# Patient Record
Sex: MALE | Race: OTHER | Employment: OTHER | URBAN - METROPOLITAN AREA
[De-identification: names, ages, dates, MRNs, and addresses within clinical notes are randomized per-mention and may not be internally consistent; named-entity substitution may affect disease eponyms.]

---

## 2017-01-13 PROBLEM — D64.9 ANEMIA: Status: ACTIVE | Noted: 2017-01-13

## 2017-01-31 ENCOUNTER — TELEPHONE (OUTPATIENT)
Dept: PHARMACY | Facility: CLINIC | Age: 57
End: 2017-01-31

## 2017-02-09 ENCOUNTER — OFFICE VISIT (OUTPATIENT)
Dept: ORTHOPEDIC SURGERY | Age: 57
End: 2017-02-09

## 2017-02-09 VITALS
HEART RATE: 78 BPM | SYSTOLIC BLOOD PRESSURE: 134 MMHG | DIASTOLIC BLOOD PRESSURE: 87 MMHG | BODY MASS INDEX: 41.31 KG/M2 | HEIGHT: 72 IN | WEIGHT: 305 LBS

## 2017-02-09 DIAGNOSIS — M17.0 PRIMARY OSTEOARTHRITIS OF BOTH KNEES: Primary | ICD-10-CM

## 2017-02-09 PROCEDURE — 99213 OFFICE O/P EST LOW 20 MIN: CPT | Performed by: ORTHOPAEDIC SURGERY

## 2017-02-09 PROCEDURE — 20610 DRAIN/INJ JOINT/BURSA W/O US: CPT | Performed by: ORTHOPAEDIC SURGERY

## 2017-02-16 ENCOUNTER — OFFICE VISIT (OUTPATIENT)
Dept: ORTHOPEDIC SURGERY | Age: 57
End: 2017-02-16

## 2017-02-16 DIAGNOSIS — M17.0 PRIMARY OSTEOARTHRITIS OF BOTH KNEES: Primary | ICD-10-CM

## 2017-02-16 PROCEDURE — 20610 DRAIN/INJ JOINT/BURSA W/O US: CPT | Performed by: ORTHOPAEDIC SURGERY

## 2017-02-23 ENCOUNTER — OFFICE VISIT (OUTPATIENT)
Dept: ORTHOPEDIC SURGERY | Age: 57
End: 2017-02-23

## 2017-02-23 DIAGNOSIS — M17.0 PRIMARY OSTEOARTHRITIS OF BOTH KNEES: Primary | ICD-10-CM

## 2017-02-23 PROCEDURE — 20610 DRAIN/INJ JOINT/BURSA W/O US: CPT | Performed by: ORTHOPAEDIC SURGERY

## 2017-03-02 ENCOUNTER — OFFICE VISIT (OUTPATIENT)
Dept: ORTHOPEDIC SURGERY | Age: 57
End: 2017-03-02

## 2017-03-02 VITALS
WEIGHT: 304.9 LBS | HEART RATE: 70 BPM | HEIGHT: 72 IN | BODY MASS INDEX: 41.3 KG/M2 | SYSTOLIC BLOOD PRESSURE: 134 MMHG | DIASTOLIC BLOOD PRESSURE: 70 MMHG

## 2017-03-02 DIAGNOSIS — M17.0 PRIMARY OSTEOARTHRITIS OF BOTH KNEES: Primary | ICD-10-CM

## 2017-03-02 PROCEDURE — 20610 DRAIN/INJ JOINT/BURSA W/O US: CPT | Performed by: ORTHOPAEDIC SURGERY

## 2017-03-09 ENCOUNTER — OFFICE VISIT (OUTPATIENT)
Dept: ORTHOPEDIC SURGERY | Age: 57
End: 2017-03-09

## 2017-03-09 VITALS
HEART RATE: 78 BPM | HEIGHT: 72 IN | BODY MASS INDEX: 41.3 KG/M2 | WEIGHT: 304.9 LBS | SYSTOLIC BLOOD PRESSURE: 130 MMHG | DIASTOLIC BLOOD PRESSURE: 86 MMHG

## 2017-03-09 DIAGNOSIS — M17.0 PRIMARY OSTEOARTHRITIS OF BOTH KNEES: Primary | ICD-10-CM

## 2017-03-09 PROCEDURE — 20610 DRAIN/INJ JOINT/BURSA W/O US: CPT | Performed by: ORTHOPAEDIC SURGERY

## 2017-03-16 ENCOUNTER — ANTI-COAG VISIT (OUTPATIENT)
Dept: PHARMACY | Facility: CLINIC | Age: 57
End: 2017-03-16

## 2017-03-16 LAB — INR BLD: 2.2

## 2017-04-01 ENCOUNTER — HOSPITAL ENCOUNTER (OUTPATIENT)
Dept: OTHER | Age: 57
Discharge: HOME | End: 2017-04-30
Attending: NURSE PRACTITIONER | Admitting: NURSE PRACTITIONER

## 2017-04-13 ENCOUNTER — ANTI-COAG VISIT (OUTPATIENT)
Dept: PHARMACY | Facility: CLINIC | Age: 57
End: 2017-04-13

## 2017-04-13 LAB — INR BLD: 2.6

## 2017-05-11 ENCOUNTER — ANTI-COAG VISIT (OUTPATIENT)
Dept: PHARMACY | Facility: CLINIC | Age: 57
End: 2017-05-11

## 2017-05-11 LAB — INR BLD: 2.2

## 2017-06-06 ENCOUNTER — HOSPITAL ENCOUNTER (OUTPATIENT)
Dept: WOUND CARE | Age: 57
Discharge: HOME | End: 2017-06-06
Attending: CLINICAL NURSE SPECIALIST | Admitting: CLINICAL NURSE SPECIALIST

## 2017-06-06 VITALS
SYSTOLIC BLOOD PRESSURE: 98 MMHG | RESPIRATION RATE: 20 BRPM | HEIGHT: 71 IN | WEIGHT: 296.2 LBS | HEART RATE: 50 BPM | DIASTOLIC BLOOD PRESSURE: 70 MMHG | TEMPERATURE: 97 F | BODY MASS INDEX: 41.47 KG/M2

## 2017-06-06 DIAGNOSIS — T63.301A SPIDER BITE WOUND, ACCIDENTAL OR UNINTENTIONAL, INITIAL ENCOUNTER: ICD-10-CM

## 2017-06-06 LAB
GLUCOSE BLD-MCNC: 89 MG/DL (ref 70–99)
PERFORMED ON: NORMAL

## 2017-06-06 PROCEDURE — 97597 DBRDMT OPN WND 1ST 20 CM/<: CPT | Performed by: CLINICAL NURSE SPECIALIST

## 2017-06-06 RX ORDER — DOXYCYCLINE HYCLATE 100 MG/1
100 CAPSULE ORAL 2 TIMES DAILY
COMMUNITY
End: 2017-06-30

## 2017-06-06 ASSESSMENT — PAIN DESCRIPTION - ONSET: ONSET: SUDDEN

## 2017-06-06 ASSESSMENT — PAIN DESCRIPTION - DESCRIPTORS: DESCRIPTORS: ACHING;BURNING;THROBBING

## 2017-06-06 ASSESSMENT — PAIN DESCRIPTION - LOCATION: LOCATION: LEG

## 2017-06-06 ASSESSMENT — PAIN DESCRIPTION - PROGRESSION: CLINICAL_PROGRESSION: NOT CHANGED

## 2017-06-06 ASSESSMENT — PAIN DESCRIPTION - FREQUENCY: FREQUENCY: INTERMITTENT

## 2017-06-06 ASSESSMENT — PAIN DESCRIPTION - PAIN TYPE: TYPE: ACUTE PAIN

## 2017-06-06 ASSESSMENT — PAIN SCALES - GENERAL: PAINLEVEL_OUTOF10: 6

## 2017-06-06 ASSESSMENT — PAIN DESCRIPTION - ORIENTATION: ORIENTATION: LEFT;LOWER

## 2017-06-08 PROBLEM — T63.301A SPIDER BITE WOUND: Status: ACTIVE | Noted: 2017-06-08

## 2017-06-13 ENCOUNTER — HOSPITAL ENCOUNTER (OUTPATIENT)
Dept: WOUND CARE | Age: 57
Discharge: HOME | End: 2017-06-13
Attending: CLINICAL NURSE SPECIALIST | Admitting: CLINICAL NURSE SPECIALIST

## 2017-06-13 VITALS
HEART RATE: 58 BPM | HEIGHT: 71 IN | WEIGHT: 296.4 LBS | DIASTOLIC BLOOD PRESSURE: 60 MMHG | TEMPERATURE: 97 F | SYSTOLIC BLOOD PRESSURE: 103 MMHG | BODY MASS INDEX: 41.49 KG/M2 | RESPIRATION RATE: 20 BRPM

## 2017-06-13 PROCEDURE — 11045 DBRDMT SUBQ TISS EACH ADDL: CPT | Performed by: CLINICAL NURSE SPECIALIST

## 2017-06-13 PROCEDURE — 11042 DBRDMT SUBQ TIS 1ST 20SQCM/<: CPT | Performed by: CLINICAL NURSE SPECIALIST

## 2017-06-13 RX ORDER — OXYCODONE HYDROCHLORIDE AND ACETAMINOPHEN 5; 325 MG/1; MG/1
1 TABLET ORAL EVERY 8 HOURS PRN
Status: ON HOLD | COMMUNITY
End: 2017-08-02

## 2017-06-13 ASSESSMENT — PAIN SCALES - GENERAL: PAINLEVEL_OUTOF10: 10

## 2017-06-13 ASSESSMENT — PAIN DESCRIPTION - PAIN TYPE: TYPE: ACUTE PAIN

## 2017-06-13 ASSESSMENT — PAIN DESCRIPTION - DESCRIPTORS: DESCRIPTORS: THROBBING;STABBING;CONSTANT

## 2017-06-13 ASSESSMENT — PAIN DESCRIPTION - LOCATION: LOCATION: LEG

## 2017-06-13 ASSESSMENT — PAIN DESCRIPTION - ONSET: ONSET: ON-GOING

## 2017-06-13 ASSESSMENT — PAIN DESCRIPTION - ORIENTATION: ORIENTATION: LEFT;POSTERIOR

## 2017-06-13 ASSESSMENT — PAIN DESCRIPTION - FREQUENCY: FREQUENCY: CONTINUOUS

## 2017-06-13 ASSESSMENT — PAIN DESCRIPTION - PROGRESSION: CLINICAL_PROGRESSION: NOT CHANGED

## 2017-06-15 ENCOUNTER — TELEPHONE (OUTPATIENT)
Dept: PHARMACY | Facility: CLINIC | Age: 57
End: 2017-06-15

## 2017-06-20 ENCOUNTER — ANTI-COAG VISIT (OUTPATIENT)
Dept: PHARMACY | Facility: CLINIC | Age: 57
End: 2017-06-20

## 2017-06-20 ENCOUNTER — HOSPITAL ENCOUNTER (OUTPATIENT)
Dept: WOUND CARE | Age: 57
Discharge: HOME | End: 2017-06-20
Attending: CLINICAL NURSE SPECIALIST | Admitting: CLINICAL NURSE SPECIALIST

## 2017-06-20 VITALS
SYSTOLIC BLOOD PRESSURE: 98 MMHG | BODY MASS INDEX: 40.74 KG/M2 | WEIGHT: 291 LBS | HEIGHT: 71 IN | DIASTOLIC BLOOD PRESSURE: 62 MMHG | HEART RATE: 99 BPM | RESPIRATION RATE: 20 BRPM | TEMPERATURE: 98.3 F

## 2017-06-20 DIAGNOSIS — T63.301D SPIDER BITE WOUND, ACCIDENTAL OR UNINTENTIONAL, SUBSEQUENT ENCOUNTER: ICD-10-CM

## 2017-06-20 LAB
GLUCOSE BLD-MCNC: 196 MG/DL (ref 70–99)
INR BLD: 3.9
PERFORMED ON: ABNORMAL

## 2017-06-20 PROCEDURE — 11045 DBRDMT SUBQ TISS EACH ADDL: CPT | Performed by: CLINICAL NURSE SPECIALIST

## 2017-06-20 PROCEDURE — 11042 DBRDMT SUBQ TIS 1ST 20SQCM/<: CPT | Performed by: CLINICAL NURSE SPECIALIST

## 2017-06-20 ASSESSMENT — PAIN DESCRIPTION - PAIN TYPE: TYPE: ACUTE PAIN

## 2017-06-20 ASSESSMENT — PAIN DESCRIPTION - DESCRIPTORS
DESCRIPTORS: ACHING;THROBBING
DESCRIPTORS: ACHING;BURNING;THROBBING

## 2017-06-20 ASSESSMENT — PAIN DESCRIPTION - ORIENTATION: ORIENTATION: LEFT;POSTERIOR

## 2017-06-20 ASSESSMENT — PAIN DESCRIPTION - PROGRESSION
CLINICAL_PROGRESSION: GRADUALLY WORSENING
CLINICAL_PROGRESSION: NOT CHANGED

## 2017-06-20 ASSESSMENT — PAIN SCALES - GENERAL: PAINLEVEL_OUTOF10: 8

## 2017-06-20 ASSESSMENT — PAIN DESCRIPTION - ONSET: ONSET: ON-GOING

## 2017-06-20 ASSESSMENT — PAIN DESCRIPTION - FREQUENCY
FREQUENCY: CONTINUOUS
FREQUENCY: CONTINUOUS

## 2017-06-20 ASSESSMENT — PAIN DESCRIPTION - LOCATION: LOCATION: LEG

## 2017-06-27 ENCOUNTER — HOSPITAL ENCOUNTER (OUTPATIENT)
Dept: WOUND CARE | Age: 57
Discharge: HOME | End: 2017-06-27
Attending: CLINICAL NURSE SPECIALIST | Admitting: CLINICAL NURSE SPECIALIST

## 2017-06-27 ENCOUNTER — ANTI-COAG VISIT (OUTPATIENT)
Dept: PHARMACY | Facility: CLINIC | Age: 57
End: 2017-06-27

## 2017-06-27 VITALS
TEMPERATURE: 98.2 F | WEIGHT: 291 LBS | BODY MASS INDEX: 40.74 KG/M2 | HEART RATE: 88 BPM | HEIGHT: 71 IN | DIASTOLIC BLOOD PRESSURE: 71 MMHG | SYSTOLIC BLOOD PRESSURE: 111 MMHG

## 2017-06-27 DIAGNOSIS — L97.222 ULCER OF LEFT CALF WITH FAT LAYER EXPOSED (HCC): ICD-10-CM

## 2017-06-27 DIAGNOSIS — I12.9: ICD-10-CM

## 2017-06-27 DIAGNOSIS — E55.9 VITAMIN D DEFICIENCY: ICD-10-CM

## 2017-06-27 DIAGNOSIS — E61.1 IRON DEFICIENCY: ICD-10-CM

## 2017-06-27 DIAGNOSIS — E83.39 HYPERPHOSPHATEMIA: ICD-10-CM

## 2017-06-27 DIAGNOSIS — E83.59 CALCIPHYLAXIS: ICD-10-CM

## 2017-06-27 DIAGNOSIS — N25.81 SECONDARY HYPERPARATHYROIDISM OF RENAL ORIGIN (HCC): ICD-10-CM

## 2017-06-27 LAB
ANION GAP SERPL CALCULATED.3IONS-SCNC: 18 MMOL/L (ref 3–16)
BUN BLDV-MCNC: 106 MG/DL (ref 7–20)
CALCIUM SERPL-MCNC: 8.8 MG/DL (ref 8.3–10.6)
CHLORIDE BLD-SCNC: 103 MMOL/L (ref 99–110)
CO2: 16 MMOL/L (ref 21–32)
CREAT SERPL-MCNC: 3.8 MG/DL (ref 0.9–1.3)
GFR AFRICAN AMERICAN: 20
GFR NON-AFRICAN AMERICAN: 17
GLUCOSE BLD-MCNC: 155 MG/DL (ref 70–99)
INR BLD: 3
PARATHYROID HORMONE INTACT: 155.8 PG/ML (ref 14–72)
PHOSPHORUS: 5.9 MG/DL (ref 2.5–4.9)
POTASSIUM SERPL-SCNC: 5 MMOL/L (ref 3.5–5.1)
SODIUM BLD-SCNC: 137 MMOL/L (ref 136–145)
VITAMIN D 25-HYDROXY: 25.8 NG/ML

## 2017-06-27 PROCEDURE — 97598 DBRDMT OPN WND ADDL 20CM/<: CPT | Performed by: CLINICAL NURSE SPECIALIST

## 2017-06-27 PROCEDURE — 99244 OFF/OP CNSLTJ NEW/EST MOD 40: CPT | Performed by: INTERNAL MEDICINE

## 2017-06-27 PROCEDURE — 97597 DBRDMT OPN WND 1ST 20 CM/<: CPT | Performed by: CLINICAL NURSE SPECIALIST

## 2017-06-27 ASSESSMENT — PAIN DESCRIPTION - ORIENTATION: ORIENTATION: LEFT;LOWER

## 2017-06-27 ASSESSMENT — PAIN DESCRIPTION - PROGRESSION: CLINICAL_PROGRESSION: NOT CHANGED

## 2017-06-27 ASSESSMENT — PAIN DESCRIPTION - ONSET: ONSET: ON-GOING

## 2017-06-27 ASSESSMENT — PAIN DESCRIPTION - PAIN TYPE: TYPE: ACUTE PAIN

## 2017-06-27 ASSESSMENT — PAIN DESCRIPTION - FREQUENCY: FREQUENCY: CONTINUOUS

## 2017-06-27 ASSESSMENT — PAIN DESCRIPTION - LOCATION: LOCATION: LEG

## 2017-06-27 ASSESSMENT — PAIN SCALES - GENERAL: PAINLEVEL_OUTOF10: 5

## 2017-06-27 ASSESSMENT — PAIN DESCRIPTION - DESCRIPTORS: DESCRIPTORS: ACHING;BURNING

## 2017-06-28 LAB
ESTIMATED AVERAGE GLUCOSE: 139.9 MG/DL
HBA1C MFR BLD: 6.5 %

## 2017-06-29 PROBLEM — E83.59 CALCIPHYLAXIS: Status: ACTIVE | Noted: 2017-06-08

## 2017-06-30 PROBLEM — E83.39 HYPERPHOSPHATEMIA: Status: ACTIVE | Noted: 2017-06-30

## 2017-06-30 PROBLEM — E61.1 IRON DEFICIENCY: Status: ACTIVE | Noted: 2017-06-30

## 2017-06-30 PROBLEM — F17.200 TOBACCO USE DISORDER: Status: ACTIVE | Noted: 2017-06-30

## 2017-06-30 PROBLEM — L97.222 ULCER OF LEFT CALF WITH FAT LAYER EXPOSED (HCC): Status: ACTIVE | Noted: 2017-06-30

## 2017-06-30 PROBLEM — E55.9 VITAMIN D DEFICIENCY: Status: ACTIVE | Noted: 2017-06-30

## 2017-06-30 PROBLEM — F32.89 OTHER DEPRESSIVE DISORDER: Status: ACTIVE | Noted: 2017-06-30

## 2017-06-30 PROBLEM — L02.92 FURUNCULOSIS: Status: ACTIVE | Noted: 2017-06-30

## 2017-06-30 PROBLEM — E66.9 ADIPOSITY: Status: ACTIVE | Noted: 2017-01-25

## 2017-06-30 PROBLEM — I12.9 RENAL ARTERIOSCLEROSIS: Status: ACTIVE | Noted: 2017-06-30

## 2017-06-30 PROBLEM — I25.10 CORONARY ARTERY DISEASE INVOLVING NATIVE CORONARY ARTERY: Status: ACTIVE | Noted: 2017-06-30

## 2017-06-30 PROBLEM — N25.81 SECONDARY HYPERPARATHYROIDISM OF RENAL ORIGIN (HCC): Status: ACTIVE | Noted: 2017-06-30

## 2017-06-30 PROBLEM — E66.01 MORBID OBESITY DUE TO EXCESS CALORIES (HCC): Status: ACTIVE | Noted: 2017-01-25

## 2017-06-30 PROBLEM — F32.A DEPRESSION: Status: ACTIVE | Noted: 2017-06-30

## 2017-06-30 PROBLEM — L02.93 CARBUNCLE: Status: ACTIVE | Noted: 2017-06-30

## 2017-07-01 RX ORDER — DOXYCYCLINE HYCLATE 100 MG
TABLET ORAL
Refills: 1 | COMMUNITY
Start: 2017-05-10 | End: 2017-07-12

## 2017-07-01 RX ORDER — PIOGLITAZONEHYDROCHLORIDE 30 MG/1
TABLET ORAL
Status: ON HOLD | COMMUNITY
Start: 2017-06-18 | End: 2017-09-26 | Stop reason: ALTCHOICE

## 2017-07-05 ENCOUNTER — HOSPITAL ENCOUNTER (OUTPATIENT)
Dept: WOUND CARE | Age: 57
Discharge: HOME | End: 2017-07-05
Attending: INTERNAL MEDICINE | Admitting: INTERNAL MEDICINE

## 2017-07-05 VITALS
HEART RATE: 98 BPM | RESPIRATION RATE: 18 BRPM | WEIGHT: 284 LBS | HEIGHT: 71 IN | TEMPERATURE: 98.3 F | SYSTOLIC BLOOD PRESSURE: 98 MMHG | BODY MASS INDEX: 39.76 KG/M2 | DIASTOLIC BLOOD PRESSURE: 67 MMHG

## 2017-07-05 PROCEDURE — 99214 OFFICE O/P EST MOD 30 MIN: CPT | Performed by: INTERNAL MEDICINE

## 2017-07-05 RX ORDER — LIDOCAINE AND PRILOCAINE 25; 25 MG/G; MG/G
CREAM TOPICAL
Qty: 60 G | Refills: 2 | Status: ON HOLD | OUTPATIENT
Start: 2017-07-05 | End: 2017-09-26 | Stop reason: ALTCHOICE

## 2017-07-05 ASSESSMENT — PAIN DESCRIPTION - DESCRIPTORS: DESCRIPTORS: CONSTANT;STABBING

## 2017-07-05 ASSESSMENT — PAIN DESCRIPTION - LOCATION: LOCATION: LEG

## 2017-07-05 ASSESSMENT — PAIN DESCRIPTION - PAIN TYPE: TYPE: ACUTE PAIN

## 2017-07-05 ASSESSMENT — PAIN SCALES - GENERAL: PAINLEVEL_OUTOF10: 8

## 2017-07-05 ASSESSMENT — PAIN DESCRIPTION - ONSET: ONSET: ON-GOING

## 2017-07-05 ASSESSMENT — PAIN DESCRIPTION - ORIENTATION: ORIENTATION: LEFT;LOWER;OUTER

## 2017-07-05 ASSESSMENT — PAIN DESCRIPTION - PROGRESSION: CLINICAL_PROGRESSION: NOT CHANGED

## 2017-07-05 ASSESSMENT — PAIN DESCRIPTION - FREQUENCY: FREQUENCY: CONTINUOUS

## 2017-07-06 ENCOUNTER — TELEPHONE (OUTPATIENT)
Dept: PHARMACY | Facility: CLINIC | Age: 57
End: 2017-07-06

## 2017-07-07 PROBLEM — E83.59 OTHER DISORDER OF CALCIUM METABOLISM: Status: ACTIVE | Noted: 2017-07-07

## 2017-07-07 RX ORDER — SODIUM CHLORIDE 9 MG/ML
INJECTION, SOLUTION INTRAVENOUS ONCE
Status: CANCELLED
Start: 2017-07-11 | End: 2017-07-11

## 2017-07-07 RX ORDER — SODIUM CHLORIDE 0.9 % (FLUSH) 0.9 %
10 SYRINGE (ML) INJECTION ONCE
Status: CANCELLED
Start: 2017-07-11 | End: 2017-07-11

## 2017-07-11 ENCOUNTER — HOSPITAL ENCOUNTER (OUTPATIENT)
Dept: OTHER | Age: 57
Discharge: HOME | End: 2017-07-31
Attending: INTERNAL MEDICINE | Admitting: INTERNAL MEDICINE

## 2017-07-11 ENCOUNTER — HOSPITAL ENCOUNTER (OUTPATIENT)
Dept: WOUND CARE | Age: 57
Discharge: HOME | End: 2017-07-11
Attending: CLINICAL NURSE SPECIALIST | Admitting: CLINICAL NURSE SPECIALIST

## 2017-07-11 DIAGNOSIS — E83.59 CALCIPHYLAXIS: ICD-10-CM

## 2017-07-11 DIAGNOSIS — E83.59 OTHER DISORDER OF CALCIUM METABOLISM: ICD-10-CM

## 2017-07-12 ENCOUNTER — HOSPITAL ENCOUNTER (OUTPATIENT)
Dept: WOUND CARE | Age: 57
Discharge: HOME | End: 2017-07-12
Attending: INTERNAL MEDICINE | Admitting: INTERNAL MEDICINE

## 2017-07-12 ENCOUNTER — HOSPITAL ENCOUNTER (OUTPATIENT)
Dept: OTHER | Age: 57
Discharge: HOME | End: 2017-07-12
Admitting: INTERNAL MEDICINE

## 2017-07-12 VITALS
BODY MASS INDEX: 39.76 KG/M2 | HEIGHT: 71 IN | HEART RATE: 98 BPM | WEIGHT: 284 LBS | DIASTOLIC BLOOD PRESSURE: 67 MMHG | RESPIRATION RATE: 20 BRPM | SYSTOLIC BLOOD PRESSURE: 93 MMHG

## 2017-07-12 VITALS
HEART RATE: 82 BPM | DIASTOLIC BLOOD PRESSURE: 76 MMHG | HEIGHT: 71 IN | TEMPERATURE: 97 F | RESPIRATION RATE: 18 BRPM | WEIGHT: 284.6 LBS | SYSTOLIC BLOOD PRESSURE: 108 MMHG | BODY MASS INDEX: 39.84 KG/M2

## 2017-07-12 DIAGNOSIS — E83.59 OTHER DISORDER OF CALCIUM METABOLISM: ICD-10-CM

## 2017-07-12 DIAGNOSIS — E83.59 CALCIPHYLAXIS: ICD-10-CM

## 2017-07-12 LAB
ANION GAP SERPL CALCULATED.3IONS-SCNC: 16 MMOL/L (ref 3–16)
BUN BLDV-MCNC: 118 MG/DL (ref 7–20)
CALCIUM SERPL-MCNC: 9.3 MG/DL (ref 8.3–10.6)
CHLORIDE BLD-SCNC: 100 MMOL/L (ref 99–110)
CO2: 19 MMOL/L (ref 21–32)
CREAT SERPL-MCNC: 4.4 MG/DL (ref 0.9–1.3)
GFR AFRICAN AMERICAN: 17
GFR NON-AFRICAN AMERICAN: 14
GLUCOSE BLD-MCNC: 133 MG/DL (ref 70–99)
POTASSIUM SERPL-SCNC: 4.8 MMOL/L (ref 3.5–5.1)
SODIUM BLD-SCNC: 135 MMOL/L (ref 136–145)

## 2017-07-12 PROCEDURE — 15271 SKIN SUB GRAFT TRNK/ARM/LEG: CPT | Performed by: INTERNAL MEDICINE

## 2017-07-12 RX ORDER — DOXYCYCLINE HYCLATE 100 MG
100 TABLET ORAL 2 TIMES DAILY
Qty: 60 TABLET | Refills: 3 | Status: ON HOLD | OUTPATIENT
Start: 2017-07-12 | End: 2017-08-02 | Stop reason: HOSPADM

## 2017-07-12 RX ORDER — SODIUM CHLORIDE 9 MG/ML
INJECTION, SOLUTION INTRAVENOUS ONCE
Status: COMPLETED | OUTPATIENT
Start: 2017-07-12 | End: 2017-07-12

## 2017-07-12 RX ORDER — SODIUM CHLORIDE 0.9 % (FLUSH) 0.9 %
10 SYRINGE (ML) INJECTION ONCE
Status: COMPLETED | OUTPATIENT
Start: 2017-07-12 | End: 2017-07-12

## 2017-07-12 RX ORDER — SODIUM CHLORIDE 0.9 % (FLUSH) 0.9 %
10 SYRINGE (ML) INJECTION ONCE
Status: CANCELLED
Start: 2017-07-12 | End: 2017-07-12

## 2017-07-12 RX ORDER — SODIUM CHLORIDE 9 MG/ML
INJECTION, SOLUTION INTRAVENOUS ONCE
Status: CANCELLED
Start: 2017-07-12 | End: 2017-07-12

## 2017-07-12 RX ADMIN — SODIUM CHLORIDE: 9 INJECTION, SOLUTION INTRAVENOUS at 09:39

## 2017-07-12 RX ADMIN — Medication 10 ML: at 09:39

## 2017-07-12 ASSESSMENT — PAIN DESCRIPTION - ORIENTATION
ORIENTATION: RIGHT
ORIENTATION: RIGHT

## 2017-07-12 ASSESSMENT — PAIN DESCRIPTION - DESCRIPTORS
DESCRIPTORS: SHARP;SHOOTING
DESCRIPTORS: OTHER (COMMENT)

## 2017-07-12 ASSESSMENT — PAIN DESCRIPTION - LOCATION
LOCATION: LEG
LOCATION: LEG

## 2017-07-12 ASSESSMENT — PAIN DESCRIPTION - FREQUENCY
FREQUENCY: INTERMITTENT
FREQUENCY: INTERMITTENT

## 2017-07-12 ASSESSMENT — PAIN DESCRIPTION - PROGRESSION
CLINICAL_PROGRESSION: NOT CHANGED
CLINICAL_PROGRESSION: NOT CHANGED

## 2017-07-12 ASSESSMENT — PAIN DESCRIPTION - PAIN TYPE
TYPE: CHRONIC PAIN
TYPE: ACUTE PAIN

## 2017-07-12 ASSESSMENT — PAIN SCALES - GENERAL
PAINLEVEL_OUTOF10: 2
PAINLEVEL_OUTOF10: 4

## 2017-07-12 ASSESSMENT — PAIN DESCRIPTION - ONSET: ONSET: ON-GOING

## 2017-07-14 ENCOUNTER — HOSPITAL ENCOUNTER (OUTPATIENT)
Dept: WOUND CARE | Age: 57
Discharge: HOME | End: 2017-07-14
Attending: CLINICAL NURSE SPECIALIST | Admitting: INTERNAL MEDICINE

## 2017-07-14 ENCOUNTER — HOSPITAL ENCOUNTER (OUTPATIENT)
Dept: OTHER | Age: 57
Discharge: HOME | End: 2017-07-14
Admitting: INTERNAL MEDICINE

## 2017-07-14 VITALS
BODY MASS INDEX: 39.76 KG/M2 | HEIGHT: 71 IN | WEIGHT: 284 LBS | RESPIRATION RATE: 18 BRPM | HEART RATE: 85 BPM | TEMPERATURE: 97.5 F | DIASTOLIC BLOOD PRESSURE: 60 MMHG | SYSTOLIC BLOOD PRESSURE: 98 MMHG

## 2017-07-14 VITALS
DIASTOLIC BLOOD PRESSURE: 69 MMHG | TEMPERATURE: 98.3 F | WEIGHT: 284 LBS | BODY MASS INDEX: 39.76 KG/M2 | HEART RATE: 77 BPM | HEIGHT: 71 IN | RESPIRATION RATE: 18 BRPM | SYSTOLIC BLOOD PRESSURE: 130 MMHG

## 2017-07-14 DIAGNOSIS — E83.59 OTHER DISORDER OF CALCIUM METABOLISM: ICD-10-CM

## 2017-07-14 DIAGNOSIS — E83.59 CALCIPHYLAXIS: ICD-10-CM

## 2017-07-14 RX ORDER — SODIUM CHLORIDE 0.9 % (FLUSH) 0.9 %
10 SYRINGE (ML) INJECTION ONCE
Status: CANCELLED
Start: 2017-07-14 | End: 2017-07-14

## 2017-07-14 RX ORDER — SODIUM CHLORIDE 9 MG/ML
INJECTION, SOLUTION INTRAVENOUS ONCE
Status: CANCELLED
Start: 2017-07-14 | End: 2017-07-14

## 2017-07-14 RX ORDER — SODIUM CHLORIDE 0.9 % (FLUSH) 0.9 %
10 SYRINGE (ML) INJECTION ONCE
Status: COMPLETED | OUTPATIENT
Start: 2017-07-14 | End: 2017-07-14

## 2017-07-14 RX ORDER — SODIUM CHLORIDE 9 MG/ML
INJECTION, SOLUTION INTRAVENOUS ONCE
Status: COMPLETED | OUTPATIENT
Start: 2017-07-14 | End: 2017-07-14

## 2017-07-14 RX ADMIN — Medication 10 ML: at 09:25

## 2017-07-14 RX ADMIN — SODIUM CHLORIDE: 9 INJECTION, SOLUTION INTRAVENOUS at 09:25

## 2017-07-14 ASSESSMENT — PAIN DESCRIPTION - ONSET: ONSET: ON-GOING

## 2017-07-14 ASSESSMENT — PAIN DESCRIPTION - FREQUENCY: FREQUENCY: INTERMITTENT

## 2017-07-14 ASSESSMENT — PAIN DESCRIPTION - DESCRIPTORS: DESCRIPTORS: TINGLING

## 2017-07-14 ASSESSMENT — PAIN DESCRIPTION - ORIENTATION: ORIENTATION: LEFT

## 2017-07-14 ASSESSMENT — PAIN SCALES - GENERAL: PAINLEVEL_OUTOF10: 5

## 2017-07-14 ASSESSMENT — PAIN DESCRIPTION - PAIN TYPE: TYPE: CHRONIC PAIN

## 2017-07-14 ASSESSMENT — PAIN DESCRIPTION - PROGRESSION: CLINICAL_PROGRESSION: NOT CHANGED

## 2017-07-14 ASSESSMENT — PAIN DESCRIPTION - LOCATION: LOCATION: LEG

## 2017-07-18 ENCOUNTER — HOSPITAL ENCOUNTER (OUTPATIENT)
Dept: WOUND CARE | Age: 57
Discharge: HOME | End: 2017-07-18
Attending: CLINICAL NURSE SPECIALIST | Admitting: CLINICAL NURSE SPECIALIST

## 2017-07-18 DIAGNOSIS — E83.59 CALCIPHYLAXIS: ICD-10-CM

## 2017-07-25 ENCOUNTER — HOSPITAL ENCOUNTER (OUTPATIENT)
Dept: WOUND CARE | Age: 57
Discharge: HOME | End: 2017-07-25
Attending: CLINICAL NURSE SPECIALIST | Admitting: CLINICAL NURSE SPECIALIST

## 2017-07-25 ENCOUNTER — ANTI-COAG VISIT (OUTPATIENT)
Dept: PHARMACY | Facility: CLINIC | Age: 57
End: 2017-07-25

## 2017-07-25 DIAGNOSIS — E83.59 CALCIPHYLAXIS: ICD-10-CM

## 2017-07-25 PROBLEM — N17.9 ACUTE RENAL FAILURE (ARF) (HCC): Status: ACTIVE | Noted: 2017-07-25

## 2017-07-25 PROBLEM — K92.2 GI BLEED: Status: ACTIVE | Noted: 2017-07-25

## 2017-07-25 PROBLEM — E87.20 METABOLIC ACIDOSIS: Status: ACTIVE | Noted: 2017-07-25

## 2017-07-25 PROBLEM — E87.5 HYPERKALEMIA: Status: ACTIVE | Noted: 2017-07-25

## 2017-07-25 PROBLEM — N18.30 CKD (CHRONIC KIDNEY DISEASE), STAGE III (HCC): Status: ACTIVE | Noted: 2017-07-25

## 2017-08-15 ENCOUNTER — OFFICE VISIT (OUTPATIENT)
Dept: CARDIOLOGY CLINIC | Age: 57
End: 2017-08-15

## 2017-08-15 VITALS
SYSTOLIC BLOOD PRESSURE: 110 MMHG | HEIGHT: 71 IN | WEIGHT: 294 LBS | HEART RATE: 94 BPM | BODY MASS INDEX: 41.16 KG/M2 | OXYGEN SATURATION: 97 % | DIASTOLIC BLOOD PRESSURE: 80 MMHG

## 2017-08-15 DIAGNOSIS — I48.20 CHRONIC ATRIAL FIBRILLATION (HCC): ICD-10-CM

## 2017-08-15 DIAGNOSIS — I25.10 CORONARY ARTERY DISEASE INVOLVING NATIVE CORONARY ARTERY OF NATIVE HEART WITHOUT ANGINA PECTORIS: Primary | ICD-10-CM

## 2017-08-15 DIAGNOSIS — E78.2 MIXED HYPERLIPIDEMIA: ICD-10-CM

## 2017-08-15 PROCEDURE — 99214 OFFICE O/P EST MOD 30 MIN: CPT | Performed by: INTERNAL MEDICINE

## 2017-08-15 RX ORDER — FUROSEMIDE 20 MG/1
20 TABLET ORAL DAILY
Status: ON HOLD | COMMUNITY
End: 2017-09-27 | Stop reason: ALTCHOICE

## 2017-08-15 RX ORDER — OXYCODONE HYDROCHLORIDE AND ACETAMINOPHEN 5; 325 MG/1; MG/1
1 TABLET ORAL EVERY 4 HOURS PRN
COMMUNITY
End: 2018-01-09 | Stop reason: ALTCHOICE

## 2017-09-07 ENCOUNTER — HOSPITAL ENCOUNTER (OUTPATIENT)
Dept: WOUND CARE | Age: 57
Discharge: HOME | End: 2017-09-07
Attending: SURGERY | Admitting: SURGERY

## 2017-09-27 PROBLEM — E87.1 HYPONATREMIA: Status: ACTIVE | Noted: 2017-09-27

## 2017-09-27 PROBLEM — Z72.0 TOBACCO ABUSE: Status: ACTIVE | Noted: 2017-09-27

## 2017-09-27 PROBLEM — N17.9 AKI (ACUTE KIDNEY INJURY) (HCC): Status: ACTIVE | Noted: 2017-09-27

## 2017-10-09 ENCOUNTER — HOSPITAL ENCOUNTER (OUTPATIENT)
Dept: ONCOLOGY | Age: 57
Discharge: HOME | End: 2017-10-31
Attending: NURSE PRACTITIONER | Admitting: NURSE PRACTITIONER

## 2017-10-09 VITALS
DIASTOLIC BLOOD PRESSURE: 67 MMHG | SYSTOLIC BLOOD PRESSURE: 102 MMHG | WEIGHT: 280 LBS | HEIGHT: 72 IN | RESPIRATION RATE: 16 BRPM | TEMPERATURE: 98.4 F | BODY MASS INDEX: 37.93 KG/M2 | HEART RATE: 70 BPM

## 2017-10-09 ASSESSMENT — PAIN - FUNCTIONAL ASSESSMENT: PAIN_FUNCTIONAL_ASSESSMENT: 0-10

## 2017-10-11 ENCOUNTER — HOSPITAL ENCOUNTER (OUTPATIENT)
Dept: ONCOLOGY | Age: 57
Discharge: HOME | End: 2017-10-11
Admitting: NURSE PRACTITIONER

## 2017-10-11 VITALS
BODY MASS INDEX: 37.93 KG/M2 | HEART RATE: 53 BPM | WEIGHT: 280 LBS | TEMPERATURE: 98.3 F | DIASTOLIC BLOOD PRESSURE: 76 MMHG | RESPIRATION RATE: 16 BRPM | SYSTOLIC BLOOD PRESSURE: 118 MMHG | HEIGHT: 72 IN

## 2017-10-11 ASSESSMENT — PAIN - FUNCTIONAL ASSESSMENT: PAIN_FUNCTIONAL_ASSESSMENT: 0-10

## 2017-10-13 ENCOUNTER — HOSPITAL ENCOUNTER (OUTPATIENT)
Dept: ONCOLOGY | Age: 57
Discharge: HOME | End: 2017-10-13
Admitting: NURSE PRACTITIONER

## 2017-10-13 VITALS
BODY MASS INDEX: 37.93 KG/M2 | HEIGHT: 72 IN | TEMPERATURE: 97.7 F | RESPIRATION RATE: 14 BRPM | DIASTOLIC BLOOD PRESSURE: 75 MMHG | WEIGHT: 280 LBS | SYSTOLIC BLOOD PRESSURE: 114 MMHG | HEART RATE: 69 BPM

## 2017-10-13 ASSESSMENT — PAIN SCALES - GENERAL: PAINLEVEL_OUTOF10: 0

## 2017-10-16 ENCOUNTER — HOSPITAL ENCOUNTER (OUTPATIENT)
Dept: ONCOLOGY | Age: 57
Discharge: HOME | End: 2017-10-16
Admitting: NURSE PRACTITIONER

## 2017-10-16 VITALS
HEART RATE: 61 BPM | DIASTOLIC BLOOD PRESSURE: 75 MMHG | BODY MASS INDEX: 37.93 KG/M2 | SYSTOLIC BLOOD PRESSURE: 117 MMHG | RESPIRATION RATE: 16 BRPM | TEMPERATURE: 99 F | HEIGHT: 72 IN | WEIGHT: 280 LBS

## 2017-10-16 ASSESSMENT — PAIN - FUNCTIONAL ASSESSMENT: PAIN_FUNCTIONAL_ASSESSMENT: 0-10

## 2017-10-17 ENCOUNTER — HOSPITAL ENCOUNTER (OUTPATIENT)
Dept: WOUND CARE | Age: 57
Discharge: HOME | End: 2017-10-17
Attending: CLINICAL NURSE SPECIALIST | Admitting: CLINICAL NURSE SPECIALIST

## 2017-10-17 VITALS
HEIGHT: 72 IN | BODY MASS INDEX: 37.82 KG/M2 | TEMPERATURE: 97.7 F | RESPIRATION RATE: 20 BRPM | WEIGHT: 279.2 LBS | DIASTOLIC BLOOD PRESSURE: 49 MMHG | HEART RATE: 80 BPM | SYSTOLIC BLOOD PRESSURE: 85 MMHG

## 2017-10-17 LAB
GLUCOSE BLD-MCNC: 168 MG/DL (ref 70–99)
PERFORMED ON: ABNORMAL

## 2017-10-17 PROCEDURE — 97597 DBRDMT OPN WND 1ST 20 CM/<: CPT | Performed by: CLINICAL NURSE SPECIALIST

## 2017-10-17 PROCEDURE — 97598 DBRDMT OPN WND ADDL 20CM/<: CPT | Performed by: CLINICAL NURSE SPECIALIST

## 2017-10-17 ASSESSMENT — PAIN SCALES - GENERAL: PAINLEVEL_OUTOF10: 10

## 2017-10-17 ASSESSMENT — PAIN DESCRIPTION - LOCATION: LOCATION: LEG

## 2017-10-17 ASSESSMENT — PAIN DESCRIPTION - PAIN TYPE: TYPE: CHRONIC PAIN

## 2017-10-17 ASSESSMENT — PAIN DESCRIPTION - DESCRIPTORS: DESCRIPTORS: ACHING;CONSTANT

## 2017-10-17 ASSESSMENT — PAIN DESCRIPTION - ORIENTATION: ORIENTATION: LEFT

## 2017-10-17 ASSESSMENT — PAIN DESCRIPTION - PROGRESSION: CLINICAL_PROGRESSION: NOT CHANGED

## 2017-10-17 ASSESSMENT — PAIN DESCRIPTION - FREQUENCY: FREQUENCY: CONTINUOUS

## 2017-10-19 NOTE — PROGRESS NOTES
[facility] Progress Note    Doni Peters     : 1960    DATE OF VISIT:  10/17/2017    Subjective:     Doni Peters is a 65 y.o. 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 Significant symptoms or pertinent wound history since last visit: Patient was seen in the wound clinic in the past. Now receiving Sodium Theosulfate infusions per Dr. Alvarado Mckeon, Nephrologist. Follow up appointment with Dr. Alvarado Mckeon next week. Recent labs: BUN 43, Creat 3.7, GFR 17, CRP 47, Sed rate 64, A1C 8.6, Albumin 3.7. Recent PRBC for H/H 6.. Patient now residing at [place], [place]. Current treatment with 1/4 strength Dakins soak and Santyl. Patient smokes, but minimally. B/P low in clinic today. Asymptomatic. Suggest taking B/P prior to administering medication. Sits in chair all day, with legs dependent. Encouraged to elevate legs 2-3 times per day for 30-40 minutes. Add surepress wraps daily. Appetite good. BS today 168. No fever or chills. Additional ulcer(s) noted? no.     His current medication list consists of Sodium Thiosulfate, apixaban, aspirin, carvedilol, collagenase, cyclobenzaprine, diltiazem, gabapentin, glimepiride, hydrALAZINE, insulin aspart, isosorbide mononitrate, oxyCODONE-acetaminophen, pantoprazole, pravastatin, sodium hypochlorite, and spironolactone. Allergies: Codeine;  Onion; and Penicillins    Objective:     Vitals:    10/17/17 1039 10/17/17 1047   BP: (!) 91/53  Comment: asymptomatic, states had BP meds this am at [place] (!) 85/49   Pulse: 80    Resp: 20    Temp: 97.7 °F (36.5 °C)    TempSrc: Oral    Weight: 279 lb 3.2 oz (126.6 kg)    Height: 6' (1.829 m)      General Appearance: alert and oriented to person, place and time, obese, in no acute distress  Psychiatric:  Mood and affect appropriate for situation  Skin: warm and dry, no rash  Head: normocephalic and atraumatic  Eyes: pupils equal, round, sclerae anicteric, conjunctivae normal  ENT: no thrush or oral ulcers  Neck:No complaints, normal appearance  Pulmonary/Chest: Respirations easy at rest, no cough or respiratory distress  Cardiovascular: No chest pain, normal rate, toes warm, cap refill normal, left PT and DP pulse audible with doppler  Abdomen: No nausea or vomiting  Extremities: no cyanosis or cellulitis. Bilateral leg edema  Musculoskeletal: Wheel chair for distance, moves all extremities, no deformities  Neurologic: distal sensation to light touch intact, no allodynia. Heean-ulcer skin: Intact and Induration. Dry peeling skin  Ulcer(s): Ulcer with pink and red granulation, slough, eschar and fibrin. Wound bed moist, edges open and attached. Surrounding tissue and ulcer without signs and symptoms of infection. No purulence, malodor, erythema or increased temperature. Pain, 10/10. Chronic, aching and constant pain. Pre-debridement wound measurements are in the wound documentation flowsheet. Photos also saved in electronic chart.     Assessment:     Patient Active Problem List   Diagnosis Code    Type 2 diabetes mellitus with stage 4 chronic kidney disease, without long-term current use of insulin ([facility]) E11.22, N18.4    Cigarette smoker F17.210    Chronic atrial fibrillation ([facility]) I48.2    Essential (primary) hypertension I10    Normocytic anemia D64.9    Primary osteoarthritis of both knees M17.0    Calciphylaxis E83.59    Depression F32.9    Hyperlipidemia E78.5    Morbid obesity due to excess calories ([facility]) E66.01    Furunculosis (recurrent, on chronic suppressive Abx) L02.92    Tobacco use disorder F17.200    Ulcer of left calf with fat layer exposed ([facility] [address] L97.222    Hyperphosphatemia E83.39    Vitamin D deficiency E55.9    Secondary hyperparathyroidism of renal origin ([facility] [address] N25.81    Iron deficiency E61.1    White matter changes, severe by MRI brain [id number]    Coronary artery disease involving native coronary artery I25.10    Renal arteriosclerosis I12.9    Other disorder of calcium metabolism E83.59    CKD (chronic kidney disease), stage III N18.3    Acute renal failure (ARF) ([facility]) N17.9    Gastrointestinal hemorrhage A84.1    Metabolic acidosis H92.9    Hyperkalemia E87.5    Leg wound, left S81.802A    Deep vein thrombosis (DVT) ([facility]) I82.409    Tobacco abuse Z72.0    Hyponatremia E87.1    ANA (acute kidney injury) ([facility] [address] N17.9    Wound infection T14. 8XXA, L08.9    Acute on chronic renal failure (HCC) N17.9, N18.9       Assessment of today's active condition(s): Calciphylaxis requiring debridement, elevation and compression. Factors contributing to occurrence and/or persistence of the chronic ulcer include edema, venous stasis, diabetes, poor glucose control, decreased mobility, obesity and CRF. Medical necessity of today's visit is shown by the above documentation. Sharp debridement is indicated today, based upon the exam findings in the wound(s) above. Procedure note:     Consent obtained. Time out performed per [facility] policy. Anesthetic  Anesthetic: 4% Topical Xylocaine     Using a curette, #15 blade scalpel, scissors and forceps, I sharply debrided the left lower leg ulcer(s) down through and including the removal of epidermis and dermis. The type(s) of tissue debrided included fibrin, biofilm, slough, necrotic/eschar and exudate. Along with sharp debridement, I also debrided using Debrisoft with good results. Total Surface Area Debrided: estimate 60 sq cm.     Post  Debridement Measurements:  Wound 10/17/17 #2, Left lower leg, circumferential cluster,calciphylaxis, full thickness, onset 7/1/17, gradually appeared-Wound Length (cm): 12 cm    Wound 10/17/17 #2, Left lower leg, circumferential cluster,calciphylaxis, full thickness, onset 7/1/17, gradually appeared-Wound Width (cm): 20.5 cm    Wound 10/17/17 #2, Left lower leg, circumferential cluster,calciphylaxis, full thickness, onset 7/1/17, gradually appeared-Wound Depth (cm) : 0.4    The ulcers were then irrigated with normal saline solution. The procedure was completed with a small amount of bleeding, and hemostasis was by pressure. The patient tolerated the procedure well, with no significant complications. The patient's level of pain during and after the procedure was monitored, and is noted in the wound documentation flowsheet. Discharge plan:     Continue Sodium Theosulfate infusions per Dr. Sonja Winter, Nephrologist  Follow up appointment with Dr. Sonja Winter next week  Leg elevations 2-3 times per day for 30-40 minutes  Continue to cut down or stop smoking    Treatment in the wound care center today: Wound 10/17/17 #2, Left lower leg, circumferential cluster,calciphylaxis, full thickness, onset 7/1/17, gradually appeared-Dressing/Treatment: Other (Comment) (ZnO asha,hydrogel,4x4,abd,kerlix,surepress). Home treatment: good handwashing before and after any dressing changes. Cleanse wound with saline or soap & water before dressing change. May use Vaseline (petrolatum), Aquaphor, Aveeno, CeraVe, Cetaphil, Eucerin, Lubriderm, etc for dry skin. Dressing type for home: Other: 1/4 strength Dakins 10 minute soaks prior to dressing changes. Sensicare to asha wound, Santyl to wound, moistened gauze and dry dressing daily. Surepress compression wrap daily. On AM off PM. Written discharge instructions given to patient. Follow up in 1 week.     Electronically signed by GIANLUCA Alcantara on 10/19/2017 at 5:23 PM.

## 2017-10-23 ENCOUNTER — HOSPITAL ENCOUNTER (OUTPATIENT)
Dept: ONCOLOGY | Age: 57
Discharge: HOME | End: 2017-10-23
Admitting: NURSE PRACTITIONER

## 2017-10-23 VITALS
BODY MASS INDEX: 37.79 KG/M2 | HEART RATE: 54 BPM | SYSTOLIC BLOOD PRESSURE: 102 MMHG | TEMPERATURE: 97.8 F | DIASTOLIC BLOOD PRESSURE: 70 MMHG | RESPIRATION RATE: 18 BRPM | WEIGHT: 279 LBS | HEIGHT: 72 IN

## 2017-10-23 DIAGNOSIS — E83.59 CALCIPHYLAXIS: ICD-10-CM

## 2017-10-23 ASSESSMENT — PAIN - FUNCTIONAL ASSESSMENT: PAIN_FUNCTIONAL_ASSESSMENT: 0-10

## 2017-10-24 ENCOUNTER — HOSPITAL ENCOUNTER (OUTPATIENT)
Dept: WOUND CARE | Age: 57
Discharge: HOME | End: 2017-10-24
Attending: CLINICAL NURSE SPECIALIST | Admitting: CLINICAL NURSE SPECIALIST

## 2017-10-25 ENCOUNTER — TELEPHONE (OUTPATIENT)
Dept: CARDIOLOGY CLINIC | Age: 57
End: 2017-10-25

## 2017-10-25 PROBLEM — K31.1 GASTRIC OUTLET OBSTRUCTION: Status: ACTIVE | Noted: 2017-10-25

## 2017-10-30 ENCOUNTER — HOSPITAL ENCOUNTER (OUTPATIENT)
Dept: ONCOLOGY | Age: 57
Discharge: HOME | End: 2017-10-30
Admitting: NURSE PRACTITIONER

## 2017-10-30 ENCOUNTER — HOSPITAL ENCOUNTER (OUTPATIENT)
Dept: ONCOLOGY | Age: 57
Discharge: HOME | End: 2017-10-31
Admitting: INTERNAL MEDICINE

## 2017-10-30 DIAGNOSIS — E83.59 CALCIPHYLAXIS: ICD-10-CM

## 2017-10-30 DIAGNOSIS — E83.59 OTHER DISORDER OF CALCIUM METABOLISM: ICD-10-CM

## 2017-10-30 LAB
ANION GAP SERPL CALCULATED.3IONS-SCNC: 14 MMOL/L (ref 3–16)
ANION GAP SERPL CALCULATED.3IONS-SCNC: 14 MMOL/L (ref 3–16)
BASOPHILS ABSOLUTE: 0.1 K/UL (ref 0–0.2)
BASOPHILS RELATIVE PERCENT: 2 %
BUN BLDV-MCNC: 33 MG/DL (ref 7–20)
BUN BLDV-MCNC: 37 MG/DL (ref 7–20)
CALCIUM SERPL-MCNC: 8.8 MG/DL (ref 8.3–10.6)
CALCIUM SERPL-MCNC: 8.9 MG/DL (ref 8.3–10.6)
CHLORIDE BLD-SCNC: 102 MMOL/L (ref 99–110)
CHLORIDE BLD-SCNC: 102 MMOL/L (ref 99–110)
CO2: 20 MMOL/L (ref 21–32)
CO2: 22 MMOL/L (ref 21–32)
CREAT SERPL-MCNC: 2.9 MG/DL (ref 0.9–1.3)
CREAT SERPL-MCNC: 2.9 MG/DL (ref 0.9–1.3)
EOSINOPHILS ABSOLUTE: 0.1 K/UL (ref 0–0.6)
EOSINOPHILS RELATIVE PERCENT: 1.7 %
FERRITIN: 65.4 NG/ML (ref 30–400)
GFR AFRICAN AMERICAN: 27
GFR AFRICAN AMERICAN: 27
GFR NON-AFRICAN AMERICAN: 23
GFR NON-AFRICAN AMERICAN: 23
GLUCOSE BLD-MCNC: 184 MG/DL (ref 70–99)
GLUCOSE BLD-MCNC: 186 MG/DL (ref 70–99)
HCT VFR BLD CALC: 26.9 % (ref 40.5–52.5)
HEMOGLOBIN: 8.2 G/DL (ref 13.5–17.5)
IRON SATURATION: 19 % (ref 20–50)
IRON: 60 UG/DL (ref 59–158)
LYMPHOCYTES ABSOLUTE: 0.7 K/UL (ref 1–5.1)
LYMPHOCYTES RELATIVE PERCENT: 13.1 %
MCH RBC QN AUTO: 26.1 PG (ref 26–34)
MCHC RBC AUTO-ENTMCNC: 30.3 G/DL (ref 31–36)
MCV RBC AUTO: 86 FL (ref 80–100)
MONOCYTES ABSOLUTE: 0.5 K/UL (ref 0–1.3)
MONOCYTES RELATIVE PERCENT: 9.4 %
NEUTROPHILS ABSOLUTE: 3.8 K/UL (ref 1.7–7.7)
NEUTROPHILS RELATIVE PERCENT: 73.8 %
PDW BLD-RTO: 24.3 % (ref 12.4–15.4)
PLATELET # BLD: 229 K/UL (ref 135–450)
PMV BLD AUTO: 8.1 FL (ref 5–10.5)
POTASSIUM SERPL-SCNC: 4.9 MMOL/L (ref 3.5–5.1)
POTASSIUM SERPL-SCNC: 4.9 MMOL/L (ref 3.5–5.1)
RBC # BLD: 3.13 M/UL (ref 4.2–5.9)
SODIUM BLD-SCNC: 136 MMOL/L (ref 136–145)
SODIUM BLD-SCNC: 138 MMOL/L (ref 136–145)
TOTAL IRON BINDING CAPACITY: 310 UG/DL (ref 260–445)
WBC # BLD: 5.1 K/UL (ref 4–11)

## 2017-10-30 RX ORDER — SODIUM CHLORIDE 0.9 % (FLUSH) 0.9 %
10 SYRINGE (ML) INJECTION ONCE
Status: CANCELLED
Start: 2017-10-30 | End: 2017-10-30

## 2017-10-30 RX ORDER — SODIUM CHLORIDE 9 MG/ML
INJECTION, SOLUTION INTRAVENOUS ONCE
Status: CANCELLED
Start: 2017-10-30 | End: 2017-10-30

## 2017-10-30 RX ORDER — SODIUM CHLORIDE 9 MG/ML
INJECTION, SOLUTION INTRAVENOUS ONCE
Status: DISCONTINUED | OUTPATIENT
Start: 2017-10-30 | End: 2017-10-30

## 2017-10-30 RX ORDER — SODIUM CHLORIDE 0.9 % (FLUSH) 0.9 %
10 SYRINGE (ML) INJECTION ONCE
Status: DISCONTINUED | OUTPATIENT
Start: 2017-10-30 | End: 2017-11-01 | Stop reason: HOSPADM

## 2017-10-30 NOTE — PROGRESS NOTES
Aranesp 100 mcg given SQ per order unable to scan. All his labs were drawn. Patient has 2 therapy plans with 2 different doctors. This id for MD Kemp Litten.

## 2017-10-30 NOTE — PROGRESS NOTES
Infusion completed. Tolerated without immediate complications. Discharged per wheelchair with [employer].

## 2017-11-01 ENCOUNTER — HOSPITAL ENCOUNTER (OUTPATIENT)
Dept: ONCOLOGY | Age: 57
Discharge: HOME | End: 2017-11-30
Attending: NURSE PRACTITIONER | Admitting: NURSE PRACTITIONER

## 2017-11-01 ENCOUNTER — HOSPITAL ENCOUNTER (OUTPATIENT)
Dept: ONCOLOGY | Age: 57
Discharge: HOME | End: 2017-11-30
Attending: INTERNAL MEDICINE | Admitting: INTERNAL MEDICINE

## 2017-11-01 VITALS
SYSTOLIC BLOOD PRESSURE: 158 MMHG | WEIGHT: 309 LBS | HEIGHT: 72 IN | TEMPERATURE: 98.9 F | HEART RATE: 98 BPM | RESPIRATION RATE: 16 BRPM | DIASTOLIC BLOOD PRESSURE: 100 MMHG | BODY MASS INDEX: 41.85 KG/M2

## 2017-11-03 ENCOUNTER — HOSPITAL ENCOUNTER (OUTPATIENT)
Dept: OTHER | Age: 57
Discharge: HOME | End: 2017-11-03
Admitting: NURSE PRACTITIONER

## 2017-11-03 VITALS
SYSTOLIC BLOOD PRESSURE: 133 MMHG | HEART RATE: 65 BPM | TEMPERATURE: 97.8 F | DIASTOLIC BLOOD PRESSURE: 82 MMHG | WEIGHT: 290 LBS | RESPIRATION RATE: 14 BRPM | HEIGHT: 72 IN | BODY MASS INDEX: 39.28 KG/M2

## 2017-11-03 DIAGNOSIS — E83.59 CALCIPHYLAXIS: ICD-10-CM

## 2017-11-03 LAB
FOLATE: 6.43 NG/ML (ref 4.78–24.2)
VITAMIN B-12: 467 PG/ML (ref 211–911)

## 2017-11-03 RX ORDER — ONDANSETRON 2 MG/ML
4 INJECTION INTRAMUSCULAR; INTRAVENOUS ONCE
Status: COMPLETED | OUTPATIENT
Start: 2017-11-03 | End: 2017-11-03

## 2017-11-03 RX ADMIN — ONDANSETRON 4 MG: 2 INJECTION INTRAMUSCULAR; INTRAVENOUS at 12:34

## 2017-11-03 ASSESSMENT — PAIN DESCRIPTION - DESCRIPTORS: DESCRIPTORS: CONSTANT;ACHING

## 2017-11-03 ASSESSMENT — PAIN DESCRIPTION - ORIENTATION: ORIENTATION: RIGHT;LEFT

## 2017-11-03 ASSESSMENT — PAIN DESCRIPTION - LOCATION: LOCATION: LEG

## 2017-11-03 ASSESSMENT — PAIN DESCRIPTION - FREQUENCY: FREQUENCY: CONTINUOUS

## 2017-11-03 ASSESSMENT — PAIN DESCRIPTION - PAIN TYPE: TYPE: CHRONIC PAIN

## 2017-11-03 ASSESSMENT — PAIN SCALES - GENERAL: PAINLEVEL_OUTOF10: 5

## 2017-11-07 ENCOUNTER — HOSPITAL ENCOUNTER (OUTPATIENT)
Dept: WOUND CARE | Age: 57
Discharge: HOME | End: 2017-11-07
Attending: CLINICAL NURSE SPECIALIST | Admitting: CLINICAL NURSE SPECIALIST

## 2017-11-07 VITALS
HEIGHT: 72 IN | WEIGHT: 312.3 LBS | HEART RATE: 90 BPM | SYSTOLIC BLOOD PRESSURE: 104 MMHG | TEMPERATURE: 98.1 F | DIASTOLIC BLOOD PRESSURE: 68 MMHG | RESPIRATION RATE: 16 BRPM | BODY MASS INDEX: 42.3 KG/M2

## 2017-11-07 PROCEDURE — 97598 DBRDMT OPN WND ADDL 20CM/<: CPT | Performed by: CLINICAL NURSE SPECIALIST

## 2017-11-07 PROCEDURE — 97597 DBRDMT OPN WND 1ST 20 CM/<: CPT | Performed by: CLINICAL NURSE SPECIALIST

## 2017-11-07 ASSESSMENT — PAIN DESCRIPTION - DESCRIPTORS: DESCRIPTORS: ACHING;CONSTANT

## 2017-11-07 ASSESSMENT — PAIN DESCRIPTION - FREQUENCY: FREQUENCY: CONTINUOUS

## 2017-11-07 ASSESSMENT — PAIN DESCRIPTION - ONSET: ONSET: ON-GOING

## 2017-11-07 ASSESSMENT — PAIN DESCRIPTION - PROGRESSION: CLINICAL_PROGRESSION: GRADUALLY WORSENING

## 2017-11-07 ASSESSMENT — PAIN DESCRIPTION - PAIN TYPE: TYPE: CHRONIC PAIN

## 2017-11-07 ASSESSMENT — PAIN DESCRIPTION - LOCATION: LOCATION: LEG

## 2017-11-07 ASSESSMENT — PAIN DESCRIPTION - ORIENTATION: ORIENTATION: RIGHT

## 2017-11-07 ASSESSMENT — PAIN SCALES - GENERAL: PAINLEVEL_OUTOF10: 10

## 2017-11-09 ENCOUNTER — HOSPITAL ENCOUNTER (OUTPATIENT)
Dept: OTHER | Age: 57
Discharge: HOME | End: 2017-11-09
Attending: INTERNAL MEDICINE | Admitting: INTERNAL MEDICINE

## 2017-11-09 VITALS
OXYGEN SATURATION: 98 % | SYSTOLIC BLOOD PRESSURE: 148 MMHG | HEIGHT: 72 IN | HEART RATE: 135 BPM | DIASTOLIC BLOOD PRESSURE: 99 MMHG | WEIGHT: 312 LBS | BODY MASS INDEX: 42.26 KG/M2 | TEMPERATURE: 98.6 F | RESPIRATION RATE: 14 BRPM

## 2017-11-09 DIAGNOSIS — E83.59 OTHER DISORDERS OF CALCIUM METABOLISM: ICD-10-CM

## 2017-11-09 DIAGNOSIS — E83.59 TUMORAL CALCINOSIS: ICD-10-CM

## 2017-11-09 LAB
INR BLD: 1.33 (ref 0.85–1.15)
PLATELET # BLD: 319 K/UL (ref 135–450)
PROTHROMBIN TIME: 15 SEC (ref 9.6–13)

## 2017-11-09 RX ORDER — MIDAZOLAM HYDROCHLORIDE 5 MG/ML
INJECTION INTRAMUSCULAR; INTRAVENOUS
Status: COMPLETED | OUTPATIENT
Start: 2017-11-09 | End: 2017-11-09

## 2017-11-09 RX ORDER — FENTANYL CITRATE 50 UG/ML
INJECTION, SOLUTION INTRAMUSCULAR; INTRAVENOUS
Status: COMPLETED | OUTPATIENT
Start: 2017-11-09 | End: 2017-11-09

## 2017-11-09 RX ORDER — SODIUM CHLORIDE 9 MG/ML
INJECTION, SOLUTION INTRAVENOUS ONCE
Status: DISCONTINUED | OUTPATIENT
Start: 2017-11-09 | End: 2017-11-10 | Stop reason: HOSPADM

## 2017-11-09 RX ADMIN — FENTANYL CITRATE 50 MCG: 50 INJECTION, SOLUTION INTRAMUSCULAR; INTRAVENOUS at 08:40

## 2017-11-09 RX ADMIN — MIDAZOLAM HYDROCHLORIDE 1 MG: 5 INJECTION INTRAMUSCULAR; INTRAVENOUS at 08:40

## 2017-11-09 ASSESSMENT — PAIN - FUNCTIONAL ASSESSMENT: PAIN_FUNCTIONAL_ASSESSMENT: 0-10

## 2017-11-09 NOTE — PROGRESS NOTES
Patient had iv removed from right hand. Port inserted into his right chest. Report called to [facility] message left. Patient to return back to [facility] per transport company via wheelchair.

## 2017-11-09 NOTE — PROGRESS NOTES
doppler  Abdomen: No nausea or vomiting  Extremities: no cyanosis or cellulitis. Bilateral increased leg edema  Musculoskeletal: Wheel chair for distance, ambulatory, moves all extremities, no deformities  Neurologic: distal sensation to light touch intact, no allodynia. Heena-ulcer skin: Intact and Induration. Ulcer(s): Left lower leg ulcers with red and pink granulation, biofilm and fibrin. Right lower leg ulcers with minimal pink granulation, extensive eschar and fibrin. Left lower leg wound beds moist, edges mostly open and attached. Right lower leg wound beds dry. Edges attached with nonviable tissue. Right lower leg too painful to sharply debride. Gently debrided with Debrisoft. Surrounding tissue and ulcer without signs and symptoms of infection. No purulence, malodor, erythema or increased temperature. Pain 10/10 continuous aching. Pre-debridement wound measurements are in the wound documentation flowsheet. Photos also saved in electronic chart.     Assessment:     Patient Active Problem List   Diagnosis Code    Type 2 diabetes mellitus with stage 4 chronic kidney disease, without long-term current use of insulin ([facility]) E11.22, N18.4    Cigarette smoker F17.210    Chronic atrial fibrillation ([facility]) I48.2    Essential (primary) hypertension I10    Normocytic anemia D64.9    Primary osteoarthritis of both knees M17.0    Calciphylaxis E83.59    Depression F32.9    Hyperlipidemia E78.5    Morbid obesity due to excess calories ([facility]) E66.01    Furunculosis (recurrent, on chronic suppressive Abx) L02.92    Tobacco use disorder F17.200    Ulcer of left calf with fat layer exposed ([address] L97.222    Hyperphosphatemia E83.39    Vitamin D deficiency E55.9    Secondary hyperparathyroidism of renal origin ([address] N25.81    Iron deficiency E61.1    White matter changes, severe by MRI brain [id number]    Coronary artery disease involving native coronary artery I25.10    Renal arteriosclerosis I12.9    Other disorder of calcium metabolism E83.59    CKD (chronic kidney disease), stage III N18.3    Acute renal failure (ARF) ([facility]) N17.9    Gastrointestinal hemorrhage Q18.7    Metabolic acidosis J59.9    Hyperkalemia E87.5    Leg wound, left S81.802A    Deep vein thrombosis (DVT) ([facility]) I82.409    Tobacco abuse Z72.0    Hyponatremia E87.1    ANA (acute kidney injury) ([facility] [address] N17.9    Wound infection T14. 8XXA, L08.9    Acute on chronic renal failure (HCC) N17.9, N18.9    Gastric outlet obstruction K31.1       Assessment of today's active condition(s): Calciphylaxis right and left lower legs. Factors contributing to occurrence and/or persistence of the chronic ulcer include edema, diabetes, poor glucose control, obesity, smoking, non-adherence and renal failure. Medical necessity of today's visit is shown by the above documentation. Sharp debridement is indicated today, based upon the exam findings in the wound(s) above. Procedure note:     Consent obtained. Time out performed per [facility] policy. Anesthetic  Anesthetic: 4% Topical Xylocaine     Using a curette, I sharply debrided the left lower leg anterior cluster and left posterior lower leg ulcer(s) down through and including the removal of epidermis and dermis. The type(s) of tissue debrided included fibrin, biofilm, slough and exudate. Total Surface Area Debrided: estimated 70 sq cm.     Post  Debridement Measurements:  Wound 11/07/17 #3 Right Posterior Leg,Calciphalaxis,Full thickness,onset 10/15/17,gradually appeared -Wound Length (cm): 13.9 cm  Wound 06/06/17  #1 Left posterior lower leg, Calciphylaxis, full thickness (onset 5/31/17) \"spider bite\", unknown etiology-Wound Length (cm): 13 cm  Wound 10/17/17 #2, Left lower leg, anterior cluster,calciphylaxis, full thickness, onset 7/1/17, gradually appeared-Wound Length (cm): 7 cm    Wound 11/07/17 #3 Right Posterior Leg,Calciphalaxis,Full thickness,onset 10/15/17,gradually appeared -Wound Width (cm): cluster,calciphylaxis, full thickness, onset 7/1/17, gradually appeared-Dressing/Treatment: Other (Comment) (ZnO asha,triad,aquacel ag,4x4,kerlix,surepress). Home treatment: good handwashing before and after any dressing changes. Cleanse wound with saline or soap & water before dressing change. May use Vaseline (petrolatum), Aquaphor, Aveeno, CeraVe, Cetaphil, Eucerin, Lubriderm, etc for dry skin. Dressing type for home: Other: 1/4 strength Dakins soak, then thin layer of triad and aquacel ag and dry dressing daily. Surpress wraps on AM off PM daily. Written discharge instructions given to patient. Follow up in 1 week.     Electronically signed by GIANLUCA Bryan on 11/9/2017 at 1:41 PM.

## 2017-11-09 NOTE — PRE SEDATION
Airway Assessment:  Mallampati Class II - (soft palate, fauces & uvula are visible)    Prior History of Anesthesia Complications:   none    ASA Classification:  Class 3 - A patient with severe systemic disease that limits activity but is not incapacitating    Sedation/ Anesthesia Plan:   intravenous sedation    Medications Planned:   midazolam (Versed) intravenously and fentanyl intravenously    Patient is an appropriate candidate for plan of sedation: yes    Electronically signed by Janice Favre, MD on 11/9/2017 at 8:01 AM

## 2017-11-09 NOTE — BRIEF OP NOTE
Brief Postoperative Note    Michelle Woodard  YOB: 1960  [id number]    Pre-operative Diagnosis: Calciphylaxis requiring frequent IV infusions    Post-operative Diagnosis: Same    Procedure: Port placement    Anesthesia: Local and Moderate Sedation    Surgeons/Assistants: Fredy    Estimated Blood Loss: less than 50     Complications: None    Specimens: Was Not Obtained    Findings: Port placed via right IJ using US and fluoro guidance.     Electronically signed by Miguelito White MD on 11/9/2017 at 9:24 AM

## 2017-11-10 ENCOUNTER — HOSPITAL ENCOUNTER (OUTPATIENT)
Dept: ONCOLOGY | Age: 57
Discharge: HOME | End: 2017-11-10
Admitting: NURSE PRACTITIONER

## 2017-11-10 VITALS
DIASTOLIC BLOOD PRESSURE: 86 MMHG | WEIGHT: 312 LBS | SYSTOLIC BLOOD PRESSURE: 137 MMHG | HEIGHT: 72 IN | TEMPERATURE: 99.2 F | HEART RATE: 86 BPM | BODY MASS INDEX: 42.26 KG/M2 | RESPIRATION RATE: 14 BRPM

## 2017-11-10 DIAGNOSIS — E83.59 CALCIPHYLAXIS: ICD-10-CM

## 2017-11-10 RX ORDER — ONDANSETRON 2 MG/ML
4 INJECTION INTRAMUSCULAR; INTRAVENOUS ONCE
Status: COMPLETED | OUTPATIENT
Start: 2017-11-10 | End: 2017-11-10

## 2017-11-10 RX ADMIN — ONDANSETRON 4 MG: 2 INJECTION INTRAMUSCULAR; INTRAVENOUS at 13:15

## 2017-11-10 ASSESSMENT — PAIN - FUNCTIONAL ASSESSMENT: PAIN_FUNCTIONAL_ASSESSMENT: 0-10

## 2017-11-13 ENCOUNTER — HOSPITAL ENCOUNTER (OUTPATIENT)
Dept: ONCOLOGY | Age: 57
Discharge: HOME | End: 2017-11-13
Admitting: NURSE PRACTITIONER

## 2017-11-13 VITALS
RESPIRATION RATE: 16 BRPM | HEART RATE: 89 BPM | SYSTOLIC BLOOD PRESSURE: 137 MMHG | TEMPERATURE: 98.4 F | HEIGHT: 72 IN | BODY MASS INDEX: 40.77 KG/M2 | DIASTOLIC BLOOD PRESSURE: 80 MMHG | WEIGHT: 301 LBS

## 2017-11-13 DIAGNOSIS — E83.59 OTHER DISORDER OF CALCIUM METABOLISM: ICD-10-CM

## 2017-11-13 DIAGNOSIS — E83.59 CALCIPHYLAXIS: ICD-10-CM

## 2017-11-13 LAB
BASOPHILS ABSOLUTE: 0.1 K/UL (ref 0–0.2)
BASOPHILS RELATIVE PERCENT: 1.4 %
EOSINOPHILS ABSOLUTE: 0.2 K/UL (ref 0–0.6)
EOSINOPHILS RELATIVE PERCENT: 2.1 %
HCT VFR BLD CALC: 27.2 % (ref 40.5–52.5)
HEMOGLOBIN: 8.7 G/DL (ref 13.5–17.5)
LYMPHOCYTES ABSOLUTE: 1.2 K/UL (ref 1–5.1)
LYMPHOCYTES RELATIVE PERCENT: 15.9 %
MCH RBC QN AUTO: 27 PG (ref 26–34)
MCHC RBC AUTO-ENTMCNC: 32.1 G/DL (ref 31–36)
MCV RBC AUTO: 84.1 FL (ref 80–100)
MONOCYTES ABSOLUTE: 0.6 K/UL (ref 0–1.3)
MONOCYTES RELATIVE PERCENT: 7.4 %
NEUTROPHILS ABSOLUTE: 5.6 K/UL (ref 1.7–7.7)
NEUTROPHILS RELATIVE PERCENT: 73.2 %
PDW BLD-RTO: 24.9 % (ref 12.4–15.4)
PLATELET # BLD: 373 K/UL (ref 135–450)
PMV BLD AUTO: 7.5 FL (ref 5–10.5)
RBC # BLD: 3.23 M/UL (ref 4.2–5.9)
WBC # BLD: 7.7 K/UL (ref 4–11)

## 2017-11-13 RX ORDER — ONDANSETRON 2 MG/ML
4 INJECTION INTRAMUSCULAR; INTRAVENOUS ONCE
Status: DISCONTINUED | OUTPATIENT
Start: 2017-11-13 | End: 2017-11-13 | Stop reason: ALTCHOICE

## 2017-11-13 RX ADMIN — ONDANSETRON 4 MG: 2 INJECTION INTRAMUSCULAR; INTRAVENOUS at 13:07

## 2017-11-14 ENCOUNTER — HOSPITAL ENCOUNTER (OUTPATIENT)
Dept: WOUND CARE | Age: 57
Discharge: HOME | End: 2017-11-14
Attending: CLINICAL NURSE SPECIALIST | Admitting: CLINICAL NURSE SPECIALIST

## 2017-11-14 PROCEDURE — 97598 DBRDMT OPN WND ADDL 20CM/<: CPT | Performed by: CLINICAL NURSE SPECIALIST

## 2017-11-14 PROCEDURE — 97597 DBRDMT OPN WND 1ST 20 CM/<: CPT | Performed by: CLINICAL NURSE SPECIALIST

## 2017-11-14 RX ORDER — TORSEMIDE 100 MG/1
50 TABLET ORAL DAILY
COMMUNITY
End: 2019-04-08

## 2017-11-15 ENCOUNTER — HOSPITAL ENCOUNTER (OUTPATIENT)
Dept: ONCOLOGY | Age: 57
Discharge: HOME | End: 2017-11-15
Admitting: NURSE PRACTITIONER

## 2017-11-15 VITALS
HEIGHT: 72 IN | SYSTOLIC BLOOD PRESSURE: 123 MMHG | DIASTOLIC BLOOD PRESSURE: 80 MMHG | HEART RATE: 106 BPM | TEMPERATURE: 98.1 F | BODY MASS INDEX: 40.77 KG/M2 | WEIGHT: 301 LBS | RESPIRATION RATE: 16 BRPM

## 2017-11-15 DIAGNOSIS — E83.59 CALCIPHYLAXIS: ICD-10-CM

## 2017-11-15 RX ORDER — ONDANSETRON 2 MG/ML
4 INJECTION INTRAMUSCULAR; INTRAVENOUS ONCE
Status: COMPLETED | OUTPATIENT
Start: 2017-11-15 | End: 2017-11-15

## 2017-11-15 RX ADMIN — ONDANSETRON 4 MG: 2 INJECTION INTRAMUSCULAR; INTRAVENOUS at 14:25

## 2017-11-15 ASSESSMENT — PAIN - FUNCTIONAL ASSESSMENT: PAIN_FUNCTIONAL_ASSESSMENT: 0-10

## 2017-11-17 ENCOUNTER — HOSPITAL ENCOUNTER (OUTPATIENT)
Dept: ONCOLOGY | Age: 57
Discharge: HOME | End: 2017-11-17
Admitting: NURSE PRACTITIONER

## 2017-11-17 VITALS
WEIGHT: 301 LBS | DIASTOLIC BLOOD PRESSURE: 59 MMHG | BODY MASS INDEX: 40.77 KG/M2 | HEIGHT: 72 IN | HEART RATE: 69 BPM | TEMPERATURE: 98.5 F | SYSTOLIC BLOOD PRESSURE: 103 MMHG | RESPIRATION RATE: 16 BRPM

## 2017-11-17 DIAGNOSIS — E83.59 CALCIPHYLAXIS: ICD-10-CM

## 2017-11-17 RX ORDER — ONDANSETRON 2 MG/ML
4 INJECTION INTRAMUSCULAR; INTRAVENOUS ONCE
Status: COMPLETED | OUTPATIENT
Start: 2017-11-17 | End: 2017-11-17

## 2017-11-17 RX ADMIN — ONDANSETRON 4 MG: 2 INJECTION INTRAMUSCULAR; INTRAVENOUS at 12:39

## 2017-11-17 ASSESSMENT — PAIN - FUNCTIONAL ASSESSMENT: PAIN_FUNCTIONAL_ASSESSMENT: 0-10

## 2017-11-17 NOTE — PROGRESS NOTES
[facility] Progress Note    Wyatt Reese     : 1960    DATE OF VISIT:  2017    Subjective:     Wyatt Reese is a 65 y.o. male who has a calciphylaxis ulcers located on the left anterior lower leg, left lower leg and right posterior lower leg. Significant symptoms or pertinent wound history since last visit: Appetite is good. No fever or chills. Encouraged to elevate legs and to stop smoking. ECG reports patient is noncompliant with this. Patient's weight is down. Has started Torsemide, as well as, Levaquin for wound infection. On Eliquis for Afib. Port was placed 17 for Sodium Theosulfate infusions 3 times per week. We cannot be aggressive with compression due to pain. Pain medication has been increased. Patient in complex wound management due to co morbidities and noncompliance with prescribed treatment regime. Additional ulcer(s) noted? no.     His current medication list consists of apixaban, aspirin, carvedilol, collagenase, cyclobenzaprine, diltiazem, gabapentin, glimepiride, hydrALAZINE, insulin aspart, isosorbide mononitrate, oxyCODONE-acetaminophen, pantoprazole, pravastatin, sodium bicarbonate, sodium hypochlorite, spironolactone, and torsemide. Allergies: Codeine; Onion; and Penicillins    Objective: There were no vitals filed for this visit.   General Appearance: alert and oriented to person, place and time, obese, in no acute distress  Psychiatric:  Mood and affect appropriate for situation  Skin: warm and dry, no rash  Head: normocephalic and atraumatic  Eyes: pupils equal, round, sclerae anicteric, conjunctivae normal  ENT: no thrush or oral ulcers  Neck:No complaints, normal appearance  Pulmonary/Chest: Respirations easy at rest, no cough or respiratory distress  Cardiovascular: No chest pain, normal rate, toes warm, cap refill normal, Bilateral PT and DP pulse audible with doppler  Abdomen: No nausea or vomiting  Extremities: no cyanosis or cellulitis. Bilateral leg edema  Musculoskeletal: Wheel chair for distance, ambulatory, moves all extremities, no deformities  Neurologic: distal sensation to light touch heightened, no allodynia. Heena-ulcer skin: Induration. Ulcer(s): Right posterior lower leg ulcers with extensive dry adherent eschar and fibrin. Minimal red granulation. Eschar cross-hatched with scalpel. Left lower leg ulcers with pink and red granulation, slough and fibrin. Measurements improved. Wound beds moist, edges open and attached. Surrounding tissue and ulcer without signs and symptoms of infection. No purulence, malodor, erythema or increased temperature. Unable to fully due to pain. Pain 10/10, continuous burning. Pre-debridement wound measurements are in the wound documentation flowsheet. Photos also saved in electronic chart.     Assessment:     Patient Active Problem List   Diagnosis Code    Type 2 diabetes mellitus with stage 4 chronic kidney disease, without long-term current use of insulin ([facility]) E11.22, N18.4    Cigarette smoker F17.210    Chronic atrial fibrillation ([facility]) I48.2    Essential (primary) hypertension I10    Normocytic anemia D64.9    Primary osteoarthritis of both knees M17.0    Calciphylaxis E83.59    Depression F32.9    Hyperlipidemia E78.5    Morbid obesity due to excess calories ([facility]) E66.01    Furunculosis (recurrent, on chronic suppressive Abx) L02.92    Tobacco use disorder F17.200    Ulcer of left calf with fat layer exposed ([facility] [address] L97.222    Hyperphosphatemia E83.39    Vitamin D deficiency E55.9    Secondary hyperparathyroidism of renal origin ([facility] [address] N25.81    Iron deficiency E61.1    White matter changes, severe by MRI brain [id number]    Coronary artery disease involving native coronary artery I25.10    Renal arteriosclerosis I12.9    Other disorder of calcium metabolism E83.59    CKD (chronic kidney disease), stage III N18.3    Acute renal failure (ARF) ([facility]) N17.9   

## 2017-11-20 ENCOUNTER — HOSPITAL ENCOUNTER (OUTPATIENT)
Dept: ONCOLOGY | Age: 57
Discharge: HOME | End: 2017-11-20
Admitting: NURSE PRACTITIONER

## 2017-11-20 VITALS
TEMPERATURE: 97.7 F | BODY MASS INDEX: 40.77 KG/M2 | WEIGHT: 301 LBS | HEIGHT: 72 IN | DIASTOLIC BLOOD PRESSURE: 73 MMHG | RESPIRATION RATE: 16 BRPM | HEART RATE: 79 BPM | SYSTOLIC BLOOD PRESSURE: 111 MMHG

## 2017-11-20 DIAGNOSIS — E83.59 CALCIPHYLAXIS: ICD-10-CM

## 2017-11-20 RX ORDER — ONDANSETRON 2 MG/ML
4 INJECTION INTRAMUSCULAR; INTRAVENOUS ONCE
Status: COMPLETED | OUTPATIENT
Start: 2017-11-20 | End: 2017-11-20

## 2017-11-20 RX ADMIN — ONDANSETRON 4 MG: 2 INJECTION INTRAMUSCULAR; INTRAVENOUS at 12:49

## 2017-11-20 ASSESSMENT — PAIN - FUNCTIONAL ASSESSMENT: PAIN_FUNCTIONAL_ASSESSMENT: 0-10

## 2017-11-21 ENCOUNTER — HOSPITAL ENCOUNTER (OUTPATIENT)
Dept: WOUND CARE | Age: 57
Discharge: HOME | End: 2017-11-21
Attending: CLINICAL NURSE SPECIALIST | Admitting: CLINICAL NURSE SPECIALIST

## 2017-11-21 VITALS
HEIGHT: 72 IN | HEART RATE: 66 BPM | DIASTOLIC BLOOD PRESSURE: 68 MMHG | BODY MASS INDEX: 39.17 KG/M2 | SYSTOLIC BLOOD PRESSURE: 110 MMHG | RESPIRATION RATE: 18 BRPM | TEMPERATURE: 97.6 F | WEIGHT: 289.2 LBS

## 2017-11-21 PROCEDURE — 97598 DBRDMT OPN WND ADDL 20CM/<: CPT | Performed by: CLINICAL NURSE SPECIALIST

## 2017-11-21 PROCEDURE — 97597 DBRDMT OPN WND 1ST 20 CM/<: CPT | Performed by: CLINICAL NURSE SPECIALIST

## 2017-11-21 PROCEDURE — 11042 DBRDMT SUBQ TIS 1ST 20SQCM/<: CPT | Performed by: CLINICAL NURSE SPECIALIST

## 2017-11-21 ASSESSMENT — PAIN SCALES - GENERAL: PAINLEVEL_OUTOF10: 0

## 2017-11-24 ENCOUNTER — HOSPITAL ENCOUNTER (OUTPATIENT)
Dept: ONCOLOGY | Age: 57
Discharge: HOME | End: 2017-11-24
Admitting: NURSE PRACTITIONER

## 2017-11-24 VITALS
HEIGHT: 71 IN | TEMPERATURE: 97 F | WEIGHT: 280 LBS | RESPIRATION RATE: 18 BRPM | SYSTOLIC BLOOD PRESSURE: 114 MMHG | HEART RATE: 58 BPM | BODY MASS INDEX: 39.2 KG/M2 | DIASTOLIC BLOOD PRESSURE: 71 MMHG

## 2017-11-24 DIAGNOSIS — E83.59 CALCIPHYLAXIS: ICD-10-CM

## 2017-11-24 RX ORDER — ONDANSETRON 2 MG/ML
4 INJECTION INTRAMUSCULAR; INTRAVENOUS ONCE
Status: COMPLETED | OUTPATIENT
Start: 2017-11-24 | End: 2017-11-24

## 2017-11-24 RX ADMIN — ONDANSETRON 4 MG: 2 INJECTION INTRAMUSCULAR; INTRAVENOUS at 12:46

## 2017-11-24 ASSESSMENT — PAIN - FUNCTIONAL ASSESSMENT: PAIN_FUNCTIONAL_ASSESSMENT: 0-10

## 2017-11-24 NOTE — PROGRESS NOTES
[facility] Progress Note    Sandi Cooper     : 1960    DATE OF VISIT:  2017    Subjective:     Sandi Cooper is a 65 y.o. male who has a calciphylaxis ulcers located on the left anterior lower leg, left lower leg and right posterior lower leg. Significant symptoms or pertinent wound history since last visit: Is trying to cut down smoking. Is trying to elevate his legs, though not as much as he should. Continues with significant amount of edema and pain. Additional ulcer(s) noted? no.     His current medication list consists of apixaban, aspirin, carvedilol, collagenase, cyclobenzaprine, diltiazem, gabapentin, glimepiride, hydrALAZINE, insulin aspart, isosorbide mononitrate, oxyCODONE-acetaminophen, pantoprazole, pravastatin, sodium bicarbonate, sodium hypochlorite, spironolactone, and torsemide. Allergies: Codeine; Onion; and Penicillins    Objective:     Vitals:    17 1047   BP: 110/68   Pulse: 66   Resp: 18   Temp: 97.6 °F (36.4 °C)   TempSrc: Oral   Weight: 289 lb 3.2 oz (131.2 kg)   Height: 6' (1.829 m)     General Appearance: alert and oriented to person, place and time, obese, in no acute distress  Psychiatric:  Mood and affect appropriate for situation  Skin: warm and dry, no rash  Head: normocephalic and atraumatic  Eyes: pupils equal, round, sclerae anicteric, conjunctivae normal  ENT: no thrush or oral ulcers  Neck:No complaints, normal appearance  Pulmonary/Chest: Respirations easy at rest, no cough or respiratory distress  Cardiovascular: No chest pain, normal rate, toes warm, cap refill normal, bilateral PT and DP pulse audible with doppler  Abdomen: No nausea or vomiting  Extremities: no cyanosis or cellulitis. Bilateral leg edema  Musculoskeletal: Ambulatory, moves all extremities, no deformities  Neurologic: distal sensation to light touch intact, no allodynia. Heena-ulcer skin: Intact and Induration. Scars.   Ulcer(s): Left lower leg ulcers with pink granulation and extensive amounts of biofilm and fibrin, (though improving). Wound beds moist, edges open and attached. Right posterior lower leg ulcer with extensive adherent necrotic tissue. Minimal fibrin and granulation. Too painful for extensive debridement. Cross hatched remaining eschar. Surrounding tissue and ulcer without signs and symptoms of infection. No purulence, malodor or increased temperature. Continues with erythema and pain. 10/10 with debridement. Pre-debridement wound measurements are in the wound documentation flowsheet. Photos also saved in electronic chart.     Assessment:     Patient Active Problem List   Diagnosis Code    Type 2 diabetes mellitus with stage 4 chronic kidney disease, without long-term current use of insulin ([facility] E11.22, N18.4    Cigarette smoker F17.210    Chronic atrial fibrillation ([facility] I48.2    Essential (primary) hypertension I10    Normocytic anemia D64.9    Primary osteoarthritis of both knees M17.0    Calciphylaxis E83.59    Depression F32.9    Hyperlipidemia E78.5    Morbid obesity due to excess calories ([facility]) E66.01    Furunculosis (recurrent, on chronic suppressive Abx) L02.92    Tobacco use disorder F17.200    Ulcer of left calf with fat layer exposed ([address] L97.222    Hyperphosphatemia E83.39    Vitamin D deficiency E55.9    Secondary hyperparathyroidism of renal origin ([address] N25.81    Iron deficiency E61.1    White matter changes, severe by MRI brain [id number]    Coronary artery disease involving native coronary artery I25.10    Renal arteriosclerosis I12.9    Other disorder of calcium metabolism E83.59    CKD (chronic kidney disease), stage III N18.3    Acute renal failure (ARF) ([facility]) N17.9    Gastrointestinal hemorrhage K75.3    Metabolic acidosis T17.1    Hyperkalemia E87.5    Leg wound, left S81.802A    Deep vein thrombosis (DVT) ([facility]) I82.409    Tobacco abuse Z72.0    Hyponatremia E87.1    ANA (acute kidney injury) ([facility] N17.9    Wound infection T14. 8XXA, L08.9    Acute on chronic renal failure (HCC) N17.9, N18.9    Gastric outlet obstruction K31.1       Assessment of today's active condition(s): Calciphylaxis bilateral lower legs. Factors contributing to occurrence and/or persistence of the chronic ulcer include edema, diabetes, poor glucose control, decreased mobility, obesity, smoking and non-adherence. Medical necessity of today's visit is shown by the above documentation. Sharp debridement is indicated today, based upon the exam findings in the wound(s) above. Procedure note:     Consent obtained. Time out performed per [facility] policy. Anesthetic  Anesthetic: 4% Topical Xylocaine     Using Debrisoft, I gently debrided all ulcers. Using a curette, #15 blade scalpel, scissors and forceps, I sharply debrided the left lower leg ulcers down through and including the removal of epidermis and dermis. The types of tissue debrided included fibrin, slough and exudate. Total surface area debrided estimated 40 sq cm. Using a curette, #15 scalpel, scissors and forceps, I sharply debrided the right posterior lower leg ulcer down through and including the removal of epidermis, dermis and subcutaneous tissue. Using a scalpel, I crosshatched remaining, adherent eschar. The type(s) of tissue debrided included fibrin, biofilm, slough, necrotic/eschar and exudate. Total Surface Area Debrided: estimated 10 sq cm.     Post  Debridement Measurements:  Wound 11/07/17 #3 Right Posterior Leg,Calciphalaxis,Full thickness,onset 10/15/17,gradually appeared -Wound Length (cm): 6.4 cm  Wound 10/17/17 #2, Left lower leg, anterior cluster,calciphylaxis, full thickness, onset 7/1/17, gradually appeared-Wound Length (cm): 5 cm  Wound 06/06/17  #1 Left posterior lower leg, Calciphylaxis, full thickness (onset 5/31/17) \"spider bite\", unknown etiology-Wound Length (cm): 9 cm    Wound 11/07/17 #3 Right Posterior Leg,Calciphalaxis,Full thickness,onset

## 2017-11-27 ENCOUNTER — HOSPITAL ENCOUNTER (OUTPATIENT)
Dept: ONCOLOGY | Age: 57
Discharge: HOME | End: 2017-11-27
Admitting: NURSE PRACTITIONER

## 2017-11-27 VITALS
RESPIRATION RATE: 18 BRPM | HEART RATE: 67 BPM | DIASTOLIC BLOOD PRESSURE: 73 MMHG | WEIGHT: 280 LBS | SYSTOLIC BLOOD PRESSURE: 123 MMHG | HEIGHT: 71 IN | TEMPERATURE: 98.2 F | BODY MASS INDEX: 39.2 KG/M2

## 2017-11-27 DIAGNOSIS — E83.59 CALCIPHYLAXIS: ICD-10-CM

## 2017-11-27 LAB
BASOPHILS ABSOLUTE: 0.1 K/UL (ref 0–0.2)
BASOPHILS RELATIVE PERCENT: 2 %
EOSINOPHILS ABSOLUTE: 0.1 K/UL (ref 0–0.6)
EOSINOPHILS RELATIVE PERCENT: 1.2 %
HCT VFR BLD CALC: 25.5 % (ref 40.5–52.5)
HEMOGLOBIN: 8.3 G/DL (ref 13.5–17.5)
LYMPHOCYTES ABSOLUTE: 0.9 K/UL (ref 1–5.1)
LYMPHOCYTES RELATIVE PERCENT: 13.9 %
MCH RBC QN AUTO: 27.4 PG (ref 26–34)
MCHC RBC AUTO-ENTMCNC: 32.5 G/DL (ref 31–36)
MCV RBC AUTO: 84.3 FL (ref 80–100)
MONOCYTES ABSOLUTE: 0.5 K/UL (ref 0–1.3)
MONOCYTES RELATIVE PERCENT: 7.2 %
NEUTROPHILS ABSOLUTE: 5 K/UL (ref 1.7–7.7)
NEUTROPHILS RELATIVE PERCENT: 75.7 %
PDW BLD-RTO: 23.6 % (ref 12.4–15.4)
PLATELET # BLD: 311 K/UL (ref 135–450)
PMV BLD AUTO: 7.3 FL (ref 5–10.5)
RBC # BLD: 3.02 M/UL (ref 4.2–5.9)
WBC # BLD: 6.6 K/UL (ref 4–11)

## 2017-11-27 RX ORDER — ONDANSETRON 2 MG/ML
4 INJECTION INTRAMUSCULAR; INTRAVENOUS ONCE
Status: COMPLETED | OUTPATIENT
Start: 2017-11-27 | End: 2017-11-27

## 2017-11-27 RX ADMIN — ONDANSETRON 4 MG: 2 INJECTION INTRAMUSCULAR; INTRAVENOUS at 12:56

## 2017-11-27 ASSESSMENT — PAIN - FUNCTIONAL ASSESSMENT: PAIN_FUNCTIONAL_ASSESSMENT: 0-10

## 2017-11-28 ENCOUNTER — HOSPITAL ENCOUNTER (OUTPATIENT)
Dept: WOUND CARE | Age: 57
Discharge: HOME | End: 2017-11-28
Attending: CLINICAL NURSE SPECIALIST | Admitting: CLINICAL NURSE SPECIALIST

## 2017-11-29 ENCOUNTER — HOSPITAL ENCOUNTER (OUTPATIENT)
Dept: ONCOLOGY | Age: 57
Discharge: HOME | End: 2017-11-29
Admitting: NURSE PRACTITIONER

## 2017-11-29 VITALS
DIASTOLIC BLOOD PRESSURE: 92 MMHG | HEART RATE: 97 BPM | BODY MASS INDEX: 39.2 KG/M2 | TEMPERATURE: 97.1 F | WEIGHT: 280 LBS | RESPIRATION RATE: 18 BRPM | SYSTOLIC BLOOD PRESSURE: 125 MMHG | HEIGHT: 71 IN

## 2017-11-29 DIAGNOSIS — E83.59 CALCIPHYLAXIS: ICD-10-CM

## 2017-11-29 RX ORDER — ONDANSETRON 2 MG/ML
4 INJECTION INTRAMUSCULAR; INTRAVENOUS ONCE
Status: DISCONTINUED | OUTPATIENT
Start: 2017-11-29 | End: 2017-11-29 | Stop reason: ALTCHOICE

## 2017-11-29 RX ADMIN — ONDANSETRON 4 MG: 2 INJECTION INTRAMUSCULAR; INTRAVENOUS at 12:50

## 2017-11-29 ASSESSMENT — PAIN - FUNCTIONAL ASSESSMENT: PAIN_FUNCTIONAL_ASSESSMENT: 0-10

## 2017-12-01 ENCOUNTER — HOSPITAL ENCOUNTER (OUTPATIENT)
Dept: ONCOLOGY | Age: 57
Discharge: HOME | End: 2017-12-31
Attending: NURSE PRACTITIONER | Admitting: NURSE PRACTITIONER

## 2017-12-01 DIAGNOSIS — E83.59 CALCIPHYLAXIS: ICD-10-CM

## 2017-12-05 ENCOUNTER — HOSPITAL ENCOUNTER (OUTPATIENT)
Dept: WOUND CARE | Age: 57
Discharge: HOME | End: 2017-12-05
Attending: CLINICAL NURSE SPECIALIST | Admitting: CLINICAL NURSE SPECIALIST

## 2017-12-05 NOTE — PLAN OF CARE
Problem: Wound:  Goal: Will show signs of wound healing; wound closure and no evidence of infection  Will show signs of wound healing; wound closure and no evidence of infection   Outcome: Ongoing  Pt no show for appt - called ECF- RN reported pt refused to go with Transportation to [facility] .  Also pointed that pt missed appt last week also -rescheduled for next week - [place]  will speak to pt about compliance with [facility] appts

## 2017-12-06 ENCOUNTER — HOSPITAL ENCOUNTER (OUTPATIENT)
Dept: ONCOLOGY | Age: 57
Discharge: HOME | End: 2017-12-06
Admitting: NURSE PRACTITIONER

## 2017-12-06 VITALS
RESPIRATION RATE: 18 BRPM | HEIGHT: 71 IN | TEMPERATURE: 97.9 F | WEIGHT: 287 LBS | SYSTOLIC BLOOD PRESSURE: 109 MMHG | HEART RATE: 50 BPM | DIASTOLIC BLOOD PRESSURE: 66 MMHG | BODY MASS INDEX: 40.18 KG/M2

## 2017-12-06 DIAGNOSIS — E83.59 CALCIPHYLAXIS: ICD-10-CM

## 2017-12-06 RX ORDER — ONDANSETRON 2 MG/ML
4 INJECTION INTRAMUSCULAR; INTRAVENOUS ONCE
Status: COMPLETED | OUTPATIENT
Start: 2017-12-06 | End: 2017-12-06

## 2017-12-06 RX ADMIN — ONDANSETRON 4 MG: 2 INJECTION INTRAMUSCULAR; INTRAVENOUS at 13:40

## 2017-12-06 ASSESSMENT — PAIN - FUNCTIONAL ASSESSMENT: PAIN_FUNCTIONAL_ASSESSMENT: 0-10

## 2017-12-08 ENCOUNTER — HOSPITAL ENCOUNTER (OUTPATIENT)
Dept: ONCOLOGY | Age: 57
Discharge: HOME | End: 2017-12-08
Admitting: NURSE PRACTITIONER

## 2017-12-08 VITALS
HEART RATE: 108 BPM | DIASTOLIC BLOOD PRESSURE: 92 MMHG | RESPIRATION RATE: 18 BRPM | TEMPERATURE: 98 F | BODY MASS INDEX: 40.18 KG/M2 | SYSTOLIC BLOOD PRESSURE: 144 MMHG | HEIGHT: 71 IN | WEIGHT: 287 LBS

## 2017-12-08 DIAGNOSIS — E83.59 CALCIPHYLAXIS: ICD-10-CM

## 2017-12-08 RX ORDER — ONDANSETRON 2 MG/ML
4 INJECTION INTRAMUSCULAR; INTRAVENOUS ONCE
Status: COMPLETED | OUTPATIENT
Start: 2017-12-08 | End: 2017-12-08

## 2017-12-08 RX ADMIN — ONDANSETRON 4 MG: 2 INJECTION INTRAMUSCULAR; INTRAVENOUS at 13:03

## 2017-12-08 ASSESSMENT — PAIN - FUNCTIONAL ASSESSMENT: PAIN_FUNCTIONAL_ASSESSMENT: 0-10

## 2017-12-11 ENCOUNTER — HOSPITAL ENCOUNTER (OUTPATIENT)
Dept: ONCOLOGY | Age: 57
Discharge: HOME | End: 2017-12-11
Admitting: NURSE PRACTITIONER

## 2017-12-11 VITALS
RESPIRATION RATE: 18 BRPM | DIASTOLIC BLOOD PRESSURE: 80 MMHG | SYSTOLIC BLOOD PRESSURE: 135 MMHG | BODY MASS INDEX: 40.18 KG/M2 | WEIGHT: 287 LBS | HEART RATE: 77 BPM | TEMPERATURE: 98.4 F | HEIGHT: 71 IN

## 2017-12-11 DIAGNOSIS — E83.59 CALCIPHYLAXIS: ICD-10-CM

## 2017-12-11 DIAGNOSIS — E83.59 OTHER DISORDER OF CALCIUM METABOLISM: ICD-10-CM

## 2017-12-11 LAB
HCT VFR BLD CALC: 27 % (ref 40.5–52.5)
HEMOGLOBIN: 8.7 G/DL (ref 13.5–17.5)

## 2017-12-11 RX ORDER — ONDANSETRON 2 MG/ML
4 INJECTION INTRAMUSCULAR; INTRAVENOUS ONCE
Status: COMPLETED | OUTPATIENT
Start: 2017-12-11 | End: 2017-12-11

## 2017-12-11 RX ADMIN — ONDANSETRON 4 MG: 2 INJECTION INTRAMUSCULAR; INTRAVENOUS at 12:52

## 2017-12-12 ENCOUNTER — HOSPITAL ENCOUNTER (OUTPATIENT)
Dept: WOUND CARE | Age: 57
Discharge: HOME | End: 2017-12-12
Attending: CLINICAL NURSE SPECIALIST | Admitting: CLINICAL NURSE SPECIALIST

## 2017-12-12 NOTE — PLAN OF CARE
Problem: Wound:  Goal: Will show signs of wound healing; wound closure and no evidence of infection  Will show signs of wound healing; wound closure and no evidence of infection   Outcome: Ongoing  Transportation cancelled pt appt

## 2017-12-13 ENCOUNTER — HOSPITAL ENCOUNTER (OUTPATIENT)
Dept: ONCOLOGY | Age: 57
Discharge: HOME | End: 2017-12-13
Admitting: NURSE PRACTITIONER

## 2017-12-13 VITALS
TEMPERATURE: 98.5 F | WEIGHT: 287 LBS | HEART RATE: 92 BPM | SYSTOLIC BLOOD PRESSURE: 123 MMHG | DIASTOLIC BLOOD PRESSURE: 78 MMHG | HEIGHT: 71 IN | BODY MASS INDEX: 40.18 KG/M2 | RESPIRATION RATE: 18 BRPM

## 2017-12-13 DIAGNOSIS — E83.59 CALCIPHYLAXIS: ICD-10-CM

## 2017-12-13 RX ORDER — ONDANSETRON 2 MG/ML
4 INJECTION INTRAMUSCULAR; INTRAVENOUS ONCE
Status: COMPLETED | OUTPATIENT
Start: 2017-12-13 | End: 2017-12-13

## 2017-12-13 RX ADMIN — ONDANSETRON 4 MG: 2 INJECTION INTRAMUSCULAR; INTRAVENOUS at 13:13

## 2017-12-13 ASSESSMENT — PAIN - FUNCTIONAL ASSESSMENT: PAIN_FUNCTIONAL_ASSESSMENT: 0-10

## 2017-12-18 ENCOUNTER — HOSPITAL ENCOUNTER (OUTPATIENT)
Dept: ONCOLOGY | Age: 57
Discharge: HOME | End: 2017-12-18
Admitting: NURSE PRACTITIONER

## 2017-12-18 VITALS
DIASTOLIC BLOOD PRESSURE: 69 MMHG | RESPIRATION RATE: 20 BRPM | HEIGHT: 71 IN | HEART RATE: 64 BPM | WEIGHT: 287 LBS | TEMPERATURE: 98.2 F | SYSTOLIC BLOOD PRESSURE: 100 MMHG | BODY MASS INDEX: 40.18 KG/M2

## 2017-12-18 DIAGNOSIS — E83.59 CALCIPHYLAXIS: ICD-10-CM

## 2017-12-18 RX ORDER — ONDANSETRON 2 MG/ML
4 INJECTION INTRAMUSCULAR; INTRAVENOUS ONCE
Status: COMPLETED | OUTPATIENT
Start: 2017-12-18 | End: 2017-12-18

## 2017-12-18 RX ADMIN — ONDANSETRON 4 MG: 2 INJECTION INTRAMUSCULAR; INTRAVENOUS at 13:07

## 2017-12-18 ASSESSMENT — PAIN - FUNCTIONAL ASSESSMENT: PAIN_FUNCTIONAL_ASSESSMENT: 0-10

## 2017-12-19 ENCOUNTER — HOSPITAL ENCOUNTER (OUTPATIENT)
Dept: WOUND CARE | Age: 57
Discharge: HOME | End: 2017-12-19
Attending: CLINICAL NURSE SPECIALIST | Admitting: CLINICAL NURSE SPECIALIST

## 2017-12-19 VITALS
HEART RATE: 72 BPM | BODY MASS INDEX: 39.83 KG/M2 | WEIGHT: 284.5 LBS | SYSTOLIC BLOOD PRESSURE: 97 MMHG | TEMPERATURE: 97.8 F | HEIGHT: 71 IN | RESPIRATION RATE: 18 BRPM | DIASTOLIC BLOOD PRESSURE: 63 MMHG

## 2017-12-19 PROCEDURE — 97597 DBRDMT OPN WND 1ST 20 CM/<: CPT | Performed by: CLINICAL NURSE SPECIALIST

## 2017-12-19 PROCEDURE — 99999 PR OFFICE/OUTPT VISIT,PROCEDURE ONLY: CPT | Performed by: CLINICAL NURSE SPECIALIST

## 2017-12-20 ENCOUNTER — HOSPITAL ENCOUNTER (OUTPATIENT)
Dept: ONCOLOGY | Age: 57
Discharge: HOME | End: 2017-12-20
Admitting: NURSE PRACTITIONER

## 2017-12-20 VITALS
HEART RATE: 55 BPM | HEIGHT: 71 IN | RESPIRATION RATE: 18 BRPM | WEIGHT: 284 LBS | SYSTOLIC BLOOD PRESSURE: 129 MMHG | TEMPERATURE: 99.6 F | DIASTOLIC BLOOD PRESSURE: 71 MMHG | BODY MASS INDEX: 39.76 KG/M2

## 2017-12-20 DIAGNOSIS — E83.59 CALCIPHYLAXIS: ICD-10-CM

## 2017-12-20 RX ORDER — ONDANSETRON 2 MG/ML
4 INJECTION INTRAMUSCULAR; INTRAVENOUS ONCE
Status: DISCONTINUED | OUTPATIENT
Start: 2017-12-20 | End: 2017-12-20

## 2017-12-20 RX ADMIN — ONDANSETRON 4 MG: 2 INJECTION INTRAMUSCULAR; INTRAVENOUS at 13:12

## 2017-12-20 ASSESSMENT — PAIN - FUNCTIONAL ASSESSMENT: PAIN_FUNCTIONAL_ASSESSMENT: 0-10

## 2017-12-21 NOTE — PROGRESS NOTES
cluster,calciphylaxis, full thickness, onset 7/1/17, gradually appeared-Wound Width (cm): 9.4 cm  Wound 06/06/17  #1 Left posterior lower leg, Calciphylaxis, full thickness (onset 5/31/17) \"spider bite\", unknown etiology-Wound Width (cm): 12 cm  Wound 11/07/17 #3 Right Posterior Leg,Calciphalaxis,Full thickness,onset 10/15/17,gradually appeared -Wound Width (cm): 13.5 cm    Wound 10/17/17 #2, Left lower leg, anterior cluster,calciphylaxis, full thickness, onset 7/1/17, gradually appeared-Wound Depth (cm) : 0.3  Wound 06/06/17  #1 Left posterior lower leg, Calciphylaxis, full thickness (onset 5/31/17) \"spider bite\", unknown etiology-Wound Depth (cm) : 0.3  Wound 11/07/17 #3 Right Posterior Leg,Calciphalaxis,Full thickness,onset 10/15/17,gradually appeared -Wound Depth (cm) : 0.5    The ulcers were then irrigated with normal saline solution. The procedure was completed with a small amount of bleeding, and hemostasis was not needed. The patient tolerated the procedure well, with no significant complications. The patient's level of pain during and after the procedure was monitored, and is noted in the wound documentation flowsheet.     Complex wound management due to complex co-morbidities and non compliance with treatment plans    Discharge plan:     Weekly wound care visits  Leg elevations 2-3 times per day for 30-40 minutes  Continue to cut down smoking    Treatment in the wound care center today: Wound 10/17/17 #2, Left lower leg, anterior cluster,calciphylaxis, full thickness, onset 7/1/17, gradually appeared-Dressing/Treatment:  (Zinc oxide,Triad,Nexodyn,Aquacel Ag,gauze,Kerlix,Surepress)  Wound 06/06/17  #1 Left posterior lower leg, Calciphylaxis, full thickness (onset 5/31/17) \"spider bite\", unknown etiology-Dressing/Treatment:  (Zinc oxide,Triad,Nexodyn,Aquacel Ag,gauze,Kerlix,Surepress)  Wound 11/07/17 #3 Right Posterior Leg,Calciphalaxis,Full thickness,onset 10/15/17,gradually appeared -Dressing/Treatment: (Zinc oxide,Triad,Nexodyn,Aquacel Ag,gauze,Kerlix,Surepress). Home treatment: good handwashing before and after any dressing changes. Cleanse wound with saline or soap & water before dressing change. May use Vaseline (petrolatum), Aquaphor, Aveeno, CeraVe, Cetaphil, Eucerin, Lubriderm, etc for dry skin. Dressing type for home: Other: 1/4 strength Dakins soak for 10 minutes. Triad, Aquacel Ag and dry dressing daily. Surepres wrap on AM off PM. Written discharge instructions given to patient. Follow up in 1 week.     Electronically signed by GIANLUCA Guevara on 12/21/2017 at 2:16 PM.

## 2017-12-26 ENCOUNTER — HOSPITAL ENCOUNTER (OUTPATIENT)
Dept: WOUND CARE | Age: 57
Discharge: HOME | End: 2017-12-26
Attending: CLINICAL NURSE SPECIALIST | Admitting: CLINICAL NURSE SPECIALIST

## 2017-12-26 VITALS
HEART RATE: 80 BPM | HEIGHT: 72 IN | DIASTOLIC BLOOD PRESSURE: 92 MMHG | WEIGHT: 281 LBS | RESPIRATION RATE: 20 BRPM | SYSTOLIC BLOOD PRESSURE: 136 MMHG | BODY MASS INDEX: 38.06 KG/M2 | TEMPERATURE: 98.4 F

## 2017-12-26 PROCEDURE — 97597 DBRDMT OPN WND 1ST 20 CM/<: CPT | Performed by: CLINICAL NURSE SPECIALIST

## 2017-12-26 RX ORDER — ONDANSETRON 4 MG/1
4 TABLET, ORALLY DISINTEGRATING ORAL EVERY 8 HOURS PRN
COMMUNITY
End: 2019-04-08

## 2017-12-26 RX ORDER — HYDROPHILIC CREAM
1 PASTE (GRAM) TOPICAL DAILY
COMMUNITY
End: 2019-04-08

## 2017-12-26 RX ORDER — ASCORBIC ACID 500 MG
500 TABLET ORAL DAILY
COMMUNITY
End: 2019-04-08

## 2017-12-26 ASSESSMENT — PAIN DESCRIPTION - LOCATION: LOCATION: LEG

## 2017-12-26 ASSESSMENT — PAIN DESCRIPTION - ONSET: ONSET: ON-GOING

## 2017-12-26 ASSESSMENT — PAIN DESCRIPTION - DESCRIPTORS: DESCRIPTORS: ACHING;BURNING

## 2017-12-26 ASSESSMENT — PAIN DESCRIPTION - PAIN TYPE: TYPE: CHRONIC PAIN

## 2017-12-26 ASSESSMENT — PAIN DESCRIPTION - ORIENTATION: ORIENTATION: RIGHT

## 2017-12-26 ASSESSMENT — PAIN DESCRIPTION - FREQUENCY: FREQUENCY: CONTINUOUS

## 2017-12-26 ASSESSMENT — PAIN SCALES - GENERAL: PAINLEVEL_OUTOF10: 7

## 2017-12-26 ASSESSMENT — PAIN DESCRIPTION - PROGRESSION: CLINICAL_PROGRESSION: NOT CHANGED

## 2017-12-26 NOTE — PLAN OF CARE
Problem: Wound:  Goal: Will show signs of wound healing; wound closure and no evidence of infection  Will show signs of wound healing; wound closure and no evidence of infection   Outcome: Ongoing  Pt seen in [facility]- Asked to be injected to anesthetize wounds for debridement( areas to large to inject)- applied 4%+5%lidocaine topically  and was able to use debrisoft successfully to debride wounds - minimal necrotic tissue  After using debrisoft- pt also reports that using warm water helps lessen pain- more granulation and epithelization noted this week . Pt still has significant edema- non compliant with compression and elevation as reported from ECF. Pt not allowing ECF to measure wounds and doesn't give facility DC instructions - so faxed to LASHANDA Batista today.  Pt reports minimal smoking due to having to go outside in the cold- instructions reviewed - f/u in 1 week

## 2017-12-27 ENCOUNTER — HOSPITAL ENCOUNTER (OUTPATIENT)
Dept: ONCOLOGY | Age: 57
Discharge: HOME | End: 2017-12-27
Admitting: NURSE PRACTITIONER

## 2017-12-27 VITALS
DIASTOLIC BLOOD PRESSURE: 74 MMHG | TEMPERATURE: 97 F | SYSTOLIC BLOOD PRESSURE: 112 MMHG | HEIGHT: 72 IN | RESPIRATION RATE: 20 BRPM | BODY MASS INDEX: 38.06 KG/M2 | WEIGHT: 281 LBS | HEART RATE: 64 BPM

## 2017-12-27 LAB
HCT VFR BLD CALC: 26.8 % (ref 40.5–52.5)
HEMOGLOBIN: 8.5 G/DL (ref 13.5–17.5)

## 2017-12-27 RX ORDER — ONDANSETRON 2 MG/ML
4 INJECTION INTRAMUSCULAR; INTRAVENOUS ONCE
Status: COMPLETED | OUTPATIENT
Start: 2017-12-27 | End: 2017-12-27

## 2017-12-27 RX ADMIN — ONDANSETRON 4 MG: 2 INJECTION INTRAMUSCULAR; INTRAVENOUS at 14:58

## 2017-12-27 ASSESSMENT — PAIN - FUNCTIONAL ASSESSMENT: PAIN_FUNCTIONAL_ASSESSMENT: 0-10

## 2017-12-29 ENCOUNTER — HOSPITAL ENCOUNTER (OUTPATIENT)
Dept: ONCOLOGY | Age: 57
Discharge: HOME | End: 2017-12-29
Admitting: NURSE PRACTITIONER

## 2017-12-29 VITALS
HEIGHT: 72 IN | HEART RATE: 97 BPM | SYSTOLIC BLOOD PRESSURE: 118 MMHG | DIASTOLIC BLOOD PRESSURE: 76 MMHG | WEIGHT: 281 LBS | TEMPERATURE: 98.5 F | RESPIRATION RATE: 18 BRPM | BODY MASS INDEX: 38.06 KG/M2

## 2017-12-29 DIAGNOSIS — E83.59 CALCIPHYLAXIS: ICD-10-CM

## 2017-12-29 RX ORDER — ONDANSETRON 2 MG/ML
4 INJECTION INTRAMUSCULAR; INTRAVENOUS ONCE
Status: COMPLETED | OUTPATIENT
Start: 2017-12-29 | End: 2017-12-29

## 2017-12-29 RX ADMIN — ONDANSETRON 4 MG: 2 INJECTION INTRAMUSCULAR; INTRAVENOUS at 13:01

## 2017-12-29 ASSESSMENT — PAIN - FUNCTIONAL ASSESSMENT: PAIN_FUNCTIONAL_ASSESSMENT: 0-10

## 2017-12-29 NOTE — PROGRESS NOTES
Call to [facility], spoke with Amado Morales, nurse, requested copy of MAR. She will send.
staining. Ulcer(s):  All ulcers improving with less nonviable tissue. Ulcers with pink and red granulation, biofilm, fibrin and minimal eschar (right lower leg). Wound beds moist, edges open and attached. Irregular shapes. Surrounding tissue and ulcer without signs and symptoms of infection. No purulence, malodor, erythema, increased temperature, or increased pain. Pain 7/10, continuous burning and aching. Today's ulcer measurements are in the electronic flowsheet. Photos also saved in electronic chart.     Lab Results   Component Value Date    LABALBU 3.7 10/27/2017     Lab Results   Component Value Date    CREATININE 2.9 (H) 10/30/2017     Lab Results   Component Value Date    HGB 8.5 (L) 12/27/2017     Lab Results   Component Value Date    LABA1C 8.6 09/26/2017     Assessment:     Patient Active Problem List   Diagnosis Code    Type 2 diabetes mellitus with stage 4 chronic kidney disease, without long-term current use of insulin ([facility]) E11.22, N18.4    Cigarette smoker F17.210    Chronic atrial fibrillation ([facility]) I48.2    Essential (primary) hypertension I10    Normocytic anemia D64.9    Primary osteoarthritis of both knees M17.0    Calciphylaxis E83.59    Depression F32.9    Hyperlipidemia E78.5    Morbid obesity due to excess calories ([facility]) E66.01    Furunculosis (recurrent, on chronic suppressive Abx) L02.92    Tobacco use disorder F17.200    Ulcer of left calf with fat layer exposed ([facility]) L97.222    Hyperphosphatemia E83.39    Vitamin D deficiency E55.9    Secondary hyperparathyroidism of renal origin ([facility] N25.81    Iron deficiency E61.1    White matter changes, severe by MRI brain [id number]    Coronary artery disease involving native coronary artery I25.10    Renal arteriosclerosis I12.9    Other disorder of calcium metabolism E83.59    CKD (chronic kidney disease), stage III N18.3    Acute renal failure (ARF) ([facility]) N17.9    Gastrointestinal hemorrhage P13.3    Metabolic acidosis

## 2018-01-01 ENCOUNTER — HOSPITAL ENCOUNTER (OUTPATIENT)
Dept: ONCOLOGY | Age: 58
Discharge: HOME | End: 2018-01-31
Attending: NURSE PRACTITIONER | Admitting: NURSE PRACTITIONER

## 2018-01-02 ENCOUNTER — HOSPITAL ENCOUNTER (OUTPATIENT)
Dept: WOUND CARE | Age: 58
Discharge: HOME | End: 2018-01-02
Attending: CLINICAL NURSE SPECIALIST | Admitting: CLINICAL NURSE SPECIALIST

## 2018-01-03 ENCOUNTER — HOSPITAL ENCOUNTER (OUTPATIENT)
Dept: ONCOLOGY | Age: 58
Discharge: HOME | End: 2018-01-03
Admitting: NURSE PRACTITIONER

## 2018-01-03 VITALS
DIASTOLIC BLOOD PRESSURE: 72 MMHG | RESPIRATION RATE: 18 BRPM | SYSTOLIC BLOOD PRESSURE: 121 MMHG | HEART RATE: 64 BPM | TEMPERATURE: 98 F | BODY MASS INDEX: 38.06 KG/M2 | WEIGHT: 281 LBS | HEIGHT: 72 IN

## 2018-01-03 DIAGNOSIS — E83.59 CALCIPHYLAXIS: ICD-10-CM

## 2018-01-03 RX ORDER — ONDANSETRON 2 MG/ML
4 INJECTION INTRAMUSCULAR; INTRAVENOUS ONCE
Status: COMPLETED | OUTPATIENT
Start: 2018-01-03 | End: 2018-01-03

## 2018-01-03 RX ADMIN — ONDANSETRON 4 MG: 2 INJECTION INTRAMUSCULAR; INTRAVENOUS at 12:46

## 2018-01-03 ASSESSMENT — PAIN - FUNCTIONAL ASSESSMENT: PAIN_FUNCTIONAL_ASSESSMENT: 0-10

## 2018-01-05 ENCOUNTER — HOSPITAL ENCOUNTER (OUTPATIENT)
Dept: ONCOLOGY | Age: 58
Discharge: HOME | End: 2018-01-05
Admitting: NURSE PRACTITIONER

## 2018-01-05 VITALS
HEART RATE: 77 BPM | RESPIRATION RATE: 18 BRPM | TEMPERATURE: 97.9 F | SYSTOLIC BLOOD PRESSURE: 111 MMHG | WEIGHT: 281 LBS | BODY MASS INDEX: 38.06 KG/M2 | HEIGHT: 72 IN | DIASTOLIC BLOOD PRESSURE: 76 MMHG

## 2018-01-05 DIAGNOSIS — E83.59 CALCIPHYLAXIS: ICD-10-CM

## 2018-01-05 RX ORDER — ONDANSETRON 2 MG/ML
4 INJECTION INTRAMUSCULAR; INTRAVENOUS ONCE
Status: COMPLETED | OUTPATIENT
Start: 2018-01-05 | End: 2018-01-05

## 2018-01-05 RX ADMIN — ONDANSETRON 4 MG: 2 INJECTION INTRAMUSCULAR; INTRAVENOUS at 13:16

## 2018-01-05 ASSESSMENT — PAIN - FUNCTIONAL ASSESSMENT: PAIN_FUNCTIONAL_ASSESSMENT: 0-10

## 2018-01-08 ENCOUNTER — HOSPITAL ENCOUNTER (OUTPATIENT)
Dept: ONCOLOGY | Age: 58
Discharge: HOME | End: 2018-01-08
Admitting: NURSE PRACTITIONER

## 2018-01-08 VITALS
TEMPERATURE: 97.8 F | SYSTOLIC BLOOD PRESSURE: 134 MMHG | HEART RATE: 68 BPM | HEIGHT: 72 IN | BODY MASS INDEX: 38.06 KG/M2 | WEIGHT: 281 LBS | RESPIRATION RATE: 18 BRPM | DIASTOLIC BLOOD PRESSURE: 69 MMHG

## 2018-01-08 DIAGNOSIS — E83.59 CALCIPHYLAXIS: ICD-10-CM

## 2018-01-08 RX ORDER — ONDANSETRON 2 MG/ML
4 INJECTION INTRAMUSCULAR; INTRAVENOUS
Status: ACTIVE | OUTPATIENT
Start: 2018-01-08 | End: 2018-01-08

## 2018-01-08 RX ORDER — ONDANSETRON 2 MG/ML
4 INJECTION INTRAMUSCULAR; INTRAVENOUS ONCE
Status: COMPLETED | OUTPATIENT
Start: 2018-01-08 | End: 2018-01-08

## 2018-01-08 RX ADMIN — ONDANSETRON 4 MG: 2 INJECTION INTRAMUSCULAR; INTRAVENOUS at 12:54

## 2018-01-08 ASSESSMENT — PAIN - FUNCTIONAL ASSESSMENT: PAIN_FUNCTIONAL_ASSESSMENT: 0-10

## 2018-01-09 ENCOUNTER — HOSPITAL ENCOUNTER (OUTPATIENT)
Dept: WOUND CARE | Age: 58
Discharge: HOME | End: 2018-01-09
Attending: CLINICAL NURSE SPECIALIST | Admitting: CLINICAL NURSE SPECIALIST

## 2018-01-09 VITALS
RESPIRATION RATE: 20 BRPM | SYSTOLIC BLOOD PRESSURE: 111 MMHG | DIASTOLIC BLOOD PRESSURE: 76 MMHG | HEIGHT: 72 IN | WEIGHT: 286 LBS | BODY MASS INDEX: 38.74 KG/M2 | TEMPERATURE: 97.2 F | HEART RATE: 71 BPM

## 2018-01-09 PROCEDURE — 97597 DBRDMT OPN WND 1ST 20 CM/<: CPT | Performed by: CLINICAL NURSE SPECIALIST

## 2018-01-09 RX ORDER — OMEPRAZOLE 20 MG/1
20 CAPSULE, DELAYED RELEASE ORAL DAILY
Status: ON HOLD | COMMUNITY
End: 2018-08-07

## 2018-01-09 RX ORDER — BACITRACIN 500 [USP'U]/G
OINTMENT OPHTHALMIC EVERY 4 HOURS
COMMUNITY
End: 2018-08-06

## 2018-01-09 RX ORDER — ISOSORBIDE MONONITRATE 60 MG/1
60 TABLET, EXTENDED RELEASE ORAL DAILY
Status: ON HOLD | COMMUNITY
End: 2019-06-25 | Stop reason: SDUPTHER

## 2018-01-09 RX ORDER — OXYCODONE HYDROCHLORIDE AND ACETAMINOPHEN 5; 325 MG/1; MG/1
1 TABLET ORAL EVERY 4 HOURS PRN
Status: ON HOLD | COMMUNITY
End: 2018-08-07 | Stop reason: HOSPADM

## 2018-01-09 ASSESSMENT — PAIN DESCRIPTION - PROGRESSION: CLINICAL_PROGRESSION: NOT CHANGED

## 2018-01-09 ASSESSMENT — PAIN DESCRIPTION - ONSET: ONSET: SUDDEN

## 2018-01-09 ASSESSMENT — PAIN DESCRIPTION - LOCATION: LOCATION: LEG

## 2018-01-09 ASSESSMENT — PAIN DESCRIPTION - FREQUENCY: FREQUENCY: CONTINUOUS

## 2018-01-09 ASSESSMENT — PAIN DESCRIPTION - DESCRIPTORS: DESCRIPTORS: ACHING;CONSTANT;DISCOMFORT

## 2018-01-09 ASSESSMENT — PAIN DESCRIPTION - ORIENTATION: ORIENTATION: RIGHT;LEFT

## 2018-01-09 ASSESSMENT — PAIN SCALES - GENERAL: PAINLEVEL_OUTOF10: 6

## 2018-01-09 ASSESSMENT — PAIN DESCRIPTION - PAIN TYPE: TYPE: CHRONIC PAIN

## 2018-01-11 NOTE — PROGRESS NOTES
E87.2    Hyperkalemia E87.5    Leg wound, left S81.802A    Deep vein thrombosis (DVT) ([facility]) I82.409    Tobacco abuse Z72.0    Hyponatremia E87.1    ANA (acute kidney injury) ([facility] N17.9    Wound infection T14. 8XXA, L08.9    Acute on chronic renal failure (HCC) N17.9, N18.9    Gastric outlet obstruction K31.1       Assessment of today's active condition(s): Calciphylaxis bilateral lower legs, improving. Factors contributing to occurrence and/or persistence of the chronic ulcer include edema, venous stasis, diabetes, decreased mobility, obesity, smoking and non-adherence. Medical necessity of today's visit is shown by the above documentation. Sharp debridement is indicated today, based upon the exam findings in the wound(s) above. Procedure note:     Consent obtained. Time out performed per [facility] policy. Anesthetic  Anesthetic: 4% Topical Xylocaine     Using a curette, I sharply debrided the right and left lower leg ulcer(s) down through and including the removal of epidermis and dermis. The type(s) of tissue debrided included fibrin, biofilm, slough and exudate. Total Surface Area Debrided: estimated 20 sq cm. I nonselectively debrided right and left lower legs ulcers as well.     Post  Debridement Measurements:  Wound 11/07/17 #3 Right Posterior Leg,Calciphalaxis,Full thickness,onset 10/15/17,gradually appeared -Wound Length (cm): 12.3 cm  Wound 10/17/17 #2, Left lower leg, anterior cluster,calciphylaxis, full thickness, onset 7/1/17, gradually appeared-Wound Length (cm): 6.5 cm  Wound 06/06/17  #1 Left posterior lower leg, Calciphylaxis, full thickness (onset 5/31/17) \"spider bite\", unknown etiology-Wound Length (cm): 10 cm    Wound 11/07/17 #3 Right Posterior Leg,Calciphalaxis,Full thickness,onset 10/15/17,gradually appeared -Wound Width (cm): 15.5 cm  Wound 10/17/17 #2, Left lower leg, anterior cluster,calciphylaxis, full thickness, onset 7/1/17, gradually appeared-Wound Width (cm): 10 cm  Wound

## 2018-01-12 ENCOUNTER — HOSPITAL ENCOUNTER (OUTPATIENT)
Dept: ONCOLOGY | Age: 58
Discharge: HOME | End: 2018-01-12
Admitting: NURSE PRACTITIONER

## 2018-01-12 VITALS
TEMPERATURE: 98.9 F | WEIGHT: 286 LBS | HEART RATE: 58 BPM | DIASTOLIC BLOOD PRESSURE: 64 MMHG | RESPIRATION RATE: 18 BRPM | SYSTOLIC BLOOD PRESSURE: 99 MMHG | BODY MASS INDEX: 38.74 KG/M2 | HEIGHT: 72 IN

## 2018-01-12 DIAGNOSIS — E83.59 CALCIPHYLAXIS: ICD-10-CM

## 2018-01-12 RX ORDER — ONDANSETRON 2 MG/ML
4 INJECTION INTRAMUSCULAR; INTRAVENOUS ONCE
Status: COMPLETED | OUTPATIENT
Start: 2018-01-12 | End: 2018-01-12

## 2018-01-12 RX ADMIN — ONDANSETRON 4 MG: 2 INJECTION INTRAMUSCULAR; INTRAVENOUS at 13:07

## 2018-01-12 ASSESSMENT — PAIN - FUNCTIONAL ASSESSMENT: PAIN_FUNCTIONAL_ASSESSMENT: 0-10

## 2018-01-12 ASSESSMENT — PAIN DESCRIPTION - DESCRIPTORS: DESCRIPTORS: CONSTANT;ACHING;BURNING

## 2018-01-15 ENCOUNTER — HOSPITAL ENCOUNTER (OUTPATIENT)
Dept: ONCOLOGY | Age: 58
Discharge: HOME | End: 2018-01-15
Admitting: NURSE PRACTITIONER

## 2018-01-15 VITALS
SYSTOLIC BLOOD PRESSURE: 112 MMHG | TEMPERATURE: 99.2 F | DIASTOLIC BLOOD PRESSURE: 68 MMHG | WEIGHT: 286 LBS | HEIGHT: 72 IN | BODY MASS INDEX: 38.74 KG/M2 | HEART RATE: 56 BPM | RESPIRATION RATE: 18 BRPM

## 2018-01-15 DIAGNOSIS — E83.59 CALCIPHYLAXIS: ICD-10-CM

## 2018-01-15 RX ORDER — ONDANSETRON 2 MG/ML
4 INJECTION INTRAMUSCULAR; INTRAVENOUS PRN
Status: DISCONTINUED | OUTPATIENT
Start: 2018-01-15 | End: 2018-01-17 | Stop reason: HOSPADM

## 2018-01-15 RX ORDER — ONDANSETRON 2 MG/ML
4 INJECTION INTRAMUSCULAR; INTRAVENOUS ONCE
Status: COMPLETED | OUTPATIENT
Start: 2018-01-15 | End: 2018-01-15

## 2018-01-15 RX ADMIN — ONDANSETRON 4 MG: 2 INJECTION INTRAMUSCULAR; INTRAVENOUS at 13:32

## 2018-01-15 ASSESSMENT — PAIN - FUNCTIONAL ASSESSMENT: PAIN_FUNCTIONAL_ASSESSMENT: 0-10

## 2018-01-16 ENCOUNTER — HOSPITAL ENCOUNTER (OUTPATIENT)
Dept: WOUND CARE | Age: 58
Discharge: HOME | End: 2018-01-16
Attending: CLINICAL NURSE SPECIALIST | Admitting: CLINICAL NURSE SPECIALIST

## 2018-01-17 ENCOUNTER — HOSPITAL ENCOUNTER (OUTPATIENT)
Dept: ONCOLOGY | Age: 58
Discharge: HOME | End: 2018-01-17
Admitting: NURSE PRACTITIONER

## 2018-01-17 VITALS
WEIGHT: 286 LBS | RESPIRATION RATE: 18 BRPM | TEMPERATURE: 99.7 F | DIASTOLIC BLOOD PRESSURE: 64 MMHG | HEIGHT: 72 IN | SYSTOLIC BLOOD PRESSURE: 113 MMHG | HEART RATE: 58 BPM | BODY MASS INDEX: 38.74 KG/M2

## 2018-01-17 DIAGNOSIS — E83.59 CALCIPHYLAXIS: ICD-10-CM

## 2018-01-17 DIAGNOSIS — E83.59 OTHER DISORDER OF CALCIUM METABOLISM: ICD-10-CM

## 2018-01-17 LAB
HCT VFR BLD CALC: 24.6 % (ref 40.5–52.5)
HEMOGLOBIN: 8 G/DL (ref 13.5–17.5)

## 2018-01-17 RX ORDER — ONDANSETRON 2 MG/ML
4 INJECTION INTRAMUSCULAR; INTRAVENOUS EVERY 6 HOURS PRN
Status: DISCONTINUED | OUTPATIENT
Start: 2018-01-17 | End: 2018-01-19 | Stop reason: HOSPADM

## 2018-01-17 RX ADMIN — ONDANSETRON 4 MG: 2 INJECTION INTRAMUSCULAR; INTRAVENOUS at 13:20

## 2018-01-17 ASSESSMENT — PAIN - FUNCTIONAL ASSESSMENT: PAIN_FUNCTIONAL_ASSESSMENT: 0-10

## 2018-01-19 ENCOUNTER — HOSPITAL ENCOUNTER (OUTPATIENT)
Dept: ONCOLOGY | Age: 58
Discharge: HOME | End: 2018-01-19
Admitting: NURSE PRACTITIONER

## 2018-01-19 VITALS
WEIGHT: 286 LBS | HEART RATE: 57 BPM | RESPIRATION RATE: 20 BRPM | SYSTOLIC BLOOD PRESSURE: 104 MMHG | DIASTOLIC BLOOD PRESSURE: 61 MMHG | BODY MASS INDEX: 38.74 KG/M2 | TEMPERATURE: 97.2 F | HEIGHT: 72 IN

## 2018-01-19 DIAGNOSIS — E83.59 CALCIPHYLAXIS: ICD-10-CM

## 2018-01-19 RX ORDER — ONDANSETRON 2 MG/ML
4 INJECTION INTRAMUSCULAR; INTRAVENOUS EVERY 6 HOURS PRN
Status: DISCONTINUED | OUTPATIENT
Start: 2018-01-19 | End: 2018-01-21 | Stop reason: HOSPADM

## 2018-01-19 RX ADMIN — ONDANSETRON 4 MG: 2 INJECTION INTRAMUSCULAR; INTRAVENOUS at 13:35

## 2018-01-19 ASSESSMENT — PAIN - FUNCTIONAL ASSESSMENT: PAIN_FUNCTIONAL_ASSESSMENT: 0-10

## 2018-01-19 ASSESSMENT — PAIN DESCRIPTION - DESCRIPTORS: DESCRIPTORS: ACHING

## 2018-01-22 ENCOUNTER — HOSPITAL ENCOUNTER (OUTPATIENT)
Dept: ONCOLOGY | Age: 58
Discharge: HOME | End: 2018-01-22
Admitting: NURSE PRACTITIONER

## 2018-01-22 VITALS
BODY MASS INDEX: 38.74 KG/M2 | HEIGHT: 72 IN | TEMPERATURE: 98.8 F | RESPIRATION RATE: 18 BRPM | SYSTOLIC BLOOD PRESSURE: 124 MMHG | DIASTOLIC BLOOD PRESSURE: 74 MMHG | HEART RATE: 68 BPM | WEIGHT: 286 LBS

## 2018-01-22 DIAGNOSIS — E83.59 CALCIPHYLAXIS: ICD-10-CM

## 2018-01-22 RX ORDER — ONDANSETRON 2 MG/ML
4 INJECTION INTRAMUSCULAR; INTRAVENOUS ONCE
Status: COMPLETED | OUTPATIENT
Start: 2018-01-22 | End: 2018-01-22

## 2018-01-22 RX ADMIN — ONDANSETRON 4 MG: 2 INJECTION INTRAMUSCULAR; INTRAVENOUS at 13:11

## 2018-01-22 ASSESSMENT — PAIN SCALES - GENERAL: PAINLEVEL_OUTOF10: 0

## 2018-01-23 ENCOUNTER — HOSPITAL ENCOUNTER (OUTPATIENT)
Dept: WOUND CARE | Age: 58
Discharge: HOME | End: 2018-01-23
Attending: CLINICAL NURSE SPECIALIST | Admitting: CLINICAL NURSE SPECIALIST

## 2018-01-23 VITALS
SYSTOLIC BLOOD PRESSURE: 112 MMHG | HEIGHT: 72 IN | HEART RATE: 98 BPM | DIASTOLIC BLOOD PRESSURE: 69 MMHG | WEIGHT: 273.5 LBS | RESPIRATION RATE: 18 BRPM | TEMPERATURE: 98.4 F | BODY MASS INDEX: 37.05 KG/M2

## 2018-01-23 PROCEDURE — 97597 DBRDMT OPN WND 1ST 20 CM/<: CPT | Performed by: CLINICAL NURSE SPECIALIST

## 2018-01-23 PROCEDURE — 99999 PR OFFICE/OUTPT VISIT,PROCEDURE ONLY: CPT | Performed by: CLINICAL NURSE SPECIALIST

## 2018-01-23 ASSESSMENT — PAIN DESCRIPTION - FREQUENCY: FREQUENCY: CONTINUOUS

## 2018-01-23 ASSESSMENT — PAIN DESCRIPTION - PAIN TYPE: TYPE: CHRONIC PAIN

## 2018-01-23 ASSESSMENT — PAIN DESCRIPTION - ORIENTATION: ORIENTATION: RIGHT;LEFT

## 2018-01-23 ASSESSMENT — PAIN DESCRIPTION - DESCRIPTORS: DESCRIPTORS: ACHING;CONSTANT

## 2018-01-23 ASSESSMENT — PAIN DESCRIPTION - PROGRESSION: CLINICAL_PROGRESSION: GRADUALLY WORSENING

## 2018-01-23 ASSESSMENT — PAIN SCALES - GENERAL: PAINLEVEL_OUTOF10: 10

## 2018-01-23 ASSESSMENT — PAIN DESCRIPTION - ONSET: ONSET: ON-GOING

## 2018-01-23 ASSESSMENT — PAIN DESCRIPTION - LOCATION: LOCATION: LEG

## 2018-01-26 NOTE — PROGRESS NOTES
88 [facility] Progress Note    Gurpreet Granados     : 1960    DATE OF VISIT:  2018    Subjective:     Gurpreet Granados is a 65 y.o. male who has calciphylaxis ulcers located on the left anterior, left posterior and right posterior lower leg. Significant symptoms or pertinent wound history since last visit: Patient continues with Sodium Thiosulfate infusions 3 times per week. He will be discharged from [facility] with home care. Patient needs to call wound clinic with home care agency. Patient reports he has been doing some of his dressing changes. Wounds with increased biofilm, drainage, edema and maceration. Leg elevations \"when he can\". Refused compression with surepress wraps. Agreed to loose ace wraps. Complex wound care due to co-morbidities and noncompliance. He also has been short and inconsiderate of staff members. We will discuss this at next appointment. Reporting no fever or chills. Appetite is good. Additional ulcer(s) noted? no.     His current medication list consists of Glimepiride, NONFORMULARY, Sodium Thiosulfate, Triad Hydrophilic Wound Dressi, apixaban, ascorbic acid, aspirin, bacitracin, carvedilol, collagenase, cyclobenzaprine, gabapentin, hydrALAZINE, insulin aspart, isosorbide mononitrate, omeprazole, ondansetron, oxyCODONE-acetaminophen, pravastatin, sodium bicarbonate, sodium hypochlorite, spironolactone, and torsemide. Allergies: Codeine;  Onion; and Penicillins    Objective:     Vitals:    18 1104 18 1158   BP: 112/69    Pulse: 108 98   Resp: 18    Temp: 98.4 °F (36.9 °C)    TempSrc: Oral    Weight: 273 lb 8 oz (124.1 kg)    Height: 6' (1.829 m)      General Appearance: alert and oriented to person, place and time, obese, in no acute distress  Psychiatric:  Mood and affect appropriate for situation  Skin: warm and dry, no rash  Head: normocephalic and atraumatic  Eyes: pupils equal, round, sclerae anicteric, conjunctivae normal  ENT: no

## 2018-01-29 ENCOUNTER — HOSPITAL ENCOUNTER (OUTPATIENT)
Dept: ONCOLOGY | Age: 58
Discharge: HOME | End: 2018-01-29
Admitting: NURSE PRACTITIONER

## 2018-01-29 VITALS
WEIGHT: 273 LBS | HEIGHT: 72 IN | BODY MASS INDEX: 36.98 KG/M2 | HEART RATE: 92 BPM | RESPIRATION RATE: 18 BRPM | SYSTOLIC BLOOD PRESSURE: 155 MMHG | TEMPERATURE: 98 F | DIASTOLIC BLOOD PRESSURE: 99 MMHG

## 2018-01-29 DIAGNOSIS — E83.59 CALCIPHYLAXIS: ICD-10-CM

## 2018-01-29 RX ORDER — ONDANSETRON 2 MG/ML
4 INJECTION INTRAMUSCULAR; INTRAVENOUS ONCE
Status: COMPLETED | OUTPATIENT
Start: 2018-01-29 | End: 2018-01-29

## 2018-01-29 RX ADMIN — ONDANSETRON 4 MG: 2 INJECTION INTRAMUSCULAR; INTRAVENOUS at 13:29

## 2018-01-29 ASSESSMENT — PAIN SCALES - GENERAL: PAINLEVEL_OUTOF10: 7

## 2018-01-29 ASSESSMENT — PAIN DESCRIPTION - FREQUENCY: FREQUENCY: CONTINUOUS

## 2018-01-29 ASSESSMENT — PAIN DESCRIPTION - ONSET: ONSET: ON-GOING

## 2018-01-29 ASSESSMENT — PAIN DESCRIPTION - DESCRIPTORS: DESCRIPTORS: ACHING;RADIATING;SHOOTING;SHARP

## 2018-01-29 ASSESSMENT — PAIN DESCRIPTION - LOCATION: LOCATION: LEG;FOOT

## 2018-01-29 ASSESSMENT — PAIN DESCRIPTION - PAIN TYPE: TYPE: CHRONIC PAIN

## 2018-01-29 ASSESSMENT — PAIN DESCRIPTION - ORIENTATION: ORIENTATION: RIGHT;LEFT

## 2018-01-29 ASSESSMENT — PAIN DESCRIPTION - PROGRESSION: CLINICAL_PROGRESSION: GRADUALLY WORSENING

## 2018-01-30 ENCOUNTER — HOSPITAL ENCOUNTER (OUTPATIENT)
Dept: WOUND CARE | Age: 58
Discharge: HOME | End: 2018-01-30
Attending: CLINICAL NURSE SPECIALIST | Admitting: CLINICAL NURSE SPECIALIST

## 2018-02-01 ENCOUNTER — HOSPITAL ENCOUNTER (OUTPATIENT)
Dept: ONCOLOGY | Age: 58
Discharge: HOME | End: 2018-02-28
Attending: NURSE PRACTITIONER | Admitting: NURSE PRACTITIONER

## 2018-02-02 ENCOUNTER — HOSPITAL ENCOUNTER (OUTPATIENT)
Dept: ONCOLOGY | Age: 58
Discharge: HOME | End: 2018-02-03
Admitting: NURSE PRACTITIONER

## 2018-02-02 VITALS
DIASTOLIC BLOOD PRESSURE: 83 MMHG | BODY MASS INDEX: 38.92 KG/M2 | SYSTOLIC BLOOD PRESSURE: 125 MMHG | RESPIRATION RATE: 18 BRPM | HEART RATE: 77 BPM | TEMPERATURE: 97.8 F | WEIGHT: 287 LBS

## 2018-02-02 DIAGNOSIS — E83.59 OTHER DISORDER OF CALCIUM METABOLISM: ICD-10-CM

## 2018-02-02 DIAGNOSIS — E83.59 CALCIPHYLAXIS: ICD-10-CM

## 2018-02-02 LAB
FERRITIN: 23.2 NG/ML (ref 30–400)
HCT VFR BLD CALC: 28.4 % (ref 40.5–52.5)
HEMOGLOBIN: 9.2 G/DL (ref 13.5–17.5)
IRON SATURATION: 9 % (ref 20–50)
IRON: 24 UG/DL (ref 59–158)
TOTAL IRON BINDING CAPACITY: 262 UG/DL (ref 260–445)

## 2018-02-02 RX ORDER — ONDANSETRON 2 MG/ML
4 INJECTION INTRAMUSCULAR; INTRAVENOUS ONCE
Status: COMPLETED | OUTPATIENT
Start: 2018-02-02 | End: 2018-02-02

## 2018-02-02 RX ADMIN — ONDANSETRON 4 MG: 2 INJECTION INTRAMUSCULAR; INTRAVENOUS at 14:06

## 2018-02-09 ENCOUNTER — HOSPITAL ENCOUNTER (OUTPATIENT)
Dept: ONCOLOGY | Age: 58
Discharge: HOME | End: 2018-02-10
Admitting: NURSE PRACTITIONER

## 2018-02-09 VITALS
RESPIRATION RATE: 18 BRPM | DIASTOLIC BLOOD PRESSURE: 62 MMHG | SYSTOLIC BLOOD PRESSURE: 102 MMHG | HEART RATE: 69 BPM | TEMPERATURE: 97 F

## 2018-02-09 DIAGNOSIS — E83.59 CALCIPHYLAXIS: ICD-10-CM

## 2018-02-09 DIAGNOSIS — E83.59 OTHER DISORDER OF CALCIUM METABOLISM: ICD-10-CM

## 2018-02-09 LAB
HCT VFR BLD CALC: 27.9 % (ref 40.5–52.5)
HEMOGLOBIN: 8.9 G/DL (ref 13.5–17.5)

## 2018-02-09 RX ORDER — ONDANSETRON 2 MG/ML
4 INJECTION INTRAMUSCULAR; INTRAVENOUS ONCE
Status: COMPLETED | OUTPATIENT
Start: 2018-02-09 | End: 2018-02-09

## 2018-02-09 RX ADMIN — ONDANSETRON 4 MG: 2 INJECTION INTRAMUSCULAR; INTRAVENOUS at 13:41

## 2018-02-09 ASSESSMENT — PAIN - FUNCTIONAL ASSESSMENT: PAIN_FUNCTIONAL_ASSESSMENT: 0-10

## 2018-02-09 ASSESSMENT — PAIN DESCRIPTION - DESCRIPTORS: DESCRIPTORS: ACHING

## 2018-02-09 NOTE — PROGRESS NOTES
Tolerates infusion well Medicated with aranesp 125 micrograms as ordered for hemoglobin 8.9 discharged via wheelchair in stable condition

## 2018-02-12 ENCOUNTER — HOSPITAL ENCOUNTER (OUTPATIENT)
Dept: ONCOLOGY | Age: 58
Discharge: HOME | End: 2018-02-13
Admitting: NURSE PRACTITIONER

## 2018-02-12 VITALS — TEMPERATURE: 97.2 F

## 2018-02-12 DIAGNOSIS — E83.59 CALCIPHYLAXIS: ICD-10-CM

## 2018-02-12 RX ORDER — ONDANSETRON 2 MG/ML
4 INJECTION INTRAMUSCULAR; INTRAVENOUS ONCE
Status: COMPLETED | OUTPATIENT
Start: 2018-02-12 | End: 2018-02-12

## 2018-02-12 RX ADMIN — ONDANSETRON 4 MG: 2 INJECTION INTRAMUSCULAR; INTRAVENOUS at 13:30

## 2018-02-12 ASSESSMENT — PAIN - FUNCTIONAL ASSESSMENT: PAIN_FUNCTIONAL_ASSESSMENT: 0-10

## 2018-02-12 ASSESSMENT — PAIN DESCRIPTION - DESCRIPTORS: DESCRIPTORS: ACHING

## 2018-02-16 ENCOUNTER — HOSPITAL ENCOUNTER (OUTPATIENT)
Dept: ONCOLOGY | Age: 58
Discharge: HOME | End: 2018-02-17
Admitting: NURSE PRACTITIONER

## 2018-02-16 VITALS
HEIGHT: 72 IN | HEART RATE: 51 BPM | WEIGHT: 287 LBS | SYSTOLIC BLOOD PRESSURE: 90 MMHG | TEMPERATURE: 98.7 F | RESPIRATION RATE: 18 BRPM | DIASTOLIC BLOOD PRESSURE: 55 MMHG | BODY MASS INDEX: 38.87 KG/M2

## 2018-02-16 DIAGNOSIS — E83.59 CALCIPHYLAXIS: ICD-10-CM

## 2018-02-16 LAB
HCT VFR BLD CALC: 27 % (ref 40.5–52.5)
HEMOGLOBIN: 8.7 G/DL (ref 13.5–17.5)

## 2018-02-16 RX ORDER — ONDANSETRON 2 MG/ML
4 INJECTION INTRAMUSCULAR; INTRAVENOUS ONCE
Status: COMPLETED | OUTPATIENT
Start: 2018-02-16 | End: 2018-02-16

## 2018-02-16 RX ADMIN — ONDANSETRON 4 MG: 2 INJECTION INTRAMUSCULAR; INTRAVENOUS at 13:36

## 2018-02-16 ASSESSMENT — PAIN - FUNCTIONAL ASSESSMENT: PAIN_FUNCTIONAL_ASSESSMENT: 0-10

## 2018-02-23 ENCOUNTER — HOSPITAL ENCOUNTER (OUTPATIENT)
Dept: ONCOLOGY | Age: 58
Discharge: HOME | End: 2018-02-24
Admitting: NURSE PRACTITIONER

## 2018-02-23 VITALS
RESPIRATION RATE: 18 BRPM | HEART RATE: 68 BPM | SYSTOLIC BLOOD PRESSURE: 116 MMHG | TEMPERATURE: 98.6 F | HEIGHT: 72 IN | DIASTOLIC BLOOD PRESSURE: 69 MMHG

## 2018-02-23 DIAGNOSIS — E83.59 OTHER DISORDER OF CALCIUM METABOLISM: ICD-10-CM

## 2018-02-23 DIAGNOSIS — E83.59 CALCIPHYLAXIS: ICD-10-CM

## 2018-02-23 LAB
BASOPHILS ABSOLUTE: 0.2 K/UL (ref 0–0.2)
BASOPHILS RELATIVE PERCENT: 2.2 %
EOSINOPHILS ABSOLUTE: 0.1 K/UL (ref 0–0.6)
EOSINOPHILS RELATIVE PERCENT: 0.8 %
HCT VFR BLD CALC: 28.2 % (ref 40.5–52.5)
HEMOGLOBIN: 9.1 G/DL (ref 13.5–17.5)
LYMPHOCYTES ABSOLUTE: 0.9 K/UL (ref 1–5.1)
LYMPHOCYTES RELATIVE PERCENT: 11.2 %
MCH RBC QN AUTO: 27.9 PG (ref 26–34)
MCHC RBC AUTO-ENTMCNC: 32.3 G/DL (ref 31–36)
MCV RBC AUTO: 86.3 FL (ref 80–100)
MONOCYTES ABSOLUTE: 0.5 K/UL (ref 0–1.3)
MONOCYTES RELATIVE PERCENT: 6.8 %
NEUTROPHILS ABSOLUTE: 6.3 K/UL (ref 1.7–7.7)
NEUTROPHILS RELATIVE PERCENT: 79 %
PDW BLD-RTO: 19.9 % (ref 12.4–15.4)
PLATELET # BLD: 332 K/UL (ref 135–450)
PMV BLD AUTO: 8.3 FL (ref 5–10.5)
RBC # BLD: 3.28 M/UL (ref 4.2–5.9)
WBC # BLD: 7.9 K/UL (ref 4–11)

## 2018-02-23 RX ORDER — ONDANSETRON 2 MG/ML
4 INJECTION INTRAMUSCULAR; INTRAVENOUS ONCE
Status: COMPLETED | OUTPATIENT
Start: 2018-02-23 | End: 2018-02-23

## 2018-02-23 RX ADMIN — ONDANSETRON 4 MG: 2 INJECTION INTRAMUSCULAR; INTRAVENOUS at 14:07

## 2018-02-23 ASSESSMENT — PAIN DESCRIPTION - DESCRIPTORS: DESCRIPTORS: ACHING

## 2018-02-23 ASSESSMENT — PAIN - FUNCTIONAL ASSESSMENT: PAIN_FUNCTIONAL_ASSESSMENT: 0-10

## 2018-02-26 ENCOUNTER — HOSPITAL ENCOUNTER (OUTPATIENT)
Dept: ONCOLOGY | Age: 58
Discharge: HOME | End: 2018-02-27
Admitting: NURSE PRACTITIONER

## 2018-02-26 VITALS
RESPIRATION RATE: 18 BRPM | TEMPERATURE: 97.5 F | HEIGHT: 72 IN | HEART RATE: 60 BPM | BODY MASS INDEX: 36.16 KG/M2 | DIASTOLIC BLOOD PRESSURE: 72 MMHG | WEIGHT: 267 LBS | SYSTOLIC BLOOD PRESSURE: 116 MMHG

## 2018-02-26 DIAGNOSIS — E83.59 CALCIPHYLAXIS: ICD-10-CM

## 2018-02-26 RX ORDER — ONDANSETRON 2 MG/ML
4 INJECTION INTRAMUSCULAR; INTRAVENOUS ONCE
Status: COMPLETED | OUTPATIENT
Start: 2018-02-26 | End: 2018-02-26

## 2018-02-26 RX ADMIN — ONDANSETRON 4 MG: 2 INJECTION INTRAMUSCULAR; INTRAVENOUS at 13:46

## 2018-02-26 ASSESSMENT — PAIN - FUNCTIONAL ASSESSMENT: PAIN_FUNCTIONAL_ASSESSMENT: 0-10

## 2018-03-01 ENCOUNTER — HOSPITAL ENCOUNTER (OUTPATIENT)
Dept: ONCOLOGY | Age: 58
Discharge: HOME | End: 2018-03-31
Attending: NURSE PRACTITIONER | Admitting: NURSE PRACTITIONER

## 2018-03-02 ENCOUNTER — HOSPITAL ENCOUNTER (OUTPATIENT)
Dept: ONCOLOGY | Age: 58
Discharge: HOME | End: 2018-03-03
Admitting: NURSE PRACTITIONER

## 2018-03-02 VITALS
RESPIRATION RATE: 18 BRPM | HEART RATE: 70 BPM | SYSTOLIC BLOOD PRESSURE: 99 MMHG | TEMPERATURE: 97.6 F | DIASTOLIC BLOOD PRESSURE: 64 MMHG

## 2018-03-02 DIAGNOSIS — E83.59 OTHER DISORDER OF CALCIUM METABOLISM: ICD-10-CM

## 2018-03-02 DIAGNOSIS — E83.59 CALCIPHYLAXIS: ICD-10-CM

## 2018-03-02 LAB
HCT VFR BLD CALC: 29.7 % (ref 40.5–52.5)
HEMOGLOBIN: 9.5 G/DL (ref 13.5–17.5)

## 2018-03-02 RX ORDER — ONDANSETRON 2 MG/ML
4 INJECTION INTRAMUSCULAR; INTRAVENOUS ONCE
Status: COMPLETED | OUTPATIENT
Start: 2018-03-02 | End: 2018-03-02

## 2018-03-02 RX ADMIN — ONDANSETRON 4 MG: 2 INJECTION INTRAMUSCULAR; INTRAVENOUS at 13:10

## 2018-03-02 ASSESSMENT — PAIN DESCRIPTION - DESCRIPTORS: DESCRIPTORS: ACHING

## 2018-03-02 ASSESSMENT — PAIN - FUNCTIONAL ASSESSMENT: PAIN_FUNCTIONAL_ASSESSMENT: 0-10

## 2018-03-05 ENCOUNTER — HOSPITAL ENCOUNTER (OUTPATIENT)
Dept: ONCOLOGY | Age: 58
Discharge: HOME | End: 2018-03-06
Admitting: NURSE PRACTITIONER

## 2018-03-05 VITALS
HEART RATE: 50 BPM | DIASTOLIC BLOOD PRESSURE: 80 MMHG | RESPIRATION RATE: 18 BRPM | SYSTOLIC BLOOD PRESSURE: 125 MMHG | TEMPERATURE: 97 F

## 2018-03-05 DIAGNOSIS — E83.59 CALCIPHYLAXIS: ICD-10-CM

## 2018-03-05 RX ORDER — ONDANSETRON 2 MG/ML
4 INJECTION INTRAMUSCULAR; INTRAVENOUS ONCE
Status: COMPLETED | OUTPATIENT
Start: 2018-03-05 | End: 2018-03-05

## 2018-03-05 RX ADMIN — ONDANSETRON 4 MG: 2 INJECTION INTRAMUSCULAR; INTRAVENOUS at 13:44

## 2018-03-05 NOTE — PROGRESS NOTES
Patient has tolerated his infusion of sodium thio sulfate. Left in a wheelchair per transport staff in stable condition.

## 2018-03-12 ENCOUNTER — HOSPITAL ENCOUNTER (OUTPATIENT)
Dept: ONCOLOGY | Age: 58
Discharge: HOME | End: 2018-03-13
Admitting: NURSE PRACTITIONER

## 2018-03-12 VITALS
SYSTOLIC BLOOD PRESSURE: 124 MMHG | HEART RATE: 67 BPM | DIASTOLIC BLOOD PRESSURE: 83 MMHG | BODY MASS INDEX: 34.67 KG/M2 | HEIGHT: 72 IN | WEIGHT: 256 LBS | TEMPERATURE: 97.7 F | RESPIRATION RATE: 18 BRPM

## 2018-03-12 DIAGNOSIS — E83.59 CALCIPHYLAXIS: ICD-10-CM

## 2018-03-12 DIAGNOSIS — E83.59 OTHER DISORDER OF CALCIUM METABOLISM: ICD-10-CM

## 2018-03-12 LAB
HCT VFR BLD CALC: 30.9 % (ref 40.5–52.5)
HEMOGLOBIN: 9.9 G/DL (ref 13.5–17.5)

## 2018-03-12 RX ORDER — ONDANSETRON 2 MG/ML
4 INJECTION INTRAMUSCULAR; INTRAVENOUS ONCE
Status: COMPLETED | OUTPATIENT
Start: 2018-03-12 | End: 2018-03-12

## 2018-03-12 RX ADMIN — ONDANSETRON 4 MG: 2 INJECTION INTRAMUSCULAR; INTRAVENOUS at 13:19

## 2018-03-12 ASSESSMENT — PAIN - FUNCTIONAL ASSESSMENT: PAIN_FUNCTIONAL_ASSESSMENT: 0-10

## 2018-03-12 NOTE — PROGRESS NOTES
Patient tolerates sodium thiosulfate well Hemoglobin 9.9 aranesp  125 micrograms given as ordered patient discharged via wheelchair in stable condition

## 2018-04-01 ENCOUNTER — HOSPITAL ENCOUNTER (OUTPATIENT)
Dept: ONCOLOGY | Age: 58
Discharge: HOME | End: 2018-04-30
Attending: NURSE PRACTITIONER | Admitting: NURSE PRACTITIONER

## 2018-04-04 ENCOUNTER — HOSPITAL ENCOUNTER (OUTPATIENT)
Dept: ONCOLOGY | Age: 58
Discharge: HOME | End: 2018-04-05
Admitting: NURSE PRACTITIONER

## 2018-04-04 VITALS
WEIGHT: 256 LBS | HEIGHT: 72 IN | RESPIRATION RATE: 18 BRPM | TEMPERATURE: 98.3 F | DIASTOLIC BLOOD PRESSURE: 72 MMHG | SYSTOLIC BLOOD PRESSURE: 105 MMHG | BODY MASS INDEX: 34.67 KG/M2 | HEART RATE: 62 BPM

## 2018-04-04 DIAGNOSIS — E83.59 CALCIPHYLAXIS: ICD-10-CM

## 2018-04-04 DIAGNOSIS — E83.59 OTHER DISORDER OF CALCIUM METABOLISM: ICD-10-CM

## 2018-04-04 LAB
HCT VFR BLD CALC: 31.3 % (ref 40.5–52.5)
HEMOGLOBIN: 10.1 G/DL (ref 13.5–17.5)

## 2018-04-04 RX ORDER — ONDANSETRON 2 MG/ML
4 INJECTION INTRAMUSCULAR; INTRAVENOUS ONCE
Status: COMPLETED | OUTPATIENT
Start: 2018-04-04 | End: 2018-04-04

## 2018-04-04 RX ADMIN — ONDANSETRON 4 MG: 2 INJECTION INTRAMUSCULAR; INTRAVENOUS at 13:04

## 2018-04-04 ASSESSMENT — PAIN - FUNCTIONAL ASSESSMENT: PAIN_FUNCTIONAL_ASSESSMENT: 0-10

## 2018-04-09 ENCOUNTER — HOSPITAL ENCOUNTER (OUTPATIENT)
Dept: ONCOLOGY | Age: 58
Discharge: HOME | End: 2018-04-10
Admitting: NURSE PRACTITIONER

## 2018-04-09 VITALS
BODY MASS INDEX: 34.67 KG/M2 | TEMPERATURE: 97.1 F | WEIGHT: 256 LBS | RESPIRATION RATE: 18 BRPM | DIASTOLIC BLOOD PRESSURE: 82 MMHG | HEART RATE: 93 BPM | SYSTOLIC BLOOD PRESSURE: 108 MMHG | HEIGHT: 72 IN

## 2018-04-09 DIAGNOSIS — E83.59 CALCIPHYLAXIS: ICD-10-CM

## 2018-04-09 RX ORDER — ONDANSETRON 2 MG/ML
4 INJECTION INTRAMUSCULAR; INTRAVENOUS ONCE
Status: COMPLETED | OUTPATIENT
Start: 2018-04-09 | End: 2018-04-09

## 2018-04-09 RX ADMIN — ONDANSETRON 4 MG: 2 INJECTION INTRAMUSCULAR; INTRAVENOUS at 12:26

## 2018-04-09 ASSESSMENT — PAIN - FUNCTIONAL ASSESSMENT: PAIN_FUNCTIONAL_ASSESSMENT: 0-10

## 2018-04-18 ENCOUNTER — HOSPITAL ENCOUNTER (OUTPATIENT)
Dept: ONCOLOGY | Age: 58
Discharge: HOME | End: 2018-04-19
Admitting: NURSE PRACTITIONER

## 2018-04-18 VITALS
HEIGHT: 72 IN | SYSTOLIC BLOOD PRESSURE: 145 MMHG | RESPIRATION RATE: 18 BRPM | BODY MASS INDEX: 34.67 KG/M2 | DIASTOLIC BLOOD PRESSURE: 93 MMHG | WEIGHT: 256 LBS | HEART RATE: 60 BPM | TEMPERATURE: 97 F

## 2018-04-18 DIAGNOSIS — E83.59 OTHER DISORDER OF CALCIUM METABOLISM: ICD-10-CM

## 2018-04-18 DIAGNOSIS — E83.59 CALCIPHYLAXIS: ICD-10-CM

## 2018-04-18 LAB
BASOPHILS ABSOLUTE: 0.1 K/UL (ref 0–0.2)
BASOPHILS RELATIVE PERCENT: 1.7 %
EOSINOPHILS ABSOLUTE: 0.1 K/UL (ref 0–0.6)
EOSINOPHILS RELATIVE PERCENT: 1.5 %
HCT VFR BLD CALC: 32.3 % (ref 40.5–52.5)
HEMOGLOBIN: 10.5 G/DL (ref 13.5–17.5)
LYMPHOCYTES ABSOLUTE: 1.4 K/UL (ref 1–5.1)
LYMPHOCYTES RELATIVE PERCENT: 17.6 %
MCH RBC QN AUTO: 28 PG (ref 26–34)
MCHC RBC AUTO-ENTMCNC: 32.4 G/DL (ref 31–36)
MCV RBC AUTO: 86.2 FL (ref 80–100)
MONOCYTES ABSOLUTE: 0.5 K/UL (ref 0–1.3)
MONOCYTES RELATIVE PERCENT: 6.9 %
NEUTROPHILS ABSOLUTE: 5.6 K/UL (ref 1.7–7.7)
NEUTROPHILS RELATIVE PERCENT: 72.3 %
PDW BLD-RTO: 20.6 % (ref 12.4–15.4)
PLATELET # BLD: 348 K/UL (ref 135–450)
PMV BLD AUTO: 8 FL (ref 5–10.5)
RBC # BLD: 3.75 M/UL (ref 4.2–5.9)
WBC # BLD: 7.8 K/UL (ref 4–11)

## 2018-04-18 RX ORDER — ONDANSETRON 2 MG/ML
4 INJECTION INTRAMUSCULAR; INTRAVENOUS ONCE
Status: COMPLETED | OUTPATIENT
Start: 2018-04-18 | End: 2018-04-18

## 2018-04-18 RX ADMIN — ONDANSETRON 4 MG: 2 INJECTION INTRAMUSCULAR; INTRAVENOUS at 13:20

## 2018-04-18 ASSESSMENT — PAIN - FUNCTIONAL ASSESSMENT: PAIN_FUNCTIONAL_ASSESSMENT: 0-10

## 2018-04-20 ENCOUNTER — HOSPITAL ENCOUNTER (OUTPATIENT)
Dept: ONCOLOGY | Age: 58
Discharge: HOME | End: 2018-04-21
Admitting: NURSE PRACTITIONER

## 2018-04-20 VITALS
RESPIRATION RATE: 18 BRPM | DIASTOLIC BLOOD PRESSURE: 91 MMHG | SYSTOLIC BLOOD PRESSURE: 125 MMHG | TEMPERATURE: 97.6 F | HEART RATE: 57 BPM | BODY MASS INDEX: 34.67 KG/M2 | WEIGHT: 256 LBS | HEIGHT: 72 IN

## 2018-04-20 DIAGNOSIS — E83.59 CALCIPHYLAXIS: ICD-10-CM

## 2018-04-20 RX ORDER — ONDANSETRON 2 MG/ML
4 INJECTION INTRAMUSCULAR; INTRAVENOUS ONCE
Status: COMPLETED | OUTPATIENT
Start: 2018-04-20 | End: 2018-04-20

## 2018-04-20 RX ADMIN — ONDANSETRON 4 MG: 2 INJECTION INTRAMUSCULAR; INTRAVENOUS at 13:40

## 2018-04-20 ASSESSMENT — PAIN - FUNCTIONAL ASSESSMENT: PAIN_FUNCTIONAL_ASSESSMENT: 0-10

## 2018-04-30 ENCOUNTER — HOSPITAL ENCOUNTER (OUTPATIENT)
Dept: ONCOLOGY | Age: 58
Discharge: HOME | End: 2018-05-01
Admitting: NURSE PRACTITIONER

## 2018-04-30 VITALS
HEART RATE: 67 BPM | HEIGHT: 72 IN | RESPIRATION RATE: 18 BRPM | BODY MASS INDEX: 34.67 KG/M2 | TEMPERATURE: 97.5 F | SYSTOLIC BLOOD PRESSURE: 138 MMHG | DIASTOLIC BLOOD PRESSURE: 91 MMHG | WEIGHT: 256 LBS

## 2018-04-30 DIAGNOSIS — E83.59 OTHER DISORDER OF CALCIUM METABOLISM: ICD-10-CM

## 2018-04-30 DIAGNOSIS — E83.59 CALCIPHYLAXIS: ICD-10-CM

## 2018-04-30 LAB
ANISOCYTOSIS: ABNORMAL
BANDED NEUTROPHILS RELATIVE PERCENT: 2 % (ref 0–7)
BASOPHILS ABSOLUTE: 0.2 K/UL (ref 0–0.2)
BASOPHILS RELATIVE PERCENT: 3 %
EOSINOPHILS ABSOLUTE: 0.3 K/UL (ref 0–0.6)
EOSINOPHILS RELATIVE PERCENT: 5 %
HCT VFR BLD CALC: 31.8 % (ref 40.5–52.5)
HEMOGLOBIN: 10.2 G/DL (ref 13.5–17.5)
LYMPHOCYTES ABSOLUTE: 1.1 K/UL (ref 1–5.1)
LYMPHOCYTES RELATIVE PERCENT: 18 %
MCH RBC QN AUTO: 28.4 PG (ref 26–34)
MCHC RBC AUTO-ENTMCNC: 32.2 G/DL (ref 31–36)
MCV RBC AUTO: 88.2 FL (ref 80–100)
MONOCYTES ABSOLUTE: 0.4 K/UL (ref 0–1.3)
MONOCYTES RELATIVE PERCENT: 6 %
NEUTROPHILS ABSOLUTE: 4.1 K/UL (ref 1.7–7.7)
NEUTROPHILS RELATIVE PERCENT: 66 %
PDW BLD-RTO: 20.2 % (ref 12.4–15.4)
PLATELET # BLD: 301 K/UL (ref 135–450)
PLATELET SLIDE REVIEW: ADEQUATE
PMV BLD AUTO: 8.2 FL (ref 5–10.5)
POIKILOCYTES: ABNORMAL
POLYCHROMASIA: ABNORMAL
RBC # BLD: 3.6 M/UL (ref 4.2–5.9)
WBC # BLD: 6.1 K/UL (ref 4–11)

## 2018-04-30 RX ORDER — ONDANSETRON 2 MG/ML
4 INJECTION INTRAMUSCULAR; INTRAVENOUS ONCE
Status: COMPLETED | OUTPATIENT
Start: 2018-04-30 | End: 2018-04-30

## 2018-04-30 RX ADMIN — ONDANSETRON 4 MG: 2 INJECTION INTRAMUSCULAR; INTRAVENOUS at 13:10

## 2018-04-30 ASSESSMENT — PAIN - FUNCTIONAL ASSESSMENT: PAIN_FUNCTIONAL_ASSESSMENT: 0-10

## 2018-05-01 ENCOUNTER — HOSPITAL ENCOUNTER (OUTPATIENT)
Dept: ONCOLOGY | Age: 58
Discharge: HOME | End: 2018-05-31
Attending: NURSE PRACTITIONER | Admitting: NURSE PRACTITIONER

## 2018-05-02 ENCOUNTER — HOSPITAL ENCOUNTER (OUTPATIENT)
Dept: ONCOLOGY | Age: 58
Discharge: HOME | End: 2018-05-03
Admitting: NURSE PRACTITIONER

## 2018-05-02 VITALS
HEART RATE: 98 BPM | SYSTOLIC BLOOD PRESSURE: 126 MMHG | HEIGHT: 72 IN | RESPIRATION RATE: 18 BRPM | DIASTOLIC BLOOD PRESSURE: 87 MMHG | BODY MASS INDEX: 34.67 KG/M2 | WEIGHT: 256 LBS | TEMPERATURE: 98.4 F

## 2018-05-02 DIAGNOSIS — E83.59 CALCIPHYLAXIS: ICD-10-CM

## 2018-05-02 RX ORDER — ONDANSETRON 2 MG/ML
4 INJECTION INTRAMUSCULAR; INTRAVENOUS ONCE
Status: COMPLETED | OUTPATIENT
Start: 2018-05-02 | End: 2018-05-02

## 2018-05-02 RX ADMIN — ONDANSETRON 4 MG: 2 INJECTION INTRAMUSCULAR; INTRAVENOUS at 12:54

## 2018-05-02 ASSESSMENT — PAIN - FUNCTIONAL ASSESSMENT: PAIN_FUNCTIONAL_ASSESSMENT: 0-10

## 2018-05-09 ENCOUNTER — HOSPITAL ENCOUNTER (OUTPATIENT)
Dept: ONCOLOGY | Age: 58
Discharge: HOME | End: 2018-05-10
Admitting: NURSE PRACTITIONER

## 2018-05-09 VITALS
HEIGHT: 72 IN | TEMPERATURE: 98.7 F | DIASTOLIC BLOOD PRESSURE: 83 MMHG | RESPIRATION RATE: 18 BRPM | BODY MASS INDEX: 34.67 KG/M2 | WEIGHT: 256 LBS | HEART RATE: 67 BPM | SYSTOLIC BLOOD PRESSURE: 124 MMHG

## 2018-05-09 DIAGNOSIS — E83.59 OTHER DISORDER OF CALCIUM METABOLISM: ICD-10-CM

## 2018-05-09 DIAGNOSIS — E83.59 CALCIPHYLAXIS: ICD-10-CM

## 2018-05-09 LAB
HCT VFR BLD CALC: 30.7 % (ref 40.5–52.5)
HEMOGLOBIN: 10 G/DL (ref 13.5–17.5)

## 2018-05-09 RX ORDER — ONDANSETRON 2 MG/ML
4 INJECTION INTRAMUSCULAR; INTRAVENOUS ONCE
Status: COMPLETED | OUTPATIENT
Start: 2018-05-09 | End: 2018-05-09

## 2018-05-09 RX ADMIN — ONDANSETRON 4 MG: 2 INJECTION INTRAMUSCULAR; INTRAVENOUS at 12:56

## 2018-05-09 ASSESSMENT — PAIN - FUNCTIONAL ASSESSMENT: PAIN_FUNCTIONAL_ASSESSMENT: 0-10

## 2018-05-11 ENCOUNTER — HOSPITAL ENCOUNTER (OUTPATIENT)
Dept: ONCOLOGY | Age: 58
Discharge: HOME | End: 2018-05-12
Admitting: NURSE PRACTITIONER

## 2018-05-11 VITALS
RESPIRATION RATE: 18 BRPM | WEIGHT: 256 LBS | DIASTOLIC BLOOD PRESSURE: 80 MMHG | BODY MASS INDEX: 34.67 KG/M2 | HEART RATE: 60 BPM | SYSTOLIC BLOOD PRESSURE: 113 MMHG | TEMPERATURE: 98 F | HEIGHT: 72 IN

## 2018-05-11 DIAGNOSIS — E83.59 CALCIPHYLAXIS: ICD-10-CM

## 2018-05-11 RX ORDER — ONDANSETRON 2 MG/ML
4 INJECTION INTRAMUSCULAR; INTRAVENOUS ONCE
Status: COMPLETED | OUTPATIENT
Start: 2018-05-11 | End: 2018-05-11

## 2018-05-11 RX ADMIN — ONDANSETRON 4 MG: 2 INJECTION INTRAMUSCULAR; INTRAVENOUS at 12:50

## 2018-05-11 ASSESSMENT — PAIN - FUNCTIONAL ASSESSMENT: PAIN_FUNCTIONAL_ASSESSMENT: 0-10

## 2018-05-16 ENCOUNTER — HOSPITAL ENCOUNTER (OUTPATIENT)
Dept: ONCOLOGY | Age: 58
Discharge: HOME | End: 2018-05-17
Admitting: NURSE PRACTITIONER

## 2018-05-16 VITALS
WEIGHT: 256 LBS | BODY MASS INDEX: 34.67 KG/M2 | TEMPERATURE: 97.8 F | RESPIRATION RATE: 18 BRPM | HEIGHT: 72 IN | SYSTOLIC BLOOD PRESSURE: 140 MMHG | HEART RATE: 92 BPM | DIASTOLIC BLOOD PRESSURE: 93 MMHG

## 2018-05-16 DIAGNOSIS — E83.59 OTHER DISORDER OF CALCIUM METABOLISM: ICD-10-CM

## 2018-05-16 DIAGNOSIS — E83.59 CALCIPHYLAXIS: ICD-10-CM

## 2018-05-16 LAB
HCT VFR BLD CALC: 31.3 % (ref 40.5–52.5)
HEMOGLOBIN: 10.3 G/DL (ref 13.5–17.5)

## 2018-05-16 RX ORDER — ONDANSETRON 2 MG/ML
4 INJECTION INTRAMUSCULAR; INTRAVENOUS ONCE
Status: COMPLETED | OUTPATIENT
Start: 2018-05-16 | End: 2018-05-16

## 2018-05-16 RX ADMIN — ONDANSETRON 4 MG: 2 INJECTION INTRAMUSCULAR; INTRAVENOUS at 13:33

## 2018-05-16 ASSESSMENT — PAIN - FUNCTIONAL ASSESSMENT: PAIN_FUNCTIONAL_ASSESSMENT: 0-10

## 2018-05-21 ENCOUNTER — HOSPITAL ENCOUNTER (OUTPATIENT)
Dept: ONCOLOGY | Age: 58
Discharge: HOME | End: 2018-05-22
Admitting: NURSE PRACTITIONER

## 2018-05-21 VITALS
WEIGHT: 256 LBS | HEART RATE: 89 BPM | RESPIRATION RATE: 16 BRPM | BODY MASS INDEX: 34.67 KG/M2 | HEIGHT: 72 IN | DIASTOLIC BLOOD PRESSURE: 95 MMHG | TEMPERATURE: 97.9 F | SYSTOLIC BLOOD PRESSURE: 139 MMHG

## 2018-05-21 DIAGNOSIS — E83.59 CALCIPHYLAXIS: ICD-10-CM

## 2018-05-21 RX ORDER — ONDANSETRON 2 MG/ML
4 INJECTION INTRAMUSCULAR; INTRAVENOUS ONCE
Status: COMPLETED | OUTPATIENT
Start: 2018-05-21 | End: 2018-05-21

## 2018-05-21 RX ADMIN — ONDANSETRON 4 MG: 2 INJECTION INTRAMUSCULAR; INTRAVENOUS at 12:58

## 2018-05-21 ASSESSMENT — PAIN - FUNCTIONAL ASSESSMENT: PAIN_FUNCTIONAL_ASSESSMENT: 0-10

## 2018-05-23 ENCOUNTER — HOSPITAL ENCOUNTER (OUTPATIENT)
Dept: ONCOLOGY | Age: 58
Discharge: HOME | End: 2018-05-24
Admitting: NURSE PRACTITIONER

## 2018-05-23 VITALS
RESPIRATION RATE: 18 BRPM | SYSTOLIC BLOOD PRESSURE: 144 MMHG | BODY MASS INDEX: 34.67 KG/M2 | HEIGHT: 72 IN | DIASTOLIC BLOOD PRESSURE: 93 MMHG | HEART RATE: 61 BPM | TEMPERATURE: 97.6 F | WEIGHT: 256 LBS

## 2018-05-23 DIAGNOSIS — E83.59 OTHER DISORDER OF CALCIUM METABOLISM: ICD-10-CM

## 2018-05-23 DIAGNOSIS — E83.59 CALCIPHYLAXIS: ICD-10-CM

## 2018-05-23 LAB
BASOPHILS ABSOLUTE: 0.1 K/UL (ref 0–0.2)
BASOPHILS RELATIVE PERCENT: 2.3 %
EOSINOPHILS ABSOLUTE: 0.1 K/UL (ref 0–0.6)
EOSINOPHILS RELATIVE PERCENT: 2.4 %
HCT VFR BLD CALC: 31.7 % (ref 40.5–52.5)
HEMOGLOBIN: 10.3 G/DL (ref 13.5–17.5)
LYMPHOCYTES ABSOLUTE: 1.2 K/UL (ref 1–5.1)
LYMPHOCYTES RELATIVE PERCENT: 20.6 %
MCH RBC QN AUTO: 27.7 PG (ref 26–34)
MCHC RBC AUTO-ENTMCNC: 32.4 G/DL (ref 31–36)
MCV RBC AUTO: 85.3 FL (ref 80–100)
MONOCYTES ABSOLUTE: 0.5 K/UL (ref 0–1.3)
MONOCYTES RELATIVE PERCENT: 8.3 %
NEUTROPHILS ABSOLUTE: 3.9 K/UL (ref 1.7–7.7)
NEUTROPHILS RELATIVE PERCENT: 66.4 %
PDW BLD-RTO: 19.5 % (ref 12.4–15.4)
PLATELET # BLD: 307 K/UL (ref 135–450)
PMV BLD AUTO: 7.9 FL (ref 5–10.5)
RBC # BLD: 3.72 M/UL (ref 4.2–5.9)
WBC # BLD: 5.9 K/UL (ref 4–11)

## 2018-05-23 RX ORDER — ONDANSETRON 2 MG/ML
4 INJECTION INTRAMUSCULAR; INTRAVENOUS ONCE
Status: COMPLETED | OUTPATIENT
Start: 2018-05-23 | End: 2018-05-23

## 2018-05-23 RX ADMIN — ONDANSETRON 4 MG: 2 INJECTION INTRAMUSCULAR; INTRAVENOUS at 13:42

## 2018-05-23 ASSESSMENT — PAIN - FUNCTIONAL ASSESSMENT: PAIN_FUNCTIONAL_ASSESSMENT: 0-10

## 2018-05-30 ENCOUNTER — HOSPITAL ENCOUNTER (OUTPATIENT)
Dept: ONCOLOGY | Age: 58
Discharge: HOME | End: 2018-06-30
Admitting: NURSE PRACTITIONER

## 2018-06-01 ENCOUNTER — HOSPITAL ENCOUNTER (OUTPATIENT)
Dept: ONCOLOGY | Age: 58
Discharge: HOME | End: 2018-06-01
Attending: NURSE PRACTITIONER | Admitting: NURSE PRACTITIONER

## 2018-06-04 ENCOUNTER — HOSPITAL ENCOUNTER (OUTPATIENT)
Dept: ONCOLOGY | Age: 58
Discharge: HOME | End: 2018-06-05
Admitting: NURSE PRACTITIONER

## 2018-06-04 VITALS
HEART RATE: 70 BPM | TEMPERATURE: 97.9 F | DIASTOLIC BLOOD PRESSURE: 82 MMHG | SYSTOLIC BLOOD PRESSURE: 124 MMHG | WEIGHT: 256 LBS | BODY MASS INDEX: 34.67 KG/M2 | RESPIRATION RATE: 18 BRPM | HEIGHT: 72 IN

## 2018-06-04 DIAGNOSIS — E83.59 CALCIPHYLAXIS: ICD-10-CM

## 2018-06-04 LAB
HCT VFR BLD CALC: 34.3 % (ref 40.5–52.5)
HEMOGLOBIN: 11.3 G/DL (ref 13.5–17.5)
MCH RBC QN AUTO: 28.1 PG (ref 26–34)
MCHC RBC AUTO-ENTMCNC: 33.1 G/DL (ref 31–36)
MCV RBC AUTO: 85 FL (ref 80–100)
PDW BLD-RTO: 19 % (ref 12.4–15.4)
PLATELET # BLD: 288 K/UL (ref 135–450)
PMV BLD AUTO: 8 FL (ref 5–10.5)
RBC # BLD: 4.03 M/UL (ref 4.2–5.9)
WBC # BLD: 5.9 K/UL (ref 4–11)

## 2018-06-08 ENCOUNTER — HOSPITAL ENCOUNTER (OUTPATIENT)
Dept: ONCOLOGY | Age: 58
Discharge: HOME | End: 2018-06-09
Admitting: NURSE PRACTITIONER

## 2018-06-08 VITALS
SYSTOLIC BLOOD PRESSURE: 111 MMHG | BODY MASS INDEX: 34.67 KG/M2 | RESPIRATION RATE: 18 BRPM | HEIGHT: 72 IN | WEIGHT: 256 LBS | DIASTOLIC BLOOD PRESSURE: 75 MMHG | HEART RATE: 86 BPM | TEMPERATURE: 97.8 F

## 2018-06-08 DIAGNOSIS — E83.59 CALCIPHYLAXIS: ICD-10-CM

## 2018-06-08 RX ORDER — ONDANSETRON 2 MG/ML
4 INJECTION INTRAMUSCULAR; INTRAVENOUS ONCE
Status: COMPLETED | OUTPATIENT
Start: 2018-06-08 | End: 2018-06-08

## 2018-06-08 RX ADMIN — ONDANSETRON 4 MG: 2 INJECTION INTRAMUSCULAR; INTRAVENOUS at 13:53

## 2018-06-08 ASSESSMENT — PAIN - FUNCTIONAL ASSESSMENT: PAIN_FUNCTIONAL_ASSESSMENT: 0-10

## 2018-07-01 ENCOUNTER — HOSPITAL ENCOUNTER (OUTPATIENT)
Dept: ONCOLOGY | Age: 58
Discharge: HOME | End: 2018-07-01
Attending: NURSE PRACTITIONER | Admitting: NURSE PRACTITIONER

## 2018-07-29 ENCOUNTER — APPOINTMENT (OUTPATIENT)
Dept: GENERAL RADIOLOGY | Age: 58
End: 2018-07-29
Payer: MEDICARE

## 2018-07-29 ENCOUNTER — APPOINTMENT (OUTPATIENT)
Dept: CT IMAGING | Age: 58
End: 2018-07-29
Payer: MEDICARE

## 2018-07-29 ENCOUNTER — HOSPITAL ENCOUNTER (EMERGENCY)
Age: 58
Discharge: HOME | End: 2018-07-29
Attending: EMERGENCY MEDICINE
Payer: MEDICARE

## 2018-07-29 VITALS
BODY MASS INDEX: 35.21 KG/M2 | OXYGEN SATURATION: 99 % | SYSTOLIC BLOOD PRESSURE: 101 MMHG | WEIGHT: 260 LBS | HEIGHT: 72 IN | TEMPERATURE: 98.5 F | HEART RATE: 110 BPM | DIASTOLIC BLOOD PRESSURE: 74 MMHG | RESPIRATION RATE: 15 BRPM

## 2018-07-29 DIAGNOSIS — Z86.79 HISTORY OF ATRIAL FIBRILLATION: ICD-10-CM

## 2018-07-29 DIAGNOSIS — S09.90XA CLOSED HEAD INJURY, INITIAL ENCOUNTER: ICD-10-CM

## 2018-07-29 DIAGNOSIS — S52.591A OTHER CLOSED FRACTURE OF DISTAL END OF RIGHT RADIUS, INITIAL ENCOUNTER: ICD-10-CM

## 2018-07-29 DIAGNOSIS — M25.531 RIGHT WRIST PAIN: ICD-10-CM
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
## 2018-07-29 LAB
A/G RATIO: 1.1 (ref 1.1–2.2)
ALBUMIN SERPL-MCNC: 3.8 G/DL (ref 3.4–5)
ALP BLD-CCNC: 140 U/L (ref 40–129)
ALT SERPL-CCNC: 6 U/L (ref 10–40)
ANION GAP SERPL CALCULATED.3IONS-SCNC: 12 MMOL/L (ref 3–16)
AST SERPL-CCNC: 10 U/L (ref 15–37)
BACTERIA: ABNORMAL /HPF
BASOPHILS ABSOLUTE: 0.2 K/UL (ref 0–0.2)
BASOPHILS RELATIVE PERCENT: 2.2 %
BILIRUB SERPL-MCNC: 0.4 MG/DL (ref 0–1)
BILIRUBIN URINE: NEGATIVE
BLOOD, URINE: NEGATIVE
BUN BLDV-MCNC: 44 MG/DL (ref 7–20)
CALCIUM SERPL-MCNC: 8.9 MG/DL (ref 8.3–10.6)
CHLORIDE BLD-SCNC: 106 MMOL/L (ref 99–110)
CLARITY: CLEAR
CO2: 17 MMOL/L (ref 21–32)
COLOR: YELLOW
CREAT SERPL-MCNC: 3.8 MG/DL (ref 0.9–1.3)
EKG ATRIAL RATE: 102 BPM
EKG DIAGNOSIS: NORMAL
EKG Q-T INTERVAL: 366 MS
EKG QRS DURATION: 88 MS
EKG QTC CALCULATION (BAZETT): 447 MS
EKG R AXIS: -2 DEGREES
EKG T AXIS: 35 DEGREES
EKG VENTRICULAR RATE: 90 BPM
EOSINOPHILS ABSOLUTE: 0.1 K/UL (ref 0–0.6)
EOSINOPHILS RELATIVE PERCENT: 1.7 %
GFR AFRICAN AMERICAN: 20
GFR NON-AFRICAN AMERICAN: 16
GLOBULIN: 3.5 G/DL
GLUCOSE BLD-MCNC: 199 MG/DL (ref 70–99)
GLUCOSE URINE: 100 MG/DL
HCT VFR BLD CALC: 34.3 % (ref 40.5–52.5)
HEMOGLOBIN: 11.4 G/DL (ref 13.5–17.5)
INR BLD: 1.19 (ref 0.86–1.14)
KETONES, URINE: NEGATIVE MG/DL
LEUKOCYTE ESTERASE, URINE: NEGATIVE
LYMPHOCYTES ABSOLUTE: 0.8 K/UL (ref 1–5.1)
LYMPHOCYTES RELATIVE PERCENT: 10.8 %
MCH RBC QN AUTO: 28.4 PG (ref 26–34)
MCHC RBC AUTO-ENTMCNC: 33.3 G/DL (ref 31–36)
MCV RBC AUTO: 85.2 FL (ref 80–100)
MICROSCOPIC EXAMINATION: YES
MONOCYTES ABSOLUTE: 0.5 K/UL (ref 0–1.3)
MONOCYTES RELATIVE PERCENT: 6.7 %
NEUTROPHILS ABSOLUTE: 6 K/UL (ref 1.7–7.7)
NEUTROPHILS RELATIVE PERCENT: 78.6 %
NITRITE, URINE: NEGATIVE
PDW BLD-RTO: 19.1 % (ref 12.4–15.4)
PH UA: 6
PLATELET # BLD: 316 K/UL (ref 135–450)
PMV BLD AUTO: 7.8 FL (ref 5–10.5)
POTASSIUM SERPL-SCNC: 5 MMOL/L (ref 3.5–5.1)
PROTEIN UA: >=300 MG/DL
PROTHROMBIN TIME: 13.6 SEC (ref 9.8–13)
RBC # BLD: 4.03 M/UL (ref 4.2–5.9)
RBC UA: ABNORMAL /HPF (ref 0–2)
SODIUM BLD-SCNC: 135 MMOL/L (ref 136–145)
SPECIFIC GRAVITY UA: 1.02
TOTAL PROTEIN: 7.3 G/DL (ref 6.4–8.2)
TROPONIN: 0.02 NG/ML
TROPONIN: 0.03 NG/ML
URINE REFLEX TO CULTURE: ABNORMAL
URINE TYPE: ABNORMAL
UROBILINOGEN, URINE: 0.2 E.U./DL
WBC # BLD: 7.7 K/UL (ref 4–11)
WBC UA: ABNORMAL /HPF (ref 0–5)

## 2018-07-29 PROCEDURE — 99284 EMERGENCY DEPT VISIT MOD MDM: CPT

## 2018-07-29 PROCEDURE — 96375 TX/PRO/DX INJ NEW DRUG ADDON: CPT

## 2018-07-29 PROCEDURE — 71046 X-RAY EXAM CHEST 2 VIEWS: CPT

## 2018-07-29 PROCEDURE — 2580000003 HC RX 258: Performed by: NURSE PRACTITIONER

## 2018-07-29 PROCEDURE — 73100 X-RAY EXAM OF WRIST: CPT

## 2018-07-29 PROCEDURE — 81001 URINALYSIS AUTO W/SCOPE: CPT

## 2018-07-29 PROCEDURE — 84484 ASSAY OF TROPONIN QUANT: CPT

## 2018-07-29 PROCEDURE — 4500000024 HC ED LEVEL 4 PROCEDURE

## 2018-07-29 PROCEDURE — 2580000003 HC RX 258: Performed by: EMERGENCY MEDICINE

## 2018-07-29 PROCEDURE — 93010 ELECTROCARDIOGRAM REPORT: CPT | Performed by: INTERNAL MEDICINE

## 2018-07-29 PROCEDURE — 6360000002 HC RX W HCPCS: Performed by: NURSE PRACTITIONER

## 2018-07-29 PROCEDURE — 70450 CT HEAD/BRAIN W/O DYE: CPT

## 2018-07-29 PROCEDURE — 96374 THER/PROPH/DIAG INJ IV PUSH: CPT

## 2018-07-29 PROCEDURE — 93005 ELECTROCARDIOGRAM TRACING: CPT | Performed by: NURSE PRACTITIONER

## 2018-07-29 PROCEDURE — 96361 HYDRATE IV INFUSION ADD-ON: CPT

## 2018-07-29 PROCEDURE — 80053 COMPREHEN METABOLIC PANEL: CPT

## 2018-07-29 PROCEDURE — 85025 COMPLETE CBC W/AUTO DIFF WBC: CPT

## 2018-07-29 PROCEDURE — 85610 PROTHROMBIN TIME: CPT

## 2018-07-29 RX ORDER — ONDANSETRON 2 MG/ML
4 INJECTION INTRAMUSCULAR; INTRAVENOUS ONCE
Status: COMPLETED | OUTPATIENT
Start: 2018-07-29 | End: 2018-07-29

## 2018-07-29 RX ORDER — MORPHINE SULFATE 4 MG/ML
4 INJECTION, SOLUTION INTRAMUSCULAR; INTRAVENOUS ONCE
Status: COMPLETED | OUTPATIENT
Start: 2018-07-29 | End: 2018-07-29

## 2018-07-29 RX ORDER — 0.9 % SODIUM CHLORIDE 0.9 %
500 INTRAVENOUS SOLUTION INTRAVENOUS ONCE
Status: COMPLETED | OUTPATIENT
Start: 2018-07-29 | End: 2018-07-29

## 2018-07-29 RX ORDER — HYDROCODONE BITARTRATE AND ACETAMINOPHEN 5; 325 MG/1; MG/1
1 TABLET ORAL EVERY 6 HOURS PRN
Qty: 10 TABLET | Refills: 0 | Status: SHIPPED | OUTPATIENT
Start: 2018-07-29 | End: 2018-08-05

## 2018-07-29 RX ORDER — 0.9 % SODIUM CHLORIDE 0.9 %
1000 INTRAVENOUS SOLUTION INTRAVENOUS ONCE
Status: COMPLETED | OUTPATIENT
Start: 2018-07-29 | End: 2018-07-29

## 2018-07-29 RX ADMIN — HYDROMORPHONE HYDROCHLORIDE 1 MG: 1 INJECTION, SOLUTION INTRAMUSCULAR; INTRAVENOUS; SUBCUTANEOUS at 16:25

## 2018-07-29 RX ADMIN — SODIUM CHLORIDE 500 ML: 900 INJECTION INTRAVENOUS at 15:23

## 2018-07-29 RX ADMIN — SODIUM CHLORIDE 1000 ML: 9 INJECTION, SOLUTION INTRAVENOUS at 18:32

## 2018-07-29 RX ADMIN — MORPHINE SULFATE 4 MG: 4 INJECTION INTRAVENOUS at 14:30

## 2018-07-29 RX ADMIN — ONDANSETRON 4 MG: 2 SOLUTION INTRAMUSCULAR; INTRAVENOUS at 14:30

## 2018-07-29 ASSESSMENT — ENCOUNTER SYMPTOMS
COUGH: 0
DIARRHEA: 0
COLOR CHANGE: 0
BACK PAIN: 0
WHEEZING: 0
VOMITING: 0
PHOTOPHOBIA: 0
ABDOMINAL PAIN: 0
SHORTNESS OF BREATH: 0
NAUSEA: 0

## 2018-07-29 ASSESSMENT — PAIN SCALES - GENERAL
PAINLEVEL_OUTOF10: 7
PAINLEVEL_OUTOF10: 6
PAINLEVEL_OUTOF10: 3
PAINLEVEL_OUTOF10: 8

## 2018-07-29 NOTE — ED NOTES
Placed patients right arm in a volar splint using one roll webril, 5in ortho glass, one 2in ace wrap and one 3in ace wrap. Patient tolerated well, stated it was comfortable, and understood care taking instructions.       Stevo Parsons  07/29/18 [id number]

## 2018-07-29 NOTE — ED PROVIDER NOTES
[facility]  ED  eMERGENCY dEPARTMENT eNCOUnter        Pt Name: Raúl Robertson  MRN: [id number]  Cheikhgfsiri 1960  Date of evaluation: 7/29/2018  Provider: GIANLUCA Chacko CNP  PCP: GIANLUCA Padgett CNP  ED Attending: No att. providers found    [facility]       Chief Complaint   Patient presents with    Fall       HISTORY OF PRESENT ILLNESS   (Location/Symptom, Timing/Onset, Context/Setting, Quality, Duration, Modifying Factors, Severity)  Note limiting factors. Raúl Robertson is a 63 y.o. male who presents emergency department status post fall. They just prior to ED arrival he was at home writing a list for groceries. States that he suddenly just fell to catch himself with his right arm. He is having right wrist pain and swelling. He denies getting any lightheadedness, dizziness, chest pain, shortness of breath or other symptoms prior to falling. He states he is unsure why he fell as he was not walking, did not trip. He denies any chest pain, or chest pain or shortness of breath before or after the fall. He does report having history of atrial fibrillation. He denies any recent illnesses, fever, chills, nausea, vomiting or diarrhea. Denies any pain on exam except for his right wrist, states any movement worsens the pain. Rates the pain a 6 out of 10. Denies any recent illnesses, fever, chills, cough, congestion. Denies any numbness tingling or paresthesias. Denies any additional complaints, no additional aggravating or alleviating factors. He presents awake, alert and in no acute distress or toxic appearance. Nursing Notes were all reviewed and agreed with or any disagreements were addressed  in the HPI. REVIEW OF SYSTEMS    (2-9 systems for level 4, 10 or more for level 5)     Review of Systems   Constitutional: Negative for chills and fever. HENT: Negative for congestion. Eyes: Negative for photophobia and visual disturbance.    Respiratory: outpatient follow-up with their family doctor in 24-48 hours. The patient tolerated their visit well. They were seen and evaluated by the attending physician who agreed with the assessment and plan. The patient and / or the family were informed of the results of any tests, a time was given to answer questions, a plan was proposed and they agreed with plan. Patient verbalized understanding of discharge instructions and was discharged from the department in stable condition. 1810 Addendum, nursing staff went to discharge the patient and he went to stand up and became unsteady on his feet and feeling dizzy. Attending physician evaluated the patient, however due to the end my shift attending physician will assume full care patient. The plan is for the patient to still be discharged home however if his symptoms were to continue her exacerbate we did discuss possibility of admission. Please see the attending physician documentation for further disposition and plan of care. FINAL IMPRESSION      1. Fall, initial encounter    2. Other closed fracture of distal end of right radius, initial encounter    3. Closed head injury, initial encounter    4. History of atrial fibrillation    5.  Right wrist pain          DISPOSITION/PLAN   DISPOSITION        PATIENT REFERRED TO:  GIANLUCA Hernandez [address] [place] [address] [facility]  [phone]    Schedule an appointment as soon as possible for a visit in 2 days  Follow-up with your family doctor the next 2 days reevaluation    Haylee Griggs MD  [facility] [phone]    Schedule an appointment as soon as possible for a visit in 2 days  This is an orthopedic referral, call tomorrow make an appointment in regards to your wrist fracture    [facility]  ED  [facility]  [address] [phone]  Go to   If symptoms worsen    See Olmstead MD  [address] [place] [address] [address]  [address] [id number]  [phone]    Schedule an appointment as soon as possible for a visit in 2 days  Follow-up with your cardiologist in the next 2 days      DISCHARGE MEDICATIONS:  New Prescriptions    HYDROCODONE-ACETAMINOPHEN (NORCO) 5-325 MG PER TABLET    Take 1 tablet by mouth every 6 hours as needed for Pain for up to 7 days. Surinder Humphreys        DISCONTINUED MEDICATIONS:  Discontinued Medications    No medications on file              (Please note that portions of this note were completed with a voice recognition program.  Efforts were made to edit the dictations but occasionally words are mis-transcribed.)    GIANLUCA Briseno CNP (electronically signed)          GIANLUCA Briseno CNP  07/29/18 1735       GIANLUCA Grewal CNP  07/29/18 1816

## 2018-07-30 NOTE — ED NOTES
- pt given with discharge instructions, rx's, and materials. - instructions, rx's, and follow up appointments reviewed with patient/family. No further questions posed. - vs and pt stable upon discharge. - pt ambulatory to [facility].        Blanca Bermudez RN  07/29/18 2028

## 2018-08-01 ENCOUNTER — OFFICE VISIT (OUTPATIENT)
Dept: ORTHOPEDIC SURGERY | Age: 58
End: 2018-08-01

## 2018-08-01 VITALS — HEIGHT: 72 IN | WEIGHT: 259.92 LBS | BODY MASS INDEX: 35.21 KG/M2

## 2018-08-01 DIAGNOSIS — M25.551 RIGHT HIP PAIN: Primary | ICD-10-CM

## 2018-08-01 DIAGNOSIS — G56.01 CARPAL TUNNEL SYNDROME OF RIGHT WRIST: ICD-10-CM

## 2018-08-01 DIAGNOSIS — S52.531A CLOSED COLLES' FRACTURE OF RIGHT RADIUS, INITIAL ENCOUNTER: ICD-10-CM

## 2018-08-01 PROCEDURE — G8598 ASA/ANTIPLAT THER USED: HCPCS | Performed by: ORTHOPAEDIC SURGERY

## 2018-08-01 PROCEDURE — G8427 DOCREV CUR MEDS BY ELIG CLIN: HCPCS | Performed by: ORTHOPAEDIC SURGERY

## 2018-08-01 PROCEDURE — G8417 CALC BMI ABV UP PARAM F/U: HCPCS | Performed by: ORTHOPAEDIC SURGERY

## 2018-08-01 PROCEDURE — 4004F PT TOBACCO SCREEN RCVD TLK: CPT | Performed by: ORTHOPAEDIC SURGERY

## 2018-08-01 PROCEDURE — 3017F COLORECTAL CA SCREEN DOC REV: CPT | Performed by: ORTHOPAEDIC SURGERY

## 2018-08-01 PROCEDURE — 99214 OFFICE O/P EST MOD 30 MIN: CPT | Performed by: ORTHOPAEDIC SURGERY

## 2018-08-01 NOTE — PROGRESS NOTES
Caron Roth is a 4 year old [de-identified] old female who was brought in for her Wellness Visit visit.     History was provided by mother  HPI:   Patient presents for:  Patient presents with:  Wellness Visit          Past Medical History  Past Medical Histor appears well hydrated, alert and responsive, no acute distress noted  Head/Face:  head is normocephalic  Eyes/Vision:  pupils are equal, round, and react to light, red reflex and light reflex are present and symmetric bilaterally, extraocular movements int Treatment/comfort measures reviewed with parent(s). Flu shot in October and DTaP and HAV in 6 weeks at nurse visit  Parental concerns and questions addressed. Diet, exercise, safety and development discussed  Anticipatory guidance for age reviewed.   Kr APPOINTMENT FOR NEW BLOOD WORK (Patient taking differently: TAKE ONE TABLET BY MOUTH EVERY EVENING), Disp: 30 tablet, Rfl: 0    aspirin 81 MG chewable tablet, Take 81 mg by mouth daily. , Disp: , Rfl:   Allergies:  Codeine; Onion; and Penicillins  Social History:    reports that he has been smoking Cigarettes. He has a 3.75 pack-year smoking history. He has never used smokeless tobacco. He reports that he does not drink alcohol or use drugs. Family History:   Family History   Problem Relation Age of Onset    Arthritis Mother     Depression Mother     High Blood Pressure Mother     Mental Illness Mother 80        dementia    Obesity Mother     Osteoporosis Mother        REVIEW OF SYSTEMS:   For new problems, a full review of systems will be found scanned in the patient's chart. CONSTITUTIONAL: Denies unexplained weight loss, fevers, chills   NEUROLOGICAL: Denies unsteady gait or progressive weakness  SKIN: Denies skin changes, delayed healing, rash, itching       PHYSICAL EXAM:    Vitals: Height 6' 0.01\" (1.829 m), weight 259 lb 14.8 oz (117.9 kg). GENERAL EXAM:  · General Apparence: Patient is adequately groomed with no evidence of malnutrition. · Orientation: The patient is oriented to time, place and person. · Mood & Affect:The patient's mood and affect are appropriate       Right wrist/right hip PHYSICAL EXAMINATION:  · Inspection:  Patient is  immobilized in a short arm splint    · Palpation:  Tenderness to palpation of the distal radius and at the groin. No palpable mass      · Range of Motion: Range of motion of the wrist was not tested. He is able to perform range of motion of the hip with minimal pain    · Strength: No gross strength deficits are noted at the lower extremity. Strength not tested on the wrist    · Special Tests:  Neurovascular exam is intact to the distal extremity of the right arm. Negative logroll testing of the right hip. Negative straight leg raise testing.   Negative

## 2018-08-02 ENCOUNTER — TELEPHONE (OUTPATIENT)
Dept: CARDIOLOGY CLINIC | Age: 58
End: 2018-08-02

## 2018-08-02 NOTE — LETTER
[address] [place] [address] Cardiology - [address] [place] [address] Stu Burroughs. [address]  Phone: [phone]  Fax: [phone]      Jean Paul David MD      August 2, 2018     [facility] [address] [facility] [address] [place] [place] [address]  1960      To whom it may concern,        Relda Furbish should be low risk for non cardiac procedure and ok to proceed. If you have any questions or concerns, please don't hesitate to call.     Sincerely,        Jean Paul David MD

## 2018-08-06 ENCOUNTER — ANESTHESIA EVENT (OUTPATIENT)
Dept: OPERATING ROOM | Age: 58
End: 2018-08-06
Payer: MEDICARE

## 2018-08-06 RX ORDER — CARVEDILOL 12.5 MG/1
12.5 TABLET ORAL 2 TIMES DAILY
Status: ON HOLD | COMMUNITY
End: 2021-04-14 | Stop reason: HOSPADM

## 2018-08-07 ENCOUNTER — ANESTHESIA (OUTPATIENT)
Dept: OPERATING ROOM | Age: 58
End: 2018-08-07
Payer: MEDICARE

## 2018-08-07 ENCOUNTER — HOSPITAL ENCOUNTER (OUTPATIENT)
Age: 58
Setting detail: OUTPATIENT SURGERY
Discharge: HOME | End: 2018-08-07
Attending: ORTHOPAEDIC SURGERY | Admitting: ORTHOPAEDIC SURGERY
Payer: MEDICARE

## 2018-08-07 VITALS
DIASTOLIC BLOOD PRESSURE: 63 MMHG | RESPIRATION RATE: 15 BRPM | SYSTOLIC BLOOD PRESSURE: 82 MMHG | OXYGEN SATURATION: 99 %

## 2018-08-07 VITALS
DIASTOLIC BLOOD PRESSURE: 66 MMHG | TEMPERATURE: 97.2 F | BODY MASS INDEX: 35.08 KG/M2 | RESPIRATION RATE: 16 BRPM | OXYGEN SATURATION: 97 % | HEART RATE: 77 BPM | WEIGHT: 259 LBS | HEIGHT: 72 IN | SYSTOLIC BLOOD PRESSURE: 113 MMHG

## 2018-08-07 DIAGNOSIS — S52.509A CLOSED FRACTURE OF DISTAL END OF RADIUS, UNSPECIFIED FRACTURE MORPHOLOGY, UNSPECIFIED LATERALITY, INITIAL ENCOUNTER: Primary | ICD-10-CM

## 2018-08-07 LAB
GLUCOSE BLD-MCNC: 168 MG/DL (ref 70–99)
PERFORMED ON: ABNORMAL

## 2018-08-07 PROCEDURE — 2500000003 HC RX 250 WO HCPCS: Performed by: NURSE ANESTHETIST, CERTIFIED REGISTERED

## 2018-08-07 PROCEDURE — 2709999900 HC NON-CHARGEABLE SUPPLY: Performed by: ORTHOPAEDIC SURGERY

## 2018-08-07 PROCEDURE — 3600000004 HC SURGERY LEVEL 4 BASE: Performed by: ORTHOPAEDIC SURGERY

## 2018-08-07 PROCEDURE — 6360000002 HC RX W HCPCS: Performed by: ORTHOPAEDIC SURGERY

## 2018-08-07 PROCEDURE — 7100000000 HC PACU RECOVERY - FIRST 15 MIN: Performed by: ORTHOPAEDIC SURGERY

## 2018-08-07 PROCEDURE — C1713 ANCHOR/SCREW BN/BN,TIS/BN: HCPCS | Performed by: ORTHOPAEDIC SURGERY

## 2018-08-07 PROCEDURE — 76942 ECHO GUIDE FOR BIOPSY: CPT | Performed by: ANESTHESIOLOGY

## 2018-08-07 PROCEDURE — 3600000014 HC SURGERY LEVEL 4 ADDTL 15MIN: Performed by: ORTHOPAEDIC SURGERY

## 2018-08-07 PROCEDURE — 3700000000 HC ANESTHESIA ATTENDED CARE: Performed by: ORTHOPAEDIC SURGERY

## 2018-08-07 PROCEDURE — 2580000003 HC RX 258: Performed by: ANESTHESIOLOGY

## 2018-08-07 PROCEDURE — 2720000010 HC SURG SUPPLY STERILE: Performed by: ORTHOPAEDIC SURGERY

## 2018-08-07 PROCEDURE — 6360000002 HC RX W HCPCS: Performed by: NURSE ANESTHETIST, CERTIFIED REGISTERED

## 2018-08-07 PROCEDURE — 7100000001 HC PACU RECOVERY - ADDTL 15 MIN: Performed by: ORTHOPAEDIC SURGERY

## 2018-08-07 PROCEDURE — 6360000002 HC RX W HCPCS: Performed by: ANESTHESIOLOGY

## 2018-08-07 PROCEDURE — 3700000001 HC ADD 15 MINUTES (ANESTHESIA): Performed by: ORTHOPAEDIC SURGERY

## 2018-08-07 PROCEDURE — 2580000003 HC RX 258: Performed by: ORTHOPAEDIC SURGERY

## 2018-08-07 PROCEDURE — 7100000010 HC PHASE II RECOVERY - FIRST 15 MIN: Performed by: ORTHOPAEDIC SURGERY

## 2018-08-07 PROCEDURE — 7100000011 HC PHASE II RECOVERY - ADDTL 15 MIN: Performed by: ORTHOPAEDIC SURGERY

## 2018-08-07 DEVICE — PERI-LOC 2.5MM T7 LOCKING SCREW                                    12MM SELF-TAPPING
Type: IMPLANTABLE DEVICE | Site: ARM | Status: FUNCTIONAL
Brand: PERI-LOC

## 2018-08-07 DEVICE — PERI-LOC 3.5MM T20 CORTEX SCREW                                    14MM SELF-TAPPING
Type: IMPLANTABLE DEVICE | Site: ARM | Status: FUNCTIONAL
Brand: PERI-LOC

## 2018-08-07 DEVICE — PERI-LOC 2.5MM T7 LOCKING SCREW                                    16MM SELF-TAPPING
Type: IMPLANTABLE DEVICE | Site: ARM | Status: FUNCTIONAL
Brand: PERI-LOC

## 2018-08-07 DEVICE — PERI-LOC 2.5MM T7 LOCKING SCREW                                    14MM SELF-TAPPING
Type: IMPLANTABLE DEVICE | Site: ARM | Status: FUNCTIONAL
Brand: PERI-LOC

## 2018-08-07 DEVICE — PERI-LOC 3.5MM T20 CORTEX SCREW                                    16MM SELF-TAPPING
Type: IMPLANTABLE DEVICE | Site: ARM | Status: FUNCTIONAL
Brand: PERI-LOC

## 2018-08-07 DEVICE — PERI-LOC VOLAR DISTAL RADIUS                                    LOCKING PLATE STANDARD HD 3 HOLE                                    SHAFT RIGHT 62MM
Type: IMPLANTABLE DEVICE | Site: ARM | Status: FUNCTIONAL
Brand: PERI-LOC

## 2018-08-07 RX ORDER — OXYCODONE HYDROCHLORIDE AND ACETAMINOPHEN 5; 325 MG/1; MG/1
1 TABLET ORAL EVERY 4 HOURS PRN
Qty: 30 TABLET | Refills: 0 | Status: SHIPPED | OUTPATIENT
Start: 2018-08-07 | End: 2018-08-07 | Stop reason: HOSPADM

## 2018-08-07 RX ORDER — ONDANSETRON 2 MG/ML
4 INJECTION INTRAMUSCULAR; INTRAVENOUS
Status: COMPLETED | OUTPATIENT
Start: 2018-08-07 | End: 2018-08-07

## 2018-08-07 RX ORDER — OXYCODONE HYDROCHLORIDE AND ACETAMINOPHEN 5; 325 MG/1; MG/1
1 TABLET ORAL EVERY 4 HOURS PRN
Qty: 30 TABLET | Refills: 0 | Status: SHIPPED | OUTPATIENT
Start: 2018-08-07 | End: 2018-09-01 | Stop reason: SDUPTHER

## 2018-08-07 RX ORDER — SODIUM CHLORIDE 0.9 % (FLUSH) 0.9 %
SYRINGE (ML) INJECTION
Status: DISCONTINUED
Start: 2018-08-07 | End: 2018-08-07 | Stop reason: WASHOUT

## 2018-08-07 RX ORDER — FENTANYL CITRATE 50 UG/ML
25 INJECTION, SOLUTION INTRAMUSCULAR; INTRAVENOUS EVERY 5 MIN PRN
Status: DISCONTINUED | OUTPATIENT
Start: 2018-08-07 | End: 2018-08-07 | Stop reason: HOSPADM

## 2018-08-07 RX ORDER — FENTANYL CITRATE 50 UG/ML
50 INJECTION, SOLUTION INTRAMUSCULAR; INTRAVENOUS EVERY 5 MIN PRN
Status: DISCONTINUED | OUTPATIENT
Start: 2018-08-07 | End: 2018-08-07 | Stop reason: HOSPADM

## 2018-08-07 RX ORDER — PROMETHAZINE HYDROCHLORIDE 25 MG/ML
6.25 INJECTION, SOLUTION INTRAMUSCULAR; INTRAVENOUS
Status: DISCONTINUED | OUTPATIENT
Start: 2018-08-07 | End: 2018-08-07 | Stop reason: HOSPADM

## 2018-08-07 RX ORDER — SODIUM CHLORIDE, SODIUM LACTATE, POTASSIUM CHLORIDE, CALCIUM CHLORIDE 600; 310; 30; 20 MG/100ML; MG/100ML; MG/100ML; MG/100ML
INJECTION, SOLUTION INTRAVENOUS CONTINUOUS
Status: DISCONTINUED | OUTPATIENT
Start: 2018-08-07 | End: 2018-08-07 | Stop reason: HOSPADM

## 2018-08-07 RX ORDER — EPHEDRINE SULFATE 50 MG/ML
INJECTION INTRAVENOUS PRN
Status: DISCONTINUED | OUTPATIENT
Start: 2018-08-07 | End: 2018-08-07 | Stop reason: SDUPTHER

## 2018-08-07 RX ORDER — PROPOFOL 10 MG/ML
INJECTION, EMULSION INTRAVENOUS PRN
Status: DISCONTINUED | OUTPATIENT
Start: 2018-08-07 | End: 2018-08-07 | Stop reason: SDUPTHER

## 2018-08-07 RX ADMIN — PROPOFOL 200 MG: 10 INJECTION, EMULSION INTRAVENOUS at 10:45

## 2018-08-07 RX ADMIN — PHENYLEPHRINE HYDROCHLORIDE 100 MCG: 10 INJECTION INTRAVENOUS at 11:12

## 2018-08-07 RX ADMIN — Medication 2 G: at 10:43

## 2018-08-07 RX ADMIN — EPHEDRINE SULFATE 10 MG: 50 INJECTION INTRAVENOUS at 11:19

## 2018-08-07 RX ADMIN — PHENYLEPHRINE HYDROCHLORIDE 100 MCG: 10 INJECTION INTRAVENOUS at 11:10

## 2018-08-07 RX ADMIN — SODIUM CHLORIDE, POTASSIUM CHLORIDE, SODIUM LACTATE AND CALCIUM CHLORIDE: 600; 310; 30; 20 INJECTION, SOLUTION INTRAVENOUS at 10:30

## 2018-08-07 RX ADMIN — EPHEDRINE SULFATE 10 MG: 50 INJECTION INTRAVENOUS at 11:25

## 2018-08-07 RX ADMIN — PHENYLEPHRINE HYDROCHLORIDE 100 MCG: 10 INJECTION INTRAVENOUS at 11:06

## 2018-08-07 RX ADMIN — ONDANSETRON 4 MG: 2 INJECTION INTRAMUSCULAR; INTRAVENOUS at 10:47

## 2018-08-07 RX ADMIN — PHENYLEPHRINE HYDROCHLORIDE 100 MCG: 10 INJECTION INTRAVENOUS at 11:29

## 2018-08-07 ASSESSMENT — PULMONARY FUNCTION TESTS
PIF_VALUE: 4
PIF_VALUE: 24
PIF_VALUE: 16
PIF_VALUE: 0
PIF_VALUE: 3
PIF_VALUE: 4
PIF_VALUE: 17
PIF_VALUE: 4
PIF_VALUE: 4
PIF_VALUE: 19
PIF_VALUE: 4
PIF_VALUE: 3
PIF_VALUE: 4
PIF_VALUE: 23
PIF_VALUE: 4
PIF_VALUE: 5
PIF_VALUE: 4
PIF_VALUE: 4
PIF_VALUE: 3
PIF_VALUE: 4
PIF_VALUE: 4
PIF_VALUE: 18
PIF_VALUE: 4
PIF_VALUE: 0
PIF_VALUE: 3
PIF_VALUE: 4
PIF_VALUE: 4
PIF_VALUE: 3
PIF_VALUE: 4
PIF_VALUE: 4
PIF_VALUE: 0
PIF_VALUE: 3
PIF_VALUE: 29
PIF_VALUE: 3
PIF_VALUE: 4
PIF_VALUE: 1
PIF_VALUE: 3
PIF_VALUE: 21
PIF_VALUE: 4
PIF_VALUE: 3
PIF_VALUE: 4
PIF_VALUE: 4

## 2018-08-07 ASSESSMENT — PAIN - FUNCTIONAL ASSESSMENT: PAIN_FUNCTIONAL_ASSESSMENT: 0-10

## 2018-08-07 NOTE — ANESTHESIA PRE PROCEDURE
Department of Anesthesiology  Preprocedure Note       Name:  Piter Patterson   Age:  63 y.o.  :  1960                                          MRN:  [id number]         Date:  2018      Surgeon: Jose Luis Rodriguez):  Mary Randall MD    Procedure: Procedure(s):  OPEN REDUCTION INTERNAL FIXATION RIGHT DISTAL RADIUS, RIGHT CARPAL TUNNEL RELEASE    Medications prior to admission:       Current medications:    Current Facility-Administered Medications   Medication Dose Route Frequency Provider Last Rate Last Dose    ceFAZolin (ANCEF) 2 g in sterile water 20 mL IV syringe  2 g Intravenous On Call to Lambert Martínez MD        lactated ringers infusion   Intravenous Continuous Matteo Brand, DO           Allergies: Allergies   Allergen Reactions    Codeine Itching    Onion     Penicillins Hives and Rash     Unknown reaction.   Childhood problem       Problem List:    Patient Active Problem List   Diagnosis Code    Type 2 diabetes mellitus with stage 4 chronic kidney disease, without long-term current use of insulin (HCC) E11.22, N18.4    Cigarette smoker F17.210    Chronic atrial fibrillation ([facility] I48.2    Essential (primary) hypertension I10    Normocytic anemia D64.9    Primary osteoarthritis of both knees M17.0    Calciphylaxis E83.59    Depression F32.9    Hyperlipidemia E78.5    Morbid obesity due to excess calories ([facility]) E66.01    Furunculosis (recurrent, on chronic suppressive Abx) L02.92    Tobacco use disorder F17.200    Ulcer of left calf with fat layer exposed ([address] L97.222    Hyperphosphatemia E83.39    Vitamin D deficiency E55.9    Secondary hyperparathyroidism of renal origin ([address] N25.81    Iron deficiency E61.1    White matter changes, severe by MRI brain [id number]    Coronary artery disease involving native coronary artery I25.10    Renal arteriosclerosis I12.9    Other disorder of calcium metabolism E83.59    CKD (chronic kidney disease), stage III N18.3    Acute renal failure (ARF) ([facility]) N17.9    Gastrointestinal hemorrhage R87.6    Metabolic acidosis K32.0    Hyperkalemia E87.5    Leg wound, left S81.802A    Deep vein thrombosis (DVT) ([facility]) I82.409    Tobacco abuse Z72.0    Hyponatremia E87.1    ANA (acute kidney injury) ([facility] N17.9    Wound infection T14. 8XXA, L08.9    Acute on chronic renal failure (HCC) N17.9, N18.9    Gastric outlet obstruction K31.1       Past Medical History:        Diagnosis Date    Atrial fibrillation ([facility])     CAD (coronary artery disease)     Cardiomyopathy ([facility] 7/13/2015    CHF (congestive heart failure) ([facility])     Chronic kidney disease     Deep vein thrombosis (DVT) ([facility]) 8/7/2012    Depression     Diabetes mellitus ([facility])     Gastrointestinal bleed     GERD (gastroesophageal reflux disease)     Heme positive stool 01/2017    History of blood transfusion     1/2017    Hyperlipidemia     Hypertension     Hypoglycemia     MRSA (methicillin resistant staph aureus) culture positive 9/26/17,09/18/2017    posterior left calf    Primary osteoarthritis of both knees     Primary osteoarthritis of both knees        Past Surgical History:        Procedure Laterality Date    CARDIAC CATHETERIZATION  07/12/2011    COLONOSCOPY  01/17/2017    diverticulosis    HAND SURGERY      KIDNEY BIOPSY Right 06/20/2016    KIDNEY BIOPSY Left 09/20/2016    TUNNELED VENOUS PORT PLACEMENT Right 11/09/2017    BARD POWER PORT    UPPER GASTROINTESTINAL ENDOSCOPY  01/17/2017    mild chronic gastritis; duodenal biopsy normal    UPPER GASTROINTESTINAL ENDOSCOPY  10/24/2017       Social History:    Social History   Substance Use Topics    Smoking status: Current Every Day Smoker     Packs/day: 0.50     Years: 15.00     Types: Cigarettes    Smokeless tobacco: Never Used    Alcohol use No      Comment: rarely                                Ready to quit: Not Answered  Counseling given: Not Answered      Vital Signs (Current):   Vitals: 08/06/18 0841   Weight: 259 lb (117.5 kg)   Height: 6' (1.829 m)                                              BP Readings from Last 3 Encounters:   07/29/18 101/74   06/08/18 111/75   06/04/18 124/82       NPO Status:                                                                                 BMI:   Wt Readings from Last 3 Encounters:   08/06/18 259 lb (117.5 kg)   08/01/18 259 lb 14.8 oz (117.9 kg)   07/29/18 260 lb (117.9 kg)     Body mass index is 35.13 kg/m². CBC:   Lab Results   Component Value Date    WBC 7.7 07/29/2018    RBC 4.03 07/29/2018    HGB 11.4 07/29/2018    HCT 34.3 07/29/2018    MCV 85.2 07/29/2018    RDW 19.1 07/29/2018     07/29/2018       CMP:   Lab Results   Component Value Date     07/29/2018    K 5.0 07/29/2018     07/29/2018    CO2 17 07/29/2018    BUN 44 07/29/2018    CREATININE 3.8 07/29/2018    GFRAA 20 07/29/2018    GFRAA 27 12/16/2012    AGRATIO 1.1 07/29/2018    LABGLOM 16 07/29/2018    GLUCOSE 199 07/29/2018    PROT 7.3 07/29/2018    PROT 7.6 12/16/2012    CALCIUM 8.9 07/29/2018    BILITOT 0.4 07/29/2018    ALKPHOS 140 07/29/2018    AST 10 07/29/2018    ALT 6 07/29/2018       POC Tests: No results for input(s): POCGLU, POCNA, POCK, POCCL, POCBUN, POCHEMO, POCHCT in the last 72 hours.     Coags:   Lab Results   Component Value Date    PROTIME 13.6 07/29/2018    INR 1.19 07/29/2018    APTT 32.4 10/24/2017       HCG (If Applicable): No results found for: PREGTESTUR, PREGSERUM, HCG, HCGQUANT     ABGs: No results found for: PHART, PO2ART, CUA3RBZ, DFG1CBK, BEART, F8QLCZSJ     Type & Screen (If Applicable):  No results found for: LABABO, [address]    Anesthesia Evaluation  Patient summary reviewed and Nursing notes reviewed no history of anesthetic complications:   Airway: Mallampati: II  TM distance: >3 FB   Neck ROM: full  Mouth opening: > = 3 FB Dental: normal exam         Pulmonary:Negative Pulmonary ROS and normal exam

## 2018-08-07 NOTE — H&P
I have reviewed the history and physical and examined the patient and find no relevant changes. I have reviewed with the patient and/or family the risks, benefits, and alternatives to the procedure. Patient being given increased dosage/quantity of opoid pain medication in excess of OSMB guidelines which noted a 30 MED daily of opioids due to the fact that he/she has undergone major orthopaedic surgery as outlined in rule 4731-11-13. Dosages and further duration of the pain medication will be re-evaluated at her post op visit in 2 weeks. Patient was educated on dosing expectations and limits of prescribing as a result of the new [facility] rules enacted August 31, 2017. Please also note that this is not the initial opoid prescription issued to this patient but a continuation of medication utilized during the hospital admission as noted in the medical record. OARRS report has also been utilized to screen for any abuse history or suspicious activity as outlined in [place]. All efforts have been taken to prevent abuse potential and misuse of opioid medications including education, screening, and close clinical follow up.

## 2018-08-07 NOTE — BRIEF OP NOTE
Brief Postoperative Note  ______________________________________________________________    Patient: Esther Wagner  YOB: 1960  MRN: [id number]  Date of Procedure: 8/7/2018    Pre-Op Diagnosis: RIGHT DISTAL RADIUS FRACTURE, RIGHT CARPAL TUNNEL SYNDROME    Post-Op Diagnosis: Same       Procedure(s):  OPEN REDUCTION INTERNAL FIXATION RIGHT DISTAL RADIUS, RIGHT CARPAL TUNNEL RELEASE    Anesthesia: General    Surgeon(s):  Dale Stoner MD    Staff:  Jd Ramey Person First: Shae Mercado Assist Physician Assistant: NARCISA Chavis     Estimated Blood Loss: 5 (units unknown) mL    Complications: None    Specimens:   * No specimens in log *    Implants:    Implant Name Type Inv.  Item Serial No.  Lot No. LRB No. Used   PLATE VDR LK STD HD 3HL RT 62MM Screw/Plate/Nail/Otf PLATE VDR LK STD HD 3HL RT 62MM  SMITH   Right 1   SCREW CORTX LK 2.5X12MM STAR Screw/Plate/Nail/Otf SCREW CORTX LK 2.5X12MM STAR  SMITH   Right 1   SCREW CORTX LK 2.5X14MM STAR Screw/Plate/Nail/Otf SCREW CORTX LK 2.5X14MM STAR  SMITH   Right 1   SCREW CORTX LK 2.5X14MM STAR Screw/Plate/Nail/Otf SCREW CORTX LK 2.5X14MM STAR  SMITH   Right 1   SCREW CORTX LK 2.5X16MM STAR Screw/Plate/Nail/Otf SCREW CORTX LK 2.5X16MM STAR  SMITH   Right 1   SCREW CORTX LK 2.5X16MM STAR Screw/Plate/Nail/Otf SCREW CORTX LK 2.5X16MM STAR  SMITH   Right 1   SCREW PERILOC CORTX 3.5X14MM Screw/Plate/Nail/Otf SCREW PERILOC CORTX 3.5X14MM  [employer]   Right 1   SCREW PERILOC CORTX 3.5X14MM Screw/Plate/Nail/Otf SCREW PERILOC CORTX 3.5X14MM  CAM[employer]   Right 1   SCREW NON LK 3.5X16MM Screw/Plate/Nail/Otf SCREW NON LK 3.5X16MM   SMITH    Right 1         Drains:      Findings: Fracture distal radius     NARCISA Chavis  Date: 8/7/2018  Time: 1:10 PM

## 2018-08-07 NOTE — PROGRESS NOTES
POCT      Blood Glucose: 168      (Normal Range 70-99)    PT:      (Normal Range 9.8 - 13)    INR:      (Normal Range 0.86-1.14)

## 2018-08-10 ENCOUNTER — HOSPITAL ENCOUNTER (OUTPATIENT)
Dept: OCCUPATIONAL THERAPY | Age: 58
Setting detail: THERAPIES SERIES
Discharge: HOME | End: 2018-08-10
Payer: MEDICARE

## 2018-08-10 ENCOUNTER — OFFICE VISIT (OUTPATIENT)
Dept: ORTHOPEDIC SURGERY | Age: 58
End: 2018-08-10

## 2018-08-10 VITALS — BODY MASS INDEX: 35.09 KG/M2 | HEIGHT: 72 IN | WEIGHT: 259.04 LBS

## 2018-08-10 DIAGNOSIS — S52.531A CLOSED COLLES' FRACTURE OF RIGHT RADIUS, INITIAL ENCOUNTER: ICD-10-CM

## 2018-08-10 DIAGNOSIS — M25.531 RIGHT WRIST PAIN: Primary | ICD-10-CM

## 2018-08-10 PROCEDURE — 99024 POSTOP FOLLOW-UP VISIT: CPT | Performed by: ORTHOPAEDIC SURGERY

## 2018-08-10 PROCEDURE — L3906 WHO W/O JOINTS CF: HCPCS | Performed by: OCCUPATIONAL THERAPIST

## 2018-08-10 RX ORDER — TRAMADOL HYDROCHLORIDE 50 MG/1
50 TABLET ORAL EVERY 6 HOURS PRN
Qty: 28 TABLET | Refills: 0 | Status: SHIPPED | OUTPATIENT
Start: 2018-08-10 | End: 2018-08-17

## 2018-08-10 RX ORDER — TRAMADOL HYDROCHLORIDE 50 MG/1
50 TABLET ORAL EVERY 6 HOURS PRN
Qty: 28 TABLET | Refills: 0 | Status: SHIPPED | OUTPATIENT
Start: 2018-08-10 | End: 2018-08-10 | Stop reason: SDUPTHER

## 2018-08-10 NOTE — PROGRESS NOTES
The patient presents a status post his ORIF of the distal radius and carpal tunnel release. Right    Overall he reports he is doing well. Minimal pain. The block is no more often he feels his fingers well. He is got good range of motion of the fingers. The incisions are completely benign in appearance. Mild expected amount of ecchymosis and swelling. 3 x-ray views of the right wrist inserted adequate position of the plate. The fracture is anatomically aligned. The patient will have a light dressing applied and have the Orthoplast splint made by hand therapy. He'll return in 10 days for suture removal and initiation of wrist range of motion. His groin strain injection bothering him more than his wrist.  I wouldn't: Some tramadol for this. Flexoril was not helping. I have personally performed and/or participated in the history, exam and medical decision making and agree with all pertinent clinical information. I have also reviewed and agree with the past medical, family and social history unless otherwise noted. This dictation was performed with a verbal recognition program (DRAGON) and it was checked for errors. It is possible that there are still dictated errors within this office note. If so, please bring any errors to my attention for an addendum. All efforts were made to ensure that this office note is accurate.           Shaniqua Tan MD

## 2018-08-20 ENCOUNTER — OFFICE VISIT (OUTPATIENT)
Dept: ORTHOPEDIC SURGERY | Age: 58
End: 2018-08-20

## 2018-08-20 DIAGNOSIS — S52.531A CLOSED COLLES' FRACTURE OF RIGHT RADIUS, INITIAL ENCOUNTER: Primary | ICD-10-CM

## 2018-08-20 PROCEDURE — 99024 POSTOP FOLLOW-UP VISIT: CPT | Performed by: PHYSICIAN ASSISTANT

## 2018-08-20 NOTE — PATIENT INSTRUCTIONS
Patient Education        Hip Flexor Strain: Rehab Exercises  Your Care Instructions  Here are some examples of typical rehabilitation exercises for your condition. Start each exercise slowly. Ease off the exercise if you start to have pain. Your doctor or physical therapist will tell you when you can start these exercises and which ones will work best for you. How to do the exercises  Pelvic tilt with marching    1. Lie on your back with your knees bent and your feet flat on the floor. 2. Tighten your belly muscles and buttocks, and press your lower back to the floor. 3. Keeping your knees bent, lift and then lower one leg up off the floor, and then lift and lower your other leg like you are marching. Each time you lift your leg, hold that position for about 6 seconds before lowering your leg. 4. Repeat 8 to 12 times. Scissors    1. Lie on your back with your knees bent at a 90-degree angle and your feet off the floor. 2. Tighten your belly muscles and buttocks, and press your lower back to the floor. 3. Slowly straighten one leg, and hold that position for about 6 seconds. Your leg should be about 12 inches off the floor. Bring that leg back to the starting position, and then straighten your other leg. Hold that position for about 6 seconds, and then switch legs again. 4. Repeat 8 to 12 times. Hamstring stretch (lying down)    1. Lie flat on your back with your legs straight. If you feel discomfort in your back, place a small towel roll under your lower back. 2. Holding the back of your affected leg for support, lift your leg straight up and toward your body until you feel a stretch at the back of your thigh. 3. Hold the stretch for at least 30 seconds. 4. Repeat 2 to 4 times. Quadricep and hip flexor stretch (lying on side)    1. Lie on your side with your good leg flat on the floor and your hand supporting your head.   2. Bend your top leg, and reach behind you to grab the front of that foot or ankle

## 2018-08-21 ENCOUNTER — HOSPITAL ENCOUNTER (OUTPATIENT)
Dept: OCCUPATIONAL THERAPY | Age: 58
Setting detail: THERAPIES SERIES
End: 2018-08-21
Payer: MEDICARE

## 2018-09-01 ENCOUNTER — TELEPHONE (OUTPATIENT)
Dept: ORTHOPEDIC SURGERY | Age: 58
End: 2018-09-01

## 2018-09-01 DIAGNOSIS — S52.509A CLOSED FRACTURE OF DISTAL END OF RADIUS, UNSPECIFIED FRACTURE MORPHOLOGY, UNSPECIFIED LATERALITY, INITIAL ENCOUNTER: ICD-10-CM

## 2018-09-01 RX ORDER — OXYCODONE HYDROCHLORIDE AND ACETAMINOPHEN 5; 325 MG/1; MG/1
1 TABLET ORAL EVERY 12 HOURS PRN
Qty: 6 TABLET | Refills: 0 | Status: SHIPPED | OUTPATIENT
Start: 2018-09-01 | End: 2018-09-04

## 2018-09-01 NOTE — TELEPHONE ENCOUNTER
Patient called on 9/1 for a refill of pain medication. S/P ORIF distal radius fracture and CTR on 8/7. All pain medication on chart was due to run out by 8/12. He was only taking percocet 5/325 BID. 6 pills into pharmacy to get him through the weekend. Instructed to call Gregor Liriano on Tuesday. Attempted to call patient twice to inform him that the medication was at the pharmacy. Could not leave message.   Mail box not set up

## 2018-09-05 ENCOUNTER — OFFICE VISIT (OUTPATIENT)
Dept: ORTHOPEDIC SURGERY | Age: 58
End: 2018-09-05

## 2018-09-05 VITALS — BODY MASS INDEX: 35.08 KG/M2 | HEIGHT: 72 IN | WEIGHT: 259 LBS

## 2018-09-05 DIAGNOSIS — M25.531 RIGHT WRIST PAIN: Primary | ICD-10-CM

## 2018-09-05 DIAGNOSIS — Z98.890 POSTOPERATIVE STATE: ICD-10-CM

## 2018-09-05 PROCEDURE — 99024 POSTOP FOLLOW-UP VISIT: CPT | Performed by: PHYSICIAN ASSISTANT

## 2018-09-05 RX ORDER — HYDROCODONE BITARTRATE AND ACETAMINOPHEN 5; 325 MG/1; MG/1
1 TABLET ORAL EVERY 6 HOURS PRN
Qty: 28 TABLET | Refills: 0 | Status: SHIPPED | OUTPATIENT
Start: 2018-09-05 | End: 2018-09-12

## 2018-09-05 NOTE — PROGRESS NOTES
Chief Complaint   Patient presents with    Post-Op Check     PO RIGHT WRIST ORIF SX:8/7/18      Subjective:    Scott Yousif returns to clinic today following up from his right distal radius ORIF performed on 8/7/2018. He has been working on physical therapy exercises at home. He states his symptoms are improving however, he does still feels stiff with range of motion. Objective: Incision is healing well with no signs of infection. Range of motion is limited secondary to pain. Neurovascular exam is intact to the distal extremity. He has significant dysvascular changes of his extremities with ulcerations of his lower legs. X-rays:    3 x-ray views of the right wrist were taken today and reveal a healing fracture with no signs of loosening. Assessment/plan:    He is doing well postoperatively and is going to begin working with hand therapy. He was given 1 more refill of Norco.  I discussed with him this will be his last refill of pain medication. I would like to see him back again in 1 month to assess his progress. Controlled Substances Monitoring:     RX Monitoring 9/5/2018   Attestation The Prescription Monitoring Report for this patient was reviewed today. Documentation No signs of potential drug abuse or diversion identified. Eric [place], [facility]    This dictation was performed with a verbal recognition program [facility] ) and it was checked for errors. It is possible that there are still dictated errors within this office note. If so, please bring any errors to my attention for an addendum. All efforts were made to ensure that this office note is accurate.

## 2018-09-06 ENCOUNTER — HOSPITAL ENCOUNTER (OUTPATIENT)
Dept: OCCUPATIONAL THERAPY | Age: 58
Setting detail: THERAPIES SERIES
Discharge: HOME | End: 2018-09-06
Payer: MEDICARE

## 2018-09-06 PROCEDURE — 97140 MANUAL THERAPY 1/> REGIONS: CPT | Performed by: OCCUPATIONAL THERAPIST

## 2018-09-06 PROCEDURE — G8984 CARRY CURRENT STATUS: HCPCS | Performed by: OCCUPATIONAL THERAPIST

## 2018-09-06 PROCEDURE — 97165 OT EVAL LOW COMPLEX 30 MIN: CPT | Performed by: OCCUPATIONAL THERAPIST

## 2018-09-06 PROCEDURE — G8985 CARRY GOAL STATUS: HCPCS | Performed by: OCCUPATIONAL THERAPIST

## 2018-09-06 PROCEDURE — 97110 THERAPEUTIC EXERCISES: CPT | Performed by: OCCUPATIONAL THERAPIST

## 2018-09-06 NOTE — FLOWSHEET NOTE
[facility] [facility], [address] [place] [address] [place]  Phone: [phone]  Fax [phone]  Hand Therapy Daily Treatment Note  Date:  2018    Patient: Leoncio Hairston   : 1960   MRN: [id number]  Referring Physician: Referring Practitioner: Dr. Maris Mariscal Diagnosis Information:   Diagnosis: S52.531A (ICD-10-CM) - Closed Colles' fracture of right radius   Assessment: M25.631 right wrist stiffness     Date of injury: 18  Date of Surgery/Type of procedure:    18  1.  Open reduction and internal fixation of right distal radius fracture,  Smith and Nephew volar plate, standard size with utilization of fluoroscopy  to assess fracture alignment and implant fixation and position.  The  fluoroscopy was used on several occasions during the procedure to assess  the aforementioned respects of this operation. 2.  Open carpal tunnel release-right. Insurance information:  Medicare  Comorbidities Affecting Functional Performance:     []Anxiety (F41.9)/Depression (F32.9)   []Diabetes Type 1(E10.65) or 2 (E11.65)   []Rheumatoid Arthritis (M05.9)  []Fibromyalgia (M79.7)  []Neuropathy(G60.9)  []Osteoarthritis(M19.91)  [x]None   []Other:    G-code: OT G-codes  Functional Assessment Tool Used: QuickDASH  Score: 61  Functional Limitation: Carrying, moving and handling objects  Carrying, Moving and Handling Objects Current Status (): At least 60 percent but less than 80 percent impaired, limited or restricted  Carrying, Moving and Handling Objects Goal Status ():  At least 20 percent but less than 40 percent impaired, limited or restricted    Date of Patient follow up with Physician:  10/3/18  Preferred Language for Healthcare:   [x]English       []other:  Latex Allergy:  [x]NO      []YES    RESTRICTIONS/PRECAUTIONS:  Fracture precautions   NONE  Progress Note: [x]  Yes  []  No  Next due by : Visit contact. Use of dynamic activities to improve functional performance. Activities of Daily Living:  [] (95454) Provided self-care/home management training (i.e., activities of daily living and compensatory training, meal preparation, safety procedures, and instructions in use of assistive technology devices/adaptive equipment)     Home Exercise Program:    [x] (31871) Reviewed/Progressed HEP activities related to strengthening, flexibility, endurance, ROM of scapular, scapulothoracic and UE control with self care, reaching, carrying, lifting, house/yardwork, driving/computer work    Manual Treatments:  Scar, edema and STM right forearm  [x] (01486) Provided manual therapy to mobilize soft tissue/joints of the UE for the purpose of modulating pain, promoting relaxation,  increasing ROM, reducing/eliminating soft tissue swelling/inflammation/restriction, improving soft tissue extensibility and allowing for proper ROM for normal function with self care, reaching, carrying, lifting, house/yardwork, driving/computer work    Splinting:  [] Fabrication of: L-code  [] (69997) Checkout for orthotic/prosthetic use, established patient   [] (45670) Orthotic management and training (fitting and assessment)  [x] Comments:adjusted splint to accommodate decreased edeam  Charges:  Timed Code Treatment Minutes: 30   Total Treatment Minutes: 60   [x] EVAL (LOW) 42450   [] OT Re-eval (29243)  [] EVAL (MOD) 19017   [] EVAL (HIGH) 21622       [x] [place] (36141) x  1   [] TRHUX(28085)  [] NMR (48689) x      [] Estim (attended) (20805)   [x] Manual ([id number]) x  1    [] US (16854)  [] TA (43479) x      [] Paraffin (91682)  [] ADL  ([phone]) x     [] Splint/L code:    [] Estim (unattended) (71535)  [] Other:  [] Fluidotherapy (84702)  [](72785) Checkout for orthotic use, established patient x       [] (55399) Orthotic mgmt & training  x        GOALS: Short Term Goals: To be achieved in: 2 weeks  1.  Independent in HEP and progression per patient

## 2018-09-06 NOTE — PLAN OF CARE
[place] [address] and [facility], [address]  Phone: [phone]  Fax [phone]    Occupational Therapy/Hand Therapy Certification  Dear Referring Practitioner: Dr. Frida Lizarraga    We had the pleasure of evaluating the following patient for occupational therapy services at [address] [place] [address]. A summary of our findings can be found in the initial assessment below. This includes our plan of care. If you have any questions or concerns regarding these findings, please do not hesitate to contact me at the office phone number checked above. Thank you for the referral.     Physician Signature:_______________________________Date:__________________  By signing above (or electronic signature), therapists plan is approved by physician      Patient: Nora Vasquez   : 1960   MRN: [id number]  Referring Physician: Referring Practitioner: Dr. Frida Lizarraga      Evaluation Date: 2018      Medical Diagnosis Information:  Diagnosis: L54.329E (ICD-10-CM) - Closed Colles' fracture of right radius                                             Insurance information:  Medicare     Precautions/ Contra-indications: wrist precautions  Latex Allergy:  [x]No      []Yes  Pacemaker:  [x] No       [] Yes     Preferred Language for Healthcare:   [x]English       []other:    G-Code:  Applied on 2018   OT G-codes  Functional Assessment Tool Used: QuickDASH  Score: 61  Functional Limitation: Carrying, moving and handling objects  Carrying, Moving and Handling Objects Current Status (): At least 60 percent but less than 80 percent impaired, limited or restricted  Carrying, Moving and Handling Objects Goal Status (): At least 20 percent but less than 40 percent impaired, limited or restricted      [x] Patient reported history, allergies, and medications reviewed - see intake form.      SUBJECTIVE:  Date of injury: 18 fell in grocery store  Date of surgery: 8/7/18  1. Open reduction and internal fixation of right distal radius fracture,  Smith and Nephew volar plate, standard size with utilization of fluoroscopy  to assess fracture alignment and implant fixation and position. The  fluoroscopy was used on several occasions during the procedure to assess  the aforementioned respects of this operation. 2.  Open carpal tunnel release-right. Background/Relevant Medical & Therapy History:  See history. Patient is on disability mainly from a brown recluse spider bite in 6/2017 that caused MRSA in left leg and needed to be in rehab for 8 months.     Pain Scale: 5/10   [x]Constant      [x]Intermittent    []other:  Pain Location:  Right wrist  Easing factors: rest  Provocative factors: pain increases with using right hand outside of splint     Occupational Profile:  Home Enviroment: lives with  [] spouse,  [x] family,  [] alone,  [] significant other,   [x] other: lives with mom who has dementia    Occupation/School:  Worked as a  up until the time of the spider bite, currently on disability    Recreational Activities/Meaningful Interests:  none    Prior Level of Function: [x] Independent with ADLs/IADLs     [] Assistance needed (describe):    Patient-Identified Primary Performance Deficits (to be addressed in POC):   [x] bathing - difficult taking a shower    [x] household tasks (cooking/cleaning) carrying laundry basket, can't do yard work, have been using riding mower, difficulty opening food packages, cans and jars   [x] dressing - tying shoes, don't tie them    [] self feeding   [x] grooming - can't shave with right hand    [] work/education   [] functional mobility   [] sleeping/rest   [] toileting/hygiene   [] recreational activities   [] driving    [] community/social participation   [] other:     Comorbidities Affecting Functional Performance:     [x]Anxiety (F41.9)/Depression (F32.9)   [x]Diabetes Type 1(E10.65) or 2 Pt to be independent in graded HEP progression with a good level of effort and compliance. 2) Pt to report a score of 25 % or less on the Quick DASH disability questionnaire for increased performance with carrying, moving, and handling objects. 3) Pt will demonstrate increased ROM to right wrist 60/60, forearm 80/80 for improved ability to shave with right hand. 4) Pt will demonstrate increased strength to right  and pinch to 60% of left  for improved ability to pull on clothing, tie shoes, open food packages. [place] 5) Pt will have a decrease in pain to 2/10 to facilitate improvement in managing the yard . 6)    7)    OCCUPATIONAL THERAPY EVALUATION COMPLEXITY JUSTIFICATION:    [] An occupational profile and medical/therapy history, which includes:   [x] a brief history including medical and/or therapy records relating to the     presenting problem   [] an expanded review of medical and/or therapy records and additional review     of physical, cognitive or psychosocial history related to current functional    performance   [] an extensive additional review of review of medical and/or therapy records   and physical, cognitive, or psychosocial history related to current    functional performance    [] An assessment that identifies performance deficits (relating to physical, cognitive, or psychosocial skills) that result in activity limitations and/or participation restrictions:   [x] 1-3 performance deficits   [] 3-5 performance deficits   [] 5 or more performance deficits    [] Clinical decision making of:   [x] low complexity, including analysis of occupational profile, data analysis from problem focused assessment, and consideration of a limited number of treatment options. No comorbidities affect occupational performance. No task modifications or assistance needed to complete evaluation.    [] moderate complexity, including analysis of occupational profile, data analysis from detailed assessment and consideration of several treatment options. Comorbidities that affect occupational performance may be present. Minimal to moderate task modifications or assistance needed to complete assessment. [] high complexity, including analysis of occupational profile, analysis of data from comprehensive assessment and consideration of multiple treatment options. Multiple comorbidities present that affect occupational performance. Significant task modifications or assistance needed to complete assessment.     Evaluation Code:  [x] Low Complexity EVAL 19874 (typically 30 minutes face to face)  [] Mod Complexity EVAL 40342 (typically 45 minutes face to face)  [] High Complexity EVAL 39598 (typically 60 minutes face to face)    Electronically signed by:  Malena Hua, [address] [place] [id number]

## 2018-09-27 ENCOUNTER — APPOINTMENT (OUTPATIENT)
Dept: OCCUPATIONAL THERAPY | Age: 58
End: 2018-09-27
Payer: MEDICARE

## 2018-10-03 DIAGNOSIS — Z98.890 POSTOPERATIVE STATE: Primary | ICD-10-CM

## 2018-10-04 RX ORDER — HYDROCODONE BITARTRATE AND ACETAMINOPHEN 7.5; 325 MG/1; MG/1
1 TABLET ORAL EVERY 8 HOURS PRN
Qty: 21 TABLET | Refills: 0 | Status: SHIPPED | OUTPATIENT
Start: 2018-10-04 | End: 2018-10-11

## 2018-10-08 ENCOUNTER — HOSPITAL ENCOUNTER (OUTPATIENT)
Dept: OCCUPATIONAL THERAPY | Age: 58
Setting detail: THERAPIES SERIES
Discharge: HOME | End: 2018-10-08
Payer: MEDICARE

## 2018-10-08 ENCOUNTER — OFFICE VISIT (OUTPATIENT)
Dept: ORTHOPEDIC SURGERY | Age: 58
End: 2018-10-08

## 2018-10-08 VITALS — HEIGHT: 72 IN | WEIGHT: 259 LBS | BODY MASS INDEX: 35.08 KG/M2

## 2018-10-08 DIAGNOSIS — S52.509A CLOSED FRACTURE OF DISTAL END OF RADIUS, UNSPECIFIED FRACTURE MORPHOLOGY, UNSPECIFIED LATERALITY, INITIAL ENCOUNTER: Primary | ICD-10-CM

## 2018-10-08 PROCEDURE — 99024 POSTOP FOLLOW-UP VISIT: CPT | Performed by: ORTHOPAEDIC SURGERY

## 2018-10-08 PROCEDURE — 97140 MANUAL THERAPY 1/> REGIONS: CPT | Performed by: OCCUPATIONAL THERAPIST

## 2018-10-08 PROCEDURE — 97110 THERAPEUTIC EXERCISES: CPT | Performed by: OCCUPATIONAL THERAPIST

## 2018-10-08 PROCEDURE — 97022 WHIRLPOOL THERAPY: CPT | Performed by: OCCUPATIONAL THERAPIST

## 2018-10-08 RX ORDER — HYDROCODONE BITARTRATE AND ACETAMINOPHEN 5; 325 MG/1; MG/1
1 TABLET ORAL EVERY 6 HOURS PRN
Qty: 16 TABLET | Refills: 0 | Status: SHIPPED | OUTPATIENT
Start: 2018-10-08 | End: 2018-10-11

## 2018-10-10 ENCOUNTER — HOSPITAL ENCOUNTER (OUTPATIENT)
Dept: OCCUPATIONAL THERAPY | Age: 58
Setting detail: THERAPIES SERIES
Discharge: HOME | End: 2018-10-10
Payer: MEDICARE

## 2018-10-10 NOTE — FLOWSHEET NOTE
[place] [address] [facility], [address] [place] [address] [place]  Phone: [phone]  Fax [phone]  Hand Therapy Daily Treatment Note  Date:  10/10/2018    Patient: Erwin Jo   : 1960   MRN: [id number]  Referring Physician: Referring Practitioner: Dr. Lashae Khan Diagnosis Information:   Diagnosis: S52.531A (ICD-10-CM) - Closed Colles' fracture of right radius   Assessment: M25.631 right wrist stiffness     Date of injury: 18  Date of Surgery/Type of procedure:    18  1.  Open reduction and internal fixation of right distal radius fracture,  Smith and Nephew volar plate, standard size with utilization of fluoroscopy  to assess fracture alignment and implant fixation and position.  The  fluoroscopy was used on several occasions during the procedure to assess  the aforementioned respects of this operation. 2.  Open carpal tunnel release-right. Insurance information:  Medicare  Comorbidities Affecting Functional Performance:     []Anxiety (F41.9)/Depression (F32.9)   []Diabetes Type 1(E10.65) or 2 (E11.65)   []Rheumatoid Arthritis (M05.9)  []Fibromyalgia (M79.7)  []Neuropathy(G60.9)  []Osteoarthritis(M19.91)  [x]None   []Other:    G-code: OT G-codes  Functional Assessment Tool Used: QuickDASH  Score: 61  Functional Limitation: Carrying, moving and handling objects  Carrying, Moving and Handling Objects Current Status (): At least 60 percent but less than 80 percent impaired, limited or restricted  Carrying, Moving and Handling Objects Goal Status ():  At least 20 percent but less than 40 percent impaired, limited or restricted    Date of Patient follow up with Physician:  10/3/18  Preferred Language for Healthcare:   [x]English       []other:  Latex Allergy:  [x]NO      []YES    RESTRICTIONS/PRECAUTIONS:  Fracture precautions   NONE  Progress Note: [x]  Yes  []  No  Next due by : Visit

## 2018-10-12 DIAGNOSIS — G89.18 POST-OP PAIN: Primary | ICD-10-CM

## 2018-10-12 RX ORDER — TRAMADOL HYDROCHLORIDE 50 MG/1
50 TABLET ORAL EVERY 8 HOURS PRN
Qty: 15 TABLET | Refills: 0 | Status: SHIPPED | OUTPATIENT
Start: 2018-10-12 | End: 2018-10-17

## 2018-10-12 NOTE — TELEPHONE ENCOUNTER
Pt was told at last visit no more narcotics. I attempted to call him his phone is not accepting calls.   I will send him one small RX of tramadol NO REFILLS

## 2018-10-15 ENCOUNTER — APPOINTMENT (OUTPATIENT)
Dept: OCCUPATIONAL THERAPY | Age: 58
End: 2018-10-15
Payer: MEDICARE

## 2018-10-22 ENCOUNTER — APPOINTMENT (OUTPATIENT)
Dept: OCCUPATIONAL THERAPY | Age: 58
End: 2018-10-22
Payer: MEDICARE

## 2019-03-22 ENCOUNTER — OFFICE VISIT (OUTPATIENT)
Dept: VASCULAR SURGERY | Age: 59
End: 2019-03-22
Payer: MEDICARE

## 2019-03-22 VITALS
DIASTOLIC BLOOD PRESSURE: 85 MMHG | HEIGHT: 72 IN | SYSTOLIC BLOOD PRESSURE: 110 MMHG | BODY MASS INDEX: 39.28 KG/M2 | WEIGHT: 290 LBS

## 2019-03-22 DIAGNOSIS — Z99.2 ENCOUNTER REGARDING VASCULAR ACCESS FOR DIALYSIS FOR ESRD (HCC): Primary | ICD-10-CM

## 2019-03-22 DIAGNOSIS — N18.6 ENCOUNTER REGARDING VASCULAR ACCESS FOR DIALYSIS FOR ESRD (HCC): Primary | ICD-10-CM

## 2019-03-22 PROCEDURE — G8484 FLU IMMUNIZE NO ADMIN: HCPCS | Performed by: SURGERY

## 2019-03-22 PROCEDURE — 3017F COLORECTAL CA SCREEN DOC REV: CPT | Performed by: SURGERY

## 2019-03-22 PROCEDURE — 99202 OFFICE O/P NEW SF 15 MIN: CPT | Performed by: SURGERY

## 2019-03-22 PROCEDURE — G8417 CALC BMI ABV UP PARAM F/U: HCPCS | Performed by: SURGERY

## 2019-03-22 PROCEDURE — G8427 DOCREV CUR MEDS BY ELIG CLIN: HCPCS | Performed by: SURGERY

## 2019-04-02 ENCOUNTER — SURG/PROC ORDERS (OUTPATIENT)
Dept: VASCULAR SURGERY | Age: 59
End: 2019-04-02

## 2019-04-02 DIAGNOSIS — Z01.818 PRE-OP TESTING: Primary | ICD-10-CM

## 2019-04-02 RX ORDER — SODIUM CHLORIDE 0.9 % (FLUSH) 0.9 %
10 SYRINGE (ML) INJECTION PRN
Status: CANCELLED | OUTPATIENT
Start: 2019-04-02

## 2019-04-02 RX ORDER — SODIUM CHLORIDE 0.9 % (FLUSH) 0.9 %
10 SYRINGE (ML) INJECTION EVERY 12 HOURS SCHEDULED
Status: CANCELLED | OUTPATIENT
Start: 2019-04-02

## 2019-04-08 RX ORDER — ACETAMINOPHEN 160 MG
1 TABLET,DISINTEGRATING ORAL DAILY
COMMUNITY
End: 2021-02-23

## 2019-04-08 RX ORDER — DILTIAZEM HYDROCHLORIDE 120 MG/1
120 CAPSULE, EXTENDED RELEASE ORAL 2 TIMES DAILY
COMMUNITY
End: 2021-02-23

## 2019-04-08 NOTE — PROGRESS NOTES
Obstructive Sleep Apnea (JAQUELIN) Screening     Patient:  Jose Muñiz    YOB: 1960      Medical Record #:  [id number]                     Date:  4/8/2019     1. Are you a loud and/or regular snorer? []  Yes       [x] No    2. Have you been observed to gasp or stop breathing during sleep? []  Yes       [x] No    3. Do you feel tired or groggy upon awakening or do you awaken with a headache?           []  Yes       [x] No    4. Are you often tired or fatigued during the wake time hours? []  Yes       [x] No    5. Do you fall asleep sitting, reading, watching TV or driving? []  Yes       [x] No    6. Do you often have problems with memory or concentration? []  Yes       [x] No    **If patient's score is ? 3 they are considered high risk for JAQUELIN. Notify the anesthesiologist of the high risk and document in focus note. Note:  If the patient's BMI is more than 35 kg m¯² , has neck circumference > 40 cm, and/or high blood pressure the risk is greater (© American Sleep Apnea Association, 2006).

## 2019-04-10 ENCOUNTER — TELEPHONE (OUTPATIENT)
Dept: VASCULAR SURGERY | Age: 59
End: 2019-04-10

## 2019-04-10 NOTE — TELEPHONE ENCOUNTER
Called patient and reminded him of his surgery for tomorrow. Arrive at 6:00am. NPO after midnight. Stopped Eliquis two days prior. saleem

## 2019-04-11 ENCOUNTER — HOSPITAL ENCOUNTER (INPATIENT)
Age: 59
LOS: 1 days | Discharge: HOME | DRG: 641 | End: 2019-04-12
Attending: SURGERY | Admitting: SURGERY
Payer: MEDICARE

## 2019-04-11 ENCOUNTER — ANESTHESIA EVENT (OUTPATIENT)
Dept: OPERATING ROOM | Age: 59
DRG: 641 | End: 2019-04-11
Payer: MEDICARE

## 2019-04-11 ENCOUNTER — ANESTHESIA (OUTPATIENT)
Dept: OPERATING ROOM | Age: 59
DRG: 641 | End: 2019-04-11
Payer: MEDICARE

## 2019-04-11 VITALS
SYSTOLIC BLOOD PRESSURE: 125 MMHG | DIASTOLIC BLOOD PRESSURE: 77 MMHG | RESPIRATION RATE: 51 BRPM | OXYGEN SATURATION: 100 %

## 2019-04-11 PROBLEM — E87.5 HYPERKALEMIA: Status: ACTIVE | Noted: 2019-04-11

## 2019-04-11 PROBLEM — E11.9 DM2 (DIABETES MELLITUS, TYPE 2) (HCC): Status: ACTIVE | Noted: 2019-04-11

## 2019-04-11 PROBLEM — N18.5 CKD (CHRONIC KIDNEY DISEASE) STAGE 5, GFR LESS THAN 15 ML/MIN (HCC): Status: ACTIVE | Noted: 2019-04-11

## 2019-04-11 PROBLEM — E87.5 POTASSIUM EXCESS: Status: ACTIVE | Noted: 2019-04-11

## 2019-04-11 LAB
ALBUMIN SERPL-MCNC: 3.5 G/DL (ref 3.4–5)
ANION GAP SERPL CALCULATED.3IONS-SCNC: 11 MMOL/L (ref 3–16)
ANION GAP SERPL CALCULATED.3IONS-SCNC: 13 MMOL/L (ref 3–16)
ANION GAP SERPL CALCULATED.3IONS-SCNC: 15 MMOL/L (ref 3–16)
BASOPHILS ABSOLUTE: 0 K/UL (ref 0–0.2)
BASOPHILS RELATIVE PERCENT: 0.3 %
BUN BLDV-MCNC: 79 MG/DL (ref 7–20)
BUN BLDV-MCNC: 80 MG/DL (ref 7–20)
BUN BLDV-MCNC: 80 MG/DL (ref 7–20)
CALCIUM SERPL-MCNC: 8.1 MG/DL (ref 8.3–10.6)
CHLORIDE BLD-SCNC: 104 MMOL/L (ref 99–110)
CHLORIDE BLD-SCNC: 105 MMOL/L (ref 99–110)
CHLORIDE BLD-SCNC: 106 MMOL/L (ref 99–110)
CO2: 14 MMOL/L (ref 21–32)
CO2: 15 MMOL/L (ref 21–32)
CO2: 15 MMOL/L (ref 21–32)
CREAT SERPL-MCNC: 5.6 MG/DL (ref 0.9–1.3)
CREAT SERPL-MCNC: 5.9 MG/DL (ref 0.9–1.3)
CREAT SERPL-MCNC: 5.9 MG/DL (ref 0.9–1.3)
EOSINOPHILS ABSOLUTE: 0.1 K/UL (ref 0–0.6)
EOSINOPHILS RELATIVE PERCENT: 2.2 %
GFR AFRICAN AMERICAN: 12
GFR AFRICAN AMERICAN: 12
GFR AFRICAN AMERICAN: 13
GFR NON-AFRICAN AMERICAN: 10
GFR NON-AFRICAN AMERICAN: 10
GFR NON-AFRICAN AMERICAN: 11
GLUCOSE BLD-MCNC: 117 MG/DL (ref 70–99)
GLUCOSE BLD-MCNC: 128 MG/DL (ref 70–99)
GLUCOSE BLD-MCNC: 144 MG/DL (ref 70–99)
GLUCOSE BLD-MCNC: 204 MG/DL (ref 70–99)
GLUCOSE BLD-MCNC: 250 MG/DL (ref 70–99)
GLUCOSE BLD-MCNC: 251 MG/DL (ref 70–99)
GLUCOSE BLD-MCNC: 258 MG/DL (ref 70–99)
HCT VFR BLD CALC: 30.3 % (ref 40.5–52.5)
HEMOGLOBIN: 10.3 G/DL (ref 13.5–17.5)
LYMPHOCYTES ABSOLUTE: 1.2 K/UL (ref 1–5.1)
LYMPHOCYTES RELATIVE PERCENT: 19.3 %
MCH RBC QN AUTO: 33 PG (ref 26–34)
MCHC RBC AUTO-ENTMCNC: 33.9 G/DL (ref 31–36)
MCV RBC AUTO: 97.2 FL (ref 80–100)
MONOCYTES ABSOLUTE: 0.5 K/UL (ref 0–1.3)
MONOCYTES RELATIVE PERCENT: 8 %
NEUTROPHILS ABSOLUTE: 4.5 K/UL (ref 1.7–7.7)
NEUTROPHILS RELATIVE PERCENT: 70.2 %
PDW BLD-RTO: 16.1 % (ref 12.4–15.4)
PERFORMED ON: ABNORMAL
PHOSPHORUS: 6.7 MG/DL (ref 2.5–4.9)
PLATELET # BLD: 246 K/UL (ref 135–450)
PMV BLD AUTO: 8.3 FL (ref 5–10.5)
POTASSIUM REFLEX MAGNESIUM: 6.5 MMOL/L (ref 3.5–5.1)
POTASSIUM SERPL-SCNC: 6.1 MMOL/L (ref 3.5–5.1)
POTASSIUM SERPL-SCNC: 7.4 MMOL/L (ref 3.5–5.1)
RBC # BLD: 3.12 M/UL (ref 4.2–5.9)
SODIUM BLD-SCNC: 131 MMOL/L (ref 136–145)
SODIUM BLD-SCNC: 133 MMOL/L (ref 136–145)
SODIUM BLD-SCNC: 134 MMOL/L (ref 136–145)
WBC # BLD: 6.5 K/UL (ref 4–11)

## 2019-04-11 PROCEDURE — 2500000003 HC RX 250 WO HCPCS: Performed by: INTERNAL MEDICINE

## 2019-04-11 PROCEDURE — 2580000003 HC RX 258: Performed by: INTERNAL MEDICINE

## 2019-04-11 PROCEDURE — 6370000000 HC RX 637 (ALT 250 FOR IP): Performed by: INTERNAL MEDICINE

## 2019-04-11 PROCEDURE — 36415 COLL VENOUS BLD VENIPUNCTURE: CPT

## 2019-04-11 PROCEDURE — 93005 ELECTROCARDIOGRAM TRACING: CPT | Performed by: ANESTHESIOLOGY

## 2019-04-11 PROCEDURE — 6360000002 HC RX W HCPCS: Performed by: INTERNAL MEDICINE

## 2019-04-11 PROCEDURE — 2060000000 HC ICU INTERMEDIATE R&B

## 2019-04-11 PROCEDURE — 2500000003 HC RX 250 WO HCPCS: Performed by: ANESTHESIOLOGY

## 2019-04-11 PROCEDURE — 85025 COMPLETE CBC W/AUTO DIFF WBC: CPT

## 2019-04-11 PROCEDURE — 2580000003 HC RX 258: Performed by: ANESTHESIOLOGY

## 2019-04-11 PROCEDURE — 80069 RENAL FUNCTION PANEL: CPT

## 2019-04-11 RX ORDER — INSULIN GLARGINE 100 [IU]/ML
8 INJECTION, SOLUTION SUBCUTANEOUS NIGHTLY
Status: DISCONTINUED | OUTPATIENT
Start: 2019-04-11 | End: 2019-04-12 | Stop reason: HOSPADM

## 2019-04-11 RX ORDER — FLUDROCORTISONE ACETATE 0.1 MG/1
0.2 TABLET ORAL 2 TIMES DAILY
Status: COMPLETED | OUTPATIENT
Start: 2019-04-11 | End: 2019-04-12

## 2019-04-11 RX ORDER — ONDANSETRON 2 MG/ML
4 INJECTION INTRAMUSCULAR; INTRAVENOUS EVERY 6 HOURS PRN
Status: DISCONTINUED | OUTPATIENT
Start: 2019-04-11 | End: 2019-04-12 | Stop reason: HOSPADM

## 2019-04-11 RX ORDER — OXYCODONE HYDROCHLORIDE AND ACETAMINOPHEN 5; 325 MG/1; MG/1
2 TABLET ORAL PRN
Status: DISCONTINUED | OUTPATIENT
Start: 2019-04-11 | End: 2019-04-11 | Stop reason: HOSPADM

## 2019-04-11 RX ORDER — LIDOCAINE HYDROCHLORIDE 10 MG/ML
0.3 INJECTION, SOLUTION EPIDURAL; INFILTRATION; INTRACAUDAL; PERINEURAL
Status: COMPLETED | OUTPATIENT
Start: 2019-04-11 | End: 2019-04-11

## 2019-04-11 RX ORDER — SODIUM CHLORIDE 0.9 % (FLUSH) 0.9 %
10 SYRINGE (ML) INJECTION EVERY 12 HOURS SCHEDULED
Status: DISCONTINUED | OUTPATIENT
Start: 2019-04-11 | End: 2019-04-11 | Stop reason: HOSPADM

## 2019-04-11 RX ORDER — ASPIRIN 81 MG/1
81 TABLET, CHEWABLE ORAL DAILY
Status: DISCONTINUED | OUTPATIENT
Start: 2019-04-12 | End: 2019-04-12 | Stop reason: HOSPADM

## 2019-04-11 RX ORDER — ISOSORBIDE MONONITRATE 60 MG/1
60 TABLET, EXTENDED RELEASE ORAL 2 TIMES DAILY
Status: DISCONTINUED | OUTPATIENT
Start: 2019-04-11 | End: 2019-04-12 | Stop reason: HOSPADM

## 2019-04-11 RX ORDER — OXYCODONE HYDROCHLORIDE AND ACETAMINOPHEN 5; 325 MG/1; MG/1
1 TABLET ORAL PRN
Status: DISCONTINUED | OUTPATIENT
Start: 2019-04-11 | End: 2019-04-11 | Stop reason: HOSPADM

## 2019-04-11 RX ORDER — DIPHENHYDRAMINE HYDROCHLORIDE 50 MG/ML
6.25 INJECTION INTRAMUSCULAR; INTRAVENOUS
Status: DISCONTINUED | OUTPATIENT
Start: 2019-04-11 | End: 2019-04-11 | Stop reason: HOSPADM

## 2019-04-11 RX ORDER — DEXTROSE MONOHYDRATE 25 G/50ML
12.5 INJECTION, SOLUTION INTRAVENOUS PRN
Status: DISCONTINUED | OUTPATIENT
Start: 2019-04-11 | End: 2019-04-12 | Stop reason: HOSPADM

## 2019-04-11 RX ORDER — DEXTROSE MONOHYDRATE 25 G/50ML
25 INJECTION, SOLUTION INTRAVENOUS ONCE
Status: COMPLETED | OUTPATIENT
Start: 2019-04-11 | End: 2019-04-11

## 2019-04-11 RX ORDER — HYDRALAZINE HYDROCHLORIDE 50 MG/1
100 TABLET, FILM COATED ORAL 2 TIMES DAILY
Status: DISCONTINUED | OUTPATIENT
Start: 2019-04-11 | End: 2019-04-12 | Stop reason: HOSPADM

## 2019-04-11 RX ORDER — ONDANSETRON 2 MG/ML
4 INJECTION INTRAMUSCULAR; INTRAVENOUS EVERY 30 MIN PRN
Status: DISCONTINUED | OUTPATIENT
Start: 2019-04-11 | End: 2019-04-11 | Stop reason: HOSPADM

## 2019-04-11 RX ORDER — CARVEDILOL 6.25 MG/1
12.5 TABLET ORAL 2 TIMES DAILY
Status: DISCONTINUED | OUTPATIENT
Start: 2019-04-11 | End: 2019-04-12 | Stop reason: HOSPADM

## 2019-04-11 RX ORDER — DEXTROSE MONOHYDRATE 50 MG/ML
100 INJECTION, SOLUTION INTRAVENOUS PRN
Status: DISCONTINUED | OUTPATIENT
Start: 2019-04-11 | End: 2019-04-12 | Stop reason: HOSPADM

## 2019-04-11 RX ORDER — NICOTINE POLACRILEX 4 MG
15 LOZENGE BUCCAL PRN
Status: DISCONTINUED | OUTPATIENT
Start: 2019-04-11 | End: 2019-04-12 | Stop reason: HOSPADM

## 2019-04-11 RX ORDER — SODIUM CHLORIDE 0.9 % (FLUSH) 0.9 %
10 SYRINGE (ML) INJECTION PRN
Status: DISCONTINUED | OUTPATIENT
Start: 2019-04-11 | End: 2019-04-11 | Stop reason: HOSPADM

## 2019-04-11 RX ORDER — SODIUM CHLORIDE, SODIUM LACTATE, POTASSIUM CHLORIDE, CALCIUM CHLORIDE 600; 310; 30; 20 MG/100ML; MG/100ML; MG/100ML; MG/100ML
INJECTION, SOLUTION INTRAVENOUS CONTINUOUS
Status: DISCONTINUED | OUTPATIENT
Start: 2019-04-11 | End: 2019-04-11

## 2019-04-11 RX ORDER — LABETALOL HYDROCHLORIDE 5 MG/ML
5 INJECTION, SOLUTION INTRAVENOUS
Status: DISCONTINUED | OUTPATIENT
Start: 2019-04-11 | End: 2019-04-11 | Stop reason: HOSPADM

## 2019-04-11 RX ORDER — PRAVASTATIN SODIUM 40 MG
40 TABLET ORAL NIGHTLY
Status: DISCONTINUED | OUTPATIENT
Start: 2019-04-11 | End: 2019-04-12 | Stop reason: HOSPADM

## 2019-04-11 RX ORDER — FUROSEMIDE 10 MG/ML
20 INJECTION INTRAMUSCULAR; INTRAVENOUS ONCE
Status: COMPLETED | OUTPATIENT
Start: 2019-04-11 | End: 2019-04-11

## 2019-04-11 RX ORDER — SODIUM CHLORIDE 0.9 % (FLUSH) 0.9 %
10 SYRINGE (ML) INJECTION PRN
Status: DISCONTINUED | OUTPATIENT
Start: 2019-04-11 | End: 2019-04-12 | Stop reason: HOSPADM

## 2019-04-11 RX ORDER — DILTIAZEM HYDROCHLORIDE 120 MG/1
120 CAPSULE, COATED, EXTENDED RELEASE ORAL DAILY
Status: DISCONTINUED | OUTPATIENT
Start: 2019-04-12 | End: 2019-04-12 | Stop reason: HOSPADM

## 2019-04-11 RX ORDER — SODIUM CHLORIDE 0.9 % (FLUSH) 0.9 %
10 SYRINGE (ML) INJECTION EVERY 12 HOURS SCHEDULED
Status: DISCONTINUED | OUTPATIENT
Start: 2019-04-11 | End: 2019-04-12 | Stop reason: HOSPADM

## 2019-04-11 RX ORDER — HYDRALAZINE HYDROCHLORIDE 20 MG/ML
5 INJECTION INTRAMUSCULAR; INTRAVENOUS EVERY 30 MIN PRN
Status: DISCONTINUED | OUTPATIENT
Start: 2019-04-11 | End: 2019-04-11 | Stop reason: HOSPADM

## 2019-04-11 RX ORDER — DOCUSATE SODIUM 100 MG/1
100 CAPSULE, LIQUID FILLED ORAL 2 TIMES DAILY
Status: DISCONTINUED | OUTPATIENT
Start: 2019-04-11 | End: 2019-04-12 | Stop reason: HOSPADM

## 2019-04-11 RX ORDER — METOLAZONE 2.5 MG/1
5 TABLET ORAL ONCE
Status: COMPLETED | OUTPATIENT
Start: 2019-04-11 | End: 2019-04-11

## 2019-04-11 RX ADMIN — SODIUM CHLORIDE, PRESERVATIVE FREE 10 ML: 5 INJECTION INTRAVENOUS at 20:23

## 2019-04-11 RX ADMIN — LIDOCAINE HYDROCHLORIDE 0.3 ML: 10 INJECTION, SOLUTION EPIDURAL; INFILTRATION; INTRACAUDAL; PERINEURAL at 07:06

## 2019-04-11 RX ADMIN — INSULIN LISPRO 2 UNITS: 100 INJECTION, SOLUTION INTRAVENOUS; SUBCUTANEOUS at 11:34

## 2019-04-11 RX ADMIN — INSULIN GLARGINE 8 UNITS: 100 INJECTION, SOLUTION SUBCUTANEOUS at 21:14

## 2019-04-11 RX ADMIN — PATIROMER 16.8 G: 8.4 POWDER, FOR SUSPENSION ORAL at 11:36

## 2019-04-11 RX ADMIN — FLUDROCORTISONE ACETATE 0.2 MG: 0.1 TABLET ORAL at 20:23

## 2019-04-11 RX ADMIN — PRAVASTATIN SODIUM 40 MG: 40 TABLET ORAL at 20:22

## 2019-04-11 RX ADMIN — CARVEDILOL 12.5 MG: 6.25 TABLET, FILM COATED ORAL at 20:22

## 2019-04-11 RX ADMIN — METOLAZONE 5 MG: 2.5 TABLET ORAL at 11:35

## 2019-04-11 RX ADMIN — DOCUSATE SODIUM 100 MG: 100 CAPSULE, LIQUID FILLED ORAL at 11:35

## 2019-04-11 RX ADMIN — ISOSORBIDE MONONITRATE 60 MG: 60 TABLET, EXTENDED RELEASE ORAL at 20:22

## 2019-04-11 RX ADMIN — FUROSEMIDE 20 MG: 10 INJECTION, SOLUTION INTRAVENOUS at 16:26

## 2019-04-11 RX ADMIN — INSULIN HUMAN 10 UNITS: 100 INJECTION, SOLUTION PARENTERAL at 11:34

## 2019-04-11 RX ADMIN — SODIUM CHLORIDE, POTASSIUM CHLORIDE, SODIUM LACTATE AND CALCIUM CHLORIDE: 600; 310; 30; 20 INJECTION, SOLUTION INTRAVENOUS at 07:06

## 2019-04-11 RX ADMIN — SODIUM BICARBONATE 150 MEQ: 84 INJECTION, SOLUTION INTRAVENOUS at 16:14

## 2019-04-11 RX ADMIN — DEXTROSE MONOHYDRATE 25 G: 25 INJECTION, SOLUTION INTRAVENOUS at 11:34

## 2019-04-11 RX ADMIN — FLUDROCORTISONE ACETATE 0.2 MG: 0.1 TABLET ORAL at 11:43

## 2019-04-11 RX ADMIN — SODIUM BICARBONATE: 84 INJECTION, SOLUTION INTRAVENOUS at 11:36

## 2019-04-11 RX ADMIN — INSULIN LISPRO 1 UNITS: 100 INJECTION, SOLUTION INTRAVENOUS; SUBCUTANEOUS at 18:25

## 2019-04-11 RX ADMIN — HYDRALAZINE HYDROCHLORIDE 100 MG: 50 TABLET, FILM COATED ORAL at 20:23

## 2019-04-11 ASSESSMENT — PULMONARY FUNCTION TESTS
PIF_VALUE: 2
PIF_VALUE: 1
PIF_VALUE: 17
PIF_VALUE: 1
PIF_VALUE: 20
PIF_VALUE: 2
PIF_VALUE: 1
PIF_VALUE: 2
PIF_VALUE: 1
PIF_VALUE: 5
PIF_VALUE: 1
PIF_VALUE: 1

## 2019-04-11 ASSESSMENT — PAIN SCALES - GENERAL
PAINLEVEL_OUTOF10: 0

## 2019-04-11 ASSESSMENT — PAIN DESCRIPTION - DESCRIPTORS: DESCRIPTORS: ACHING;CONSTANT

## 2019-04-11 ASSESSMENT — PAIN - FUNCTIONAL ASSESSMENT: PAIN_FUNCTIONAL_ASSESSMENT: 0-10

## 2019-04-11 NOTE — H&P
I have reviewed the history and physical (See note dated 3/22/2019) and examined the patient and find no relevant changes. I have reviewed with the patient and/or family the risks, benefits, and alternatives to the procedure.

## 2019-04-11 NOTE — PROGRESS NOTES
Patient admitted to room 427 from same day surgery. Patient oriented to room, call light, bed rails, phone, lights and bathroom. Patient instructed about the schedule of the day including: vital sign frequency, lab draws, possible tests, frequency of MD and staff rounds, including RN/MD rounding together at bedside, daily weights, and I &O's. Patient instructed about prescribed diet, how to use 8MENU, and television. Telemetry box in place, patient aware of placement and reason. Bed locked, in lowest position, side rails up 2/4, call light within reach. Will continue to monitor.

## 2019-04-11 NOTE — CONSULTS
Thank you to requesting provider: Dr. Meli Cardoza, for asking us to see [place]  Reason for consultation:  Hyperkalemia  Chief Complaint:  Pre-op for fistula placement    History of Presenting Illness      62 y/o male with history of CKD stage 5 due to diabetic nephropathy who came to the hospital for surgery today for placement of AVF by Dr. Babak Booth. Pre-op his potassium noted to be 7.4 so his surgery was cancelled. He is feeling ok. Recently his lasix was stopped. He used to follow with Dr. Mita Stacy and now follows with Dr. Nita Vasquez. He is not uremic. No fluid overload.        Past Medical/Surgical History      Active Ambulatory Problems     Diagnosis Date Noted    Type 2 diabetes mellitus with stage 4 chronic kidney disease, without long-term current use of insulin ([address] 07/09/2011    Cigarette smoker 07/09/2011    Chronic atrial fibrillation ([address] 07/12/2011    Essential (primary) hypertension 08/02/2011    Normocytic anemia 01/13/2017    Primary osteoarthritis of both knees 02/23/2017    Calciphylaxis 06/08/2017    Depression 06/30/2017    Hyperlipidemia 08/07/2012    Morbid obesity due to excess calories ([address] 01/25/2017    Furunculosis (recurrent, on chronic suppressive Abx) 06/30/2017    Tobacco use disorder 06/30/2017    Ulcer of left calf with fat layer exposed ([address] 06/30/2017    Hyperphosphatemia 06/30/2017    Vitamin D deficiency 06/30/2017    Secondary hyperparathyroidism of renal origin ([address] 06/30/2017    Iron deficiency 06/30/2017    White matter changes, severe by MRI brain 06/30/2017    Coronary artery disease involving native coronary artery 06/30/2017    Renal arteriosclerosis 06/30/2017    Other disorder of calcium metabolism 07/12/2017    CKD (chronic kidney disease), stage III ([address] 07/25/2017    Acute renal failure (ARF) ([address] 07/25/2017    Gastrointestinal hemorrhage 19/46/3329    Metabolic acidosis 81/61/9451    Hyperkalemia 07/25/2017    Leg wound, left     Deep vein thrombosis (DVT) ([facility] 08/07/2012    Tobacco abuse 09/27/2017    Hyponatremia 09/27/2017    ANA (acute kidney injury) ([facility] 09/27/2017    Wound infection     Acute on chronic renal failure ([facility])     Gastric outlet obstruction 10/25/2017     Resolved Ambulatory Problems     Diagnosis Date Noted    Hypertensive cardiovascular disease 07/09/2011    Chronic kidney disease, stage IV (severe) ([facility] 07/12/2011    Embolism and thrombosis ([facility] 11/30/2012    Long term current use of anticoagulant therapy 11/30/2012    Cardiomyopathy ([facility] 07/13/2015    Deep vein thrombosis (DVT) ([facility] 08/07/2012     Past Medical History:   Diagnosis Date    Atrial fibrillation ([facility])     CAD (coronary artery disease)     Cardiomyopathy ([facility] 7/13/2015    CHF (congestive heart failure) ([facility])     Chronic kidney disease     COPD (chronic obstructive pulmonary disease) ([facility])     Deep vein thrombosis (DVT) ([facility]) 8/7/2012    Depression     Diabetes mellitus ([facility])     Gastrointestinal bleed     GERD (gastroesophageal reflux disease)     Heme positive stool 01/2017    History of blood transfusion     Hx of blood clots     Hyperlipidemia     Hypertension     Hypoglycemia     MDRO (multiple drug resistant organisms) resistance 2017    MRSA (methicillin resistant staph aureus) culture positive 9/26/17,09/18/2017    Primary osteoarthritis of both knees     Primary osteoarthritis of both knees          Review of Systems     Constitutional:  No weight loss, no fever/chills  Eyes:  No eye pain, no eye redness  Cardiovascular:  No chest pain, no worsening of edema  Respiratory:  No hemoptysis, no stidor  Gastrointestinal:  No blood in stool, no n/v, no diarrhea  Genitoruinary:  No hematuria, no difficulty with urination  Musculoskeletal:  No joint swelling, no redness  Integumentary:  No Rash, no itching  Neurological:  No focal weakness, No new sensory deficit  Psychiatric:  No depression, no confusion  Endocrine: No polyuria, no polydipsia       Medications      Reviewed in EMR     Allergies     Codeine; Onion; and Penicillins      Family History       Negative for Kidney Disease    Social History      Social History     Socioeconomic History    Marital status:      Spouse name: None    Number of children: None    Years of education: None    Highest education level: None   Occupational History    Occupation: disabled   Social Needs    Financial resource strain: None    Food insecurity:     Worry: None     Inability: None    Transportation needs:     Medical: None     Non-medical: None   Tobacco Use    Smoking status: Current Every Day Smoker     Packs/day: 0.50     Years: 15.00     Pack years: 7.50     Types: Cigarettes    Smokeless tobacco: Never Used   Substance and Sexual Activity    Alcohol use: No     Comment: rarely    Drug use: No    Sexual activity: Yes     Partners: Female   Lifestyle    Physical activity:     Days per week: None     Minutes per session: None    Stress: None   Relationships    Social connections:     Talks on phone: None     Gets together: None     Attends Tenriism service: None     Active member of club or organization: None     Attends meetings of clubs or organizations: None     Relationship status: None    Intimate partner violence:     Fear of current or ex partner: None     Emotionally abused: None     Physically abused: None     Forced sexual activity: None   Other Topics Concern    None   Social History Narrative    None       Physical Exam     Blood pressure 116/76, pulse 73, temperature 97.5 °F (36.4 °C), temperature source Axillary, resp. rate (!) 73, height 6' (1.829 m), weight 290 lb (131.5 kg), SpO2 95 %.     General:  NAD, A+Ox3, ill-appearing  HEENT:  PERRL, EOMI  Neck:  Supple, normal range of movement  Chest:  CTAB, good respiratory effort, good air movement  CV:  RRR, no murmurs or rubs, no carotid bruit, no abdominal bruits  Abdomen:  NTND, soft, +BS, no hepatosplenomegaly  Extremities:  No peripheral edema  Neurological:  Moving all four extremities, CN II-XII grossly intact  Lymphatics:  No palpable lymph nodes  Skin:  No rash, no jaundice  Psychiatric:  Normal insight and judgement, good recall    Data     Recent Labs     04/11/19  0652   WBC 6.5   HGB 10.3*   HCT 30.3*   MCV 97.2        Recent Labs     04/11/19  0652 04/11/19  0725   * 131*   K 6.5* 7.4*    105   CO2 14* 15*   GLUCOSE 250* 258*   BUN 80* 79*   CREATININE 5.9* 5.9*   LABGLOM 10* 10*   GFRAA 12* 12*       Assessment:    Chronic Kidney Disease:  Stage 5  - Etiology:  Diabetic Nephropathy  - Clinical:  Not uremic, he was going to get AVF placed today in anticipation of dialysis down the road    Hyperkalemia:  Etiology is multifactorial.  Low renin/miguel angel state related to diabetic nephropathy. Advanced CKD. Also, this is seen when patients with advanced CKD or ESRD are NPO and insulin levels get low. They are very sensitive to transcellular shifts of potassium     Hypertension:  Well controlled     Metabolic Acidosis:  Due to CKD and hyperkalemia. High potassium inhibits ammoniagenesis and net acid excretion    Plan: Will medically treat hyperkalemia   Shift potassium with 10 units regular insulin / will give Dextrose to prevent hypoglycemia   Forced diuresis    1 liter of 1/2 NS with 75 mEq HCO3    Lasix IV and metolazone ( combination more effective for kaliuresis )     Florinef to stimulate distal renal K excretion ( will help to have distal Na and HCO3 delivery to activate ROMK )   HCO3   Lower total body K with veltassa   Recheck K around 3 pm, if refractory to medical management I discussed with patient we would need to transfer to the ICU so the I can place a line at the bedside since IR is not willing to do the procedure.         Thank you for asking us to participate in the management of your patient, please do not hesitate to contact me for any concerns regarding my recommendations as outlined above.    -----------------------------  Rosa Gil M.D.   [facility]

## 2019-04-11 NOTE — PROGRESS NOTES
Vascular    Procedure cancelled due to hyperkalemia. Hospitalist to admit for treatment. Can reschedule to next week once K corrected.

## 2019-04-11 NOTE — H&P
ankle; age 13    HAND SURGERY      KIDNEY BIOPSY Right 06/20/2016    KIDNEY BIOPSY Left 09/20/2016    OPEN TREATMENT RADIAL SHAFT FRACTURE Right 8/7/2018    OPEN REDUCTION INTERNAL FIXATION RIGHT DISTAL RADIUS, RIGHT CARPAL TUNNEL RELEASE performed by Jason Torres MD at [facility] TUNNELED [facility] 94 Right 11/09/2017    BARD POWER PORT    UPPER GASTROINTESTINAL ENDOSCOPY  01/17/2017    mild chronic gastritis; duodenal biopsy normal    UPPER GASTROINTESTINAL ENDOSCOPY  10/24/2017       Medications Prior to Admission:      Prior to Admission medications    Medication Sig Start Date End Date Taking? Authorizing Provider   Cholecalciferol (VITAMIN D3) 2000 units CAPS Take 1 capsule by mouth daily   Yes Historical Provider, MD   diltiazem (CARDIZEM 12 HR) 120 MG extended release capsule Take 120 mg by mouth daily   Yes Historical Provider, MD   carvedilol (COREG) 12.5 MG tablet Take 12.5 mg by mouth 2 times daily   Yes Historical Provider, MD   isosorbide mononitrate (IMDUR) 60 MG extended release tablet Take 60 mg by mouth 2 times daily   Yes Historical Provider, MD   Glimepiride (AMARYL PO) Take 2 mg by mouth daily    Yes Historical Provider, MD   spironolactone (ALDACTONE) 25 MG tablet Take 25 mg by mouth daily   Yes Historical Provider, MD   hydrALAZINE (APRESOLINE) 100 MG tablet Take 100 mg by mouth 2 times daily    Yes Historical Provider, MD   pravastatin (PRAVACHOL) 40 MG tablet TAKE ONE TABLET BY MOUTH EVERY EVENING **YOU NEED TO CONTACT YOUR DOCTORS OFFICE TO MAKE AN APPOINTMENT FOR NEW BLOOD WORK**  Patient taking differently: TAKE ONE TABLET BY MOUTH EVERY EVENING 3/14/14  Yes Rosaura Torres MD   aspirin 81 MG chewable tablet Take 81 mg by mouth daily.      Yes Historical Provider, MD   apixaban (ELIQUIS) 2.5 MG TABS tablet Take 2.5 mg by mouth 2 times daily Take two tablets BID    Historical Provider, MD   cyclobenzaprine (FLEXERIL) 5 MG tablet Take 5 mg by mouth 3 times daily as needed for Muscle spasms    Historical Provider, MD       Allergies:  Codeine; Onion; and Penicillins    Social History:    TOBACCO:   reports that he has been smoking cigarettes. He has a 7.50 pack-year smoking history. He has never used smokeless tobacco.  ETOH:   reports that he does not drink alcohol. Family History:          Problem Relation Age of Onset    Arthritis Mother     Depression Mother     High Blood Pressure Mother     Mental Illness Mother 80        dementia    Obesity Mother     Osteoporosis Mother        REVIEW OF SYSTEMS:   Pertinent positives as noted in the HPI. All other systems reviewed and negative. Physical Exam Performed:    /76   Pulse 73   Temp 97.5 °F (36.4 °C) (Axillary)   Resp (!) 73   Ht 6' (1.829 m)   Wt 290 lb (131.5 kg)   SpO2 95%   BMI 39.33 kg/m²     General appearance: No apparent distress, appears stated age and cooperative. HEENT:  Normal cephalic, atraumatic without obvious deformity. Pupils equal, round, and reactive to light. Extra ocular muscles intact. Conjunctivae/corneas clear. Neck: Supple, no jugular venous distention. Trachea midline with full range of motion. Respiratory:  Normal respiratory effort. Diminished but clear to auscultation, bilaterally without Rales/Wheezes/Rhonchi. Cardiovascular: Regular rate and rhythm with normal S1/S2 without murmurs, rubs or gallops. Abdomen: Soft, non-tender, non-distended with normal bowel sounds. Musculoskelatal: No clubbing, cyanosis or edema bilaterally. Full range of motion without deformity. Neurologic:  Neurovascularly intact without any focal sensory/motor deficits. Cranial nerves: II-XII intact, grossly non-focal.  Psychiatric: Alert and oriented, thought content appropriate, normal insight  Skin: Skin color, texture, turgor normal.  No rashes or lesions.   Capillary Refill: Brisk,< 3 seconds   Peripheral Pulses: +2 palpable, equal bilaterally       Labs:     Recent Labs 04/11/19  0652   WBC 6.5   HGB 10.3*   HCT 30.3*        Recent Labs     04/11/19  0652 04/11/19  0725 04/11/19  1430   * 131* 134*   K 6.5* 7.4* 6.1*    105 104   CO2 14* 15* 15*   BUN 80* 79* 80*   CREATININE 5.9* 5.9* 5.6*   CALCIUM 8.1* 8.1* 8.1*   PHOS  --   --  6.7*     No results for input(s): AST, ALT, BILIDIR, BILITOT, ALKPHOS in the last 72 hours. No results for input(s): INR in the last 72 hours. No results for input(s): [place] Reasoner in the last 72 hours. Radiology:       EKG:  I have reviewed the EKG with the following interpretation: Atrial fibrillation, normal rate, no acute T wave changes or acute ischemic changes. No results found. ASSESSMENT:    Principal Problem:    Hyperkalemia  Active Problems:    Chronic atrial fibrillation (HCC)    Potassium excess    DM2 (diabetes mellitus, type 2) (HCC)    CKD (chronic kidney disease) stage 5, GFR less than 15 ml/min ([facility])  Resolved Problems:    * No resolved hospital problems. *      PLAN:    Hyperkalemia in setting of CKD (chronic kidney disease) stage 5: Nephrology consulted. Recommend initial medical management with fluids, diuretics, bicarb, and Valtessa. If not improving, may require temporary HD line and starting HD. Otherwise, will need to re-schedule AVF placement with Dr. Iris San once improved. Chronic atrial fibrillation: Stable, rate controlled. Continue holding Eliquis for now until timing of AVF determined. Diabetes Mellitus, Type 2: Controlled. Hold oral regimen. Start Basal Bolus regimen. Monitor. DVT Prophylaxis: Holding Eliquis for now. Consider SQ Heparin if not resuming soon. Diet: DIET RENAL;  Code Status: Full Code    PT/OT Eval Status: NA    Dispo - 1-2 days     Daryl Anaya MD    Thank you GIANLUCA Maria - EVELYN for the opportunity to be involved in this patient's care. If you have any questions or concerns please feel free to contact me at [phone].

## 2019-04-12 VITALS
HEART RATE: 88 BPM | HEIGHT: 72 IN | DIASTOLIC BLOOD PRESSURE: 91 MMHG | SYSTOLIC BLOOD PRESSURE: 140 MMHG | BODY MASS INDEX: 39.28 KG/M2 | WEIGHT: 290 LBS | RESPIRATION RATE: 18 BRPM | TEMPERATURE: 98.7 F | OXYGEN SATURATION: 97 %

## 2019-04-12 LAB
ALBUMIN SERPL-MCNC: 3.4 G/DL (ref 3.4–5)
ANION GAP SERPL CALCULATED.3IONS-SCNC: 15 MMOL/L (ref 3–16)
BUN BLDV-MCNC: 77 MG/DL (ref 7–20)
CALCIUM SERPL-MCNC: 7.8 MG/DL (ref 8.3–10.6)
CHLORIDE BLD-SCNC: 107 MMOL/L (ref 99–110)
CO2: 16 MMOL/L (ref 21–32)
CREAT SERPL-MCNC: 5.6 MG/DL (ref 0.9–1.3)
EKG ATRIAL RATE: 113 BPM
EKG DIAGNOSIS: NORMAL
EKG Q-T INTERVAL: 324 MS
EKG QRS DURATION: 88 MS
EKG QTC CALCULATION (BAZETT): 413 MS
EKG R AXIS: 29 DEGREES
EKG T AXIS: 68 DEGREES
EKG VENTRICULAR RATE: 98 BPM
GFR AFRICAN AMERICAN: 13
GFR NON-AFRICAN AMERICAN: 11
GLUCOSE BLD-MCNC: 110 MG/DL (ref 70–99)
GLUCOSE BLD-MCNC: 129 MG/DL (ref 70–99)
GLUCOSE BLD-MCNC: 256 MG/DL (ref 70–99)
HCT VFR BLD CALC: 29.2 % (ref 40.5–52.5)
HEMOGLOBIN: 9.9 G/DL (ref 13.5–17.5)
MCH RBC QN AUTO: 32.5 PG (ref 26–34)
MCHC RBC AUTO-ENTMCNC: 33.8 G/DL (ref 31–36)
MCV RBC AUTO: 96.2 FL (ref 80–100)
PDW BLD-RTO: 16.1 % (ref 12.4–15.4)
PERFORMED ON: ABNORMAL
PERFORMED ON: ABNORMAL
PHOSPHORUS: 6.7 MG/DL (ref 2.5–4.9)
PLATELET # BLD: 233 K/UL (ref 135–450)
PMV BLD AUTO: 8.2 FL (ref 5–10.5)
POTASSIUM SERPL-SCNC: 4.9 MMOL/L (ref 3.5–5.1)
RBC # BLD: 3.03 M/UL (ref 4.2–5.9)
SODIUM BLD-SCNC: 138 MMOL/L (ref 136–145)
WBC # BLD: 5.5 K/UL (ref 4–11)

## 2019-04-12 PROCEDURE — 36415 COLL VENOUS BLD VENIPUNCTURE: CPT

## 2019-04-12 PROCEDURE — 85027 COMPLETE CBC AUTOMATED: CPT

## 2019-04-12 PROCEDURE — 6370000000 HC RX 637 (ALT 250 FOR IP): Performed by: INTERNAL MEDICINE

## 2019-04-12 PROCEDURE — 93010 ELECTROCARDIOGRAM REPORT: CPT | Performed by: INTERNAL MEDICINE

## 2019-04-12 PROCEDURE — 2580000003 HC RX 258: Performed by: INTERNAL MEDICINE

## 2019-04-12 PROCEDURE — 2500000003 HC RX 250 WO HCPCS: Performed by: INTERNAL MEDICINE

## 2019-04-12 PROCEDURE — 80069 RENAL FUNCTION PANEL: CPT

## 2019-04-12 RX ORDER — TRISODIUM CITRATE DIHYDRATE AND CITRIC ACID MONOHYDRATE 500; 334 MG/5ML; MG/5ML
30 SOLUTION ORAL DAILY
Status: DISCONTINUED | OUTPATIENT
Start: 2019-04-12 | End: 2019-04-12 | Stop reason: HOSPADM

## 2019-04-12 RX ADMIN — SODIUM BICARBONATE 100 MEQ: 84 INJECTION, SOLUTION INTRAVENOUS at 11:53

## 2019-04-12 RX ADMIN — SODIUM CHLORIDE, PRESERVATIVE FREE 10 ML: 5 INJECTION INTRAVENOUS at 09:07

## 2019-04-12 RX ADMIN — DILTIAZEM HYDROCHLORIDE 120 MG: 120 CAPSULE, COATED, EXTENDED RELEASE ORAL at 09:07

## 2019-04-12 RX ADMIN — CARVEDILOL 12.5 MG: 6.25 TABLET, FILM COATED ORAL at 09:07

## 2019-04-12 RX ADMIN — FLUDROCORTISONE ACETATE 0.2 MG: 0.1 TABLET ORAL at 09:07

## 2019-04-12 RX ADMIN — HYDRALAZINE HYDROCHLORIDE 100 MG: 50 TABLET, FILM COATED ORAL at 09:07

## 2019-04-12 RX ADMIN — PATIROMER 16.8 G: 8.4 POWDER, FOR SUSPENSION ORAL at 09:07

## 2019-04-12 RX ADMIN — ISOSORBIDE MONONITRATE 60 MG: 60 TABLET, EXTENDED RELEASE ORAL at 09:07

## 2019-04-12 RX ADMIN — ASPIRIN 81 MG 81 MG: 81 TABLET ORAL at 09:07

## 2019-04-12 ASSESSMENT — PAIN SCALES - GENERAL: PAINLEVEL_OUTOF10: 0

## 2019-04-12 NOTE — PROGRESS NOTES
Vascular    K 4.9. Can reschedule dialysis access surgery as oupt this coming Wed. Ok to discharge from my standpoint.

## 2019-04-12 NOTE — DISCHARGE SUMMARY
SpO2 97%   BMI 39.33 kg/m²   General appearance: No apparent distress, appears stated age and cooperative. HEENT:  Normal cephalic, atraumatic without obvious deformity. Pupils equal, round, and reactive to light. Extra ocular muscles intact. Conjunctivae/corneas clear. Neck: Supple, no jugular venous distention. Trachea midline with full range of motion. Respiratory:  Normal respiratory effort. Diminished but clear to auscultation, bilaterally without Rales/Wheezes/Rhonchi. Cardiovascular: Regular rate and rhythm with normal S1/S2 without murmurs, rubs or gallops. Abdomen: Soft, non-tender, non-distended with normal bowel sounds. Musculoskelatal: No clubbing, cyanosis or edema bilaterally. Full range of motion without deformity. Neurologic:  Neurovascularly intact without any focal sensory/motor deficits. Cranial nerves: II-XII intact, grossly non-focal.  Psychiatric: Alert and oriented, thought content appropriate, normal insight  Skin: Skin color, texture, turgor normal.  No rashes or lesions. Capillary Refill: Brisk,< 3 seconds   Peripheral Pulses: +2 palpable, equal bilaterally       Patient Discharge Instructions: Follow up:  1. Surgery with Dr. Anni Vila next Wednesday  2.  Nephrology Dr. Xiomara Warner as planned      Discharge Medications:   Discharge Medication List as of 4/12/2019 11:02 AM      START taking these medications    Details   patiromer sorbitex calcium (VELTASSA) 8.4 g PACK packet Take 2 packets by mouth daily Obtain samples from Nephrology office, Disp-3 each, R-0Sample           Discharge Medication List as of 4/12/2019 11:02 AM        Discharge Medication List as of 4/12/2019 11:02 AM      CONTINUE these medications which have NOT CHANGED    Details   Cholecalciferol (VITAMIN D3) 2000 units CAPS Take 1 capsule by mouth dailyHistorical Med      diltiazem (CARDIZEM 12 HR) 120 MG extended release capsule Take 120 mg by mouth dailyHistorical Med      carvedilol (COREG) 12.5 MG tablet Take 12.5 mg by mouth 2 times dailyHistorical Med      isosorbide mononitrate (IMDUR) 60 MG extended release tablet Take 60 mg by mouth 2 times dailyHistorical Med      Glimepiride (AMARYL PO) Take 2 mg by mouth daily Historical Med      hydrALAZINE (APRESOLINE) 100 MG tablet Take 100 mg by mouth 2 times daily Historical Med      pravastatin (PRAVACHOL) 40 MG tablet TAKE ONE TABLET BY MOUTH EVERY EVENING **YOU NEED TO CONTACT YOUR DOCTORS OFFICE TO MAKE AN APPOINTMENT FOR NEW BLOOD WORK**, Disp-30 tablet, R-0Needs an office visit      aspirin 81 MG chewable tablet Take 81 mg by mouth daily. apixaban (ELIQUIS) 2.5 MG TABS tablet Take 2.5 mg by mouth 2 times daily Take two tablets BIDHistorical Med      cyclobenzaprine (FLEXERIL) 5 MG tablet Take 5 mg by mouth 3 times daily as needed for Muscle spasmsHistorical Med           Discharge Medication List as of 4/12/2019 11:02 AM      STOP taking these medications       spironolactone (ALDACTONE) 25 MG tablet Comments:   Reason for Stopping:                 Significant Test Results    No results found. Consults:     IP CONSULT TO HOSPITALIST  IP CONSULT TO PHARMACY  IP CONSULT TO NEPHROLOGY    Labs:  For convenience and continuity at follow-up the following most recent labs are provided:    Lab Results   Component Value Date    WBC 5.5 04/12/2019    HGB 9.9 04/12/2019    HCT 29.2 04/12/2019    MCV 96.2 04/12/2019     04/12/2019     04/12/2019    K 4.9 04/12/2019    K 6.5 04/11/2019     04/12/2019    CO2 16 04/12/2019    BUN 77 04/12/2019    CREATININE 5.6 04/12/2019    CALCIUM 7.8 04/12/2019    PHOS 6.7 04/12/2019     08/27/2013    TROPONINI 0.02 07/29/2018    ALKPHOS 140 07/29/2018    ALT 6 07/29/2018    AST 10 07/29/2018    BILITOT 0.4 07/29/2018    BILIDIR 0.19 12/16/2012    LABALBU 3.4 04/12/2019    LDLCALC 70 07/10/2011    TRIG 284 07/10/2011    LABA1C 8.6 09/26/2017     Lab Results   Component Value Date    INR 1.19 (H) 07/29/2018 INR 1.33 (H) 11/09/2017    INR 1.29 (H) 10/24/2017         The patient was seen and examined on day of discharge and this discharge summary is in conjunction with any daily progress note from day of discharge. Time spent on discharge is more than 30 minutes in the examination, evaluation, counseling and review of medications and discharge plan. Signed:    Daryl Anaya MD   5/5/2019    Thank you GIANLUCA Maria CNP for the opportunity to be involved in this patient's care. If you have any questions or concerns please feel free to contact my office [phone].

## 2019-04-12 NOTE — PLAN OF CARE
Problem: Falls - Risk of:  Goal: Will remain free from falls  Description  Will remain free from falls  Outcome: Ongoing  Note:   Patient will remain free of falls this shift. Patient is currently resting in bed with bed locked and in the lowest position. Patient's call light, belongings and bedside table are within reach. Patient is alert and oriented and uses call light appropriately. Will monitor.       Problem: Serum Glucose Level - Abnormal:  Goal: Ability to maintain appropriate glucose levels will improve  Description  Ability to maintain appropriate glucose levels will improve  4/12/2019 0927 by Nahun Irizarry RN  Outcome: Ongoing  Note:   Blood glucose levels WNL this AM.   4/11/2019 2329 by Goran Lawton RN  Outcome: Ongoing

## 2019-04-12 NOTE — PROGRESS NOTES
Progress Note    HISTORY     CC:   Hyperkalemia, we are following for hyperkalemia and CKD 5       Subjective/   HPI:  K is down to 4.9. Feeling ok. Taking the veltassa. Still with some metabolic acidosis.   BP is good     ROS:  Constitutional:  No fevers, No Chills, + weakness  Cardiovascular:  No palpations, mild edema  Respiratory:  No wheezing, no cough  Skin:  No rash, no itching  :  No hematuria, no dysuria     Social Hx:  No family at bedside     Past Medical and Surgical History:  - Reviewed, no changes     EXAM       Objective/     Vitals:    04/11/19 1946 04/11/19 2303 04/12/19 0345 04/12/19 0745   BP: (!) 166/68 (!) 142/78  (!) 140/91   Pulse: 81 94  88   Resp: 19 18 18 18   Temp: 98.2 °F (36.8 °C) 98.2 °F (36.8 °C)  98.7 °F (37.1 °C)   TempSrc: Oral Oral Oral Oral   SpO2: 94% 96%  97%   Weight:       Height:         24HR INTAKE/OUTPUT:      Intake/Output Summary (Last 24 hours) at 4/12/2019 1119  Last data filed at 4/12/2019 0940  Gross per 24 hour   Intake 490 ml   Output 2900 ml   Net -2410 ml     Constitutional:  Alert, awake, no apparent distress  Eyes:  Pupils reactive, sclera clear   Neck:  Normal thyroid, no masses   Cardiovascular:  Regular, no rub  Respiratory:  No distress, no wheezing  Psychiatry:  Appropriate mood/affect, alert  Abdomen: +bs, soft, nt, no masses   Musculoskeletal: No LE edema, no clubbing   Lymphatics:  No LAD in neck, no supraclavicular nodes       MEDICAL DECISION MAKING       Data/  Recent Labs     04/11/19  0652 04/12/19  0443   WBC 6.5 5.5   HGB 10.3* 9.9*   HCT 30.3* 29.2*   MCV 97.2 96.2    233     Recent Labs     04/11/19  0725 04/11/19  1430 04/12/19  0443   * 134* 138   K 7.4* 6.1* 4.9    104 107   CO2 15* 15* 16*   GLUCOSE 258* 117* 110*   PHOS  --  6.7* 6.7*   BUN 79* 80* 77*   CREATININE 5.9* 5.6* 5.6*   LABGLOM 10* 11* 11*   GFRAA 12* 13* 13*       Assessment/     Chronic Kidney Disease:  Stage 5  - Etiology:  Diabetic Nephropathy  - Clinical:  Not uremic, he was going to get AVF placed today in anticipation of dialysis down the road. No need for urgent start of dialysis      Hyperkalemia:  Etiology is multifactorial.  Low renin/miguel angel state related to diabetic nephropathy. Advanced CKD. He was also drinking 1/2 gallon of milk per day    - s/p medical management, K is down to 4.9     Hypertension:  Well controlled      Metabolic Acidosis:  Due to CKD and hyperkalemia. High potassium inhibits ammoniagenesis and net acid excretion.   He was on sodium bicarb in the past but this was stopped for some reason    Plan/     Ok for discharge  Will give 100 mEq NaHCO3 x 1 prior to discharge, might need NaHCO3 down the road  Advised to reduce milk intake and try low potassium diet  He will got to our office to get veltassa for 1 week to keep K down prior to access surgery, follow labs as outpatient     -----------------------------  Ozzy Zavala M.D.   [facility]

## 2019-04-16 ENCOUNTER — TELEPHONE (OUTPATIENT)
Dept: VASCULAR SURGERY | Age: 59
End: 2019-04-16

## 2019-04-16 NOTE — TELEPHONE ENCOUNTER
Called patient regarding surgery for tomorrow as reminder, but patient had to cancel as no transportation. He is seeing his pcp on 4/18/19 to make sure Potassium is where needs to be, as that was reason last surgery was cancelled. He will call me on Monday. saleem

## 2019-06-20 ENCOUNTER — APPOINTMENT (OUTPATIENT)
Dept: GENERAL RADIOLOGY | Age: 59
DRG: 291 | End: 2019-06-20
Payer: MEDICARE

## 2019-06-20 ENCOUNTER — HOSPITAL ENCOUNTER (INPATIENT)
Age: 59
LOS: 5 days | Discharge: HOME | DRG: 291 | End: 2019-06-25
Attending: EMERGENCY MEDICINE | Admitting: INTERNAL MEDICINE
Payer: MEDICARE

## 2019-06-20 DIAGNOSIS — R09.02 HYPOXIA: ICD-10-CM

## 2019-06-20 DIAGNOSIS — N17.9 ACUTE RENAL FAILURE, UNSPECIFIED ACUTE RENAL FAILURE TYPE (HCC): ICD-10-CM

## 2019-06-20 DIAGNOSIS — E87.70 HYPERVOLEMIA, UNSPECIFIED HYPERVOLEMIA TYPE: ICD-10-CM

## 2019-06-20 DIAGNOSIS — R06.02 SHORTNESS OF BREATH: Primary | ICD-10-CM

## 2019-06-20 LAB
A/G RATIO: 0.9 (ref 1.1–2.2)
ALBUMIN SERPL-MCNC: 3.3 G/DL (ref 3.4–5)
ALP BLD-CCNC: 119 U/L (ref 40–129)
ALT SERPL-CCNC: 6 U/L (ref 10–40)
ANION GAP SERPL CALCULATED.3IONS-SCNC: 15 MMOL/L (ref 3–16)
AST SERPL-CCNC: 8 U/L (ref 15–37)
BASOPHILS ABSOLUTE: 0.1 K/UL (ref 0–0.2)
BASOPHILS RELATIVE PERCENT: 1.5 %
BILIRUB SERPL-MCNC: <0.2 MG/DL (ref 0–1)
BILIRUBIN URINE: NEGATIVE
BLOOD, URINE: NEGATIVE
BUN BLDV-MCNC: 65 MG/DL (ref 7–20)
CALCIUM SERPL-MCNC: 7.9 MG/DL (ref 8.3–10.6)
CHLORIDE BLD-SCNC: 106 MMOL/L (ref 99–110)
CLARITY: CLEAR
CO2: 16 MMOL/L (ref 21–32)
COLOR: YELLOW
CREAT SERPL-MCNC: 6.1 MG/DL (ref 0.9–1.3)
EKG ATRIAL RATE: 129 BPM
EKG DIAGNOSIS: NORMAL
EKG Q-T INTERVAL: 374 MS
EKG QRS DURATION: 76 MS
EKG QTC CALCULATION (BAZETT): 457 MS
EKG R AXIS: -2 DEGREES
EKG T AXIS: 73 DEGREES
EKG VENTRICULAR RATE: 90 BPM
EOSINOPHILS ABSOLUTE: 0.1 K/UL (ref 0–0.6)
EOSINOPHILS RELATIVE PERCENT: 2.2 %
EPITHELIAL CELLS, UA: NORMAL /HPF
GFR AFRICAN AMERICAN: 12
GFR NON-AFRICAN AMERICAN: 10
GLOBULIN: 3.6 G/DL
GLUCOSE BLD-MCNC: 191 MG/DL (ref 70–99)
GLUCOSE BLD-MCNC: 231 MG/DL (ref 70–99)
GLUCOSE BLD-MCNC: 248 MG/DL (ref 70–99)
GLUCOSE BLD-MCNC: 308 MG/DL (ref 70–99)
GLUCOSE URINE: >=1000 MG/DL
HCT VFR BLD CALC: 39.9 % (ref 40.5–52.5)
HEMOGLOBIN: 13.1 G/DL (ref 13.5–17.5)
INR BLD: 1.11 (ref 0.86–1.14)
KETONES, URINE: NEGATIVE MG/DL
LEUKOCYTE ESTERASE, URINE: NEGATIVE
LYMPHOCYTES ABSOLUTE: 0.5 K/UL (ref 1–5.1)
LYMPHOCYTES RELATIVE PERCENT: 12.4 %
MCH RBC QN AUTO: 31.5 PG (ref 26–34)
MCHC RBC AUTO-ENTMCNC: 32.9 G/DL (ref 31–36)
MCV RBC AUTO: 96 FL (ref 80–100)
MICROSCOPIC EXAMINATION: YES
MONOCYTES ABSOLUTE: 0.2 K/UL (ref 0–1.3)
MONOCYTES RELATIVE PERCENT: 6.3 %
NEUTROPHILS ABSOLUTE: 2.9 K/UL (ref 1.7–7.7)
NEUTROPHILS RELATIVE PERCENT: 77.6 %
NITRITE, URINE: NEGATIVE
PDW BLD-RTO: 16.8 % (ref 12.4–15.4)
PERFORMED ON: ABNORMAL
PH UA: 6.5 (ref 5–8)
PLATELET # BLD: 181 K/UL (ref 135–450)
PMV BLD AUTO: 7.6 FL (ref 5–10.5)
POTASSIUM REFLEX MAGNESIUM: 5.1 MMOL/L (ref 3.5–5.1)
PRO-BNP: 7665 PG/ML (ref 0–124)
PROTEIN UA: >=300 MG/DL
PROTHROMBIN TIME: 12.6 SEC (ref 9.8–13)
RBC # BLD: 4.15 M/UL (ref 4.2–5.9)
RBC UA: NORMAL /HPF (ref 0–2)
SODIUM BLD-SCNC: 137 MMOL/L (ref 136–145)
SPECIFIC GRAVITY UA: 1.01 (ref 1–1.03)
TOTAL PROTEIN: 6.9 G/DL (ref 6.4–8.2)
TROPONIN: 0.03 NG/ML
URINE TYPE: ABNORMAL
UROBILINOGEN, URINE: 0.2 E.U./DL
WBC # BLD: 3.8 K/UL (ref 4–11)
WBC UA: NORMAL /HPF (ref 0–5)

## 2019-06-20 PROCEDURE — 93010 ELECTROCARDIOGRAM REPORT: CPT | Performed by: INTERNAL MEDICINE

## 2019-06-20 PROCEDURE — 73562 X-RAY EXAM OF KNEE 3: CPT

## 2019-06-20 PROCEDURE — 85610 PROTHROMBIN TIME: CPT

## 2019-06-20 PROCEDURE — 36415 COLL VENOUS BLD VENIPUNCTURE: CPT

## 2019-06-20 PROCEDURE — 96374 THER/PROPH/DIAG INJ IV PUSH: CPT

## 2019-06-20 PROCEDURE — 80053 COMPREHEN METABOLIC PANEL: CPT

## 2019-06-20 PROCEDURE — 6360000002 HC RX W HCPCS: Performed by: EMERGENCY MEDICINE

## 2019-06-20 PROCEDURE — 71045 X-RAY EXAM CHEST 1 VIEW: CPT

## 2019-06-20 PROCEDURE — 6370000000 HC RX 637 (ALT 250 FOR IP): Performed by: INTERNAL MEDICINE

## 2019-06-20 PROCEDURE — 6370000000 HC RX 637 (ALT 250 FOR IP): Performed by: PHYSICIAN ASSISTANT

## 2019-06-20 PROCEDURE — 2580000003 HC RX 258: Performed by: PHYSICIAN ASSISTANT

## 2019-06-20 PROCEDURE — 6360000002 HC RX W HCPCS: Performed by: INTERNAL MEDICINE

## 2019-06-20 PROCEDURE — 84484 ASSAY OF TROPONIN QUANT: CPT

## 2019-06-20 PROCEDURE — 2500000003 HC RX 250 WO HCPCS: Performed by: INTERNAL MEDICINE

## 2019-06-20 PROCEDURE — 83880 ASSAY OF NATRIURETIC PEPTIDE: CPT

## 2019-06-20 PROCEDURE — 85025 COMPLETE CBC W/AUTO DIFF WBC: CPT

## 2019-06-20 PROCEDURE — 99285 EMERGENCY DEPT VISIT HI MDM: CPT

## 2019-06-20 PROCEDURE — 99222 1ST HOSP IP/OBS MODERATE 55: CPT | Performed by: INTERNAL MEDICINE

## 2019-06-20 PROCEDURE — 2060000000 HC ICU INTERMEDIATE R&B

## 2019-06-20 PROCEDURE — 83036 HEMOGLOBIN GLYCOSYLATED A1C: CPT

## 2019-06-20 PROCEDURE — 93005 ELECTROCARDIOGRAM TRACING: CPT | Performed by: EMERGENCY MEDICINE

## 2019-06-20 PROCEDURE — 81001 URINALYSIS AUTO W/SCOPE: CPT

## 2019-06-20 RX ORDER — NICOTINE 21 MG/24HR
1 PATCH, TRANSDERMAL 24 HOURS TRANSDERMAL DAILY
Status: DISCONTINUED | OUTPATIENT
Start: 2019-06-20 | End: 2019-06-24

## 2019-06-20 RX ORDER — HYDROCODONE BITARTRATE AND ACETAMINOPHEN 5; 325 MG/1; MG/1
1 TABLET ORAL EVERY 6 HOURS PRN
Status: DISCONTINUED | OUTPATIENT
Start: 2019-06-20 | End: 2019-06-25 | Stop reason: HOSPADM

## 2019-06-20 RX ORDER — DEXTROSE MONOHYDRATE 25 G/50ML
12.5 INJECTION, SOLUTION INTRAVENOUS PRN
Status: DISCONTINUED | OUTPATIENT
Start: 2019-06-20 | End: 2019-06-25 | Stop reason: HOSPADM

## 2019-06-20 RX ORDER — CYCLOBENZAPRINE HCL 10 MG
5 TABLET ORAL 3 TIMES DAILY PRN
Status: DISCONTINUED | OUTPATIENT
Start: 2019-06-20 | End: 2019-06-25 | Stop reason: HOSPADM

## 2019-06-20 RX ORDER — DILTIAZEM HYDROCHLORIDE 120 MG/1
120 CAPSULE, COATED, EXTENDED RELEASE ORAL DAILY
Status: DISCONTINUED | OUTPATIENT
Start: 2019-06-20 | End: 2019-06-25 | Stop reason: HOSPADM

## 2019-06-20 RX ORDER — PRAVASTATIN SODIUM 40 MG
40 TABLET ORAL NIGHTLY
Status: DISCONTINUED | OUTPATIENT
Start: 2019-06-20 | End: 2019-06-25 | Stop reason: HOSPADM

## 2019-06-20 RX ORDER — ASPIRIN 81 MG/1
81 TABLET, CHEWABLE ORAL DAILY
Status: DISCONTINUED | OUTPATIENT
Start: 2019-06-20 | End: 2019-06-20

## 2019-06-20 RX ORDER — FUROSEMIDE 10 MG/ML
80 INJECTION INTRAMUSCULAR; INTRAVENOUS ONCE
Status: COMPLETED | OUTPATIENT
Start: 2019-06-20 | End: 2019-06-20

## 2019-06-20 RX ORDER — FUROSEMIDE 10 MG/ML
40 INJECTION INTRAMUSCULAR; INTRAVENOUS ONCE
Status: COMPLETED | OUTPATIENT
Start: 2019-06-20 | End: 2019-06-20

## 2019-06-20 RX ORDER — NICOTINE POLACRILEX 4 MG
15 LOZENGE BUCCAL PRN
Status: DISCONTINUED | OUTPATIENT
Start: 2019-06-20 | End: 2019-06-25 | Stop reason: HOSPADM

## 2019-06-20 RX ORDER — SODIUM BICARBONATE 650 MG/1
650 TABLET ORAL 3 TIMES DAILY
Status: DISCONTINUED | OUTPATIENT
Start: 2019-06-20 | End: 2019-06-21

## 2019-06-20 RX ORDER — ONDANSETRON 2 MG/ML
4 INJECTION INTRAMUSCULAR; INTRAVENOUS EVERY 6 HOURS PRN
Status: DISCONTINUED | OUTPATIENT
Start: 2019-06-20 | End: 2019-06-25 | Stop reason: HOSPADM

## 2019-06-20 RX ORDER — HYDRALAZINE HYDROCHLORIDE 50 MG/1
100 TABLET, FILM COATED ORAL 2 TIMES DAILY
Status: DISCONTINUED | OUTPATIENT
Start: 2019-06-20 | End: 2019-06-25 | Stop reason: HOSPADM

## 2019-06-20 RX ORDER — ACETAMINOPHEN 325 MG/1
650 TABLET ORAL EVERY 4 HOURS PRN
Status: DISCONTINUED | OUTPATIENT
Start: 2019-06-20 | End: 2019-06-25 | Stop reason: HOSPADM

## 2019-06-20 RX ORDER — CARVEDILOL 6.25 MG/1
12.5 TABLET ORAL 2 TIMES DAILY
Status: DISCONTINUED | OUTPATIENT
Start: 2019-06-20 | End: 2019-06-25 | Stop reason: HOSPADM

## 2019-06-20 RX ORDER — DOCUSATE SODIUM 100 MG/1
100 CAPSULE, LIQUID FILLED ORAL 2 TIMES DAILY
Status: DISCONTINUED | OUTPATIENT
Start: 2019-06-20 | End: 2019-06-25 | Stop reason: HOSPADM

## 2019-06-20 RX ORDER — DEXTROSE MONOHYDRATE 50 MG/ML
100 INJECTION, SOLUTION INTRAVENOUS PRN
Status: DISCONTINUED | OUTPATIENT
Start: 2019-06-20 | End: 2019-06-25 | Stop reason: HOSPADM

## 2019-06-20 RX ORDER — SODIUM CHLORIDE 0.9 % (FLUSH) 0.9 %
10 SYRINGE (ML) INJECTION EVERY 12 HOURS SCHEDULED
Status: DISCONTINUED | OUTPATIENT
Start: 2019-06-20 | End: 2019-06-25 | Stop reason: HOSPADM

## 2019-06-20 RX ORDER — SODIUM CHLORIDE 0.9 % (FLUSH) 0.9 %
10 SYRINGE (ML) INJECTION PRN
Status: DISCONTINUED | OUTPATIENT
Start: 2019-06-20 | End: 2019-06-25 | Stop reason: HOSPADM

## 2019-06-20 RX ORDER — ISOSORBIDE MONONITRATE 60 MG/1
60 TABLET, EXTENDED RELEASE ORAL 2 TIMES DAILY
Status: DISCONTINUED | OUTPATIENT
Start: 2019-06-20 | End: 2019-06-25 | Stop reason: HOSPADM

## 2019-06-20 RX ORDER — SODIUM BICARBONATE 650 MG/1
650 TABLET ORAL 3 TIMES DAILY
Status: ON HOLD | COMMUNITY
End: 2019-06-24 | Stop reason: HOSPADM

## 2019-06-20 RX ADMIN — Medication 10 ML: at 19:54

## 2019-06-20 RX ADMIN — HYDRALAZINE HYDROCHLORIDE 100 MG: 50 TABLET, FILM COATED ORAL at 21:09

## 2019-06-20 RX ADMIN — INSULIN LISPRO 4 UNITS: 100 INJECTION, SOLUTION INTRAVENOUS; SUBCUTANEOUS at 17:49

## 2019-06-20 RX ADMIN — Medication 50 MEQ: at 19:23

## 2019-06-20 RX ADMIN — SODIUM BICARBONATE 650 MG: 650 TABLET ORAL at 21:10

## 2019-06-20 RX ADMIN — ISOSORBIDE MONONITRATE 60 MG: 60 TABLET ORAL at 21:10

## 2019-06-20 RX ADMIN — FUROSEMIDE 80 MG: 10 INJECTION, SOLUTION INTRAMUSCULAR; INTRAVENOUS at 19:23

## 2019-06-20 RX ADMIN — SODIUM BICARBONATE 650 MG: 650 TABLET ORAL at 17:48

## 2019-06-20 RX ADMIN — HYDROCODONE BITARTRATE AND ACETAMINOPHEN 1 TABLET: 5; 325 TABLET ORAL at 19:52

## 2019-06-20 RX ADMIN — PATIROMER 8.4 G: 8.4 POWDER, FOR SUSPENSION ORAL at 19:23

## 2019-06-20 RX ADMIN — FUROSEMIDE 40 MG: 10 INJECTION, SOLUTION INTRAMUSCULAR; INTRAVENOUS at 11:28

## 2019-06-20 RX ADMIN — PRAVASTATIN SODIUM 40 MG: 40 TABLET ORAL at 21:10

## 2019-06-20 RX ADMIN — INSULIN LISPRO 1 UNITS: 100 INJECTION, SOLUTION INTRAVENOUS; SUBCUTANEOUS at 21:17

## 2019-06-20 ASSESSMENT — PAIN SCALES - GENERAL
PAINLEVEL_OUTOF10: 10
PAINLEVEL_OUTOF10: 0
PAINLEVEL_OUTOF10: 9

## 2019-06-20 ASSESSMENT — PAIN DESCRIPTION - PROGRESSION: CLINICAL_PROGRESSION: GRADUALLY WORSENING

## 2019-06-20 ASSESSMENT — PAIN DESCRIPTION - LOCATION
LOCATION: KNEE
LOCATION: KNEE

## 2019-06-20 ASSESSMENT — PAIN DESCRIPTION - FREQUENCY
FREQUENCY: CONTINUOUS
FREQUENCY: INTERMITTENT

## 2019-06-20 ASSESSMENT — PAIN DESCRIPTION - DESCRIPTORS
DESCRIPTORS: ACHING;DISCOMFORT
DESCRIPTORS: ACHING

## 2019-06-20 ASSESSMENT — PAIN - FUNCTIONAL ASSESSMENT: PAIN_FUNCTIONAL_ASSESSMENT: PREVENTS OR INTERFERES SOME ACTIVE ACTIVITIES AND ADLS

## 2019-06-20 ASSESSMENT — PAIN DESCRIPTION - PAIN TYPE
TYPE: ACUTE PAIN
TYPE: ACUTE PAIN

## 2019-06-20 ASSESSMENT — PAIN DESCRIPTION - ORIENTATION
ORIENTATION: LEFT
ORIENTATION: LEFT

## 2019-06-20 ASSESSMENT — PAIN DESCRIPTION - ONSET: ONSET: GRADUAL

## 2019-06-20 NOTE — H&P
Hospital Medicine History & Physical      PCP: GIANLUCA Jeff - CNP    Date of Admission: 6/20/2019    Date of Service: Pt seen/examined on 6/20/2019     Chief Complaint:    Chief Complaint   Patient presents with    Shortness of Breath     pt arrived from his doctor's office in the hospital. pt states he has been sob x 2 wks with exertion. per md office, pt was 80% on room air after ambulation today. pt has hx of renal failure, does not do dialysis and has gained 25 lbs since november. History Of Present Illness: The patient is a 63 y.o. male with CKD5, DM2, A fib, history of calciphylaxis who presented to the ED with complaint of shortness of breath, feeling generally unwell. Has been progressive over the past few days. He denies CP, fever, chills. F/w with nephro and was planned for AVF creation in April which was cancelled due to hyperkalemia. Went to PCP today for eval and was sent to ER for hypoxia. In the ER today he was noted to have acute worsening of his baseline creatinine. Not hypoxic. CXR with vascular congestion.    Admitted for further care     He is on Eliquis for a fib, his last dose was in the AM on 6/20/19     Past Medical History:        Diagnosis Date    Atrial fibrillation ([address]     CAD (coronary artery disease)     Cardiomyopathy ([facility] 7/13/2015    CHF (congestive heart failure) (HCC)     Chronic kidney disease     COPD (chronic obstructive pulmonary disease) (HCC)     Deep vein thrombosis (DVT) ([facility] [facility] 8/7/2012    Depression     Diabetes mellitus (HCC)     Gastrointestinal bleed     GERD (gastroesophageal reflux disease)     Heme positive stool 01/2017    History of blood transfusion     1/2017    Hx of blood clots     left leg; 5 plus years ago    Hyperlipidemia     Hypertension     Hypoglycemia     MDRO (multiple drug resistant organisms) resistance 2017    treated with antibiotics    MRSA (methicillin resistant staph aureus) culture positive AM   - cont cardizem   - tele     #DM2, uncontrolled   - stop glimepiride   - use SSI for now     #Morbid Obesity  - Body mass index is 40.59 kg/m². - Complicating assessment and treatment. Placing patient at risk for multiple co-morbidities as well as early death and contributing to the patient's presentation.   - Counseled on weight loss.     #Left knee effusion  - with pain for 2-3 days, unknown injury  - XR with large effusion  - norco, tylenol PRN pain  - ortho consult     DVT Prophylaxis: Eliquis (hold for procedure)  Diet: Diet NPO Time Specified  Code Status: Prior    Radha Javier PA-C  6/20/2019 1:37 PM      Agree with above  Changes made to note    Sri Chambers MD 6/20/2019 2:19 PM

## 2019-06-20 NOTE — CONSULTS
[address]                 [address] [address]                                  CONSULTATION    PATIENT NAME: Shruthi Dowell                      :        1960  MED REC NO:   [id number]                          ROOM:         ACCOUNT NO:   [de-identified]                           ADMIT DATE: 2019  PROVIDER:     Estefany Lazar MD    CONSULT DATE:  2019    REASON FOR CONSULTATION:  Acute on chronic kidney disease. HISTORY OF PRESENT ILLNESS:  The patient is a 27-year-old male patient  with past medical history significant for advanced chronic kidney  disease stage V and a baseline creatinine ranging between 5 and 5.5  mg/dL. The patient is followed by Dr. Ayaka Schroeder as outpatient and had vein  mapping in preparation for AV fistula creation; however, he was noted to  have severe hyperkalemia and his surgery was canceled. The patient  presented today to [facility] complaining of worsening  shortness of breath along with 25 pounds of weight gain. His workup  revealed acute on chronic kidney injury with a serum creatinine of 6.1  mg/dL. The patient was admitted for further evaluation and Nephrology  was consulted for further management of his worsening renal function. PAST MEDICAL HISTORY:  1. Chronic kidney disease. 2.  Atrial fibrillation. 3.  Coronary artery disease. 4.  Diastolic heart failure. 5.  COPD. 6.  Deep vein thrombosis. 7.  Diabetes mellitus. 8.  Depression. 9.  Hypertension. 10.  Hyperlipidemia. PAST SURGICAL HISTORY:  1. Colonoscopy. 2.  Kidney biopsy. 3.  EGD. ALLERGIES:  The patient is allergic to CODEINE and PENICILLIN. SOCIAL HISTORY:  The patient smokes one pack of cigarettes per day and  does not drink alcohol. FAMILY HISTORY:  Significant for hypertension, depression, and  osteoarthritis in his mother.     REVIEW OF SYSTEMS:  The patient denied any fever, chills, cough,

## 2019-06-20 NOTE — PLAN OF CARE
Problem: Falls - Risk of:  Goal: Will remain free from falls  Description  Will remain free from falls  Outcome: Ongoing  Goal: Absence of physical injury  Description  Absence of physical injury  Outcome: Ongoing     Problem: Infection:  Goal: Will remain free from infection  Description  Will remain free from infection  Outcome: Ongoing     Problem: Safety:  Goal: Free from accidental physical injury  Description  Free from accidental physical injury  Outcome: Ongoing     Problem: Daily Care:  Goal: Daily care needs are met  Description  Daily care needs are met  Outcome: Ongoing     Problem: Pain:  Goal: Patient's pain/discomfort is manageable  Description  Patient's pain/discomfort is manageable  Outcome: Ongoing     Problem: Skin Integrity:  Goal: Skin integrity will stabilize  Description  Skin integrity will stabilize  Outcome: Ongoing     Problem: OXYGENATION/RESPIRATORY FUNCTION  Goal: Patient will maintain patent airway  Outcome: Ongoing  Goal: Patient will achieve/maintain normal respiratory rate/effort  Description  Respiratory rate and effort will be within normal limits for the patient  Outcome: Ongoing     Problem: HEMODYNAMIC STATUS  Goal: Patient has stable vital signs and fluid balance  Outcome: Ongoing     Problem: FLUID AND ELECTROLYTE IMBALANCE  Goal: Fluid and electrolyte balance are achieved/maintained  Outcome: Ongoing     Problem: ACTIVITY INTOLERANCE/IMPAIRED MOBILITY  Goal: Mobility/activity is maintained at optimum level for patient  Outcome: Ongoing      Patient's EF (Ejection Fraction) is greater than 40%    Patient's weights and intake/output reviewed:    Patient's Last Weight: 299.5 lbs obtained by standing scale. Today's weight is noted to be an initial weight for admission.       Intake/Output Summary (Last 24 hours) at 6/20/2019 1845  Last data filed at 6/20/2019 1717  Gross per 24 hour   Intake --   Output 700 ml   Net -700 ml       Patient's current functional capacity:  Slight limitation of physical activity. Comfortable at rest. Ordinary physical activity results in fatigue, palpitation, dyspnea. Pt resting in bed at this time on room air. Pt denies shortness of breath. Pt with pitting lower extremity edema. Patient and family's stated goal of care: reduce shortness of breath, increase activity tolerance, better understand heart failure and disease management, be more comfortable and reduce lower extremity edema prior to discharge        Patient has a past medical history of Atrial fibrillation ([facility] CAD (coronary artery disease), Cardiomyopathy ([address] CHF (congestive heart failure) ([address] Chronic kidney disease, COPD (chronic obstructive pulmonary disease) ([facility] Deep vein thrombosis (DVT) ([facility] Depression, Diabetes mellitus ([facility] [facility] Gastrointestinal bleed, GERD (gastroesophageal reflux disease), Heme positive stool, History of blood transfusion, Hx of blood clots, Hyperlipidemia, Hypertension, Hypoglycemia, MDRO (multiple drug resistant organisms) resistance, MRSA (methicillin resistant staph aureus) culture positive, Primary osteoarthritis of both knees, and Primary osteoarthritis of both knees. Comorbidities reviewed and education provided.     >>For CHF and Comorbidity documentation on Education Time and Topics, please see Education Tab

## 2019-06-20 NOTE — ED NOTES
65 Dr. Dheeraj Jaramillo paged through [address] [place], [place] returned call for Dheeraj Borja to Dr. Ramon Archer  06/20/19 8378

## 2019-06-20 NOTE — ED NOTES
[id number] Nephrology paged, Dr. Jose L Dela Cruz is on call.   1119 Call from Dr. Carrol Carmona to Dr. Carole Nava  06/20/19 1126

## 2019-06-20 NOTE — ED PROVIDER NOTES
100 MG tablet Take 100 mg by mouth 2 times daily       pravastatin (PRAVACHOL) 40 MG tablet TAKE ONE TABLET BY MOUTH EVERY EVENING  YOU NEED TO CONTACT YOUR DOCTORS OFFICE TO MAKE AN APPOINTMENT FOR NEW BLOOD WORK  (Patient taking differently: TAKE ONE TABLET BY MOUTH EVERY EVENING) 30 tablet 0    aspirin 81 MG chewable tablet Take 81 mg by mouth daily.  patiromer sorbitex calcium (VELTASSA) 8.4 g PACK packet Take 2 packets by mouth daily Obtain samples from Nephrology office 3 each 0     Allergies   Allergen Reactions    Codeine Itching    Onion Other (See Comments)     diarrhea    Penicillins Hives and Rash     Unknown reaction. Childhood problem       Nursing Notes Reviewed    Physical Exam:  Triage VS:    ED Triage Vitals [06/20/19 0954]   Enc Vitals Group      BP (!) 137/90      Pulse 88      Resp 20      Temp 98.2 °F (36.8 °C)      Temp Source Oral      SpO2 98 %      Weight 300 lb (136.1 kg)      Height 6' (1.829 m)      Head Circumference       Peak Flow       Pain Score       Pain Loc       Pain Edu? Excl. in [address]? My pulse ox interpretation is - normal on oxygen    General appearance:  No acute distress. Anxious  Head is normocephalic atraumatic  Skin:  Warm. Dry. Eye:  Extraocular movements intact. Ears, nose, mouth and throat:  Oral mucosa moist throat is clear speech is clear  Neck:  Trachea midline. No JVD  Extremity: Chronic venous stasis changes with evidence of prior venous ulcers no open wounds at this time. 1+ bilateral pedal edema. Normal ROM     Heart:  Regular rate and rhythm,    Perfusion:  intact  Respiratory:  Lungs decreased breath sounds at the bases bilaterally. Respirations nonlabored. Abdominal:  Normal bowel sounds. Soft. Nontender. Obese  Back:  No CVA tenderness to palpation full range of motion  Neurological:  Alert and oriented times 3. No focal neuro deficits.              Psychiatric: Anxious    I have reviewed and interpreted all of the Leukocyte Esterase, Urine Negative Negative    Microscopic Examination YES     Urine Type Not Specified    Troponin   Result Value Ref Range    Troponin 0.03 (H) <0.01 ng/mL   Brain Natriuretic Peptide   Result Value Ref Range    Pro-BNP 7,665 (H) 0 - 124 pg/mL   Microscopic Urinalysis   Result Value Ref Range    WBC, UA 3-5 0 - 5 /HPF    RBC, UA 0-2 0 - 2 /HPF    Epi Cells 0-2 /HPF   EKG 12 Lead   Result Value Ref Range    Ventricular Rate 90 BPM    Atrial Rate 129 BPM    QRS Duration 76 ms    Q-T Interval 374 ms    QTc Calculation (Bazett) 457 ms    R Axis -2 degrees    T Axis 73 degrees    Diagnosis       Atrial fibrillationAbnormal ECGNo previous ECGs available      Radiographs (if obtained):    [x] Radiologist's Report Reviewed:  XR CHEST PORTABLE   Final Result   Vascular congestion. Stable enlargement of the cardiac silhouette, likely cardiomegaly. EKG (if obtained): (All EKG's are interpreted by myself in the absence of a cardiologist)  EKG shows atrial fibrillation at 90 bpm no acute changes no STEMI this is an abnormal EKG. In comparison with an EKG of 4/11/2019 rate has decreased patient was in atrial fibrillation at that time. Chart review shows recent radiographs:  No results found. MDM:  52-year-old male sent in from the primary care office for shortness of breath and hypoxia. Patient is fluid overloaded as evidenced on his labs and his chest x-ray. His potassium so far is adequate. I have spoken to Dr. Geraldine Cheadle of nephrology who would like the patient admitted to the hospitalist service and IV diuresis. He would not do dialysis unless the patient fails IV diuresis. Patient is in agreement with this plan. I have spoken to the hospitalist service they will admit the patient for further care. Clinical Impression:  1. Shortness of breath    2. Hypoxia    3. Acute renal failure, unspecified acute renal failure type ([address]    4.  Hypervolemia, unspecified hypervolemia type

## 2019-06-20 NOTE — PROGRESS NOTES
ER contacted to verify if meds were given that had not been scanned ordered by Nephro prior to patients admission to PCU. Nurse Susan Childers unsure if meds were given. Charge nurse and clinical aware. Page sent to Nephro to verify orders not completed by ER for patient. Awaiting response.

## 2019-06-20 NOTE — PROGRESS NOTES
Patient brought to the floor via transport, on RA. Pt A&O x4. No s/s of distress. Dtr at bedside. Pt oriented to room, white board updated, call monitor within reach. MD assessing pt at this time. Admission assessment/skin assessment to be completed. Will continue to monitor.

## 2019-06-21 ENCOUNTER — APPOINTMENT (OUTPATIENT)
Dept: GENERAL RADIOLOGY | Age: 59
DRG: 291 | End: 2019-06-21
Payer: MEDICARE

## 2019-06-21 LAB
ALBUMIN SERPL-MCNC: 3 G/DL (ref 3.4–5)
ANION GAP SERPL CALCULATED.3IONS-SCNC: 12 MMOL/L (ref 3–16)
BASOPHILS ABSOLUTE: 0.1 K/UL (ref 0–0.2)
BASOPHILS RELATIVE PERCENT: 1.8 %
BUN BLDV-MCNC: 64 MG/DL (ref 7–20)
CALCIUM SERPL-MCNC: 8.1 MG/DL (ref 8.3–10.6)
CHLORIDE BLD-SCNC: 104 MMOL/L (ref 99–110)
CO2: 18 MMOL/L (ref 21–32)
CREAT SERPL-MCNC: 6.3 MG/DL (ref 0.9–1.3)
EOSINOPHILS ABSOLUTE: 0.1 K/UL (ref 0–0.6)
EOSINOPHILS RELATIVE PERCENT: 1.9 %
ESTIMATED AVERAGE GLUCOSE: 217.3 MG/DL
GFR AFRICAN AMERICAN: 11
GFR NON-AFRICAN AMERICAN: 9
GLUCOSE BLD-MCNC: 102 MG/DL (ref 70–99)
GLUCOSE BLD-MCNC: 152 MG/DL (ref 70–99)
GLUCOSE BLD-MCNC: 195 MG/DL (ref 70–99)
GLUCOSE BLD-MCNC: 202 MG/DL (ref 70–99)
GLUCOSE BLD-MCNC: 234 MG/DL (ref 70–99)
HBA1C MFR BLD: 9.2 %
HBV SURFACE AB TITR SER: <3.5 MIU/ML
HCT VFR BLD CALC: 23.9 % (ref 40.5–52.5)
HEMOGLOBIN: 8 G/DL (ref 13.5–17.5)
HEPATITIS B SURFACE ANTIGEN INTERPRETATION: NORMAL
LYMPHOCYTES ABSOLUTE: 0.8 K/UL (ref 1–5.1)
LYMPHOCYTES RELATIVE PERCENT: 13.3 %
MAGNESIUM: 1.8 MG/DL (ref 1.8–2.4)
MCH RBC QN AUTO: 31.8 PG (ref 26–34)
MCHC RBC AUTO-ENTMCNC: 33.5 G/DL (ref 31–36)
MCV RBC AUTO: 95 FL (ref 80–100)
MONOCYTES ABSOLUTE: 0.4 K/UL (ref 0–1.3)
MONOCYTES RELATIVE PERCENT: 7 %
NEUTROPHILS ABSOLUTE: 4.5 K/UL (ref 1.7–7.7)
NEUTROPHILS RELATIVE PERCENT: 76 %
PDW BLD-RTO: 16 % (ref 12.4–15.4)
PERFORMED ON: ABNORMAL
PHOSPHORUS: 6.5 MG/DL (ref 2.5–4.9)
PLATELET # BLD: 247 K/UL (ref 135–450)
PMV BLD AUTO: 7.7 FL (ref 5–10.5)
POTASSIUM SERPL-SCNC: 4.5 MMOL/L (ref 3.5–5.1)
RBC # BLD: 2.52 M/UL (ref 4.2–5.9)
SODIUM BLD-SCNC: 134 MMOL/L (ref 136–145)
WBC # BLD: 6 K/UL (ref 4–11)

## 2019-06-21 PROCEDURE — 94761 N-INVAS EAR/PLS OXIMETRY MLT: CPT

## 2019-06-21 PROCEDURE — 6370000000 HC RX 637 (ALT 250 FOR IP): Performed by: PHYSICIAN ASSISTANT

## 2019-06-21 PROCEDURE — 6370000000 HC RX 637 (ALT 250 FOR IP): Performed by: INTERNAL MEDICINE

## 2019-06-21 PROCEDURE — 6360000002 HC RX W HCPCS: Performed by: RADIOLOGY

## 2019-06-21 PROCEDURE — 2580000003 HC RX 258

## 2019-06-21 PROCEDURE — 1200000000 HC SEMI PRIVATE

## 2019-06-21 PROCEDURE — 2580000003 HC RX 258: Performed by: RADIOLOGY

## 2019-06-21 PROCEDURE — 86706 HEP B SURFACE ANTIBODY: CPT

## 2019-06-21 PROCEDURE — 99152 MOD SED SAME PHYS/QHP 5/>YRS: CPT

## 2019-06-21 PROCEDURE — C1881 DIALYSIS ACCESS SYSTEM: HCPCS

## 2019-06-21 PROCEDURE — 5A1D70Z PERFORMANCE OF URINARY FILTRATION, INTERMITTENT, LESS THAN 6 HOURS PER DAY: ICD-10-PCS | Performed by: INTERNAL MEDICINE

## 2019-06-21 PROCEDURE — 85025 COMPLETE CBC W/AUTO DIFF WBC: CPT

## 2019-06-21 PROCEDURE — 0JH63XZ INSERTION OF TUNNELED VASCULAR ACCESS DEVICE INTO CHEST SUBCUTANEOUS TISSUE AND FASCIA, PERCUTANEOUS APPROACH: ICD-10-PCS | Performed by: INTERNAL MEDICINE

## 2019-06-21 PROCEDURE — 6360000002 HC RX W HCPCS: Performed by: INTERNAL MEDICINE

## 2019-06-21 PROCEDURE — 36415 COLL VENOUS BLD VENIPUNCTURE: CPT

## 2019-06-21 PROCEDURE — 77001 FLUOROGUIDE FOR VEIN DEVICE: CPT

## 2019-06-21 PROCEDURE — 99221 1ST HOSP IP/OBS SF/LOW 40: CPT | Performed by: PHYSICIAN ASSISTANT

## 2019-06-21 PROCEDURE — 87340 HEPATITIS B SURFACE AG IA: CPT

## 2019-06-21 PROCEDURE — 80069 RENAL FUNCTION PANEL: CPT

## 2019-06-21 PROCEDURE — 86704 HEP B CORE ANTIBODY TOTAL: CPT

## 2019-06-21 PROCEDURE — 83735 ASSAY OF MAGNESIUM: CPT

## 2019-06-21 PROCEDURE — 2580000003 HC RX 258: Performed by: INTERNAL MEDICINE

## 2019-06-21 PROCEDURE — 02H633Z INSERTION OF INFUSION DEVICE INTO RIGHT ATRIUM, PERCUTANEOUS APPROACH: ICD-10-PCS | Performed by: RADIOLOGY

## 2019-06-21 PROCEDURE — 36558 INSERT TUNNELED CV CATH: CPT

## 2019-06-21 PROCEDURE — 2580000003 HC RX 258: Performed by: PHYSICIAN ASSISTANT

## 2019-06-21 PROCEDURE — 99153 MOD SED SAME PHYS/QHP EA: CPT

## 2019-06-21 PROCEDURE — 90935 HEMODIALYSIS ONE EVALUATION: CPT

## 2019-06-21 PROCEDURE — 99232 SBSQ HOSP IP/OBS MODERATE 35: CPT | Performed by: INTERNAL MEDICINE

## 2019-06-21 RX ORDER — FENTANYL CITRATE 50 UG/ML
INJECTION, SOLUTION INTRAMUSCULAR; INTRAVENOUS
Status: COMPLETED | OUTPATIENT
Start: 2019-06-21 | End: 2019-06-21

## 2019-06-21 RX ORDER — SODIUM CHLORIDE 9 MG/ML
INJECTION, SOLUTION INTRAVENOUS
Status: COMPLETED
Start: 2019-06-21 | End: 2019-06-21

## 2019-06-21 RX ORDER — MIDAZOLAM HYDROCHLORIDE 5 MG/ML
INJECTION INTRAMUSCULAR; INTRAVENOUS
Status: COMPLETED | OUTPATIENT
Start: 2019-06-21 | End: 2019-06-21

## 2019-06-21 RX ORDER — HEPARIN SODIUM 1000 [USP'U]/ML
4200 INJECTION, SOLUTION INTRAVENOUS; SUBCUTANEOUS PRN
Status: DISCONTINUED | OUTPATIENT
Start: 2019-06-21 | End: 2019-06-25 | Stop reason: HOSPADM

## 2019-06-21 RX ADMIN — MIDAZOLAM HYDROCHLORIDE 1 MG: 5 INJECTION INTRAMUSCULAR; INTRAVENOUS at 13:05

## 2019-06-21 RX ADMIN — HYDRALAZINE HYDROCHLORIDE 100 MG: 50 TABLET, FILM COATED ORAL at 22:54

## 2019-06-21 RX ADMIN — CARVEDILOL 12.5 MG: 6.25 TABLET, FILM COATED ORAL at 16:59

## 2019-06-21 RX ADMIN — HYDROCODONE BITARTRATE AND ACETAMINOPHEN 1 TABLET: 5; 325 TABLET ORAL at 16:59

## 2019-06-21 RX ADMIN — HEPARIN SODIUM 4200 UNITS: 1000 INJECTION, SOLUTION INTRAVENOUS; SUBCUTANEOUS at 16:11

## 2019-06-21 RX ADMIN — DOCUSATE SODIUM 100 MG: 100 CAPSULE, LIQUID FILLED ORAL at 22:54

## 2019-06-21 RX ADMIN — HYDRALAZINE HYDROCHLORIDE 100 MG: 50 TABLET, FILM COATED ORAL at 16:59

## 2019-06-21 RX ADMIN — FENTANYL CITRATE 50 MCG: 50 INJECTION INTRAMUSCULAR; INTRAVENOUS at 13:05

## 2019-06-21 RX ADMIN — CEFAZOLIN 1 G: 1 INJECTION, POWDER, FOR SOLUTION INTRAMUSCULAR; INTRAVENOUS at 12:04

## 2019-06-21 RX ADMIN — ISOSORBIDE MONONITRATE 60 MG: 60 TABLET ORAL at 22:54

## 2019-06-21 RX ADMIN — CARVEDILOL 12.5 MG: 6.25 TABLET, FILM COATED ORAL at 21:11

## 2019-06-21 RX ADMIN — HYDROCODONE BITARTRATE AND ACETAMINOPHEN 1 TABLET: 5; 325 TABLET ORAL at 22:53

## 2019-06-21 RX ADMIN — DILTIAZEM HYDROCHLORIDE 120 MG: 120 CAPSULE, COATED, EXTENDED RELEASE ORAL at 16:59

## 2019-06-21 RX ADMIN — Medication 10 ML: at 09:00

## 2019-06-21 RX ADMIN — DARBEPOETIN ALFA 100 MCG: 100 INJECTION, SOLUTION INTRAVENOUS; SUBCUTANEOUS at 15:54

## 2019-06-21 RX ADMIN — HYDROCODONE BITARTRATE AND ACETAMINOPHEN 1 TABLET: 5; 325 TABLET ORAL at 05:15

## 2019-06-21 RX ADMIN — SODIUM CHLORIDE 500 ML: 9 INJECTION, SOLUTION INTRAVENOUS at 12:08

## 2019-06-21 RX ADMIN — PRAVASTATIN SODIUM 40 MG: 40 TABLET ORAL at 22:54

## 2019-06-21 RX ADMIN — Medication 10 ML: at 21:13

## 2019-06-21 RX ADMIN — INSULIN LISPRO 2 UNITS: 100 INJECTION, SOLUTION INTRAVENOUS; SUBCUTANEOUS at 21:12

## 2019-06-21 ASSESSMENT — PAIN DESCRIPTION - ORIENTATION
ORIENTATION: LEFT

## 2019-06-21 ASSESSMENT — PAIN SCALES - GENERAL
PAINLEVEL_OUTOF10: 3
PAINLEVEL_OUTOF10: 0
PAINLEVEL_OUTOF10: 7
PAINLEVEL_OUTOF10: 8
PAINLEVEL_OUTOF10: 5
PAINLEVEL_OUTOF10: 10
PAINLEVEL_OUTOF10: 6

## 2019-06-21 ASSESSMENT — PAIN DESCRIPTION - FREQUENCY
FREQUENCY: CONTINUOUS
FREQUENCY: INTERMITTENT
FREQUENCY: CONTINUOUS
FREQUENCY: INTERMITTENT

## 2019-06-21 ASSESSMENT — PAIN DESCRIPTION - PROGRESSION
CLINICAL_PROGRESSION: NOT CHANGED

## 2019-06-21 ASSESSMENT — PAIN DESCRIPTION - PAIN TYPE
TYPE: ACUTE PAIN
TYPE: ACUTE PAIN;CHRONIC PAIN
TYPE: ACUTE PAIN

## 2019-06-21 ASSESSMENT — PAIN DESCRIPTION - LOCATION
LOCATION: KNEE

## 2019-06-21 ASSESSMENT — PAIN DESCRIPTION - DESCRIPTORS
DESCRIPTORS: ACHING;DISCOMFORT;OTHER (COMMENT)
DESCRIPTORS: ACHING;DISCOMFORT;CONSTANT
DESCRIPTORS: ACHING
DESCRIPTORS: ACHING;DISCOMFORT

## 2019-06-21 ASSESSMENT — PAIN DESCRIPTION - ONSET
ONSET: ON-GOING
ONSET: AWAKENED FROM SLEEP
ONSET: ON-GOING
ONSET: ON-GOING

## 2019-06-21 ASSESSMENT — PAIN - FUNCTIONAL ASSESSMENT: PAIN_FUNCTIONAL_ASSESSMENT: PREVENTS OR INTERFERES SOME ACTIVE ACTIVITIES AND ADLS

## 2019-06-21 NOTE — PROGRESS NOTES
Pt arrived for image guided tunneled dialysis catheter placement. Procedure explained including the risk and benefits of the procedure. All questions answered. Consent verified. Vital signs stable see flow sheets. Labs, allergies, medications, and code status reviewed. No contraindications noted. Pre Lalo score 10.

## 2019-06-21 NOTE — CARE COORDINATION
INTERDISCIPLINARY PLAN OF CARE CONFERENCE    Date/Time: 6/21/2019 11:41 AM  Completed by: Elizabeth Bates Case Management      Patient Name:  Khushi Sierra  YOB: 1960  Admitting Diagnosis: ANA (acute kidney injury) [facility]) [N17.9]     Admit Date/Time:  6/20/2019  9:48 AM    Chart reviewed. Interdisciplinary team met to discuss patient progress and discharge plans. Disciplines included Case Management, Nursing, and Dietitian. Current Status: Stable    PT/OT recommendation: N/A    Anticipated Discharge Date: TBD  Expected D/C Disposition:  Home  Confirmed plan with patient  Yes  Discharge Plan Comments: Reviewed chart and noted pt will need outpt HD at dc. States would like [employer]. Noted hep B ordered and TDC ordered. Will make referral once has received TDC and 1st HD Tx. Will cont to follow.     Home O2 in place on admit: No  Pt informed of need to bring portable home O2 tank on day of discharge for nursing to connect prior to leaving:  Not Indicated  Verbalized agreement/Understanding:  Not Indicated
chronic renal failure and was sent to ED from Nephrologist office for admission and monitoring with possible need to set up HD. IPTA. No HC or DME needs. +CM following  Explained Case Management role/services.

## 2019-06-21 NOTE — PROGRESS NOTES
effusion  - with pain for 2-3 days, unknown injury  - XR with large effusion  - norco, tylenol PRN pain  - ortho consult      DVT Prophylaxis: Eliquis (hold for procedure)  Diet: Diet NPO Time Specified  Code Status: FULL    Resume [facility] in am     Hetal Barbosa MD 6/21/2019 8:13 AM

## 2019-06-21 NOTE — PROGRESS NOTES
Department of Internal Medicine  Nephrology Progress Note        SUBJECTIVE:    We are following this patient for A/CKD. The patient was seen and examined during dialysis; he feels well today with no CP, SOB, nausea or vomiting. ROS: No fever or chills. Social: No family at bedside. Physical Exam:    VITALS:  BP (!) 179/90   Pulse 107   Temp 98.8 °F (37.1 °C) (Oral)   Resp 17   Ht 6' (1.829 m)   Wt 293 lb 4.8 oz (133 kg)   SpO2 98%   BMI 39.78 kg/m²     General appearance: Seems comfortable, no acute distress. Neck: Trachea midline, thyroid normal.   Lungs:  Non labored breathing, CTA to anterior auscultation. Heart:  S1S2 normal, rub or gallop. + peripheral edema. Abdomen: Soft, non-tender, no organomegaly. Skin: No lesions or rashes, warm to touch. DATA:    CBC:   Lab Results   Component Value Date    WBC 6.0 06/21/2019    RBC 2.52 06/21/2019    HGB 8.0 06/21/2019    HCT 23.9 06/21/2019    MCV 95.0 06/21/2019    MCH 31.8 06/21/2019    MCHC 33.5 06/21/2019    RDW 16.0 06/21/2019     06/21/2019    MPV 7.7 06/21/2019     BMP:    Lab Results   Component Value Date     06/21/2019    K 4.5 06/21/2019    K 5.1 06/20/2019     06/21/2019    CO2 18 06/21/2019    BUN 64 06/21/2019    LABALBU 3.0 06/21/2019    CREATININE 6.3 06/21/2019    CALCIUM 8.1 06/21/2019    GFRAA 11 06/21/2019    GFRAA 27 12/16/2012    LABGLOM 9 06/21/2019    GLUCOSE 195 06/21/2019       IMPRESSION/RECOMMENDATIONS:      1- A/CKD likely progressed to ESRD: The patient has advanced, chronic kidney disease stage V with a baseline creatinine of 5 to 5.5. His ANA is likely secondary to progressive renal failure s/p tunneled dialysis catheter placement. Hemodialysis ongoing, we will arrange for another treatment tomorrow pending outpatient dialysis arrangement. 2- Hyperkalemia: Resolved with medical therapy, monitor. 3- HTN: Blood pressure within acceptable range.   4- Anemia: Acute hemoglobin drop overnight, will start PRISCA during dialysis and will defer further work-up to primary team.

## 2019-06-21 NOTE — PLAN OF CARE
Problem: Falls - Risk of:  Goal: Will remain free from falls  Description  Will remain free from falls  6/21/2019 1812 by Rachel Dominique RN  Outcome: Ongoing  6/21/2019 1049 by Lorraine Beckford RN  Outcome: Ongoing  6/21/2019 1024 by Lorraine Beckford RN  Outcome: Ongoing  Goal: Absence of physical injury  Description  Absence of physical injury  6/21/2019 1049 by Lorraine Beckford RN  Outcome: Ongoing  6/21/2019 1024 by Lorraine Beckford RN  Outcome: Ongoing     Problem: Infection:  Goal: Will remain free from infection  Description  Will remain free from infection  6/21/2019 1812 by Rachel Dominique RN  Outcome: Ongoing  6/21/2019 1049 by Lorraine Beckford RN  Outcome: Ongoing  6/21/2019 1024 by Lorraine Beckford RN  Outcome: Ongoing     Problem: Safety:  Goal: Free from accidental physical injury  Description  Free from accidental physical injury  6/21/2019 1812 by Rachel Dominique RN  Outcome: Ongoing  6/21/2019 1049 by Lorraine Beckford RN  Outcome: Ongoing  6/21/2019 1024 by Lorraine Beckford RN  Outcome: Ongoing  Goal: Free from intentional harm  Description  Free from intentional harm  6/21/2019 1049 by Lorraine Beckford RN  Outcome: Ongoing  6/21/2019 1024 by Lorraine Beckford RN  Outcome: Ongoing     Problem: Daily Care:  Goal: Daily care needs are met  Description  Daily care needs are met  6/21/2019 1812 by Rachel Dominique RN  Outcome: Ongoing  6/21/2019 1049 by Lorraine Beckford RN  Outcome: Ongoing  6/21/2019 1024 by Lorraine Beckford RN  Outcome: Ongoing     Problem: Pain:  Goal: Patient's pain/discomfort is manageable  Description  Patient's pain/discomfort is manageable  6/21/2019 1049 by Lorraine Beckford RN  Outcome: Ongoing  6/21/2019 1024 by Lorraine Beckford RN  Outcome: Ongoing  Goal: Pain level will decrease  Description  Pain level will decrease  6/21/2019 1049 by Lorraine Beckford RN  Outcome: Ongoing  6/21/2019 1024 by Lorraine Beckford RN  Outcome: Ongoing  Goal: Control of acute pain  Description  Control of acute pain  6/21/2019 1049 by Rosa Morris RN  Outcome: Ongoing  6/21/2019 1024 by Rosa Morris RN  Outcome: Ongoing  Goal: Control of chronic pain  Description  Control of chronic pain  6/21/2019 1049 by Rosa Morris RN  Outcome: Ongoing  6/21/2019 1024 by Rosa Morris RN  Outcome: Ongoing     Problem: Skin Integrity:  Goal: Skin integrity will stabilize  Description  Skin integrity will stabilize  6/21/2019 1049 by Rosa Morris RN  Outcome: Ongoing  6/21/2019 1024 by Rosa Morris RN  Outcome: Ongoing     Problem: Discharge Planning:  Goal: Patients continuum of care needs are met  Description  Patients continuum of care needs are met  6/21/2019 1049 by Rosa Morris RN  Outcome: Ongoing  6/21/2019 1024 by Rosa Morris RN  Outcome: Ongoing     Problem: OXYGENATION/RESPIRATORY FUNCTION  Goal: Patient will maintain patent airway  6/21/2019 1049 by Rosa Morris RN  Outcome: Ongoing  6/21/2019 1024 by Rosa Morris RN  Outcome: Ongoing  Goal: Patient will achieve/maintain normal respiratory rate/effort  Description  Respiratory rate and effort will be within normal limits for the patient  6/21/2019 1049 by Rosa Morris RN  Outcome: Ongoing  6/21/2019 1024 by Rosa Morris RN  Outcome: Ongoing     Problem: HEMODYNAMIC STATUS  Goal: Patient has stable vital signs and fluid balance  6/21/2019 1049 by Rosa Morris RN  Outcome: Ongoing  6/21/2019 1024 by Rosa Morris RN  Outcome: Ongoing     Problem: FLUID AND ELECTROLYTE IMBALANCE  Goal: Fluid and electrolyte balance are achieved/maintained  6/21/2019 1049 by Rosa Morris RN  Outcome: Ongoing  6/21/2019 1024 by Rosa Morris RN  Outcome: Ongoing     Problem: ACTIVITY INTOLERANCE/IMPAIRED MOBILITY  Goal: Mobility/activity is maintained at optimum level for patient  6/21/2019 1049 by Rosa Morris RN  Outcome: Ongoing  6/21/2019 1024 by Rosa Morris RN  Outcome: Ongoing

## 2019-06-21 NOTE — PROGRESS NOTES
Pt received all meds ordered while pt was in ER/Xray after ok with nephro. Report given at this time, patient stable, care transferred to [facility]. DISPLAY PLAN FREE TEXT

## 2019-06-21 NOTE — CONSULTS
Department of Orthopedic Surgery  Physician Assistant   Consult Note        Reason for Consult:  Left knee pain  Requesting Physician: Cary Mea MD  Date of Service: 6/21/2019 8:10 AM    CHIEF COMPLAINT:  As Above    History Obtained From:  patient    HISTORY OF PRESENT ILLNESS:                The patient is a 63 y.o. male who presents with above chief complaint. Pt states he has had a little increased knee pain for the last 2-3 days. Has had chronic knee pain and OA but thinks he may have done something to irritate his knee earlier this week. No falls or trauma noted. No n/t in the leg. Pain localized to medial aspect of the knee. Pain with WB and full flexion. Has noticed some swelling. Has had Synvisc in the past for this kind of pain with moderate success. No hip pain. No fevers, chills.     Past Medical History:        Diagnosis Date    Atrial fibrillation ([facility]     CAD (coronary artery disease)     Cardiomyopathy ([facility] 7/13/2015    CH[facility]congestive heart failure) ([facility])     Chronic kidney disease     COPD (chronic obstructive pulmonary disease) ([facility])     Deep vein thrombosis (DVT) ([facility]) 8/7/2012    Depression     Diabetes mellitus ([facility])     Gastrointestinal bleed     GERD (gastroesophageal reflux disease)     Heme positive stool 01/2017    History of blood transfusion     1/2017    Hx of blood clots     left leg; 5 plus years ago    Hyperlipidemia     Hypertension     Hypoglycemia     MDRO (multiple drug resistant organisms) resistance 2017    treated with antibiotics    MRSA (methicillin resistant staph aureus) culture positive 9/26/17,09/18/2017    posterior left calf    Primary osteoarthritis of both knees     Primary osteoarthritis of both knees      Past Surgical History:        Procedure Laterality Date    CARDIAC CATHETERIZATION  07/12/2011    COLONOSCOPY  01/17/2017    diverticulosis    FRACTURE SURGERY Left     ankle; age 13    HAND SURGERY      KIDNEY 8.0*    247     BMP:    Recent Labs     06/20/19  0950 06/21/19  0507    134*   K 5.1 4.5    104   CO2 16* 18*   BUN 65* 64*   CREATININE 6.1* 6.3*   GLUCOSE 308* 195*     INR:   Recent Labs     06/20/19  0950   INR 1.11       Radiology:   XR KNEE LEFT (3 VIEWS)   Final Result   Progressive degenerative changes. Large left joint effusion, new. XR CHEST PORTABLE   Final Result   Vascular congestion. Stable enlargement of the cardiac silhouette, likely cardiomegaly. IR NONTUNNELED VASCULAR CATHETER > 5 YEARS    (Results Pending)          IMPRESSION/RECOMMENDATIONS:    Assessment: left knee pain - likely related to advancing OA of the medial compartment of the knee    Plan:  1) Do not suspect septic joint at this time based on exam and objective data. Feel more likely related to his OA with plain films showing significant narrowing of the medial compartment and consistent with the location of his pain today. He has had success with hyaluronic acid injections in the past and may benefit from this again as an outpatient. Will have him f/u with our total joint surgeons once d/c from hospital.  He can be WBAT at this time and will ask for an ACE wrap to the left knee for some help with swelling and pain. He did not wish to have an immobilizer at this time. Okay for him to get up with PT to start mobilizing. Thank you for the opportunity to consult on this patient.     Darinel Antunez

## 2019-06-22 LAB
ALBUMIN SERPL-MCNC: 3.3 G/DL (ref 3.4–5)
ANION GAP SERPL CALCULATED.3IONS-SCNC: 12 MMOL/L (ref 3–16)
BUN BLDV-MCNC: 46 MG/DL (ref 7–20)
CALCIUM SERPL-MCNC: 8.7 MG/DL (ref 8.3–10.6)
CHLORIDE BLD-SCNC: 105 MMOL/L (ref 99–110)
CO2: 21 MMOL/L (ref 21–32)
CREAT SERPL-MCNC: 5.1 MG/DL (ref 0.9–1.3)
GFR AFRICAN AMERICAN: 14
GFR NON-AFRICAN AMERICAN: 12
GLUCOSE BLD-MCNC: 192 MG/DL (ref 70–99)
GLUCOSE BLD-MCNC: 223 MG/DL (ref 70–99)
GLUCOSE BLD-MCNC: 251 MG/DL (ref 70–99)
GLUCOSE BLD-MCNC: 262 MG/DL (ref 70–99)
HCT VFR BLD CALC: 29.3 % (ref 40.5–52.5)
HEMOGLOBIN: 9.8 G/DL (ref 13.5–17.5)
HEPATITIS B CORE TOTAL ANTIBODY: NEGATIVE
MAGNESIUM: 1.9 MG/DL (ref 1.8–2.4)
MCH RBC QN AUTO: 31.8 PG (ref 26–34)
MCHC RBC AUTO-ENTMCNC: 33.4 G/DL (ref 31–36)
MCV RBC AUTO: 95.1 FL (ref 80–100)
PDW BLD-RTO: 15.9 % (ref 12.4–15.4)
PERFORMED ON: ABNORMAL
PHOSPHORUS: 5.5 MG/DL (ref 2.5–4.9)
PLATELET # BLD: 300 K/UL (ref 135–450)
PMV BLD AUTO: 7.7 FL (ref 5–10.5)
POTASSIUM SERPL-SCNC: 4.3 MMOL/L (ref 3.5–5.1)
RBC # BLD: 3.08 M/UL (ref 4.2–5.9)
SODIUM BLD-SCNC: 138 MMOL/L (ref 136–145)
WBC # BLD: 6.8 K/UL (ref 4–11)

## 2019-06-22 PROCEDURE — 6370000000 HC RX 637 (ALT 250 FOR IP): Performed by: INTERNAL MEDICINE

## 2019-06-22 PROCEDURE — 6360000002 HC RX W HCPCS: Performed by: INTERNAL MEDICINE

## 2019-06-22 PROCEDURE — 36415 COLL VENOUS BLD VENIPUNCTURE: CPT

## 2019-06-22 PROCEDURE — 83735 ASSAY OF MAGNESIUM: CPT

## 2019-06-22 PROCEDURE — 94761 N-INVAS EAR/PLS OXIMETRY MLT: CPT

## 2019-06-22 PROCEDURE — 1200000000 HC SEMI PRIVATE

## 2019-06-22 PROCEDURE — 99232 SBSQ HOSP IP/OBS MODERATE 35: CPT | Performed by: INTERNAL MEDICINE

## 2019-06-22 PROCEDURE — 90935 HEMODIALYSIS ONE EVALUATION: CPT

## 2019-06-22 PROCEDURE — 80069 RENAL FUNCTION PANEL: CPT

## 2019-06-22 PROCEDURE — 85027 COMPLETE CBC AUTOMATED: CPT

## 2019-06-22 PROCEDURE — 2580000003 HC RX 258: Performed by: INTERNAL MEDICINE

## 2019-06-22 RX ORDER — CHOLECALCIFEROL (VITAMIN D3) 10 MCG
1 TABLET ORAL DAILY
Status: DISCONTINUED | OUTPATIENT
Start: 2019-06-22 | End: 2019-06-25 | Stop reason: HOSPADM

## 2019-06-22 RX ORDER — ASPIRIN 81 MG/1
81 TABLET ORAL DAILY
Status: DISCONTINUED | OUTPATIENT
Start: 2019-06-22 | End: 2019-06-25 | Stop reason: HOSPADM

## 2019-06-22 RX ADMIN — DOCUSATE SODIUM 100 MG: 100 CAPSULE, LIQUID FILLED ORAL at 22:11

## 2019-06-22 RX ADMIN — Medication 10 ML: at 20:25

## 2019-06-22 RX ADMIN — INSULIN LISPRO 6 UNITS: 100 INJECTION, SOLUTION INTRAVENOUS; SUBCUTANEOUS at 17:24

## 2019-06-22 RX ADMIN — PRAVASTATIN SODIUM 40 MG: 40 TABLET ORAL at 22:11

## 2019-06-22 RX ADMIN — ISOSORBIDE MONONITRATE 60 MG: 60 TABLET ORAL at 11:25

## 2019-06-22 RX ADMIN — HYDRALAZINE HYDROCHLORIDE 100 MG: 50 TABLET, FILM COATED ORAL at 11:23

## 2019-06-22 RX ADMIN — HEPARIN SODIUM 4200 UNITS: 1000 INJECTION, SOLUTION INTRAVENOUS; SUBCUTANEOUS at 10:30

## 2019-06-22 RX ADMIN — VITAMIN D, TAB 1000IU (100/BT) 2000 UNITS: 25 TAB at 11:23

## 2019-06-22 RX ADMIN — CARVEDILOL 12.5 MG: 6.25 TABLET, FILM COATED ORAL at 11:23

## 2019-06-22 RX ADMIN — INSULIN LISPRO 2 UNITS: 100 INJECTION, SOLUTION INTRAVENOUS; SUBCUTANEOUS at 11:41

## 2019-06-22 RX ADMIN — CARVEDILOL 12.5 MG: 6.25 TABLET, FILM COATED ORAL at 22:12

## 2019-06-22 RX ADMIN — ISOSORBIDE MONONITRATE 60 MG: 60 TABLET ORAL at 22:12

## 2019-06-22 RX ADMIN — DILTIAZEM HYDROCHLORIDE 120 MG: 120 CAPSULE, COATED, EXTENDED RELEASE ORAL at 11:23

## 2019-06-22 RX ADMIN — HYDRALAZINE HYDROCHLORIDE 100 MG: 50 TABLET, FILM COATED ORAL at 22:11

## 2019-06-22 RX ADMIN — ASPIRIN 81 MG: 81 TABLET, COATED ORAL at 11:23

## 2019-06-22 RX ADMIN — APIXABAN 2.5 MG: 5 TABLET, FILM COATED ORAL at 22:11

## 2019-06-22 RX ADMIN — ASCORBIC ACID, THIAMINE MONONITRATE,RIBOFLAVIN, NIACINAMIDE, PYRIDOXINE HYDROCHLORIDE, FOLIC ACID, CYANOCOBALAMIN, BIOTIN, CALCIUM PANTOTHENATE, 1 MG: 100; 1.5; 1.7; 20; 10; 1; 6000; 150000; 5 CAPSULE, LIQUID FILLED ORAL at 11:30

## 2019-06-22 RX ADMIN — APIXABAN 2.5 MG: 5 TABLET, FILM COATED ORAL at 11:24

## 2019-06-22 RX ADMIN — Medication 10 ML: at 11:23

## 2019-06-22 RX ADMIN — ACETAMINOPHEN 650 MG: 325 TABLET ORAL at 02:48

## 2019-06-22 RX ADMIN — INSULIN LISPRO 3 UNITS: 100 INJECTION, SOLUTION INTRAVENOUS; SUBCUTANEOUS at 20:24

## 2019-06-22 RX ADMIN — DOCUSATE SODIUM 100 MG: 100 CAPSULE, LIQUID FILLED ORAL at 11:23

## 2019-06-22 ASSESSMENT — PAIN DESCRIPTION - PAIN TYPE: TYPE: ACUTE PAIN;CHRONIC PAIN

## 2019-06-22 ASSESSMENT — PAIN DESCRIPTION - PROGRESSION: CLINICAL_PROGRESSION: NOT CHANGED

## 2019-06-22 ASSESSMENT — PAIN - FUNCTIONAL ASSESSMENT: PAIN_FUNCTIONAL_ASSESSMENT: PREVENTS OR INTERFERES SOME ACTIVE ACTIVITIES AND ADLS

## 2019-06-22 ASSESSMENT — PAIN DESCRIPTION - DESCRIPTORS: DESCRIPTORS: ACHING

## 2019-06-22 ASSESSMENT — PAIN DESCRIPTION - LOCATION: LOCATION: KNEE

## 2019-06-22 ASSESSMENT — PAIN SCALES - GENERAL
PAINLEVEL_OUTOF10: 0
PAINLEVEL_OUTOF10: 0
PAINLEVEL_OUTOF10: 10

## 2019-06-22 ASSESSMENT — PAIN DESCRIPTION - FREQUENCY: FREQUENCY: CONTINUOUS

## 2019-06-22 ASSESSMENT — PAIN DESCRIPTION - ONSET: ONSET: AWAKENED FROM SLEEP

## 2019-06-22 ASSESSMENT — PAIN DESCRIPTION - ORIENTATION: ORIENTATION: LEFT

## 2019-06-22 NOTE — PLAN OF CARE
Problem: Falls - Risk of:  Goal: Will remain free from falls  Description  Will remain free from falls  6/22/2019 1600 by Juan Jose Oneil RN  Outcome: Ongoing  6/22/2019 0343 by Heather Pro RN  Outcome: Ongoing  Goal: Absence of physical injury  Description  Absence of physical injury  6/22/2019 0343 by Heather Pro RN  Outcome: Ongoing     Problem: Infection:  Goal: Will remain free from infection  Description  Will remain free from infection  6/22/2019 1600 by Juan Jose Oneil RN  Outcome: Ongoing  6/22/2019 0343 by Heather Pro RN  Outcome: Ongoing     Problem: Safety:  Goal: Free from accidental physical injury  Description  Free from accidental physical injury  6/22/2019 1600 by Juan Jose Oneil RN  Outcome: Ongoing  6/22/2019 0343 by Heather Pro RN  Outcome: Ongoing  Goal: Free from intentional harm  Description  Free from intentional harm  6/22/2019 0343 by Heather Pro RN  Outcome: Ongoing     Problem: Daily Care:  Goal: Daily care needs are met  Description  Daily care needs are met  6/22/2019 1600 by Juan Jose Oneil RN  Outcome: Ongoing  6/22/2019 0343 by Heather Pro RN  Outcome: Ongoing     Problem: Pain:  Goal: Patient's pain/discomfort is manageable  Description  Patient's pain/discomfort is manageable  6/22/2019 0343 by Heather Pro RN  Outcome: Ongoing  Goal: Pain level will decrease  Description  Pain level will decrease  6/22/2019 0343 by Heather Pro RN  Outcome: Ongoing  Goal: Control of acute pain  Description  Control of acute pain  6/22/2019 0343 by Heather Pro RN  Outcome: Ongoing  Goal: Control of chronic pain  Description  Control of chronic pain  6/22/2019 0343 by Heather Pro RN  Outcome: Ongoing     Problem: Skin Integrity:  Goal: Skin integrity will stabilize  Description  Skin integrity will stabilize  6/22/2019 0343 by Heather Pro RN  Outcome: Ongoing     Problem: Discharge Planning:  Goal: Patients continuum of care needs are

## 2019-06-22 NOTE — FLOWSHEET NOTE
Treatment time: 3 hrs    Net UF: 2.6 liters    Pre weight: 143.0kg 1pillow 1top sheet 1 white blanket  Post weight: 140.1 kg  EDW: TBD    Access used: L IJ TDC  Access function: Good reversed    Medications or blood products given: Heparin Dwells. Regular outpatient schedule: 2nd tx today    Summary of response to treatment:      06/22/19 1033   Vital Signs   /77   Temp 98 °F (36.7 °C)   Pulse 74   Resp 16   Weight (!) 308 lb 13.8 oz (140.1 kg)   Weight Method Bed scale  (Pt refused standing scale.)   Percent Weight Change 6.7   Pain Assessment   Pain Assessment 0-10   Pain Level 0   Response to Pain Intervention Patient Satisfied   Post-Hemodialysis Assessment   Post-Treatment Procedures Blood returned;Catheter capped, clamped and heparinized x 2 ports   Machine Disinfection Process Acid/Vinegar Clean;Heat Disinfect; Exterior Machine Disinfection   Rinseback Volume (ml) 400 ml   Total Liters Processed (l/min) 57 l/min   Dialyzer Clearance Moderately streaked   Duration of Treatment (minutes) 180 minutes   Heparin amount administered during treatment (units) 0 units   Hemodialysis Intake (ml) 400 ml   Hemodialysis Output (ml) 3000 ml   NET Removed (ml) 2400 ml   Tolerated Treatment Good   Patient Response to Treatment Vss. C/o increased but improved per  pt statement once Ufg increased per . No other issues. Bilateral Breath Sounds Diminished   RLE Edema +2   LLE Edema +2   Physician Notified? Yes   Copy of dialysis treatment record placed in chart, to be scanned into EMR. Report given to [address]

## 2019-06-22 NOTE — PROGRESS NOTES
IM Progress Note    Admit Date:  6/20/2019  2    This is my first encounter with the patient. Chart reviewed briefly. Interval history:  Admitted for progressive renal failure    Subjective:  Mr. Bryson Rai seen up in bed , denies any issues- No sob , stable on RA  Getting bored  Reports polyuria  For HD catheter today     6/22- TDC placed yesterday. Going for HD. Will need OP slot. Objective:   /73   Pulse 76   Temp 98.5 °F (36.9 °C) (Oral)   Resp 18   Ht 6' (1.829 m)   Wt 289 lb 7.4 oz (131.3 kg)   SpO2 96%   BMI 39.26 kg/m²       Intake/Output Summary (Last 24 hours) at 6/22/2019 0703  Last data filed at 6/22/2019 0532  Gross per 24 hour   Intake 730 ml   Output 4450 ml   Net -3720 ml       Physical Exam:    General: obese, middle aged male  Awake, alert and oriented. Appears to be not in any distress  Mucous Membranes:  Pink , anicteric  Neck: No JVD, no carotid bruit, no thyromegaly  Chest:  Clear to auscultation bilaterally, no added sounds- right PAC/Left [facility]  Cardiovascular: Irregular rate and rhythm, S1S2 heard, no murmurs or gallops  Abdomen:  Soft, obese,  undistended, non tender, no organomegaly, BS present  Extremities: chronic bilateral LE edema 1+ , old healed scars on both legs  .  Distal pulses well felt  Neurological : grossly normal        Medications:   Scheduled Medications:    darbepoetin kelin-polysorbate  100 mcg Intravenous Weekly    carvedilol  12.5 mg Oral BID    vitamin D  2,000 Units Oral Daily    diltiazem  120 mg Oral Daily    hydrALAZINE  100 mg Oral BID    isosorbide mononitrate  60 mg Oral BID    pravastatin  40 mg Oral Nightly    sodium chloride flush  10 mL Intravenous 2 times per day    docusate sodium  100 mg Oral BID    insulin lispro  0-12 Units Subcutaneous TID WC    insulin lispro  0-6 Units Subcutaneous Nightly    nicotine  1 patch Transdermal Daily     I   dextrose       heparin (porcine), cyclobenzaprine, sodium chloride flush, ondansetron,

## 2019-06-22 NOTE — PLAN OF CARE
rate/effort  Description  Respiratory rate and effort will be within normal limits for the patient  Outcome: Ongoing     Problem: HEMODYNAMIC STATUS  Goal: Patient has stable vital signs and fluid balance  Outcome: Ongoing     Problem: FLUID AND ELECTROLYTE IMBALANCE  Goal: Fluid and electrolyte balance are achieved/maintained  Outcome: Ongoing     Problem: ACTIVITY INTOLERANCE/IMPAIRED MOBILITY  Goal: Mobility/activity is maintained at optimum level for patient  Outcome: Ongoing

## 2019-06-23 LAB
ALBUMIN SERPL-MCNC: 3.4 G/DL (ref 3.4–5)
ANION GAP SERPL CALCULATED.3IONS-SCNC: 15 MMOL/L (ref 3–16)
BUN BLDV-MCNC: 33 MG/DL (ref 7–20)
CALCIUM SERPL-MCNC: 9 MG/DL (ref 8.3–10.6)
CHLORIDE BLD-SCNC: 99 MMOL/L (ref 99–110)
CO2: 23 MMOL/L (ref 21–32)
CREAT SERPL-MCNC: 5.1 MG/DL (ref 0.9–1.3)
GFR AFRICAN AMERICAN: 14
GFR NON-AFRICAN AMERICAN: 12
GLUCOSE BLD-MCNC: 263 MG/DL (ref 70–99)
GLUCOSE BLD-MCNC: 268 MG/DL (ref 70–99)
GLUCOSE BLD-MCNC: 308 MG/DL (ref 70–99)
GLUCOSE BLD-MCNC: 314 MG/DL (ref 70–99)
GLUCOSE BLD-MCNC: 387 MG/DL (ref 70–99)
MAGNESIUM: 1.9 MG/DL (ref 1.8–2.4)
PERFORMED ON: ABNORMAL
PHOSPHORUS: 5.9 MG/DL (ref 2.5–4.9)
POTASSIUM SERPL-SCNC: 4.3 MMOL/L (ref 3.5–5.1)
SODIUM BLD-SCNC: 137 MMOL/L (ref 136–145)

## 2019-06-23 PROCEDURE — 6370000000 HC RX 637 (ALT 250 FOR IP): Performed by: INTERNAL MEDICINE

## 2019-06-23 PROCEDURE — 99232 SBSQ HOSP IP/OBS MODERATE 35: CPT | Performed by: INTERNAL MEDICINE

## 2019-06-23 PROCEDURE — 80069 RENAL FUNCTION PANEL: CPT

## 2019-06-23 PROCEDURE — 83735 ASSAY OF MAGNESIUM: CPT

## 2019-06-23 PROCEDURE — 1200000000 HC SEMI PRIVATE

## 2019-06-23 PROCEDURE — 2580000003 HC RX 258: Performed by: INTERNAL MEDICINE

## 2019-06-23 PROCEDURE — 94761 N-INVAS EAR/PLS OXIMETRY MLT: CPT

## 2019-06-23 RX ADMIN — CARVEDILOL 12.5 MG: 6.25 TABLET, FILM COATED ORAL at 22:03

## 2019-06-23 RX ADMIN — ISOSORBIDE MONONITRATE 60 MG: 60 TABLET ORAL at 22:10

## 2019-06-23 RX ADMIN — ISOSORBIDE MONONITRATE 60 MG: 60 TABLET ORAL at 08:46

## 2019-06-23 RX ADMIN — DILTIAZEM HYDROCHLORIDE 120 MG: 120 CAPSULE, COATED, EXTENDED RELEASE ORAL at 08:44

## 2019-06-23 RX ADMIN — INSULIN LISPRO 8 UNITS: 100 INJECTION, SOLUTION INTRAVENOUS; SUBCUTANEOUS at 11:42

## 2019-06-23 RX ADMIN — ASCORBIC ACID, THIAMINE MONONITRATE,RIBOFLAVIN, NIACINAMIDE, PYRIDOXINE HYDROCHLORIDE, FOLIC ACID, CYANOCOBALAMIN, BIOTIN, CALCIUM PANTOTHENATE, 1 MG: 100; 1.5; 1.7; 20; 10; 1; 6000; 150000; 5 CAPSULE, LIQUID FILLED ORAL at 08:44

## 2019-06-23 RX ADMIN — PRAVASTATIN SODIUM 40 MG: 40 TABLET ORAL at 22:03

## 2019-06-23 RX ADMIN — APIXABAN 2.5 MG: 5 TABLET, FILM COATED ORAL at 22:03

## 2019-06-23 RX ADMIN — INSULIN LISPRO 8 UNITS: 100 INJECTION, SOLUTION INTRAVENOUS; SUBCUTANEOUS at 16:38

## 2019-06-23 RX ADMIN — HYDRALAZINE HYDROCHLORIDE 100 MG: 50 TABLET, FILM COATED ORAL at 22:04

## 2019-06-23 RX ADMIN — Medication 10 ML: at 22:04

## 2019-06-23 RX ADMIN — HYDRALAZINE HYDROCHLORIDE 100 MG: 50 TABLET, FILM COATED ORAL at 08:44

## 2019-06-23 RX ADMIN — INSULIN LISPRO 6 UNITS: 100 INJECTION, SOLUTION INTRAVENOUS; SUBCUTANEOUS at 08:49

## 2019-06-23 RX ADMIN — Medication 10 ML: at 08:49

## 2019-06-23 RX ADMIN — CARVEDILOL 12.5 MG: 6.25 TABLET, FILM COATED ORAL at 08:44

## 2019-06-23 RX ADMIN — APIXABAN 2.5 MG: 5 TABLET, FILM COATED ORAL at 08:45

## 2019-06-23 RX ADMIN — INSULIN LISPRO 5 UNITS: 100 INJECTION, SOLUTION INTRAVENOUS; SUBCUTANEOUS at 20:27

## 2019-06-23 RX ADMIN — DOCUSATE SODIUM 100 MG: 100 CAPSULE, LIQUID FILLED ORAL at 08:44

## 2019-06-23 RX ADMIN — DOCUSATE SODIUM 100 MG: 100 CAPSULE, LIQUID FILLED ORAL at 22:03

## 2019-06-23 RX ADMIN — ASPIRIN 81 MG: 81 TABLET, COATED ORAL at 08:44

## 2019-06-23 RX ADMIN — VITAMIN D, TAB 1000IU (100/BT) 2000 UNITS: 25 TAB at 08:44

## 2019-06-23 NOTE — PROGRESS NOTES
IM Progress Note    Admit Date:  6/20/2019  3        Interval history:  Admitted for progressive renal failure    Subjective:  Mr. Ace Lung seen up in bed , denies any issues- No sob , stable on RA  Getting bored  Reports polyuria  For HD catheter today     6/22- TDC placed yesterday. Going for HD. Will need OP slot. 6/23- slept well. No new complaints. Objective:   BP (!) 144/79   Pulse 93   Temp 98.1 °F (36.7 °C) (Oral)   Resp 18   Ht 6' (1.829 m)   Wt (!) 308 lb 13.8 oz (140.1 kg)   SpO2 93%   BMI 41.89 kg/m²       Intake/Output Summary (Last 24 hours) at 6/23/2019 0825  Last data filed at 6/23/2019 1932  Gross per 24 hour   Intake 1010 ml   Output 3400 ml   Net -2390 ml       Physical Exam:    General: obese, middle aged male  Awake, alert and oriented. Appears to be not in any distress  Mucous Membranes:  Pink , anicteric  Neck: No JVD, no carotid bruit, no thyromegaly  Chest:  Clear to auscultation bilaterally, no added sounds- right PAC/Left [facility]  Cardiovascular: Irregular rate and rhythm, S1S2 heard, no murmurs or gallops  Abdomen:  Soft, obese,  undistended, non tender, no organomegaly, BS present  Extremities: chronic bilateral LE edema 1+ , old healed scars on both legs  .  Distal pulses well felt  Neurological : grossly normal        Medications:   Scheduled Medications:    apixaban  2.5 mg Oral BID    aspirin  81 mg Oral Daily    b complex-C-folic acid  1 capsule Oral Daily    darbepoetin kelin-polysorbate  100 mcg Intravenous Weekly    carvedilol  12.5 mg Oral BID    vitamin D  2,000 Units Oral Daily    diltiazem  120 mg Oral Daily    hydrALAZINE  100 mg Oral BID    isosorbide mononitrate  60 mg Oral BID    pravastatin  40 mg Oral Nightly    sodium chloride flush  10 mL Intravenous 2 times per day    docusate sodium  100 mg Oral BID    insulin lispro  0-12 Units Subcutaneous TID WC    insulin lispro  0-6 Units Subcutaneous Nightly    nicotine  1 patch Transdermal Daily placement. Last dose was 6/20/19 in the AM   - cont cardizem   - tele   - resume Eliquis.     #DM2, uncontrolled   - stop glimepiride   - use SSI for now      #Morbid Obesity  - Body mass index is 40.59 kg/m². - Complicating assessment and treatment. Placing patient at risk for multiple co-morbidities as well as early death and contributing to the patient's presentation.   - Counseled on weight loss.     #Left knee effusion  - with pain for 2-3 days, unknown injury  - XR with large effusion  - norco, tylenol PRN pain  - ortho consulted.  OP follow up recommended.      DVT Prophylaxis: Eliquis (hold for procedure)  Diet: renal/carb  Code Status: Rigo Zee MD 6/23/2019 8:25 AM

## 2019-06-24 LAB
ALBUMIN SERPL-MCNC: 3.3 G/DL (ref 3.4–5)
ANION GAP SERPL CALCULATED.3IONS-SCNC: 16 MMOL/L (ref 3–16)
BASOPHILS ABSOLUTE: 0.1 K/UL (ref 0–0.2)
BASOPHILS RELATIVE PERCENT: 2.2 %
BUN BLDV-MCNC: 42 MG/DL (ref 7–20)
CALCIUM SERPL-MCNC: 8.8 MG/DL (ref 8.3–10.6)
CHLORIDE BLD-SCNC: 97 MMOL/L (ref 99–110)
CO2: 21 MMOL/L (ref 21–32)
CREAT SERPL-MCNC: 6.2 MG/DL (ref 0.9–1.3)
EOSINOPHILS ABSOLUTE: 0.2 K/UL (ref 0–0.6)
EOSINOPHILS RELATIVE PERCENT: 2.4 %
GFR AFRICAN AMERICAN: 11
GFR NON-AFRICAN AMERICAN: 9
GLUCOSE BLD-MCNC: 145 MG/DL (ref 70–99)
GLUCOSE BLD-MCNC: 245 MG/DL (ref 70–99)
GLUCOSE BLD-MCNC: 298 MG/DL (ref 70–99)
GLUCOSE BLD-MCNC: 305 MG/DL (ref 70–99)
GLUCOSE BLD-MCNC: 452 MG/DL (ref 70–99)
HCT VFR BLD CALC: 27.7 % (ref 40.5–52.5)
HEMOGLOBIN: 9.3 G/DL (ref 13.5–17.5)
LYMPHOCYTES ABSOLUTE: 1 K/UL (ref 1–5.1)
LYMPHOCYTES RELATIVE PERCENT: 15 %
MAGNESIUM: 2 MG/DL (ref 1.8–2.4)
MCH RBC QN AUTO: 32 PG (ref 26–34)
MCHC RBC AUTO-ENTMCNC: 33.4 G/DL (ref 31–36)
MCV RBC AUTO: 95.7 FL (ref 80–100)
MONOCYTES ABSOLUTE: 0.7 K/UL (ref 0–1.3)
MONOCYTES RELATIVE PERCENT: 10.9 %
NEUTROPHILS ABSOLUTE: 4.6 K/UL (ref 1.7–7.7)
NEUTROPHILS RELATIVE PERCENT: 69.5 %
PDW BLD-RTO: 15.9 % (ref 12.4–15.4)
PERFORMED ON: ABNORMAL
PHOSPHORUS: 6.7 MG/DL (ref 2.5–4.9)
PLATELET # BLD: 291 K/UL (ref 135–450)
PMV BLD AUTO: 8.5 FL (ref 5–10.5)
POTASSIUM SERPL-SCNC: 3.8 MMOL/L (ref 3.5–5.1)
RBC # BLD: 2.89 M/UL (ref 4.2–5.9)
SODIUM BLD-SCNC: 134 MMOL/L (ref 136–145)
WBC # BLD: 6.7 K/UL (ref 4–11)

## 2019-06-24 PROCEDURE — 6370000000 HC RX 637 (ALT 250 FOR IP): Performed by: INTERNAL MEDICINE

## 2019-06-24 PROCEDURE — 83735 ASSAY OF MAGNESIUM: CPT

## 2019-06-24 PROCEDURE — 80069 RENAL FUNCTION PANEL: CPT

## 2019-06-24 PROCEDURE — 85025 COMPLETE CBC W/AUTO DIFF WBC: CPT

## 2019-06-24 PROCEDURE — 6360000002 HC RX W HCPCS: Performed by: INTERNAL MEDICINE

## 2019-06-24 PROCEDURE — 1200000000 HC SEMI PRIVATE

## 2019-06-24 PROCEDURE — 2580000003 HC RX 258: Performed by: INTERNAL MEDICINE

## 2019-06-24 PROCEDURE — 99232 SBSQ HOSP IP/OBS MODERATE 35: CPT | Performed by: INTERNAL MEDICINE

## 2019-06-24 RX ORDER — LANCETS 33 GAUGE
EACH MISCELLANEOUS
Qty: 100 EACH | Refills: 0 | Status: SHIPPED | OUTPATIENT
Start: 2019-06-24

## 2019-06-24 RX ORDER — BLOOD-GLUCOSE METER
EACH MISCELLANEOUS
Qty: 1 KIT | Refills: 0 | Status: SHIPPED | OUTPATIENT
Start: 2019-06-24

## 2019-06-24 RX ORDER — INSULIN GLARGINE 100 [IU]/ML
25 INJECTION, SOLUTION SUBCUTANEOUS NIGHTLY
Status: DISCONTINUED | OUTPATIENT
Start: 2019-06-24 | End: 2019-06-25

## 2019-06-24 RX ORDER — INSULIN GLARGINE 100 [IU]/ML
10 INJECTION, SOLUTION SUBCUTANEOUS ONCE
Status: COMPLETED | OUTPATIENT
Start: 2019-06-24 | End: 2019-06-24

## 2019-06-24 RX ADMIN — VITAMIN D, TAB 1000IU (100/BT) 2000 UNITS: 25 TAB at 09:41

## 2019-06-24 RX ADMIN — HEPARIN SODIUM 4200 UNITS: 1000 INJECTION, SOLUTION INTRAVENOUS; SUBCUTANEOUS at 19:19

## 2019-06-24 RX ADMIN — DOCUSATE SODIUM 100 MG: 100 CAPSULE, LIQUID FILLED ORAL at 09:39

## 2019-06-24 RX ADMIN — ASCORBIC ACID, THIAMINE MONONITRATE,RIBOFLAVIN, NIACINAMIDE, PYRIDOXINE HYDROCHLORIDE, FOLIC ACID, CYANOCOBALAMIN, BIOTIN, CALCIUM PANTOTHENATE, 1 MG: 100; 1.5; 1.7; 20; 10; 1; 6000; 150000; 5 CAPSULE, LIQUID FILLED ORAL at 09:39

## 2019-06-24 RX ADMIN — ISOSORBIDE MONONITRATE 60 MG: 60 TABLET ORAL at 09:41

## 2019-06-24 RX ADMIN — DOCUSATE SODIUM 100 MG: 100 CAPSULE, LIQUID FILLED ORAL at 21:47

## 2019-06-24 RX ADMIN — Medication 10 ML: at 21:45

## 2019-06-24 RX ADMIN — APIXABAN 2.5 MG: 5 TABLET, FILM COATED ORAL at 09:39

## 2019-06-24 RX ADMIN — ASPIRIN 81 MG: 81 TABLET, COATED ORAL at 09:41

## 2019-06-24 RX ADMIN — INSULIN GLARGINE 10 UNITS: 100 INJECTION, SOLUTION SUBCUTANEOUS at 13:51

## 2019-06-24 RX ADMIN — CARVEDILOL 12.5 MG: 6.25 TABLET, FILM COATED ORAL at 09:41

## 2019-06-24 RX ADMIN — APIXABAN 2.5 MG: 5 TABLET, FILM COATED ORAL at 21:46

## 2019-06-24 RX ADMIN — Medication 10 ML: at 09:39

## 2019-06-24 RX ADMIN — DILTIAZEM HYDROCHLORIDE 120 MG: 120 CAPSULE, COATED, EXTENDED RELEASE ORAL at 09:41

## 2019-06-24 RX ADMIN — INSULIN GLARGINE 25 UNITS: 100 INJECTION, SOLUTION SUBCUTANEOUS at 21:53

## 2019-06-24 RX ADMIN — PRAVASTATIN SODIUM 40 MG: 40 TABLET ORAL at 21:45

## 2019-06-24 RX ADMIN — HYDRALAZINE HYDROCHLORIDE 100 MG: 50 TABLET, FILM COATED ORAL at 21:45

## 2019-06-24 RX ADMIN — CARVEDILOL 12.5 MG: 6.25 TABLET, FILM COATED ORAL at 21:45

## 2019-06-24 RX ADMIN — INSULIN LISPRO 12 UNITS: 100 INJECTION, SOLUTION INTRAVENOUS; SUBCUTANEOUS at 11:28

## 2019-06-24 RX ADMIN — ISOSORBIDE MONONITRATE 60 MG: 60 TABLET ORAL at 21:46

## 2019-06-24 RX ADMIN — INSULIN LISPRO 6 UNITS: 100 INJECTION, SOLUTION INTRAVENOUS; SUBCUTANEOUS at 09:42

## 2019-06-24 RX ADMIN — INSULIN LISPRO 2 UNITS: 100 INJECTION, SOLUTION INTRAVENOUS; SUBCUTANEOUS at 21:53

## 2019-06-24 RX ADMIN — HYDRALAZINE HYDROCHLORIDE 100 MG: 50 TABLET, FILM COATED ORAL at 09:41

## 2019-06-24 ASSESSMENT — PAIN SCALES - GENERAL
PAINLEVEL_OUTOF10: 0
PAINLEVEL_OUTOF10: 0

## 2019-06-24 NOTE — PROGRESS NOTES
Pt in bed with eyes closed, appears in no acute distress, respirations are even and easy. Will continue to monitor. Call light within reach. Bedside report given to Liliana PYLE for transfer of care.

## 2019-06-24 NOTE — PROGRESS NOTES
PM assessment completed, see flow sheet. Pt is alert and oriented. Respirations are even & easy at rest. No complaints voiced. Pt denies needs at this time. SR up x 2, and bed in low position. Call light is within reach.

## 2019-06-24 NOTE — PROGRESS NOTES
(porcine), cyclobenzaprine, sodium chloride flush, ondansetron, glucose, dextrose, glucagon (rDNA), dextrose, acetaminophen, HYDROcodone 5 mg - acetaminophen    Lab Data:  Recent Labs     06/22/19  1130   WBC 6.8   HGB 9.8*   HCT 29.3*   MCV 95.1        Recent Labs     06/22/19  0520 06/23/19  0535 06/24/19  0544    137 134*   K 4.3 4.3 3.8    99 97*   CO2 21 23 21   PHOS 5.5* 5.9* 6.7*   BUN 46* 33* 42*   CREATININE 5.1* 5.1* 6.2*     No results for input(s): CKTOTAL, CKMB, CKMBINDEX, TROPONINI in the last 72 hours. Coagulation:   Lab Results   Component Value Date    INR 1.11 06/20/2019    APTT 32.4 10/24/2017     Cardiac markers:   Lab Results   Component Value Date    TROPONINI 0.03 06/20/2019         Lab Results   Component Value Date    ALT 6 (L) 06/20/2019    AST 8 (L) 06/20/2019    ALKPHOS 119 06/20/2019    BILITOT <0.2 06/20/2019       Lab Results   Component Value Date    INR 1.11 06/20/2019    INR 1.19 (H) 07/29/2018    INR 1.33 (H) 11/09/2017    PROTIME 12.6 06/20/2019    PROTIME 13.6 (H) 07/29/2018    PROTIME 15.0 (H) 11/09/2017       Radiology      EKG:  I have reviewed the EKG with the following interpretation:   A fib with CVR      RADIOLOGY  XR KNEE LEFT (3 VIEWS)   Final Result   Progressive degenerative changes.       Large left joint effusion, new.           XR CHEST PORTABLE   Final Result   Vascular congestion.       Stable enlargement of the cardiac silhouette, likely cardiomegaly.           IR NONTUNNELED VASCULAR CATHETER > 5 YEARS    (Results Pending)         ASSESSMENT/PLAN:     #ANA on CKD 5  - f/w nephro as OP and had plans for AVF creation in 4/2019, cancelled 2/2 hyperkalemia   - s/p IV lasix. Seen by nephro. Tunneled HD cath placementplaced -had HD on 6/21. HD started. Will need OP slot.     #HTN  - controlled  - cont home medications -imdur, resume hydralazine cardizem      #A fib with CVR  - held Eliquis for line placement.   Last dose was 6/20/19 in the AM - cont cardizem   - tele   - resumed Eliquis.     #DM2, uncontrolled   - stop glimepiride   - start lantus, ssi      #Morbid Obesity  - Body mass index is 40.59 kg/m². - Complicating assessment and treatment. Placing patient at risk for multiple co-morbidities as well as early death and contributing to the patient's presentation.   - Counseled on weight loss.     #Left knee effusion  - with pain for 2-3 days, unknown injury  - XR with large effusion  - norco, tylenol PRN pain  - ortho consulted.  OP follow up recommended.      DVT Prophylaxis: Eliquis (hold for procedure)  Diet: renal/carb  Code Status: FULL      Sri Chambers MD 6/24/2019 12:16 PM

## 2019-06-25 VITALS
SYSTOLIC BLOOD PRESSURE: 90 MMHG | RESPIRATION RATE: 20 BRPM | HEIGHT: 72 IN | BODY MASS INDEX: 40.52 KG/M2 | TEMPERATURE: 97.7 F | WEIGHT: 299.16 LBS | HEART RATE: 69 BPM | DIASTOLIC BLOOD PRESSURE: 60 MMHG | OXYGEN SATURATION: 100 %

## 2019-06-25 LAB
ALBUMIN SERPL-MCNC: 3.5 G/DL (ref 3.4–5)
ANION GAP SERPL CALCULATED.3IONS-SCNC: 13 MMOL/L (ref 3–16)
BASOPHILS ABSOLUTE: 0.2 K/UL (ref 0–0.2)
BASOPHILS RELATIVE PERCENT: 2.3 %
BUN BLDV-MCNC: 25 MG/DL (ref 7–20)
CALCIUM SERPL-MCNC: 8.8 MG/DL (ref 8.3–10.6)
CHLORIDE BLD-SCNC: 94 MMOL/L (ref 99–110)
CO2: 24 MMOL/L (ref 21–32)
CREAT SERPL-MCNC: 4.4 MG/DL (ref 0.9–1.3)
EOSINOPHILS ABSOLUTE: 0.1 K/UL (ref 0–0.6)
EOSINOPHILS RELATIVE PERCENT: 2.1 %
GFR AFRICAN AMERICAN: 17
GFR NON-AFRICAN AMERICAN: 14
GLUCOSE BLD-MCNC: 246 MG/DL (ref 70–99)
GLUCOSE BLD-MCNC: 373 MG/DL (ref 70–99)
GLUCOSE BLD-MCNC: 430 MG/DL (ref 70–99)
GLUCOSE BLD-MCNC: 432 MG/DL (ref 70–99)
GLUCOSE BLD-MCNC: 452 MG/DL (ref 70–99)
HCT VFR BLD CALC: 28.6 % (ref 40.5–52.5)
HEMOGLOBIN: 9.6 G/DL (ref 13.5–17.5)
LYMPHOCYTES ABSOLUTE: 1 K/UL (ref 1–5.1)
LYMPHOCYTES RELATIVE PERCENT: 14.5 %
MAGNESIUM: 2.1 MG/DL (ref 1.8–2.4)
MCH RBC QN AUTO: 32.4 PG (ref 26–34)
MCHC RBC AUTO-ENTMCNC: 33.7 G/DL (ref 31–36)
MCV RBC AUTO: 96.2 FL (ref 80–100)
MONOCYTES ABSOLUTE: 0.7 K/UL (ref 0–1.3)
MONOCYTES RELATIVE PERCENT: 10.1 %
NEUTROPHILS ABSOLUTE: 4.7 K/UL (ref 1.7–7.7)
NEUTROPHILS RELATIVE PERCENT: 71 %
PDW BLD-RTO: 16 % (ref 12.4–15.4)
PERFORMED ON: ABNORMAL
PHOSPHORUS: 4.6 MG/DL (ref 2.5–4.9)
PLATELET # BLD: 289 K/UL (ref 135–450)
PMV BLD AUTO: 8.3 FL (ref 5–10.5)
POTASSIUM SERPL-SCNC: 4.3 MMOL/L (ref 3.5–5.1)
RBC # BLD: 2.98 M/UL (ref 4.2–5.9)
SODIUM BLD-SCNC: 131 MMOL/L (ref 136–145)
WBC # BLD: 6.7 K/UL (ref 4–11)

## 2019-06-25 PROCEDURE — 6370000000 HC RX 637 (ALT 250 FOR IP): Performed by: INTERNAL MEDICINE

## 2019-06-25 PROCEDURE — 83735 ASSAY OF MAGNESIUM: CPT

## 2019-06-25 PROCEDURE — 2580000003 HC RX 258: Performed by: INTERNAL MEDICINE

## 2019-06-25 PROCEDURE — 80069 RENAL FUNCTION PANEL: CPT

## 2019-06-25 PROCEDURE — 90935 HEMODIALYSIS ONE EVALUATION: CPT

## 2019-06-25 PROCEDURE — 99238 HOSP IP/OBS DSCHRG MGMT 30/<: CPT | Performed by: INTERNAL MEDICINE

## 2019-06-25 PROCEDURE — 85025 COMPLETE CBC W/AUTO DIFF WBC: CPT

## 2019-06-25 PROCEDURE — 6360000002 HC RX W HCPCS: Performed by: INTERNAL MEDICINE

## 2019-06-25 RX ORDER — INSULIN GLARGINE 100 [IU]/ML
25 INJECTION, SOLUTION SUBCUTANEOUS 2 TIMES DAILY
Status: DISCONTINUED | OUTPATIENT
Start: 2019-06-25 | End: 2019-06-25 | Stop reason: HOSPADM

## 2019-06-25 RX ORDER — ISOSORBIDE MONONITRATE 60 MG/1
60 TABLET, EXTENDED RELEASE ORAL 2 TIMES DAILY
Qty: 60 TABLET | Refills: 3 | Status: ON HOLD | OUTPATIENT
Start: 2019-06-25 | End: 2019-10-06 | Stop reason: HOSPADM

## 2019-06-25 RX ORDER — ISOSORBIDE MONONITRATE 60 MG/1
60 TABLET, EXTENDED RELEASE ORAL 2 TIMES DAILY
Qty: 60 TABLET | Refills: 3
Start: 2019-06-25 | End: 2019-06-25 | Stop reason: SDUPTHER

## 2019-06-25 RX ADMIN — INSULIN GLARGINE 25 UNITS: 100 INJECTION, SOLUTION SUBCUTANEOUS at 10:06

## 2019-06-25 RX ADMIN — INSULIN LISPRO 4 UNITS: 100 INJECTION, SOLUTION INTRAVENOUS; SUBCUTANEOUS at 16:42

## 2019-06-25 RX ADMIN — INSULIN LISPRO 10 UNITS: 100 INJECTION, SOLUTION INTRAVENOUS; SUBCUTANEOUS at 11:51

## 2019-06-25 RX ADMIN — ASCORBIC ACID, THIAMINE MONONITRATE,RIBOFLAVIN, NIACINAMIDE, PYRIDOXINE HYDROCHLORIDE, FOLIC ACID, CYANOCOBALAMIN, BIOTIN, CALCIUM PANTOTHENATE, 1 MG: 100; 1.5; 1.7; 20; 10; 1; 6000; 150000; 5 CAPSULE, LIQUID FILLED ORAL at 10:04

## 2019-06-25 RX ADMIN — DILTIAZEM HYDROCHLORIDE 120 MG: 120 CAPSULE, COATED, EXTENDED RELEASE ORAL at 10:05

## 2019-06-25 RX ADMIN — APIXABAN 2.5 MG: 5 TABLET, FILM COATED ORAL at 10:04

## 2019-06-25 RX ADMIN — ASPIRIN 81 MG: 81 TABLET, COATED ORAL at 10:05

## 2019-06-25 RX ADMIN — Medication 10 ML: at 10:05

## 2019-06-25 RX ADMIN — CARVEDILOL 12.5 MG: 6.25 TABLET, FILM COATED ORAL at 10:04

## 2019-06-25 RX ADMIN — DOCUSATE SODIUM 100 MG: 100 CAPSULE, LIQUID FILLED ORAL at 10:05

## 2019-06-25 RX ADMIN — HYDRALAZINE HYDROCHLORIDE 100 MG: 50 TABLET, FILM COATED ORAL at 10:05

## 2019-06-25 RX ADMIN — INSULIN LISPRO 12 UNITS: 100 INJECTION, SOLUTION INTRAVENOUS; SUBCUTANEOUS at 10:06

## 2019-06-25 RX ADMIN — VITAMIN D, TAB 1000IU (100/BT) 2000 UNITS: 25 TAB at 10:05

## 2019-06-25 RX ADMIN — ISOSORBIDE MONONITRATE 60 MG: 60 TABLET ORAL at 10:18

## 2019-06-25 RX ADMIN — HEPARIN SODIUM 4200 UNITS: 1000 INJECTION, SOLUTION INTRAVENOUS; SUBCUTANEOUS at 09:21

## 2019-06-25 NOTE — PROGRESS NOTES
Bedside report given to [facility] RN  pt in stable condition no needs at this time.  Transport here to take pt to dialysis

## 2019-06-25 NOTE — PROGRESS NOTES
Shift assessment complete. VSS. Patient A/Ox4. Denies pain or nausea. Scheduled meds given per orders. See MAR. Patient denies needs at this time. Refusing to ambulate halls w/ staff at this time. Will attempt later. Call light within reach. Bed alarm on. Will continue to monitor.

## 2019-06-25 NOTE — PROGRESS NOTES
Pt A/O x 4 assessment completed. Pt upset that his dinner tray was bad when he got back from HD. Provided pt with milk ans cereal on request. PM meds given. Pt denies any needs at this time.  Call light within reach

## 2019-06-25 NOTE — PROGRESS NOTES
IM Progress Note    Admit Date:  6/20/2019  5        Interval history:  Admitted for progressive renal failure  Started on lantus for high sugars  Sugars still above 400 this am    Subjective:    Mr. Bryson Rai seen up in bed , denies any issues- No sob , stable on RA        Objective:   BP (!) 140/70   Pulse 76   Temp 98.5 °F (36.9 °C)   Resp 18   Ht 6' (1.829 m)   Wt (!) 300 lb 7.8 oz (136.3 kg)   SpO2 97%   BMI 40.75 kg/m²       Intake/Output Summary (Last 24 hours) at 6/25/2019 0809  Last data filed at 6/25/2019 0138  Gross per 24 hour   Intake 740 ml   Output 4075 ml   Net -3335 ml       Physical Exam:    General: obese, middle aged male  Awake, alert and oriented. Appears to be not in any distress  Mucous Membranes:  Pink , anicteric  Neck: No JVD, no carotid bruit, no thyromegaly  Chest:  Clear to auscultation bilaterally, no added sounds- right PAC/Left [facility] [place]  Cardiovascular: Irregular rate and rhythm, S1S2 heard, no murmurs or gallops  Abdomen:  Soft, obese,  undistended, non tender, no organomegaly, BS present  Extremities: chronic bilateral LE edema 1+ , old healed scars on both legs  .  Distal pulses well felt  Neurological : grossly normal        Medications:   Scheduled Medications:    insulin glargine  25 Units Subcutaneous BID    apixaban  2.5 mg Oral BID    aspirin  81 mg Oral Daily    b complex-C-folic acid  1 capsule Oral Daily    darbepoetin kelin-polysorbate  100 mcg Intravenous Weekly    carvedilol  12.5 mg Oral BID    vitamin D  2,000 Units Oral Daily    diltiazem  120 mg Oral Daily    hydrALAZINE  100 mg Oral BID    isosorbide mononitrate  60 mg Oral BID    pravastatin  40 mg Oral Nightly    sodium chloride flush  10 mL Intravenous 2 times per day    docusate sodium  100 mg Oral BID    insulin lispro  0-12 Units Subcutaneous TID WC    insulin lispro  0-6 Units Subcutaneous Nightly     I   dextrose       nicotine polacrilex, heparin (porcine), cyclobenzaprine, sodium chloride

## 2019-06-28 ENCOUNTER — HOSPITAL ENCOUNTER (EMERGENCY)
Age: 59
Discharge: HOME | End: 2019-06-29
Attending: EMERGENCY MEDICINE
Payer: MEDICARE

## 2019-06-28 VITALS
RESPIRATION RATE: 16 BRPM | BODY MASS INDEX: 38.82 KG/M2 | TEMPERATURE: 98.5 F | HEIGHT: 72 IN | WEIGHT: 286.6 LBS | HEART RATE: 96 BPM | SYSTOLIC BLOOD PRESSURE: 110 MMHG | OXYGEN SATURATION: 97 % | DIASTOLIC BLOOD PRESSURE: 96 MMHG

## 2019-06-28 DIAGNOSIS — R73.9 HYPERGLYCEMIA: Primary | ICD-10-CM

## 2019-06-28 LAB
A/G RATIO: 1.1 (ref 1.1–2.2)
ALBUMIN SERPL-MCNC: 3.6 G/DL (ref 3.4–5)
ALP BLD-CCNC: 105 U/L (ref 40–129)
ALT SERPL-CCNC: 8 U/L (ref 10–40)
ANION GAP SERPL CALCULATED.3IONS-SCNC: 15 MMOL/L (ref 3–16)
AST SERPL-CCNC: 10 U/L (ref 15–37)
BASE EXCESS VENOUS: -1.4 MMOL/L (ref -3–3)
BASOPHILS ABSOLUTE: 0.2 K/UL (ref 0–0.2)
BASOPHILS RELATIVE PERCENT: 2.4 %
BILIRUB SERPL-MCNC: <0.2 MG/DL (ref 0–1)
BUN BLDV-MCNC: 40 MG/DL (ref 7–20)
CALCIUM SERPL-MCNC: 8.3 MG/DL (ref 8.3–10.6)
CARBOXYHEMOGLOBIN: 9.7 % (ref 0–1.5)
CHLORIDE BLD-SCNC: 92 MMOL/L (ref 99–110)
CO2: 23 MMOL/L (ref 21–32)
CREAT SERPL-MCNC: 5.9 MG/DL (ref 0.9–1.3)
EOSINOPHILS ABSOLUTE: 0.2 K/UL (ref 0–0.6)
EOSINOPHILS RELATIVE PERCENT: 3.1 %
GFR AFRICAN AMERICAN: 12
GFR NON-AFRICAN AMERICAN: 10
GLOBULIN: 3.4 G/DL
GLUCOSE BLD-MCNC: 309 MG/DL (ref 70–99)
GLUCOSE BLD-MCNC: 325 MG/DL (ref 70–99)
GLUCOSE BLD-MCNC: 350 MG/DL (ref 70–99)
HCO3 VENOUS: 23.6 MMOL/L (ref 23–29)
HCT VFR BLD CALC: 27.1 % (ref 40.5–52.5)
HEMOGLOBIN: 9.1 G/DL (ref 13.5–17.5)
LYMPHOCYTES ABSOLUTE: 1.4 K/UL (ref 1–5.1)
LYMPHOCYTES RELATIVE PERCENT: 21.1 %
MCH RBC QN AUTO: 31.8 PG (ref 26–34)
MCHC RBC AUTO-ENTMCNC: 33.6 G/DL (ref 31–36)
MCV RBC AUTO: 94.4 FL (ref 80–100)
METHEMOGLOBIN VENOUS: 0.1 %
MONOCYTES ABSOLUTE: 0.6 K/UL (ref 0–1.3)
MONOCYTES RELATIVE PERCENT: 9.7 %
NEUTROPHILS ABSOLUTE: 4.2 K/UL (ref 1.7–7.7)
NEUTROPHILS RELATIVE PERCENT: 63.7 %
O2 CONTENT, VEN: 12 VOL %
O2 SAT, VEN: 86 %
O2 THERAPY: ABNORMAL
PCO2, VEN: 40.5 MMHG (ref 40–50)
PDW BLD-RTO: 16 % (ref 12.4–15.4)
PERFORMED ON: ABNORMAL
PERFORMED ON: ABNORMAL
PH VENOUS: 7.38 (ref 7.35–7.45)
PLATELET # BLD: 293 K/UL (ref 135–450)
PMV BLD AUTO: 7.9 FL (ref 5–10.5)
PO2, VEN: 51.3 MMHG (ref 25–40)
POTASSIUM SERPL-SCNC: 4.4 MMOL/L (ref 3.5–5.1)
RBC # BLD: 2.87 M/UL (ref 4.2–5.9)
SODIUM BLD-SCNC: 130 MMOL/L (ref 136–145)
TCO2 CALC VENOUS: 25 MMOL/L
TOTAL PROTEIN: 7 G/DL (ref 6.4–8.2)
WBC # BLD: 6.5 K/UL (ref 4–11)

## 2019-06-28 PROCEDURE — 80053 COMPREHEN METABOLIC PANEL: CPT

## 2019-06-28 PROCEDURE — 99284 EMERGENCY DEPT VISIT MOD MDM: CPT

## 2019-06-28 PROCEDURE — 85025 COMPLETE CBC W/AUTO DIFF WBC: CPT

## 2019-06-28 PROCEDURE — 82803 BLOOD GASES ANY COMBINATION: CPT

## 2019-06-29 NOTE — ED PROVIDER NOTES
Triage Chief Complaint:    Hyperglycemia (pt c/o his sugar running high in the upper 500's today. pt was just discharged tuesday due to being a new insulin dependant . pt  at triage )    [place]:  Akin Vazquez is a 63 y.o. male that presents to the emergency department with complaints of having elevated blood sugar at home. The patient states that he was recently started on insulin, and states that previous to this he was only on oral diabetic medications. The patient states that he has since been started on dialysis, and was discharged recently from the hospital after being started on Lantus. The patient states that he did note that his blood sugar was elevated to nearly 500 today. The patient states that he really did not check his blood sugars in the past, and is only been forced to do this because he is been started on Lantus. The patient states that he was not sure what to do with the elevated blood sugar so he did contact the hospital floor where he was admitted and discussed with them. They basically told the patient that if he had any problems he was to come to the hospital right away for further evaluation. The patient denies fevers or chills. Patient denies nausea or vomiting. Patient states that he just generally has a feeling of weakness, but no other complaints. .    ROS:  At least 10 systems reviewed and otherwise acutely negative except as in the [address]     Past Medical History:   Diagnosis Date    [facility]     CAD (coronary artery disease)     Cardiomyopathy [facility] 7/13/2015    [facility]     Chronic kidney disease     COPD [facility]chronic obstructive pulmonary disease) ([facility])     Deep vein thrombosis (DVT) ([facility]) 8/7/2012    Depression     Diabetes mellitus (HCC)     Gastrointestinal bleed     GERD (gastroesophageal reflux disease)     Heme positive stool 01/2017    History of blood transfusion     1/2017    Hx of blood clots     left leg; 5 Comment: rarely    Drug use: No    Sexual activity: Yes     Partners: Female   Lifestyle    Physical activity:     Days per week: Not on file     Minutes per session: Not on file    Stress: Not on file   Relationships    Social connections:     Talks on phone: Not on file     Gets together: Not on file     Attends [employer] service: Not on file     Active member of club or organization: Not on file     Attends meetings of clubs or organizations: Not on file     Relationship status: Not on file    Intimate partner violence:     Fear of current or ex partner: Not on file     Emotionally abused: Not on file     Physically abused: Not on file     Forced sexual activity: Not on file   Other Topics Concern    Not on file   Social History Narrative    Not on file     No current facility-administered medications for this encounter. Current Outpatient Medications   Medication Sig Dispense Refill    insulin glargine (LANTUS SOLOSTAR) 100 UNIT/ML injection pen Inject 25 Units into the skin 2 times daily 5 pen 0    isosorbide mononitrate (IMDUR) 60 MG extended release tablet Take 1 tablet by mouth 2 times daily 60 tablet 3    blood glucose test strips (AGAMATRIX AMP TEST) strip 1 each by In Vitro route daily As needed.  100 each 3    Blood Glucose Monitoring Suppl (AGAMATRIX PRESTO) w/Device KIT Check twice daily 1 kit 0    AGAMATRIX ULTRA-THIN LANCETS MISC Check sugars bid 100 each 0    Cholecalciferol (VITAMIN D3) 2000 units CAPS Take 1 capsule by mouth daily      diltiazem (CARDIZEM 12 HR) 120 MG extended release capsule Take 120 mg by mouth 2 times daily       carvedilol (COREG) 12.5 MG tablet Take 12.5 mg by mouth 2 times daily      apixaban (ELIQUIS) 2.5 MG TABS tablet Take 2.5 mg by mouth 2 times daily Take two tablets BID      pravastatin (PRAVACHOL) 40 MG tablet TAKE ONE TABLET BY MOUTH EVERY EVENING*YOU NEED TO CONTACT YOUR DOCTORS OFFICE TO MAKE AN APPOINTMENT FOR NEW BLOOD WORK* (Patient taking differently: No sig reported) 30 tablet 0    aspirin 81 MG chewable tablet Take 81 mg by mouth daily. Allergies   Allergen Reactions    Codeine Itching    Onion Other (See Comments)     diarrhea    Penicillins Hives and Rash     Unknown reaction. Childhood problem       Nursing Notes Reviewed    Physical Exam:  ED Triage Vitals   BP Temp Temp Source Pulse Resp SpO2 Height Weight   06/28/19 2109 06/28/19 2106 06/28/19 2106 06/28/19 2106 06/28/19 2106 06/28/19 2106 06/28/19 2106 06/28/19 2106   (!) 110/96 98.5 °F (36.9 °C) Oral 96 16 97 % 6' (1.829 m) 286 lb 9.6 oz (130 kg)     GENERAL APPEARANCE: Awake and alert. Cooperative. No acute distress. HEAD:Normocephalic. Atraumatic. EYES: EOM's grossly intact. Sclera anicteric. ENT: Mucous membranes are moist. Tolerates saliva. No trismus. NECK: Supple. No meningismus. Trachea midline. HEART: RRR. LUNGS: Respirations unlabored. CTAB  ABDOMEN: Soft. Non-tender. No guarding or rebound. EXTREMITIES: No acute deformities. SKIN: Warm and dry. NEUROLOGICAL: No gross facial drooping. Moves all 4 extremities spontaneously.     Physical Exam    I have reviewed and interpreted all of the currently availablelab results from this visit (if applicable):  Results for orders placed or performed during the hospital encounter of 06/28/19   CBC auto differential   Result Value Ref Range    WBC 6.5 4.0 - 11.0 K/uL    RBC 2.87 (L) 4.20 - 5.90 M/uL    Hemoglobin 9.1 (L) 13.5 - 17.5 g/dL    Hematocrit 27.1 (L) 40.5 - 52.5 %    MCV 94.4 80.0 - 100.0 fL    MCH 31.8 26.0 - 34.0 pg    MCHC 33.6 31.0 - 36.0 g/dL    RDW 16.0 (H) 12.4 - 15.4 %    Platelets 838 862 - 048 K/uL    MPV 7.9 5.0 - 10.5 fL    Neutrophils % 63.7 %    Lymphocytes % 21.1 %    Monocytes % 9.7 %    Eosinophils % 3.1 %    Basophils % 2.4 %    Neutrophils # 4.2 1.7 - 7.7 K/uL    Lymphocytes # 1.4 1.0 - 5.1 K/uL    Monocytes # 0.6 0.0 - 1.3 K/uL    Eosinophils # 0.2 0.0 - 0.6 K/uL    Basophils # 0.2 0.0 - 0.2 K/uL   Comprehensive metabolic panel   Result Value Ref Range    Sodium 130 (L) 136 - 145 mmol/L    Potassium 4.4 3.5 - 5.1 mmol/L    Chloride 92 (L) 99 - 110 mmol/L    CO2 23 21 - 32 mmol/L    Anion Gap 15 3 - 16    Glucose 325 (H) 70 - 99 mg/dL    BUN 40 (H) 7 - 20 mg/dL    CREATININE 5.9 (HH) 0.9 - 1.3 mg/dL    GFR Non-African American 10 (A) >60    GFR  12 (A) >60    Calcium 8.3 8.3 - 10.6 mg/dL    Total Protein 7.0 6.4 - 8.2 g/dL    Alb 3.6 3.4 - 5.0 g/dL    Albumin/Globulin Ratio 1.1 1.1 - 2.2    Total Bilirubin <0.2 0.0 - 1.0 mg/dL    Alkaline Phosphatase 105 40 - 129 U/L    ALT 8 (L) 10 - 40 U/L    AST 10 (L) 15 - 37 U/L    Globulin 3.4 g/dL   Blood gas, venous   Result Value Ref Range    pH, Gi[place]any 7.383 7.350 - 7.450    pCO2, Giovany 40.5 40.0 - 50.0 mmHg    pO2, Giovany 51.3 (H) 25.0 - 40.0 mmHg    HCO3, Venous 23.6 23.0 - 29.0 mmol/L    Base Excess, Giovany -1.4 -3.0 - 3.0 mmol/L    O2 Sat, Giovany 86 Not Established %    Carboxyhemoglobin 9.7 (H) 0.0 - 1.5 %    MetHgb, Giovany 0.1 <1.5 %    TC02 (Calc), Giovany 25 Not Established mmol/L    O2 Content, Giovany 12 Not Established VOL %    O2 Therapy Unknown    POCT Glucose   Result Value Ref Range    POC Glucose 350 (H) 70 - 99 mg/dl    Performed on ACCU-CHEK    POCT Glucose   Result Value Ref Range    POC Glucose 309 (H) 70 - 99 mg/dl    Performed on ACCU-CHEK             Procedures    MDM:  After my initial evaluation, I did note that the patient has a very benign physical examination. We did decide to recheck the patient's blood sugar, and throughout his stay it has steadily dropped down to approximately 309 here, but the patient's glucometer at home read to 89. At this point, the patient is only a few hours into his evening dose of Lantus, so I do not feel that we should be actually giving him any supplemental insulin since it will speak in another 4 5 hours, and I do not want to drop the blood sugar too much.   The patient also needs to have an accurate reading in the morning so that he can determine what his change in dosage for the Lantus should be, if the blood sugar does stay elevated. The patient is not having any nausea or vomiting, so I do not feel he needs any further treatment today. I did obtain diabetic teaching materials for the patient, since he really is not sure as to what to do and how to take care of himself. I did go over the fact that the patient does need to count calories, and also try to carb count as well for his meals so he has a better idea as to how many calories he is taking in and if he does need to increase his Lantus. Patient is due to follow with his primary care doctor next week, and I have advised him to keep maintaining his glucose diary at home. Clinical Impression:  1.  Hyperglycemia      (Please note that portions of this note Cardozo Nisa been completed with a voice recognition program. Efforts were made to edit the dictations but occasionally words are mis-transcribed.)    MD Chela Segal MD  06/29/19 2915

## 2019-07-01 ENCOUNTER — TELEPHONE (OUTPATIENT)
Dept: VASCULAR SURGERY | Age: 59
End: 2019-07-01

## 2019-07-01 DIAGNOSIS — Z99.2 ENCOUNTER REGARDING VASCULAR ACCESS FOR DIALYSIS FOR ESRD (HCC): Primary | ICD-10-CM

## 2019-07-01 DIAGNOSIS — N18.6 ENCOUNTER REGARDING VASCULAR ACCESS FOR DIALYSIS FOR ESRD (HCC): Primary | ICD-10-CM

## 2019-07-10 RX ORDER — PRAVASTATIN SODIUM 40 MG
40 TABLET ORAL DAILY
Qty: 30 TABLET | Refills: 0 | Status: SHIPPED | OUTPATIENT
Start: 2019-07-10

## 2019-07-15 ENCOUNTER — TELEPHONE (OUTPATIENT)
Dept: VASCULAR SURGERY | Age: 59
End: 2019-07-15

## 2019-07-15 DIAGNOSIS — Z01.818 PRE-OP TESTING: Primary | ICD-10-CM

## 2019-07-30 ENCOUNTER — TELEPHONE (OUTPATIENT)
Dept: VASCULAR SURGERY | Age: 59
End: 2019-07-30

## 2019-08-16 ENCOUNTER — TELEPHONE (OUTPATIENT)
Dept: VASCULAR SURGERY | Age: 59
End: 2019-08-16

## 2019-09-25 ENCOUNTER — APPOINTMENT (OUTPATIENT)
Dept: GENERAL RADIOLOGY | Age: 59
DRG: 314 | End: 2019-09-25
Payer: MEDICARE

## 2019-09-25 ENCOUNTER — HOSPITAL ENCOUNTER (INPATIENT)
Age: 59
LOS: 12 days | Discharge: HOME HEALTH | DRG: 314 | End: 2019-10-07
Attending: EMERGENCY MEDICINE | Admitting: INTERNAL MEDICINE
Payer: MEDICARE

## 2019-09-25 DIAGNOSIS — A41.9 SEPSIS, DUE TO UNSPECIFIED ORGANISM: Primary | ICD-10-CM

## 2019-09-25 PROBLEM — R50.9 ACUTE FEBRILE ILLNESS: Status: ACTIVE | Noted: 2019-09-25

## 2019-09-25 LAB
A/G RATIO: 0.8 (ref 1.1–2.2)
ALBUMIN SERPL-MCNC: 3.3 G/DL (ref 3.4–5)
ALP BLD-CCNC: 65 U/L (ref 40–129)
ALT SERPL-CCNC: 8 U/L (ref 10–40)
ANION GAP SERPL CALCULATED.3IONS-SCNC: 19 MMOL/L (ref 3–16)
AST SERPL-CCNC: 12 U/L (ref 15–37)
BASE EXCESS VENOUS: -4.4 MMOL/L (ref -3–3)
BASOPHILS ABSOLUTE: 0.1 K/UL (ref 0–0.2)
BASOPHILS RELATIVE PERCENT: 1.2 %
BILIRUB SERPL-MCNC: 0.6 MG/DL (ref 0–1)
BUN BLDV-MCNC: 46 MG/DL (ref 7–20)
CALCIUM SERPL-MCNC: 8.1 MG/DL (ref 8.3–10.6)
CARBOXYHEMOGLOBIN: 1.7 % (ref 0–1.5)
CHLORIDE BLD-SCNC: 94 MMOL/L (ref 99–110)
CO2: 17 MMOL/L (ref 21–32)
CREAT SERPL-MCNC: 6.3 MG/DL (ref 0.9–1.3)
EOSINOPHILS ABSOLUTE: 0 K/UL (ref 0–0.6)
EOSINOPHILS RELATIVE PERCENT: 0 %
GFR AFRICAN AMERICAN: 11
GFR NON-AFRICAN AMERICAN: 9
GLOBULIN: 4.1 G/DL
GLUCOSE BLD-MCNC: 292 MG/DL (ref 70–99)
GLUCOSE BLD-MCNC: 435 MG/DL (ref 70–99)
HCO3 VENOUS: 18 MMOL/L (ref 23–29)
HCT VFR BLD CALC: 33.6 % (ref 40.5–52.5)
HEMOGLOBIN: 11.1 G/DL (ref 13.5–17.5)
LACTIC ACID: 1.6 MMOL/L (ref 0.4–2)
LACTIC ACID: 2.3 MMOL/L (ref 0.4–2)
LYMPHOCYTES ABSOLUTE: 0.1 K/UL (ref 1–5.1)
LYMPHOCYTES RELATIVE PERCENT: 1.3 %
MCH RBC QN AUTO: 31.8 PG (ref 26–34)
MCHC RBC AUTO-ENTMCNC: 33.1 G/DL (ref 31–36)
MCV RBC AUTO: 96.2 FL (ref 80–100)
METHEMOGLOBIN VENOUS: 0.5 %
MONOCYTES ABSOLUTE: 0.3 K/UL (ref 0–1.3)
MONOCYTES RELATIVE PERCENT: 3.1 %
NEUTROPHILS ABSOLUTE: 10.6 K/UL (ref 1.7–7.7)
NEUTROPHILS RELATIVE PERCENT: 94.4 %
O2 CONTENT, VEN: 16 VOL %
O2 SAT, VEN: 98 %
O2 THERAPY: ABNORMAL
PCO2, VEN: 25.9 MMHG (ref 40–50)
PDW BLD-RTO: 18.4 % (ref 12.4–15.4)
PERFORMED ON: ABNORMAL
PH VENOUS: 7.46 (ref 7.35–7.45)
PLATELET # BLD: 229 K/UL (ref 135–450)
PMV BLD AUTO: 8.7 FL (ref 5–10.5)
PO2, VEN: 105.5 MMHG (ref 25–40)
POTASSIUM SERPL-SCNC: 5 MMOL/L (ref 3.5–5.1)
RAPID INFLUENZA  B AGN: NEGATIVE
RAPID INFLUENZA A AGN: NEGATIVE
RBC # BLD: 3.5 M/UL (ref 4.2–5.9)
REASON FOR REJECTION: NORMAL
REJECTED TEST: NORMAL
SODIUM BLD-SCNC: 130 MMOL/L (ref 136–145)
SPECIMEN STATUS: NORMAL
TCO2 CALC VENOUS: 19 MMOL/L
TOTAL PROTEIN: 7.4 G/DL (ref 6.4–8.2)
TROPONIN: 0.05 NG/ML
WBC # BLD: 11.2 K/UL (ref 4–11)

## 2019-09-25 PROCEDURE — 6370000000 HC RX 637 (ALT 250 FOR IP): Performed by: EMERGENCY MEDICINE

## 2019-09-25 PROCEDURE — 2580000003 HC RX 258: Performed by: EMERGENCY MEDICINE

## 2019-09-25 PROCEDURE — 87150 DNA/RNA AMPLIFIED PROBE: CPT

## 2019-09-25 PROCEDURE — 99291 CRITICAL CARE FIRST HOUR: CPT

## 2019-09-25 PROCEDURE — 96367 TX/PROPH/DG ADDL SEQ IV INF: CPT

## 2019-09-25 PROCEDURE — 87040 BLOOD CULTURE FOR BACTERIA: CPT

## 2019-09-25 PROCEDURE — 82803 BLOOD GASES ANY COMBINATION: CPT

## 2019-09-25 PROCEDURE — 96366 THER/PROPH/DIAG IV INF ADDON: CPT

## 2019-09-25 PROCEDURE — 84484 ASSAY OF TROPONIN QUANT: CPT

## 2019-09-25 PROCEDURE — 1200000000 HC SEMI PRIVATE

## 2019-09-25 PROCEDURE — 51798 US URINE CAPACITY MEASURE: CPT

## 2019-09-25 PROCEDURE — 2580000003 HC RX 258: Performed by: INTERNAL MEDICINE

## 2019-09-25 PROCEDURE — 93005 ELECTROCARDIOGRAM TRACING: CPT | Performed by: EMERGENCY MEDICINE

## 2019-09-25 PROCEDURE — 6370000000 HC RX 637 (ALT 250 FOR IP): Performed by: INTERNAL MEDICINE

## 2019-09-25 PROCEDURE — 6360000002 HC RX W HCPCS: Performed by: INTERNAL MEDICINE

## 2019-09-25 PROCEDURE — 87804 INFLUENZA ASSAY W/OPTIC: CPT

## 2019-09-25 PROCEDURE — 71046 X-RAY EXAM CHEST 2 VIEWS: CPT

## 2019-09-25 PROCEDURE — 96372 THER/PROPH/DIAG INJ SC/IM: CPT

## 2019-09-25 PROCEDURE — 2580000003 HC RX 258

## 2019-09-25 PROCEDURE — 83605 ASSAY OF LACTIC ACID: CPT

## 2019-09-25 PROCEDURE — 87077 CULTURE AEROBIC IDENTIFY: CPT

## 2019-09-25 PROCEDURE — 36415 COLL VENOUS BLD VENIPUNCTURE: CPT

## 2019-09-25 PROCEDURE — 6360000002 HC RX W HCPCS: Performed by: EMERGENCY MEDICINE

## 2019-09-25 PROCEDURE — 85025 COMPLETE CBC W/AUTO DIFF WBC: CPT

## 2019-09-25 PROCEDURE — 80053 COMPREHEN METABOLIC PANEL: CPT

## 2019-09-25 PROCEDURE — 87186 SC STD MICRODIL/AGAR DIL: CPT

## 2019-09-25 PROCEDURE — 96365 THER/PROPH/DIAG IV INF INIT: CPT

## 2019-09-25 RX ORDER — ONDANSETRON 2 MG/ML
4 INJECTION INTRAMUSCULAR; INTRAVENOUS EVERY 6 HOURS PRN
Status: DISCONTINUED | OUTPATIENT
Start: 2019-09-25 | End: 2019-10-07 | Stop reason: HOSPADM

## 2019-09-25 RX ORDER — ISOSORBIDE MONONITRATE 60 MG/1
60 TABLET, EXTENDED RELEASE ORAL 2 TIMES DAILY WITH MEALS
Status: DISCONTINUED | OUTPATIENT
Start: 2019-09-26 | End: 2019-09-26

## 2019-09-25 RX ORDER — PRAVASTATIN SODIUM 40 MG
40 TABLET ORAL DAILY
Status: DISCONTINUED | OUTPATIENT
Start: 2019-09-26 | End: 2019-10-07 | Stop reason: HOSPADM

## 2019-09-25 RX ORDER — BENZONATATE 100 MG/1
200 CAPSULE ORAL ONCE
Status: COMPLETED | OUTPATIENT
Start: 2019-09-25 | End: 2019-09-25

## 2019-09-25 RX ORDER — 0.9 % SODIUM CHLORIDE 0.9 %
30 INTRAVENOUS SOLUTION INTRAVENOUS ONCE
Status: COMPLETED | OUTPATIENT
Start: 2019-09-25 | End: 2019-09-25

## 2019-09-25 RX ORDER — MORPHINE SULFATE 4 MG/ML
4 INJECTION, SOLUTION INTRAMUSCULAR; INTRAVENOUS ONCE
Status: COMPLETED | OUTPATIENT
Start: 2019-09-25 | End: 2019-09-25

## 2019-09-25 RX ORDER — ACETAMINOPHEN 500 MG
1000 TABLET ORAL ONCE
Status: COMPLETED | OUTPATIENT
Start: 2019-09-25 | End: 2019-09-25

## 2019-09-25 RX ORDER — NICOTINE POLACRILEX 4 MG
15 LOZENGE BUCCAL PRN
Status: DISCONTINUED | OUTPATIENT
Start: 2019-09-25 | End: 2019-10-07 | Stop reason: HOSPADM

## 2019-09-25 RX ORDER — DILTIAZEM HYDROCHLORIDE 120 MG/1
120 CAPSULE, COATED, EXTENDED RELEASE ORAL ONCE
Status: COMPLETED | OUTPATIENT
Start: 2019-09-25 | End: 2019-09-25

## 2019-09-25 RX ORDER — ASPIRIN 81 MG/1
81 TABLET, CHEWABLE ORAL DAILY
Status: DISCONTINUED | OUTPATIENT
Start: 2019-09-26 | End: 2019-09-27

## 2019-09-25 RX ORDER — DEXTROSE MONOHYDRATE 25 G/50ML
12.5 INJECTION, SOLUTION INTRAVENOUS PRN
Status: DISCONTINUED | OUTPATIENT
Start: 2019-09-25 | End: 2019-10-07 | Stop reason: HOSPADM

## 2019-09-25 RX ORDER — INSULIN GLARGINE 100 [IU]/ML
25 INJECTION, SOLUTION SUBCUTANEOUS 2 TIMES DAILY
Status: DISCONTINUED | OUTPATIENT
Start: 2019-09-25 | End: 2019-10-07 | Stop reason: HOSPADM

## 2019-09-25 RX ORDER — ACETAMINOPHEN 325 MG/1
650 TABLET ORAL EVERY 4 HOURS PRN
Status: DISCONTINUED | OUTPATIENT
Start: 2019-09-25 | End: 2019-10-07 | Stop reason: HOSPADM

## 2019-09-25 RX ORDER — SODIUM CHLORIDE 0.9 % (FLUSH) 0.9 %
10 SYRINGE (ML) INJECTION EVERY 12 HOURS SCHEDULED
Status: DISCONTINUED | OUTPATIENT
Start: 2019-09-25 | End: 2019-10-07 | Stop reason: HOSPADM

## 2019-09-25 RX ORDER — SODIUM CHLORIDE 9 MG/ML
INJECTION, SOLUTION INTRAVENOUS
Status: COMPLETED
Start: 2019-09-25 | End: 2019-09-25

## 2019-09-25 RX ORDER — DEXTROSE MONOHYDRATE 50 MG/ML
100 INJECTION, SOLUTION INTRAVENOUS PRN
Status: DISCONTINUED | OUTPATIENT
Start: 2019-09-25 | End: 2019-10-07 | Stop reason: HOSPADM

## 2019-09-25 RX ORDER — SODIUM CHLORIDE 0.9 % (FLUSH) 0.9 %
10 SYRINGE (ML) INJECTION PRN
Status: DISCONTINUED | OUTPATIENT
Start: 2019-09-25 | End: 2019-10-07 | Stop reason: HOSPADM

## 2019-09-25 RX ORDER — CARVEDILOL 6.25 MG/1
12.5 TABLET ORAL 2 TIMES DAILY
Status: DISCONTINUED | OUTPATIENT
Start: 2019-09-25 | End: 2019-10-07 | Stop reason: HOSPADM

## 2019-09-25 RX ORDER — DILTIAZEM HYDROCHLORIDE 120 MG/1
120 CAPSULE, COATED, EXTENDED RELEASE ORAL 2 TIMES DAILY
Status: DISCONTINUED | OUTPATIENT
Start: 2019-09-25 | End: 2019-10-07 | Stop reason: HOSPADM

## 2019-09-25 RX ADMIN — DILTIAZEM HYDROCHLORIDE 120 MG: 120 CAPSULE, COATED, EXTENDED RELEASE ORAL at 22:38

## 2019-09-25 RX ADMIN — INSULIN GLARGINE 25 UNITS: 100 INJECTION, SOLUTION SUBCUTANEOUS at 22:43

## 2019-09-25 RX ADMIN — INSULIN HUMAN 10 UNITS: 100 INJECTION, SOLUTION PARENTERAL at 18:03

## 2019-09-25 RX ADMIN — VANCOMYCIN HYDROCHLORIDE 500 MG: 500 INJECTION, POWDER, LYOPHILIZED, FOR SOLUTION INTRAVENOUS at 22:55

## 2019-09-25 RX ADMIN — DILTIAZEM HYDROCHLORIDE 120 MG: 120 CAPSULE, COATED, EXTENDED RELEASE ORAL at 18:03

## 2019-09-25 RX ADMIN — INSULIN LISPRO 2 UNITS: 100 INJECTION, SOLUTION INTRAVENOUS; SUBCUTANEOUS at 22:42

## 2019-09-25 RX ADMIN — MORPHINE SULFATE 4 MG: 4 INJECTION, SOLUTION INTRAMUSCULAR; INTRAVENOUS at 16:06

## 2019-09-25 RX ADMIN — APIXABAN 2.5 MG: 5 TABLET, FILM COATED ORAL at 22:39

## 2019-09-25 RX ADMIN — CEFEPIME HYDROCHLORIDE 2 G: 2 INJECTION, POWDER, FOR SOLUTION INTRAVENOUS at 18:03

## 2019-09-25 RX ADMIN — ACETAMINOPHEN 1000 MG: 500 TABLET ORAL at 16:05

## 2019-09-25 RX ADMIN — BENZONATATE 200 MG: 100 CAPSULE ORAL at 16:05

## 2019-09-25 RX ADMIN — SODIUM CHLORIDE 250 ML: 9 INJECTION, SOLUTION INTRAVENOUS at 22:54

## 2019-09-25 RX ADMIN — VANCOMYCIN HYDROCHLORIDE 1000 MG: 1 INJECTION, POWDER, LYOPHILIZED, FOR SOLUTION INTRAVENOUS at 19:34

## 2019-09-25 RX ADMIN — CARVEDILOL 12.5 MG: 6.25 TABLET, FILM COATED ORAL at 22:38

## 2019-09-25 RX ADMIN — Medication 10 ML: at 22:47

## 2019-09-25 RX ADMIN — SODIUM CHLORIDE 2328 ML: 9 INJECTION, SOLUTION INTRAVENOUS at 16:06

## 2019-09-25 ASSESSMENT — PAIN SCALES - GENERAL
PAINLEVEL_OUTOF10: 0
PAINLEVEL_OUTOF10: 3
PAINLEVEL_OUTOF10: 0
PAINLEVEL_OUTOF10: 7
PAINLEVEL_OUTOF10: 7

## 2019-09-25 ASSESSMENT — ENCOUNTER SYMPTOMS
NAUSEA: 1
COUGH: 1
SHORTNESS OF BREATH: 1
ABDOMINAL PAIN: 0

## 2019-09-25 ASSESSMENT — PAIN DESCRIPTION - LOCATION: LOCATION: GENERALIZED

## 2019-09-26 PROBLEM — E44.1 MILD MALNUTRITION (HCC): Status: ACTIVE | Noted: 2019-09-26

## 2019-09-26 LAB
ANION GAP SERPL CALCULATED.3IONS-SCNC: 16 MMOL/L (ref 3–16)
BASOPHILS ABSOLUTE: 0 K/UL (ref 0–0.2)
BASOPHILS RELATIVE PERCENT: 0.7 %
BILIRUBIN URINE: NEGATIVE
BLOOD, URINE: ABNORMAL
BUN BLDV-MCNC: 53 MG/DL (ref 7–20)
CALCIUM SERPL-MCNC: 8 MG/DL (ref 8.3–10.6)
CHLORIDE BLD-SCNC: 98 MMOL/L (ref 99–110)
CLARITY: CLEAR
CO2: 17 MMOL/L (ref 21–32)
COLOR: YELLOW
CREAT SERPL-MCNC: 7 MG/DL (ref 0.9–1.3)
EKG ATRIAL RATE: 108 BPM
EKG DIAGNOSIS: NORMAL
EKG Q-T INTERVAL: 306 MS
EKG QRS DURATION: 80 MS
EKG QTC CALCULATION (BAZETT): 468 MS
EKG R AXIS: -1 DEGREES
EKG T AXIS: 100 DEGREES
EKG VENTRICULAR RATE: 141 BPM
EOSINOPHILS ABSOLUTE: 0 K/UL (ref 0–0.6)
EOSINOPHILS RELATIVE PERCENT: 0.1 %
GFR AFRICAN AMERICAN: 10
GFR NON-AFRICAN AMERICAN: 8
GLUCOSE BLD-MCNC: 126 MG/DL (ref 70–99)
GLUCOSE BLD-MCNC: 128 MG/DL (ref 70–99)
GLUCOSE BLD-MCNC: 134 MG/DL (ref 70–99)
GLUCOSE BLD-MCNC: 135 MG/DL (ref 70–99)
GLUCOSE URINE: >=1000 MG/DL
HCT VFR BLD CALC: 28 % (ref 40.5–52.5)
HEMOGLOBIN: 9.2 G/DL (ref 13.5–17.5)
KETONES, URINE: NEGATIVE MG/DL
LEUKOCYTE ESTERASE, URINE: NEGATIVE
LYMPHOCYTES ABSOLUTE: 0.2 K/UL (ref 1–5.1)
LYMPHOCYTES RELATIVE PERCENT: 3.7 %
MCH RBC QN AUTO: 31.9 PG (ref 26–34)
MCHC RBC AUTO-ENTMCNC: 32.7 G/DL (ref 31–36)
MCV RBC AUTO: 97.5 FL (ref 80–100)
MICROSCOPIC EXAMINATION: YES
MONOCYTES ABSOLUTE: 0.2 K/UL (ref 0–1.3)
MONOCYTES RELATIVE PERCENT: 4.7 %
NEUTROPHILS ABSOLUTE: 4.8 K/UL (ref 1.7–7.7)
NEUTROPHILS RELATIVE PERCENT: 90.8 %
NITRITE, URINE: NEGATIVE
PDW BLD-RTO: 18.3 % (ref 12.4–15.4)
PERFORMED ON: ABNORMAL
PH UA: 6.5 (ref 5–8)
PLATELET # BLD: 149 K/UL (ref 135–450)
PMV BLD AUTO: 8.3 FL (ref 5–10.5)
POTASSIUM REFLEX MAGNESIUM: 4.8 MMOL/L (ref 3.5–5.1)
PROCALCITONIN: 18.88 NG/ML (ref 0–0.15)
PROTEIN UA: >=300 MG/DL
RBC # BLD: 2.87 M/UL (ref 4.2–5.9)
RBC UA: NORMAL /HPF (ref 0–2)
REPORT: NORMAL
SODIUM BLD-SCNC: 131 MMOL/L (ref 136–145)
SPECIFIC GRAVITY UA: 1.02 (ref 1–1.03)
TROPONIN: 0.05 NG/ML
TROPONIN: 0.05 NG/ML
URINE REFLEX TO CULTURE: ABNORMAL
URINE TYPE: ABNORMAL
UROBILINOGEN, URINE: 0.2 E.U./DL
VANCOMYCIN RANDOM: 12.7 UG/ML
WBC # BLD: 5.3 K/UL (ref 4–11)
WBC UA: NORMAL /HPF (ref 0–5)

## 2019-09-26 PROCEDURE — 99222 1ST HOSP IP/OBS MODERATE 55: CPT | Performed by: INTERNAL MEDICINE

## 2019-09-26 PROCEDURE — 6370000000 HC RX 637 (ALT 250 FOR IP): Performed by: INTERNAL MEDICINE

## 2019-09-26 PROCEDURE — 85025 COMPLETE CBC W/AUTO DIFF WBC: CPT

## 2019-09-26 PROCEDURE — 36415 COLL VENOUS BLD VENIPUNCTURE: CPT

## 2019-09-26 PROCEDURE — 2580000003 HC RX 258: Performed by: INTERNAL MEDICINE

## 2019-09-26 PROCEDURE — 94761 N-INVAS EAR/PLS OXIMETRY MLT: CPT

## 2019-09-26 PROCEDURE — 80202 ASSAY OF VANCOMYCIN: CPT

## 2019-09-26 PROCEDURE — 81001 URINALYSIS AUTO W/SCOPE: CPT

## 2019-09-26 PROCEDURE — 6360000002 HC RX W HCPCS: Performed by: INTERNAL MEDICINE

## 2019-09-26 PROCEDURE — 1200000000 HC SEMI PRIVATE

## 2019-09-26 PROCEDURE — 5A1D70Z PERFORMANCE OF URINARY FILTRATION, INTERMITTENT, LESS THAN 6 HOURS PER DAY: ICD-10-PCS | Performed by: INTERNAL MEDICINE

## 2019-09-26 PROCEDURE — 2700000000 HC OXYGEN THERAPY PER DAY

## 2019-09-26 PROCEDURE — 93010 ELECTROCARDIOGRAM REPORT: CPT | Performed by: INTERNAL MEDICINE

## 2019-09-26 PROCEDURE — 80048 BASIC METABOLIC PNL TOTAL CA: CPT

## 2019-09-26 PROCEDURE — 90935 HEMODIALYSIS ONE EVALUATION: CPT

## 2019-09-26 PROCEDURE — 84145 PROCALCITONIN (PCT): CPT

## 2019-09-26 PROCEDURE — 84484 ASSAY OF TROPONIN QUANT: CPT

## 2019-09-26 RX ORDER — HEPARIN SODIUM 1000 [USP'U]/ML
4000 INJECTION, SOLUTION INTRAVENOUS; SUBCUTANEOUS
Status: DISCONTINUED | OUTPATIENT
Start: 2019-09-26 | End: 2019-10-07 | Stop reason: HOSPADM

## 2019-09-26 RX ORDER — MIDODRINE HYDROCHLORIDE 5 MG/1
10 TABLET ORAL
Status: COMPLETED | OUTPATIENT
Start: 2019-09-26 | End: 2019-09-26

## 2019-09-26 RX ORDER — 0.9 % SODIUM CHLORIDE 0.9 %
500 INTRAVENOUS SOLUTION INTRAVENOUS ONCE
Status: COMPLETED | OUTPATIENT
Start: 2019-09-26 | End: 2019-09-26

## 2019-09-26 RX ADMIN — ACETAMINOPHEN 650 MG: 325 TABLET ORAL at 01:37

## 2019-09-26 RX ADMIN — ISOSORBIDE MONONITRATE 60 MG: 60 TABLET, EXTENDED RELEASE ORAL at 09:19

## 2019-09-26 RX ADMIN — PRAVASTATIN SODIUM 40 MG: 40 TABLET ORAL at 09:19

## 2019-09-26 RX ADMIN — INSULIN GLARGINE 25 UNITS: 100 INJECTION, SOLUTION SUBCUTANEOUS at 21:01

## 2019-09-26 RX ADMIN — APIXABAN 2.5 MG: 5 TABLET, FILM COATED ORAL at 09:21

## 2019-09-26 RX ADMIN — MIDODRINE HYDROCHLORIDE 10 MG: 5 TABLET ORAL at 15:17

## 2019-09-26 RX ADMIN — DILTIAZEM HYDROCHLORIDE 120 MG: 120 CAPSULE, COATED, EXTENDED RELEASE ORAL at 09:21

## 2019-09-26 RX ADMIN — CEFAZOLIN 1 G: 1 INJECTION, POWDER, FOR SOLUTION INTRAMUSCULAR; INTRAVENOUS at 19:02

## 2019-09-26 RX ADMIN — VITAMIN D, TAB 1000IU (100/BT) 2000 UNITS: 25 TAB at 09:18

## 2019-09-26 RX ADMIN — CARVEDILOL 12.5 MG: 6.25 TABLET, FILM COATED ORAL at 09:21

## 2019-09-26 RX ADMIN — ASPIRIN 81 MG 81 MG: 81 TABLET ORAL at 09:19

## 2019-09-26 RX ADMIN — Medication 10 ML: at 09:22

## 2019-09-26 RX ADMIN — VANCOMYCIN HYDROCHLORIDE 1.5 G: 10 INJECTION, POWDER, LYOPHILIZED, FOR SOLUTION INTRAVENOUS at 17:30

## 2019-09-26 RX ADMIN — Medication 10 ML: at 21:05

## 2019-09-26 RX ADMIN — APIXABAN 2.5 MG: 5 TABLET, FILM COATED ORAL at 21:00

## 2019-09-26 RX ADMIN — DILTIAZEM HYDROCHLORIDE 120 MG: 120 CAPSULE, COATED, EXTENDED RELEASE ORAL at 21:00

## 2019-09-26 RX ADMIN — SODIUM CHLORIDE 500 ML: 9 INJECTION, SOLUTION INTRAVENOUS at 12:11

## 2019-09-26 RX ADMIN — ACETAMINOPHEN 650 MG: 325 TABLET ORAL at 21:00

## 2019-09-26 RX ADMIN — INSULIN GLARGINE 25 UNITS: 100 INJECTION, SOLUTION SUBCUTANEOUS at 10:27

## 2019-09-26 ASSESSMENT — PAIN SCALES - GENERAL
PAINLEVEL_OUTOF10: 0

## 2019-09-27 LAB
ANION GAP SERPL CALCULATED.3IONS-SCNC: 14 MMOL/L (ref 3–16)
BUN BLDV-MCNC: 35 MG/DL (ref 7–20)
CALCIUM SERPL-MCNC: 7.9 MG/DL (ref 8.3–10.6)
CHLORIDE BLD-SCNC: 97 MMOL/L (ref 99–110)
CO2: 22 MMOL/L (ref 21–32)
CREAT SERPL-MCNC: 5.3 MG/DL (ref 0.9–1.3)
GFR AFRICAN AMERICAN: 14
GFR NON-AFRICAN AMERICAN: 11
GLUCOSE BLD-MCNC: 112 MG/DL (ref 70–99)
GLUCOSE BLD-MCNC: 130 MG/DL (ref 70–99)
GLUCOSE BLD-MCNC: 132 MG/DL (ref 70–99)
GLUCOSE BLD-MCNC: 168 MG/DL (ref 70–99)
GLUCOSE BLD-MCNC: 185 MG/DL (ref 70–99)
HCT VFR BLD CALC: 25.7 % (ref 40.5–52.5)
HEMOGLOBIN: 8.6 G/DL (ref 13.5–17.5)
MCH RBC QN AUTO: 32 PG (ref 26–34)
MCHC RBC AUTO-ENTMCNC: 33.6 G/DL (ref 31–36)
MCV RBC AUTO: 95.4 FL (ref 80–100)
PDW BLD-RTO: 18 % (ref 12.4–15.4)
PERFORMED ON: ABNORMAL
PLATELET # BLD: 141 K/UL (ref 135–450)
PMV BLD AUTO: 8.5 FL (ref 5–10.5)
POTASSIUM REFLEX MAGNESIUM: 4.2 MMOL/L (ref 3.5–5.1)
RBC # BLD: 2.69 M/UL (ref 4.2–5.9)
SODIUM BLD-SCNC: 133 MMOL/L (ref 136–145)
WBC # BLD: 3 K/UL (ref 4–11)

## 2019-09-27 PROCEDURE — 85027 COMPLETE CBC AUTOMATED: CPT

## 2019-09-27 PROCEDURE — 36415 COLL VENOUS BLD VENIPUNCTURE: CPT

## 2019-09-27 PROCEDURE — 1200000000 HC SEMI PRIVATE

## 2019-09-27 PROCEDURE — 2580000003 HC RX 258: Performed by: INTERNAL MEDICINE

## 2019-09-27 PROCEDURE — 6370000000 HC RX 637 (ALT 250 FOR IP): Performed by: INTERNAL MEDICINE

## 2019-09-27 PROCEDURE — 99232 SBSQ HOSP IP/OBS MODERATE 35: CPT | Performed by: INTERNAL MEDICINE

## 2019-09-27 PROCEDURE — 6360000002 HC RX W HCPCS: Performed by: INTERNAL MEDICINE

## 2019-09-27 PROCEDURE — 87040 BLOOD CULTURE FOR BACTERIA: CPT

## 2019-09-27 PROCEDURE — 80048 BASIC METABOLIC PNL TOTAL CA: CPT

## 2019-09-27 RX ADMIN — INSULIN GLARGINE 25 UNITS: 100 INJECTION, SOLUTION SUBCUTANEOUS at 22:18

## 2019-09-27 RX ADMIN — INSULIN LISPRO 1 UNITS: 100 INJECTION, SOLUTION INTRAVENOUS; SUBCUTANEOUS at 22:18

## 2019-09-27 RX ADMIN — DILTIAZEM HYDROCHLORIDE 120 MG: 120 CAPSULE, COATED, EXTENDED RELEASE ORAL at 21:15

## 2019-09-27 RX ADMIN — APIXABAN 2.5 MG: 5 TABLET, FILM COATED ORAL at 08:36

## 2019-09-27 RX ADMIN — VITAMIN D, TAB 1000IU (100/BT) 2000 UNITS: 25 TAB at 08:36

## 2019-09-27 RX ADMIN — INSULIN GLARGINE 25 UNITS: 100 INJECTION, SOLUTION SUBCUTANEOUS at 09:34

## 2019-09-27 RX ADMIN — CEFAZOLIN 1 G: 1 INJECTION, POWDER, FOR SOLUTION INTRAMUSCULAR; INTRAVENOUS at 17:53

## 2019-09-27 RX ADMIN — PRAVASTATIN SODIUM 40 MG: 40 TABLET ORAL at 08:35

## 2019-09-27 RX ADMIN — Medication 10 ML: at 08:37

## 2019-09-27 RX ADMIN — ASPIRIN 81 MG 81 MG: 81 TABLET ORAL at 08:36

## 2019-09-27 RX ADMIN — CARVEDILOL 12.5 MG: 6.25 TABLET, FILM COATED ORAL at 22:17

## 2019-09-27 RX ADMIN — Medication 10 ML: at 21:16

## 2019-09-27 ASSESSMENT — PAIN SCALES - GENERAL
PAINLEVEL_OUTOF10: 0

## 2019-09-28 LAB
ANION GAP SERPL CALCULATED.3IONS-SCNC: 14 MMOL/L (ref 3–16)
BLOOD CULTURE, ROUTINE: ABNORMAL
BUN BLDV-MCNC: 42 MG/DL (ref 7–20)
CALCIUM SERPL-MCNC: 7.9 MG/DL (ref 8.3–10.6)
CHLORIDE BLD-SCNC: 102 MMOL/L (ref 99–110)
CO2: 20 MMOL/L (ref 21–32)
CREAT SERPL-MCNC: 6.3 MG/DL (ref 0.9–1.3)
CULTURE, BLOOD 2: ABNORMAL
CULTURE, BLOOD 2: ABNORMAL
GFR AFRICAN AMERICAN: 11
GFR NON-AFRICAN AMERICAN: 9
GLUCOSE BLD-MCNC: 105 MG/DL (ref 70–99)
GLUCOSE BLD-MCNC: 110 MG/DL (ref 70–99)
GLUCOSE BLD-MCNC: 200 MG/DL (ref 70–99)
GLUCOSE BLD-MCNC: 230 MG/DL (ref 70–99)
HBV SURFACE AB TITR SER: 272.8 MIU/ML
HCT VFR BLD CALC: 25.9 % (ref 40.5–52.5)
HEMOGLOBIN: 8.9 G/DL (ref 13.5–17.5)
HEPATITIS B SURFACE ANTIGEN INTERPRETATION: NORMAL
MCH RBC QN AUTO: 32.5 PG (ref 26–34)
MCHC RBC AUTO-ENTMCNC: 34.2 G/DL (ref 31–36)
MCV RBC AUTO: 95 FL (ref 80–100)
ORGANISM: ABNORMAL
PDW BLD-RTO: 18.4 % (ref 12.4–15.4)
PERFORMED ON: ABNORMAL
PLATELET # BLD: 142 K/UL (ref 135–450)
PMV BLD AUTO: 8.7 FL (ref 5–10.5)
POTASSIUM REFLEX MAGNESIUM: 4.1 MMOL/L (ref 3.5–5.1)
RBC # BLD: 2.73 M/UL (ref 4.2–5.9)
SODIUM BLD-SCNC: 136 MMOL/L (ref 136–145)
WBC # BLD: 3.8 K/UL (ref 4–11)

## 2019-09-28 PROCEDURE — 6360000002 HC RX W HCPCS: Performed by: INTERNAL MEDICINE

## 2019-09-28 PROCEDURE — 85027 COMPLETE CBC AUTOMATED: CPT

## 2019-09-28 PROCEDURE — 2580000003 HC RX 258: Performed by: INTERNAL MEDICINE

## 2019-09-28 PROCEDURE — 36415 COLL VENOUS BLD VENIPUNCTURE: CPT

## 2019-09-28 PROCEDURE — 86704 HEP B CORE ANTIBODY TOTAL: CPT

## 2019-09-28 PROCEDURE — 6370000000 HC RX 637 (ALT 250 FOR IP): Performed by: INTERNAL MEDICINE

## 2019-09-28 PROCEDURE — 80048 BASIC METABOLIC PNL TOTAL CA: CPT

## 2019-09-28 PROCEDURE — 86706 HEP B SURFACE ANTIBODY: CPT

## 2019-09-28 PROCEDURE — 87040 BLOOD CULTURE FOR BACTERIA: CPT

## 2019-09-28 PROCEDURE — 1200000000 HC SEMI PRIVATE

## 2019-09-28 PROCEDURE — 90935 HEMODIALYSIS ONE EVALUATION: CPT

## 2019-09-28 PROCEDURE — 87340 HEPATITIS B SURFACE AG IA: CPT

## 2019-09-28 RX ADMIN — INSULIN GLARGINE 25 UNITS: 100 INJECTION, SOLUTION SUBCUTANEOUS at 22:50

## 2019-09-28 RX ADMIN — APIXABAN 2.5 MG: 2.5 TABLET, FILM COATED ORAL at 22:49

## 2019-09-28 RX ADMIN — INSULIN LISPRO 2 UNITS: 100 INJECTION, SOLUTION INTRAVENOUS; SUBCUTANEOUS at 17:19

## 2019-09-28 RX ADMIN — CEFAZOLIN 1 G: 1 INJECTION, POWDER, FOR SOLUTION INTRAMUSCULAR; INTRAVENOUS at 17:14

## 2019-09-28 RX ADMIN — INSULIN LISPRO 1 UNITS: 100 INJECTION, SOLUTION INTRAVENOUS; SUBCUTANEOUS at 22:50

## 2019-09-28 RX ADMIN — VANCOMYCIN HYDROCHLORIDE 1.5 G: 10 INJECTION, POWDER, LYOPHILIZED, FOR SOLUTION INTRAVENOUS at 15:01

## 2019-09-28 RX ADMIN — Medication 10 ML: at 22:54

## 2019-09-28 ASSESSMENT — PAIN DESCRIPTION - ORIENTATION
ORIENTATION: RIGHT;LEFT
ORIENTATION: RIGHT;LEFT

## 2019-09-28 ASSESSMENT — PAIN DESCRIPTION - PAIN TYPE
TYPE: CHRONIC PAIN
TYPE: CHRONIC PAIN

## 2019-09-28 ASSESSMENT — PAIN DESCRIPTION - PROGRESSION
CLINICAL_PROGRESSION: NOT CHANGED

## 2019-09-28 ASSESSMENT — PAIN SCALES - GENERAL
PAINLEVEL_OUTOF10: 2
PAINLEVEL_OUTOF10: 4
PAINLEVEL_OUTOF10: 0

## 2019-09-28 ASSESSMENT — PAIN DESCRIPTION - ONSET
ONSET: ON-GOING
ONSET: ON-GOING

## 2019-09-28 ASSESSMENT — PAIN DESCRIPTION - FREQUENCY
FREQUENCY: CONTINUOUS
FREQUENCY: CONTINUOUS

## 2019-09-28 ASSESSMENT — PAIN - FUNCTIONAL ASSESSMENT
PAIN_FUNCTIONAL_ASSESSMENT: PREVENTS OR INTERFERES SOME ACTIVE ACTIVITIES AND ADLS
PAIN_FUNCTIONAL_ASSESSMENT: PREVENTS OR INTERFERES SOME ACTIVE ACTIVITIES AND ADLS

## 2019-09-28 ASSESSMENT — PAIN DESCRIPTION - LOCATION
LOCATION: KNEE
LOCATION: KNEE

## 2019-09-28 ASSESSMENT — PAIN DESCRIPTION - DESCRIPTORS
DESCRIPTORS: ACHING
DESCRIPTORS: ACHING

## 2019-09-29 LAB
ANION GAP SERPL CALCULATED.3IONS-SCNC: 12 MMOL/L (ref 3–16)
BUN BLDV-MCNC: 29 MG/DL (ref 7–20)
CALCIUM SERPL-MCNC: 8 MG/DL (ref 8.3–10.6)
CHLORIDE BLD-SCNC: 102 MMOL/L (ref 99–110)
CO2: 23 MMOL/L (ref 21–32)
CREAT SERPL-MCNC: 5.2 MG/DL (ref 0.9–1.3)
GFR AFRICAN AMERICAN: 14
GFR NON-AFRICAN AMERICAN: 11
GLUCOSE BLD-MCNC: 101 MG/DL (ref 70–99)
GLUCOSE BLD-MCNC: 109 MG/DL (ref 70–99)
GLUCOSE BLD-MCNC: 146 MG/DL (ref 70–99)
GLUCOSE BLD-MCNC: 182 MG/DL (ref 70–99)
GLUCOSE BLD-MCNC: 205 MG/DL (ref 70–99)
HCT VFR BLD CALC: 26.5 % (ref 40.5–52.5)
HEMOGLOBIN: 8.9 G/DL (ref 13.5–17.5)
MCH RBC QN AUTO: 31.9 PG (ref 26–34)
MCHC RBC AUTO-ENTMCNC: 33.7 G/DL (ref 31–36)
MCV RBC AUTO: 94.8 FL (ref 80–100)
PDW BLD-RTO: 17.9 % (ref 12.4–15.4)
PERFORMED ON: ABNORMAL
PLATELET # BLD: 146 K/UL (ref 135–450)
PMV BLD AUTO: 8.5 FL (ref 5–10.5)
POTASSIUM REFLEX MAGNESIUM: 4.1 MMOL/L (ref 3.5–5.1)
RBC # BLD: 2.8 M/UL (ref 4.2–5.9)
SODIUM BLD-SCNC: 137 MMOL/L (ref 136–145)
WBC # BLD: 4.2 K/UL (ref 4–11)

## 2019-09-29 PROCEDURE — 6370000000 HC RX 637 (ALT 250 FOR IP): Performed by: INTERNAL MEDICINE

## 2019-09-29 PROCEDURE — 1200000000 HC SEMI PRIVATE

## 2019-09-29 PROCEDURE — 2580000003 HC RX 258: Performed by: INTERNAL MEDICINE

## 2019-09-29 PROCEDURE — 99232 SBSQ HOSP IP/OBS MODERATE 35: CPT | Performed by: INTERNAL MEDICINE

## 2019-09-29 PROCEDURE — 80048 BASIC METABOLIC PNL TOTAL CA: CPT

## 2019-09-29 PROCEDURE — 6360000002 HC RX W HCPCS: Performed by: INTERNAL MEDICINE

## 2019-09-29 PROCEDURE — 36415 COLL VENOUS BLD VENIPUNCTURE: CPT

## 2019-09-29 PROCEDURE — 85027 COMPLETE CBC AUTOMATED: CPT

## 2019-09-29 RX ADMIN — INSULIN GLARGINE 25 UNITS: 100 INJECTION, SOLUTION SUBCUTANEOUS at 21:23

## 2019-09-29 RX ADMIN — DILTIAZEM HYDROCHLORIDE 120 MG: 120 CAPSULE, COATED, EXTENDED RELEASE ORAL at 21:24

## 2019-09-29 RX ADMIN — PRAVASTATIN SODIUM 40 MG: 40 TABLET ORAL at 08:39

## 2019-09-29 RX ADMIN — Medication 10 ML: at 08:39

## 2019-09-29 RX ADMIN — CARVEDILOL 12.5 MG: 6.25 TABLET, FILM COATED ORAL at 21:24

## 2019-09-29 RX ADMIN — DILTIAZEM HYDROCHLORIDE 120 MG: 120 CAPSULE, COATED, EXTENDED RELEASE ORAL at 08:39

## 2019-09-29 RX ADMIN — CARVEDILOL 12.5 MG: 6.25 TABLET, FILM COATED ORAL at 08:39

## 2019-09-29 RX ADMIN — INSULIN LISPRO 1 UNITS: 100 INJECTION, SOLUTION INTRAVENOUS; SUBCUTANEOUS at 16:15

## 2019-09-29 RX ADMIN — INSULIN LISPRO 1 UNITS: 100 INJECTION, SOLUTION INTRAVENOUS; SUBCUTANEOUS at 21:24

## 2019-09-29 RX ADMIN — VITAMIN D, TAB 1000IU (100/BT) 2000 UNITS: 25 TAB at 08:39

## 2019-09-29 RX ADMIN — Medication 10 ML: at 21:26

## 2019-09-29 RX ADMIN — APIXABAN 2.5 MG: 2.5 TABLET, FILM COATED ORAL at 08:39

## 2019-09-29 RX ADMIN — CEFAZOLIN 1 G: 1 INJECTION, POWDER, FOR SOLUTION INTRAMUSCULAR; INTRAVENOUS at 18:27

## 2019-09-29 ASSESSMENT — PAIN DESCRIPTION - PAIN TYPE: TYPE: CHRONIC PAIN

## 2019-09-29 ASSESSMENT — PAIN DESCRIPTION - PROGRESSION
CLINICAL_PROGRESSION: NOT CHANGED

## 2019-09-29 ASSESSMENT — PAIN DESCRIPTION - DESCRIPTORS: DESCRIPTORS: ACHING

## 2019-09-29 ASSESSMENT — PAIN DESCRIPTION - LOCATION: LOCATION: KNEE

## 2019-09-29 ASSESSMENT — PAIN SCALES - GENERAL
PAINLEVEL_OUTOF10: 4
PAINLEVEL_OUTOF10: 0
PAINLEVEL_OUTOF10: 0

## 2019-09-29 ASSESSMENT — PAIN - FUNCTIONAL ASSESSMENT: PAIN_FUNCTIONAL_ASSESSMENT: PREVENTS OR INTERFERES SOME ACTIVE ACTIVITIES AND ADLS

## 2019-09-29 ASSESSMENT — PAIN DESCRIPTION - FREQUENCY: FREQUENCY: CONTINUOUS

## 2019-09-29 ASSESSMENT — PAIN DESCRIPTION - ORIENTATION: ORIENTATION: RIGHT;LEFT

## 2019-09-29 ASSESSMENT — PAIN DESCRIPTION - ONSET: ONSET: ON-GOING

## 2019-09-30 ENCOUNTER — APPOINTMENT (OUTPATIENT)
Dept: INTERVENTIONAL RADIOLOGY/VASCULAR | Age: 59
DRG: 314 | End: 2019-09-30
Payer: MEDICARE

## 2019-09-30 LAB
ANION GAP SERPL CALCULATED.3IONS-SCNC: 13 MMOL/L (ref 3–16)
BUN BLDV-MCNC: 42 MG/DL (ref 7–20)
CALCIUM SERPL-MCNC: 7.9 MG/DL (ref 8.3–10.6)
CHLORIDE BLD-SCNC: 103 MMOL/L (ref 99–110)
CO2: 20 MMOL/L (ref 21–32)
CREAT SERPL-MCNC: 6.1 MG/DL (ref 0.9–1.3)
GFR AFRICAN AMERICAN: 12
GFR NON-AFRICAN AMERICAN: 10
GLUCOSE BLD-MCNC: 115 MG/DL (ref 70–99)
GLUCOSE BLD-MCNC: 127 MG/DL (ref 70–99)
GLUCOSE BLD-MCNC: 136 MG/DL (ref 70–99)
GLUCOSE BLD-MCNC: 189 MG/DL (ref 70–99)
HCT VFR BLD CALC: 27.3 % (ref 40.5–52.5)
HEMOGLOBIN: 9 G/DL (ref 13.5–17.5)
HEPATITIS B CORE TOTAL ANTIBODY: NEGATIVE
INR BLD: 1.15 (ref 0.86–1.14)
MCH RBC QN AUTO: 31.5 PG (ref 26–34)
MCHC RBC AUTO-ENTMCNC: 32.9 G/DL (ref 31–36)
MCV RBC AUTO: 95.6 FL (ref 80–100)
PDW BLD-RTO: 18.3 % (ref 12.4–15.4)
PERFORMED ON: ABNORMAL
PLATELET # BLD: 170 K/UL (ref 135–450)
PMV BLD AUTO: 8.1 FL (ref 5–10.5)
POTASSIUM REFLEX MAGNESIUM: 4.3 MMOL/L (ref 3.5–5.1)
PROTHROMBIN TIME: 13.1 SEC (ref 9.8–13)
RBC # BLD: 2.85 M/UL (ref 4.2–5.9)
SODIUM BLD-SCNC: 136 MMOL/L (ref 136–145)
WBC # BLD: 4.4 K/UL (ref 4–11)

## 2019-09-30 PROCEDURE — 2580000003 HC RX 258

## 2019-09-30 PROCEDURE — 77001 FLUOROGUIDE FOR VEIN DEVICE: CPT

## 2019-09-30 PROCEDURE — 85027 COMPLETE CBC AUTOMATED: CPT

## 2019-09-30 PROCEDURE — 80048 BASIC METABOLIC PNL TOTAL CA: CPT

## 2019-09-30 PROCEDURE — 6360000002 HC RX W HCPCS: Performed by: RADIOLOGY

## 2019-09-30 PROCEDURE — 6370000000 HC RX 637 (ALT 250 FOR IP): Performed by: INTERNAL MEDICINE

## 2019-09-30 PROCEDURE — 2580000003 HC RX 258: Performed by: INTERNAL MEDICINE

## 2019-09-30 PROCEDURE — 36589 REMOVAL TUNNELED CV CATH: CPT

## 2019-09-30 PROCEDURE — 87070 CULTURE OTHR SPECIMN AEROBIC: CPT

## 2019-09-30 PROCEDURE — 87077 CULTURE AEROBIC IDENTIFY: CPT

## 2019-09-30 PROCEDURE — 86403 PARTICLE AGGLUT ANTBDY SCRN: CPT

## 2019-09-30 PROCEDURE — 2700000000 HC OXYGEN THERAPY PER DAY

## 2019-09-30 PROCEDURE — 36415 COLL VENOUS BLD VENIPUNCTURE: CPT

## 2019-09-30 PROCEDURE — 99232 SBSQ HOSP IP/OBS MODERATE 35: CPT | Performed by: INTERNAL MEDICINE

## 2019-09-30 PROCEDURE — 6360000002 HC RX W HCPCS: Performed by: INTERNAL MEDICINE

## 2019-09-30 PROCEDURE — 94761 N-INVAS EAR/PLS OXIMETRY MLT: CPT

## 2019-09-30 PROCEDURE — 02H633Z INSERTION OF INFUSION DEVICE INTO RIGHT ATRIUM, PERCUTANEOUS APPROACH: ICD-10-PCS | Performed by: INTERNAL MEDICINE

## 2019-09-30 PROCEDURE — C1752 CATH,HEMODIALYSIS,SHORT-TERM: HCPCS

## 2019-09-30 PROCEDURE — 87186 SC STD MICRODIL/AGAR DIL: CPT

## 2019-09-30 PROCEDURE — 0JPT3XZ REMOVAL OF TUNNELED VASCULAR ACCESS DEVICE FROM TRUNK SUBCUTANEOUS TISSUE AND FASCIA, PERCUTANEOUS APPROACH: ICD-10-PCS | Performed by: INTERNAL MEDICINE

## 2019-09-30 PROCEDURE — 85610 PROTHROMBIN TIME: CPT

## 2019-09-30 PROCEDURE — 36556 INSERT NON-TUNNEL CV CATH: CPT

## 2019-09-30 PROCEDURE — 1200000000 HC SEMI PRIVATE

## 2019-09-30 RX ORDER — FENTANYL CITRATE 50 UG/ML
INJECTION, SOLUTION INTRAMUSCULAR; INTRAVENOUS
Status: COMPLETED | OUTPATIENT
Start: 2019-09-30 | End: 2019-09-30

## 2019-09-30 RX ORDER — SODIUM CHLORIDE 9 MG/ML
INJECTION, SOLUTION INTRAVENOUS
Status: COMPLETED
Start: 2019-09-30 | End: 2019-09-30

## 2019-09-30 RX ORDER — MIDAZOLAM HYDROCHLORIDE 5 MG/ML
INJECTION INTRAMUSCULAR; INTRAVENOUS
Status: COMPLETED | OUTPATIENT
Start: 2019-09-30 | End: 2019-09-30

## 2019-09-30 RX ADMIN — CARVEDILOL 12.5 MG: 6.25 TABLET, FILM COATED ORAL at 20:38

## 2019-09-30 RX ADMIN — SODIUM CHLORIDE: 9 INJECTION, SOLUTION INTRAVENOUS at 18:47

## 2019-09-30 RX ADMIN — INSULIN GLARGINE 25 UNITS: 100 INJECTION, SOLUTION SUBCUTANEOUS at 08:18

## 2019-09-30 RX ADMIN — MIDAZOLAM HYDROCHLORIDE 1 MG: 5 INJECTION, SOLUTION INTRAMUSCULAR; INTRAVENOUS at 15:25

## 2019-09-30 RX ADMIN — DILTIAZEM HYDROCHLORIDE 120 MG: 120 CAPSULE, COATED, EXTENDED RELEASE ORAL at 20:38

## 2019-09-30 RX ADMIN — INSULIN LISPRO 1 UNITS: 100 INJECTION, SOLUTION INTRAVENOUS; SUBCUTANEOUS at 20:39

## 2019-09-30 RX ADMIN — DILTIAZEM HYDROCHLORIDE 120 MG: 120 CAPSULE, COATED, EXTENDED RELEASE ORAL at 08:17

## 2019-09-30 RX ADMIN — FENTANYL CITRATE 50 MCG: 50 INJECTION INTRAMUSCULAR; INTRAVENOUS at 15:25

## 2019-09-30 RX ADMIN — ACETAMINOPHEN 650 MG: 325 TABLET ORAL at 20:38

## 2019-09-30 RX ADMIN — INSULIN GLARGINE 25 UNITS: 100 INJECTION, SOLUTION SUBCUTANEOUS at 20:38

## 2019-09-30 RX ADMIN — Medication 10 ML: at 10:17

## 2019-09-30 RX ADMIN — CEFAZOLIN 1 G: 1 INJECTION, POWDER, FOR SOLUTION INTRAMUSCULAR; INTRAVENOUS at 17:14

## 2019-09-30 RX ADMIN — Medication 10 ML: at 20:39

## 2019-09-30 RX ADMIN — CARVEDILOL 12.5 MG: 6.25 TABLET, FILM COATED ORAL at 08:17

## 2019-09-30 RX ADMIN — MIDAZOLAM HYDROCHLORIDE 1 MG: 5 INJECTION, SOLUTION INTRAMUSCULAR; INTRAVENOUS at 15:28

## 2019-09-30 RX ADMIN — VITAMIN D, TAB 1000IU (100/BT) 2000 UNITS: 25 TAB at 08:17

## 2019-09-30 RX ADMIN — PRAVASTATIN SODIUM 40 MG: 40 TABLET ORAL at 08:17

## 2019-09-30 RX ADMIN — FENTANYL CITRATE 50 MCG: 50 INJECTION INTRAMUSCULAR; INTRAVENOUS at 15:28

## 2019-09-30 ASSESSMENT — PAIN SCALES - GENERAL
PAINLEVEL_OUTOF10: 6
PAINLEVEL_OUTOF10: 6
PAINLEVEL_OUTOF10: 0

## 2019-10-01 ENCOUNTER — APPOINTMENT (OUTPATIENT)
Dept: INTERVENTIONAL RADIOLOGY/VASCULAR | Age: 59
DRG: 314 | End: 2019-10-01
Payer: MEDICARE

## 2019-10-01 LAB
ANION GAP SERPL CALCULATED.3IONS-SCNC: 13 MMOL/L (ref 3–16)
BUN BLDV-MCNC: 42 MG/DL (ref 7–20)
CALCIUM SERPL-MCNC: 7.9 MG/DL (ref 8.3–10.6)
CHLORIDE BLD-SCNC: 101 MMOL/L (ref 99–110)
CO2: 21 MMOL/L (ref 21–32)
CREAT SERPL-MCNC: 6 MG/DL (ref 0.9–1.3)
GFR AFRICAN AMERICAN: 12
GFR NON-AFRICAN AMERICAN: 10
GLUCOSE BLD-MCNC: 134 MG/DL (ref 70–99)
GLUCOSE BLD-MCNC: 169 MG/DL (ref 70–99)
GLUCOSE BLD-MCNC: 201 MG/DL (ref 70–99)
HCT VFR BLD CALC: 27.3 % (ref 40.5–52.5)
HEMOGLOBIN: 8.9 G/DL (ref 13.5–17.5)
MCH RBC QN AUTO: 32.1 PG (ref 26–34)
MCHC RBC AUTO-ENTMCNC: 32.7 G/DL (ref 31–36)
MCV RBC AUTO: 98.1 FL (ref 80–100)
PDW BLD-RTO: 18.1 % (ref 12.4–15.4)
PERFORMED ON: ABNORMAL
PERFORMED ON: ABNORMAL
PLATELET # BLD: 211 K/UL (ref 135–450)
PMV BLD AUTO: 8.5 FL (ref 5–10.5)
POTASSIUM REFLEX MAGNESIUM: 4.4 MMOL/L (ref 3.5–5.1)
RBC # BLD: 2.78 M/UL (ref 4.2–5.9)
SODIUM BLD-SCNC: 135 MMOL/L (ref 136–145)
WBC # BLD: 4 K/UL (ref 4–11)

## 2019-10-01 PROCEDURE — 2580000003 HC RX 258: Performed by: INTERNAL MEDICINE

## 2019-10-01 PROCEDURE — 90935 HEMODIALYSIS ONE EVALUATION: CPT

## 2019-10-01 PROCEDURE — 80048 BASIC METABOLIC PNL TOTAL CA: CPT

## 2019-10-01 PROCEDURE — C1752 CATH,HEMODIALYSIS,SHORT-TERM: HCPCS

## 2019-10-01 PROCEDURE — 02PAX3Z REMOVAL OF INFUSION DEVICE FROM HEART, EXTERNAL APPROACH: ICD-10-PCS | Performed by: INTERNAL MEDICINE

## 2019-10-01 PROCEDURE — 36580 REPLACE CVAD CATH: CPT

## 2019-10-01 PROCEDURE — 1200000000 HC SEMI PRIVATE

## 2019-10-01 PROCEDURE — 6360000002 HC RX W HCPCS: Performed by: INTERNAL MEDICINE

## 2019-10-01 PROCEDURE — 6370000000 HC RX 637 (ALT 250 FOR IP): Performed by: INTERNAL MEDICINE

## 2019-10-01 PROCEDURE — 6360000002 HC RX W HCPCS

## 2019-10-01 PROCEDURE — 36415 COLL VENOUS BLD VENIPUNCTURE: CPT

## 2019-10-01 PROCEDURE — 77001 FLUOROGUIDE FOR VEIN DEVICE: CPT

## 2019-10-01 PROCEDURE — 02H633Z INSERTION OF INFUSION DEVICE INTO RIGHT ATRIUM, PERCUTANEOUS APPROACH: ICD-10-PCS | Performed by: INTERNAL MEDICINE

## 2019-10-01 PROCEDURE — 85027 COMPLETE CBC AUTOMATED: CPT

## 2019-10-01 RX ORDER — CHOLECALCIFEROL (VITAMIN D3) 10 MCG
1 TABLET ORAL DAILY
Status: DISCONTINUED | OUTPATIENT
Start: 2019-10-01 | End: 2019-10-07 | Stop reason: HOSPADM

## 2019-10-01 RX ADMIN — INSULIN GLARGINE 25 UNITS: 100 INJECTION, SOLUTION SUBCUTANEOUS at 20:42

## 2019-10-01 RX ADMIN — CEFAZOLIN 1 G: 1 INJECTION, POWDER, FOR SOLUTION INTRAMUSCULAR; INTRAVENOUS at 17:54

## 2019-10-01 RX ADMIN — Medication 10 ML: at 20:44

## 2019-10-01 RX ADMIN — APIXABAN 2.5 MG: 2.5 TABLET, FILM COATED ORAL at 20:42

## 2019-10-01 RX ADMIN — INSULIN LISPRO 1 UNITS: 100 INJECTION, SOLUTION INTRAVENOUS; SUBCUTANEOUS at 20:43

## 2019-10-01 RX ADMIN — CARVEDILOL 12.5 MG: 6.25 TABLET, FILM COATED ORAL at 15:12

## 2019-10-01 RX ADMIN — PRAVASTATIN SODIUM 40 MG: 40 TABLET ORAL at 15:13

## 2019-10-01 RX ADMIN — CARVEDILOL 12.5 MG: 6.25 TABLET, FILM COATED ORAL at 20:42

## 2019-10-01 RX ADMIN — NEPHROCAP 1 MG: 1 CAP ORAL at 15:12

## 2019-10-01 RX ADMIN — APIXABAN 2.5 MG: 2.5 TABLET, FILM COATED ORAL at 15:15

## 2019-10-01 RX ADMIN — DARBEPOETIN ALFA 60 MCG: 60 INJECTION, SOLUTION INTRAVENOUS; SUBCUTANEOUS at 13:49

## 2019-10-01 RX ADMIN — DARBEPOETIN ALFA: 60 INJECTION, SOLUTION INTRAVENOUS; SUBCUTANEOUS at 16:15

## 2019-10-01 RX ADMIN — VITAMIN D, TAB 1000IU (100/BT) 2000 UNITS: 25 TAB at 15:14

## 2019-10-01 RX ADMIN — DILTIAZEM HYDROCHLORIDE 120 MG: 120 CAPSULE, COATED, EXTENDED RELEASE ORAL at 20:42

## 2019-10-01 RX ADMIN — DILTIAZEM HYDROCHLORIDE 120 MG: 120 CAPSULE, COATED, EXTENDED RELEASE ORAL at 15:15

## 2019-10-01 ASSESSMENT — PAIN SCALES - GENERAL
PAINLEVEL_OUTOF10: 0
PAINLEVEL_OUTOF10: 0
PAINLEVEL_OUTOF10: 6
PAINLEVEL_OUTOF10: 6
PAINLEVEL_OUTOF10: 0
PAINLEVEL_OUTOF10: 3
PAINLEVEL_OUTOF10: 0
PAINLEVEL_OUTOF10: 0

## 2019-10-02 LAB
ANION GAP SERPL CALCULATED.3IONS-SCNC: 13 MMOL/L (ref 3–16)
BLOOD CULTURE, ROUTINE: NORMAL
BUN BLDV-MCNC: 28 MG/DL (ref 7–20)
CALCIUM SERPL-MCNC: 8.4 MG/DL (ref 8.3–10.6)
CHLORIDE BLD-SCNC: 100 MMOL/L (ref 99–110)
CO2: 22 MMOL/L (ref 21–32)
CREAT SERPL-MCNC: 5.2 MG/DL (ref 0.9–1.3)
GFR AFRICAN AMERICAN: 14
GFR NON-AFRICAN AMERICAN: 11
GLUCOSE BLD-MCNC: 100 MG/DL (ref 70–99)
GLUCOSE BLD-MCNC: 115 MG/DL (ref 70–99)
GLUCOSE BLD-MCNC: 135 MG/DL (ref 70–99)
GLUCOSE BLD-MCNC: 137 MG/DL (ref 70–99)
GLUCOSE BLD-MCNC: 140 MG/DL (ref 70–99)
HCT VFR BLD CALC: 27.5 % (ref 40.5–52.5)
HEMOGLOBIN: 9.3 G/DL (ref 13.5–17.5)
MCH RBC QN AUTO: 32.3 PG (ref 26–34)
MCHC RBC AUTO-ENTMCNC: 33.9 G/DL (ref 31–36)
MCV RBC AUTO: 95.3 FL (ref 80–100)
PDW BLD-RTO: 18.1 % (ref 12.4–15.4)
PERFORMED ON: ABNORMAL
PLATELET # BLD: 243 K/UL (ref 135–450)
PMV BLD AUTO: 8.1 FL (ref 5–10.5)
POTASSIUM REFLEX MAGNESIUM: 4.5 MMOL/L (ref 3.5–5.1)
RBC # BLD: 2.89 M/UL (ref 4.2–5.9)
SODIUM BLD-SCNC: 135 MMOL/L (ref 136–145)
WBC # BLD: 4.5 K/UL (ref 4–11)

## 2019-10-02 PROCEDURE — 2580000003 HC RX 258: Performed by: INTERNAL MEDICINE

## 2019-10-02 PROCEDURE — 85027 COMPLETE CBC AUTOMATED: CPT

## 2019-10-02 PROCEDURE — 6370000000 HC RX 637 (ALT 250 FOR IP): Performed by: INTERNAL MEDICINE

## 2019-10-02 PROCEDURE — 1200000000 HC SEMI PRIVATE

## 2019-10-02 PROCEDURE — 6360000002 HC RX W HCPCS: Performed by: INTERNAL MEDICINE

## 2019-10-02 PROCEDURE — 80048 BASIC METABOLIC PNL TOTAL CA: CPT

## 2019-10-02 RX ADMIN — PRAVASTATIN SODIUM 40 MG: 40 TABLET ORAL at 11:07

## 2019-10-02 RX ADMIN — CARVEDILOL 12.5 MG: 6.25 TABLET, FILM COATED ORAL at 11:07

## 2019-10-02 RX ADMIN — Medication 10 ML: at 21:14

## 2019-10-02 RX ADMIN — VITAMIN D, TAB 1000IU (100/BT) 2000 UNITS: 25 TAB at 11:07

## 2019-10-02 RX ADMIN — Medication 10 ML: at 11:09

## 2019-10-02 RX ADMIN — DILTIAZEM HYDROCHLORIDE 120 MG: 120 CAPSULE, COATED, EXTENDED RELEASE ORAL at 11:06

## 2019-10-02 RX ADMIN — NEPHROCAP 1 MG: 1 CAP ORAL at 11:07

## 2019-10-02 RX ADMIN — INSULIN GLARGINE 25 UNITS: 100 INJECTION, SOLUTION SUBCUTANEOUS at 11:08

## 2019-10-02 RX ADMIN — CEFAZOLIN 1 G: 1 INJECTION, POWDER, FOR SOLUTION INTRAMUSCULAR; INTRAVENOUS at 18:10

## 2019-10-02 RX ADMIN — INSULIN GLARGINE 25 UNITS: 100 INJECTION, SOLUTION SUBCUTANEOUS at 21:10

## 2019-10-02 RX ADMIN — APIXABAN 2.5 MG: 2.5 TABLET, FILM COATED ORAL at 11:08

## 2019-10-02 RX ADMIN — DILTIAZEM HYDROCHLORIDE 120 MG: 120 CAPSULE, COATED, EXTENDED RELEASE ORAL at 21:09

## 2019-10-02 RX ADMIN — INSULIN LISPRO 1 UNITS: 100 INJECTION, SOLUTION INTRAVENOUS; SUBCUTANEOUS at 13:11

## 2019-10-02 RX ADMIN — CARVEDILOL 12.5 MG: 6.25 TABLET, FILM COATED ORAL at 21:09

## 2019-10-02 ASSESSMENT — PAIN SCALES - GENERAL
PAINLEVEL_OUTOF10: 0

## 2019-10-03 LAB
ANION GAP SERPL CALCULATED.3IONS-SCNC: 15 MMOL/L (ref 3–16)
BLOOD CULTURE, ROUTINE: NORMAL
BUN BLDV-MCNC: 35 MG/DL (ref 7–20)
C DIFF TOXIN/ANTIGEN: NORMAL
CALCIUM SERPL-MCNC: 8 MG/DL (ref 8.3–10.6)
CHLORIDE BLD-SCNC: 104 MMOL/L (ref 99–110)
CO2: 20 MMOL/L (ref 21–32)
CREAT SERPL-MCNC: 6.6 MG/DL (ref 0.9–1.3)
CULTURE CATHETER TIP: ABNORMAL
CULTURE, BLOOD 2: NORMAL
GFR AFRICAN AMERICAN: 11
GFR NON-AFRICAN AMERICAN: 9
GLUCOSE BLD-MCNC: 104 MG/DL (ref 70–99)
GLUCOSE BLD-MCNC: 116 MG/DL (ref 70–99)
GLUCOSE BLD-MCNC: 127 MG/DL (ref 70–99)
HCT VFR BLD CALC: 26.3 % (ref 40.5–52.5)
HEMOGLOBIN: 8.8 G/DL (ref 13.5–17.5)
MCH RBC QN AUTO: 32.2 PG (ref 26–34)
MCHC RBC AUTO-ENTMCNC: 33.7 G/DL (ref 31–36)
MCV RBC AUTO: 95.7 FL (ref 80–100)
ORGANISM: ABNORMAL
PDW BLD-RTO: 18 % (ref 12.4–15.4)
PERFORMED ON: ABNORMAL
PERFORMED ON: ABNORMAL
PLATELET # BLD: 238 K/UL (ref 135–450)
PMV BLD AUTO: 8.6 FL (ref 5–10.5)
POTASSIUM REFLEX MAGNESIUM: 4.6 MMOL/L (ref 3.5–5.1)
RBC # BLD: 2.75 M/UL (ref 4.2–5.9)
SODIUM BLD-SCNC: 139 MMOL/L (ref 136–145)
WBC # BLD: 4.8 K/UL (ref 4–11)

## 2019-10-03 PROCEDURE — 6370000000 HC RX 637 (ALT 250 FOR IP): Performed by: INTERNAL MEDICINE

## 2019-10-03 PROCEDURE — 2580000003 HC RX 258: Performed by: INTERNAL MEDICINE

## 2019-10-03 PROCEDURE — 87324 CLOSTRIDIUM AG IA: CPT

## 2019-10-03 PROCEDURE — 1200000000 HC SEMI PRIVATE

## 2019-10-03 PROCEDURE — 87449 NOS EACH ORGANISM AG IA: CPT

## 2019-10-03 PROCEDURE — 0JH63XZ INSERTION OF TUNNELED VASCULAR ACCESS DEVICE INTO CHEST SUBCUTANEOUS TISSUE AND FASCIA, PERCUTANEOUS APPROACH: ICD-10-PCS | Performed by: INTERNAL MEDICINE

## 2019-10-03 PROCEDURE — 6360000002 HC RX W HCPCS: Performed by: INTERNAL MEDICINE

## 2019-10-03 PROCEDURE — 6360000002 HC RX W HCPCS: Performed by: NURSE PRACTITIONER

## 2019-10-03 PROCEDURE — 85027 COMPLETE CBC AUTOMATED: CPT

## 2019-10-03 PROCEDURE — 80048 BASIC METABOLIC PNL TOTAL CA: CPT

## 2019-10-03 PROCEDURE — 90935 HEMODIALYSIS ONE EVALUATION: CPT

## 2019-10-03 PROCEDURE — 02H633Z INSERTION OF INFUSION DEVICE INTO RIGHT ATRIUM, PERCUTANEOUS APPROACH: ICD-10-PCS | Performed by: INTERNAL MEDICINE

## 2019-10-03 RX ORDER — HEPARIN SODIUM 5000 [USP'U]/ML
5000 INJECTION, SOLUTION INTRAVENOUS; SUBCUTANEOUS EVERY 8 HOURS SCHEDULED
Status: DISCONTINUED | OUTPATIENT
Start: 2019-10-03 | End: 2019-10-07 | Stop reason: HOSPADM

## 2019-10-03 RX ORDER — LOPERAMIDE HYDROCHLORIDE 2 MG/1
2 CAPSULE ORAL 4 TIMES DAILY PRN
Status: DISCONTINUED | OUTPATIENT
Start: 2019-10-03 | End: 2019-10-07 | Stop reason: HOSPADM

## 2019-10-03 RX ADMIN — HEPARIN SODIUM 5000 UNITS: 5000 INJECTION INTRAVENOUS; SUBCUTANEOUS at 22:56

## 2019-10-03 RX ADMIN — Medication 10 ML: at 20:43

## 2019-10-03 RX ADMIN — CARVEDILOL 12.5 MG: 6.25 TABLET, FILM COATED ORAL at 20:41

## 2019-10-03 RX ADMIN — CEFAZOLIN 1 G: 1 INJECTION, POWDER, FOR SOLUTION INTRAMUSCULAR; INTRAVENOUS at 17:49

## 2019-10-03 RX ADMIN — INSULIN GLARGINE 25 UNITS: 100 INJECTION, SOLUTION SUBCUTANEOUS at 20:41

## 2019-10-03 RX ADMIN — LOPERAMIDE HYDROCHLORIDE 2 MG: 2 CAPSULE ORAL at 17:49

## 2019-10-03 RX ADMIN — DILTIAZEM HYDROCHLORIDE 120 MG: 120 CAPSULE, COATED, EXTENDED RELEASE ORAL at 20:41

## 2019-10-03 ASSESSMENT — PAIN SCALES - GENERAL
PAINLEVEL_OUTOF10: 0
PAINLEVEL_OUTOF10: 5

## 2019-10-04 ENCOUNTER — APPOINTMENT (OUTPATIENT)
Dept: INTERVENTIONAL RADIOLOGY/VASCULAR | Age: 59
DRG: 314 | End: 2019-10-04
Payer: MEDICARE

## 2019-10-04 LAB
ANION GAP SERPL CALCULATED.3IONS-SCNC: 11 MMOL/L (ref 3–16)
BUN BLDV-MCNC: 20 MG/DL (ref 7–20)
CALCIUM SERPL-MCNC: 8.5 MG/DL (ref 8.3–10.6)
CHLORIDE BLD-SCNC: 102 MMOL/L (ref 99–110)
CO2: 21 MMOL/L (ref 21–32)
CREAT SERPL-MCNC: 5.2 MG/DL (ref 0.9–1.3)
GFR AFRICAN AMERICAN: 14
GFR NON-AFRICAN AMERICAN: 11
GLUCOSE BLD-MCNC: 156 MG/DL (ref 70–99)
GLUCOSE BLD-MCNC: 178 MG/DL (ref 70–99)
GLUCOSE BLD-MCNC: 94 MG/DL (ref 70–99)
GLUCOSE BLD-MCNC: 94 MG/DL (ref 70–99)
GLUCOSE BLD-MCNC: 98 MG/DL (ref 70–99)
HCT VFR BLD CALC: 27.6 % (ref 40.5–52.5)
HEMOGLOBIN: 9.4 G/DL (ref 13.5–17.5)
MCH RBC QN AUTO: 32.4 PG (ref 26–34)
MCHC RBC AUTO-ENTMCNC: 34 G/DL (ref 31–36)
MCV RBC AUTO: 95.5 FL (ref 80–100)
PDW BLD-RTO: 18.5 % (ref 12.4–15.4)
PERFORMED ON: ABNORMAL
PERFORMED ON: ABNORMAL
PERFORMED ON: NORMAL
PERFORMED ON: NORMAL
PLATELET # BLD: 255 K/UL (ref 135–450)
PMV BLD AUTO: 8.1 FL (ref 5–10.5)
POTASSIUM REFLEX MAGNESIUM: 4.7 MMOL/L (ref 3.5–5.1)
RBC # BLD: 2.89 M/UL (ref 4.2–5.9)
SODIUM BLD-SCNC: 134 MMOL/L (ref 136–145)
WBC # BLD: 5.3 K/UL (ref 4–11)

## 2019-10-04 PROCEDURE — 76937 US GUIDE VASCULAR ACCESS: CPT

## 2019-10-04 PROCEDURE — 1200000000 HC SEMI PRIVATE

## 2019-10-04 PROCEDURE — 36415 COLL VENOUS BLD VENIPUNCTURE: CPT

## 2019-10-04 PROCEDURE — 94761 N-INVAS EAR/PLS OXIMETRY MLT: CPT

## 2019-10-04 PROCEDURE — C1881 DIALYSIS ACCESS SYSTEM: HCPCS

## 2019-10-04 PROCEDURE — 80048 BASIC METABOLIC PNL TOTAL CA: CPT

## 2019-10-04 PROCEDURE — 36558 INSERT TUNNELED CV CATH: CPT

## 2019-10-04 PROCEDURE — 6360000002 HC RX W HCPCS: Performed by: NURSE PRACTITIONER

## 2019-10-04 PROCEDURE — 6360000002 HC RX W HCPCS: Performed by: RADIOLOGY

## 2019-10-04 PROCEDURE — 2700000000 HC OXYGEN THERAPY PER DAY

## 2019-10-04 PROCEDURE — 6370000000 HC RX 637 (ALT 250 FOR IP): Performed by: INTERNAL MEDICINE

## 2019-10-04 PROCEDURE — 77001 FLUOROGUIDE FOR VEIN DEVICE: CPT

## 2019-10-04 PROCEDURE — 2580000003 HC RX 258: Performed by: INTERNAL MEDICINE

## 2019-10-04 PROCEDURE — 85027 COMPLETE CBC AUTOMATED: CPT

## 2019-10-04 RX ORDER — MIDAZOLAM HYDROCHLORIDE 5 MG/ML
INJECTION INTRAMUSCULAR; INTRAVENOUS
Status: COMPLETED | OUTPATIENT
Start: 2019-10-04 | End: 2019-10-04

## 2019-10-04 RX ORDER — FENTANYL CITRATE 50 UG/ML
INJECTION, SOLUTION INTRAMUSCULAR; INTRAVENOUS
Status: COMPLETED | OUTPATIENT
Start: 2019-10-04 | End: 2019-10-04

## 2019-10-04 RX ADMIN — DILTIAZEM HYDROCHLORIDE 120 MG: 120 CAPSULE, COATED, EXTENDED RELEASE ORAL at 20:15

## 2019-10-04 RX ADMIN — HEPARIN SODIUM 5000 UNITS: 5000 INJECTION INTRAVENOUS; SUBCUTANEOUS at 21:44

## 2019-10-04 RX ADMIN — MIDAZOLAM HYDROCHLORIDE 1 MG: 5 INJECTION, SOLUTION INTRAMUSCULAR; INTRAVENOUS at 15:17

## 2019-10-04 RX ADMIN — Medication 2 G: at 15:06

## 2019-10-04 RX ADMIN — APIXABAN 2.5 MG: 2.5 TABLET, FILM COATED ORAL at 20:15

## 2019-10-04 RX ADMIN — FENTANYL CITRATE 50 MCG: 50 INJECTION INTRAMUSCULAR; INTRAVENOUS at 15:03

## 2019-10-04 RX ADMIN — LOPERAMIDE HYDROCHLORIDE 2 MG: 2 CAPSULE ORAL at 12:51

## 2019-10-04 RX ADMIN — MIDAZOLAM HYDROCHLORIDE 1 MG: 5 INJECTION, SOLUTION INTRAMUSCULAR; INTRAVENOUS at 15:04

## 2019-10-04 RX ADMIN — Medication 10 ML: at 22:22

## 2019-10-04 RX ADMIN — LOPERAMIDE HYDROCHLORIDE 2 MG: 2 CAPSULE ORAL at 17:37

## 2019-10-04 RX ADMIN — FENTANYL CITRATE 50 MCG: 50 INJECTION INTRAMUSCULAR; INTRAVENOUS at 15:17

## 2019-10-04 RX ADMIN — LOPERAMIDE HYDROCHLORIDE 2 MG: 2 CAPSULE ORAL at 04:50

## 2019-10-04 RX ADMIN — INSULIN LISPRO 1 UNITS: 100 INJECTION, SOLUTION INTRAVENOUS; SUBCUTANEOUS at 20:16

## 2019-10-04 RX ADMIN — CARVEDILOL 12.5 MG: 6.25 TABLET, FILM COATED ORAL at 20:15

## 2019-10-04 RX ADMIN — INSULIN GLARGINE 25 UNITS: 100 INJECTION, SOLUTION SUBCUTANEOUS at 20:16

## 2019-10-04 RX ADMIN — ACETAMINOPHEN 650 MG: 325 TABLET ORAL at 20:11

## 2019-10-04 ASSESSMENT — PAIN SCALES - GENERAL
PAINLEVEL_OUTOF10: 0
PAINLEVEL_OUTOF10: 8
PAINLEVEL_OUTOF10: 0

## 2019-10-04 ASSESSMENT — PAIN DESCRIPTION - ORIENTATION: ORIENTATION: RIGHT

## 2019-10-04 ASSESSMENT — PAIN DESCRIPTION - PAIN TYPE: TYPE: ACUTE PAIN

## 2019-10-05 LAB
ANION GAP SERPL CALCULATED.3IONS-SCNC: 16 MMOL/L (ref 3–16)
BUN BLDV-MCNC: 30 MG/DL (ref 7–20)
CALCIUM SERPL-MCNC: 8.5 MG/DL (ref 8.3–10.6)
CHLORIDE BLD-SCNC: 100 MMOL/L (ref 99–110)
CO2: 20 MMOL/L (ref 21–32)
CREAT SERPL-MCNC: 6.5 MG/DL (ref 0.9–1.3)
GFR AFRICAN AMERICAN: 11
GFR NON-AFRICAN AMERICAN: 9
GLUCOSE BLD-MCNC: 149 MG/DL (ref 70–99)
GLUCOSE BLD-MCNC: 215 MG/DL (ref 70–99)
GLUCOSE BLD-MCNC: 95 MG/DL (ref 70–99)
GLUCOSE BLD-MCNC: 96 MG/DL (ref 70–99)
HCT VFR BLD CALC: 27.8 % (ref 40.5–52.5)
HEMOGLOBIN: 9.2 G/DL (ref 13.5–17.5)
MCH RBC QN AUTO: 32 PG (ref 26–34)
MCHC RBC AUTO-ENTMCNC: 33.1 G/DL (ref 31–36)
MCV RBC AUTO: 96.8 FL (ref 80–100)
PDW BLD-RTO: 18.4 % (ref 12.4–15.4)
PERFORMED ON: ABNORMAL
PERFORMED ON: ABNORMAL
PERFORMED ON: NORMAL
PLATELET # BLD: 290 K/UL (ref 135–450)
PMV BLD AUTO: 7.9 FL (ref 5–10.5)
POTASSIUM REFLEX MAGNESIUM: 4.5 MMOL/L (ref 3.5–5.1)
RBC # BLD: 2.87 M/UL (ref 4.2–5.9)
SODIUM BLD-SCNC: 136 MMOL/L (ref 136–145)
WBC # BLD: 5.7 K/UL (ref 4–11)

## 2019-10-05 PROCEDURE — 6360000002 HC RX W HCPCS: Performed by: INTERNAL MEDICINE

## 2019-10-05 PROCEDURE — 6370000000 HC RX 637 (ALT 250 FOR IP): Performed by: INTERNAL MEDICINE

## 2019-10-05 PROCEDURE — 85027 COMPLETE CBC AUTOMATED: CPT

## 2019-10-05 PROCEDURE — 80048 BASIC METABOLIC PNL TOTAL CA: CPT

## 2019-10-05 PROCEDURE — 2580000003 HC RX 258: Performed by: INTERNAL MEDICINE

## 2019-10-05 PROCEDURE — 1200000000 HC SEMI PRIVATE

## 2019-10-05 PROCEDURE — 6360000002 HC RX W HCPCS: Performed by: NURSE PRACTITIONER

## 2019-10-05 PROCEDURE — 90935 HEMODIALYSIS ONE EVALUATION: CPT

## 2019-10-05 PROCEDURE — 36415 COLL VENOUS BLD VENIPUNCTURE: CPT

## 2019-10-05 RX ORDER — HEPARIN SODIUM 1000 [USP'U]/ML
3800 INJECTION, SOLUTION INTRAVENOUS; SUBCUTANEOUS PRN
Status: DISCONTINUED | OUTPATIENT
Start: 2019-10-05 | End: 2019-10-07 | Stop reason: HOSPADM

## 2019-10-05 RX ORDER — OXYCODONE HYDROCHLORIDE AND ACETAMINOPHEN 5; 325 MG/1; MG/1
1 TABLET ORAL EVERY 6 HOURS PRN
Status: DISCONTINUED | OUTPATIENT
Start: 2019-10-05 | End: 2019-10-07 | Stop reason: HOSPADM

## 2019-10-05 RX ADMIN — DILTIAZEM HYDROCHLORIDE 120 MG: 120 CAPSULE, COATED, EXTENDED RELEASE ORAL at 21:34

## 2019-10-05 RX ADMIN — HEPARIN SODIUM 3800 UNITS: 1000 INJECTION, SOLUTION INTRAVENOUS; SUBCUTANEOUS at 12:52

## 2019-10-05 RX ADMIN — HEPARIN SODIUM 4000 UNITS: 1000 INJECTION, SOLUTION INTRAVENOUS; SUBCUTANEOUS at 10:54

## 2019-10-05 RX ADMIN — APIXABAN 2.5 MG: 2.5 TABLET, FILM COATED ORAL at 14:05

## 2019-10-05 RX ADMIN — LOPERAMIDE HYDROCHLORIDE 2 MG: 2 CAPSULE ORAL at 07:47

## 2019-10-05 RX ADMIN — APIXABAN 2.5 MG: 2.5 TABLET, FILM COATED ORAL at 21:34

## 2019-10-05 RX ADMIN — NEPHROCAP 1 MG: 1 CAP ORAL at 14:05

## 2019-10-05 RX ADMIN — HEPARIN SODIUM 5000 UNITS: 5000 INJECTION INTRAVENOUS; SUBCUTANEOUS at 06:21

## 2019-10-05 RX ADMIN — Medication 10 ML: at 21:34

## 2019-10-05 RX ADMIN — Medication 10 ML: at 14:05

## 2019-10-05 RX ADMIN — CARVEDILOL 12.5 MG: 6.25 TABLET, FILM COATED ORAL at 21:34

## 2019-10-05 RX ADMIN — PRAVASTATIN SODIUM 40 MG: 40 TABLET ORAL at 14:04

## 2019-10-05 RX ADMIN — INSULIN GLARGINE 25 UNITS: 100 INJECTION, SOLUTION SUBCUTANEOUS at 21:34

## 2019-10-05 RX ADMIN — DILTIAZEM HYDROCHLORIDE 120 MG: 120 CAPSULE, COATED, EXTENDED RELEASE ORAL at 14:05

## 2019-10-05 RX ADMIN — VITAMIN D, TAB 1000IU (100/BT) 2000 UNITS: 25 TAB at 14:05

## 2019-10-05 RX ADMIN — CARVEDILOL 12.5 MG: 6.25 TABLET, FILM COATED ORAL at 14:04

## 2019-10-05 RX ADMIN — INSULIN GLARGINE 25 UNITS: 100 INJECTION, SOLUTION SUBCUTANEOUS at 14:03

## 2019-10-05 RX ADMIN — HEPARIN SODIUM 5000 UNITS: 5000 INJECTION INTRAVENOUS; SUBCUTANEOUS at 21:33

## 2019-10-05 RX ADMIN — HEPARIN SODIUM 5000 UNITS: 5000 INJECTION INTRAVENOUS; SUBCUTANEOUS at 14:03

## 2019-10-05 RX ADMIN — ACETAMINOPHEN 650 MG: 325 TABLET ORAL at 06:21

## 2019-10-05 ASSESSMENT — PAIN SCALES - GENERAL
PAINLEVEL_OUTOF10: 10
PAINLEVEL_OUTOF10: 10
PAINLEVEL_OUTOF10: 8
PAINLEVEL_OUTOF10: 0
PAINLEVEL_OUTOF10: 0

## 2019-10-05 ASSESSMENT — PAIN DESCRIPTION - ONSET: ONSET: ON-GOING

## 2019-10-05 ASSESSMENT — PAIN DESCRIPTION - LOCATION: LOCATION: CHEST

## 2019-10-05 ASSESSMENT — PAIN DESCRIPTION - PROGRESSION: CLINICAL_PROGRESSION: NOT CHANGED

## 2019-10-05 ASSESSMENT — PAIN DESCRIPTION - ORIENTATION: ORIENTATION: RIGHT

## 2019-10-05 ASSESSMENT — PAIN DESCRIPTION - DESCRIPTORS: DESCRIPTORS: ACHING;SORE;SHARP

## 2019-10-05 ASSESSMENT — PAIN - FUNCTIONAL ASSESSMENT: PAIN_FUNCTIONAL_ASSESSMENT: ACTIVITIES ARE NOT PREVENTED

## 2019-10-05 ASSESSMENT — PAIN DESCRIPTION - FREQUENCY: FREQUENCY: CONTINUOUS

## 2019-10-05 ASSESSMENT — PAIN DESCRIPTION - PAIN TYPE
TYPE: CHRONIC PAIN
TYPE: ACUTE PAIN
TYPE: ACUTE PAIN

## 2019-10-06 LAB
ANION GAP SERPL CALCULATED.3IONS-SCNC: 14 MMOL/L (ref 3–16)
BUN BLDV-MCNC: 22 MG/DL (ref 7–20)
CALCIUM SERPL-MCNC: 8.5 MG/DL (ref 8.3–10.6)
CHLORIDE BLD-SCNC: 98 MMOL/L (ref 99–110)
CO2: 24 MMOL/L (ref 21–32)
CREAT SERPL-MCNC: 5 MG/DL (ref 0.9–1.3)
GFR AFRICAN AMERICAN: 14
GFR NON-AFRICAN AMERICAN: 12
GLUCOSE BLD-MCNC: 112 MG/DL (ref 70–99)
GLUCOSE BLD-MCNC: 123 MG/DL (ref 70–99)
GLUCOSE BLD-MCNC: 159 MG/DL (ref 70–99)
GLUCOSE BLD-MCNC: 178 MG/DL (ref 70–99)
HCT VFR BLD CALC: 27.6 % (ref 40.5–52.5)
HEMOGLOBIN: 9.4 G/DL (ref 13.5–17.5)
MCH RBC QN AUTO: 32.6 PG (ref 26–34)
MCHC RBC AUTO-ENTMCNC: 34.2 G/DL (ref 31–36)
MCV RBC AUTO: 95.3 FL (ref 80–100)
PDW BLD-RTO: 18.3 % (ref 12.4–15.4)
PERFORMED ON: ABNORMAL
PLATELET # BLD: 268 K/UL (ref 135–450)
PMV BLD AUTO: 8.2 FL (ref 5–10.5)
POTASSIUM REFLEX MAGNESIUM: 4.3 MMOL/L (ref 3.5–5.1)
RBC # BLD: 2.9 M/UL (ref 4.2–5.9)
SODIUM BLD-SCNC: 136 MMOL/L (ref 136–145)
WBC # BLD: 5.8 K/UL (ref 4–11)

## 2019-10-06 PROCEDURE — 6360000002 HC RX W HCPCS: Performed by: NURSE PRACTITIONER

## 2019-10-06 PROCEDURE — 36415 COLL VENOUS BLD VENIPUNCTURE: CPT

## 2019-10-06 PROCEDURE — 6370000000 HC RX 637 (ALT 250 FOR IP): Performed by: INTERNAL MEDICINE

## 2019-10-06 PROCEDURE — 1200000000 HC SEMI PRIVATE

## 2019-10-06 PROCEDURE — 97162 PT EVAL MOD COMPLEX 30 MIN: CPT

## 2019-10-06 PROCEDURE — 2580000003 HC RX 258: Performed by: INTERNAL MEDICINE

## 2019-10-06 PROCEDURE — 80048 BASIC METABOLIC PNL TOTAL CA: CPT

## 2019-10-06 PROCEDURE — 97116 GAIT TRAINING THERAPY: CPT

## 2019-10-06 PROCEDURE — 85027 COMPLETE CBC AUTOMATED: CPT

## 2019-10-06 PROCEDURE — 6370000000 HC RX 637 (ALT 250 FOR IP): Performed by: NURSE PRACTITIONER

## 2019-10-06 RX ORDER — CHOLECALCIFEROL (VITAMIN D3) 10 MCG
1 TABLET ORAL DAILY
Qty: 30 CAPSULE | Refills: 3 | Status: SHIPPED | OUTPATIENT
Start: 2019-10-07 | End: 2020-05-16

## 2019-10-06 RX ADMIN — INSULIN GLARGINE 25 UNITS: 100 INJECTION, SOLUTION SUBCUTANEOUS at 09:36

## 2019-10-06 RX ADMIN — OXYCODONE HYDROCHLORIDE AND ACETAMINOPHEN 1 TABLET: 5; 325 TABLET ORAL at 01:21

## 2019-10-06 RX ADMIN — NEPHROCAP 1 MG: 1 CAP ORAL at 09:26

## 2019-10-06 RX ADMIN — INSULIN GLARGINE 25 UNITS: 100 INJECTION, SOLUTION SUBCUTANEOUS at 22:57

## 2019-10-06 RX ADMIN — APIXABAN 2.5 MG: 2.5 TABLET, FILM COATED ORAL at 09:27

## 2019-10-06 RX ADMIN — Medication 10 ML: at 09:32

## 2019-10-06 RX ADMIN — DILTIAZEM HYDROCHLORIDE 120 MG: 120 CAPSULE, COATED, EXTENDED RELEASE ORAL at 22:47

## 2019-10-06 RX ADMIN — HEPARIN SODIUM 5000 UNITS: 5000 INJECTION INTRAVENOUS; SUBCUTANEOUS at 04:45

## 2019-10-06 RX ADMIN — CARVEDILOL 12.5 MG: 6.25 TABLET, FILM COATED ORAL at 22:47

## 2019-10-06 RX ADMIN — APIXABAN 2.5 MG: 2.5 TABLET, FILM COATED ORAL at 22:47

## 2019-10-06 RX ADMIN — OXYCODONE HYDROCHLORIDE AND ACETAMINOPHEN 1 TABLET: 5; 325 TABLET ORAL at 21:16

## 2019-10-06 RX ADMIN — CARVEDILOL 12.5 MG: 6.25 TABLET, FILM COATED ORAL at 09:27

## 2019-10-06 RX ADMIN — HEPARIN SODIUM 5000 UNITS: 5000 INJECTION INTRAVENOUS; SUBCUTANEOUS at 22:57

## 2019-10-06 RX ADMIN — HEPARIN SODIUM 5000 UNITS: 5000 INJECTION INTRAVENOUS; SUBCUTANEOUS at 14:46

## 2019-10-06 RX ADMIN — PRAVASTATIN SODIUM 40 MG: 40 TABLET ORAL at 09:27

## 2019-10-06 RX ADMIN — VITAMIN D, TAB 1000IU (100/BT) 2000 UNITS: 25 TAB at 09:26

## 2019-10-06 RX ADMIN — DILTIAZEM HYDROCHLORIDE 120 MG: 120 CAPSULE, COATED, EXTENDED RELEASE ORAL at 09:26

## 2019-10-06 ASSESSMENT — PAIN SCALES - GENERAL
PAINLEVEL_OUTOF10: 10
PAINLEVEL_OUTOF10: 7
PAINLEVEL_OUTOF10: 10
PAINLEVEL_OUTOF10: 9
PAINLEVEL_OUTOF10: 8
PAINLEVEL_OUTOF10: 6

## 2019-10-06 ASSESSMENT — PAIN DESCRIPTION - PAIN TYPE: TYPE: ACUTE PAIN;CHRONIC PAIN

## 2019-10-06 ASSESSMENT — PAIN DESCRIPTION - LOCATION: LOCATION: CHEST;KNEE

## 2019-10-07 VITALS
HEIGHT: 72 IN | BODY MASS INDEX: 41.69 KG/M2 | TEMPERATURE: 98.1 F | DIASTOLIC BLOOD PRESSURE: 66 MMHG | SYSTOLIC BLOOD PRESSURE: 103 MMHG | OXYGEN SATURATION: 97 % | WEIGHT: 307.76 LBS | HEART RATE: 60 BPM | RESPIRATION RATE: 16 BRPM

## 2019-10-07 LAB
ANION GAP SERPL CALCULATED.3IONS-SCNC: 14 MMOL/L (ref 3–16)
BUN BLDV-MCNC: 36 MG/DL (ref 7–20)
CALCIUM SERPL-MCNC: 8.5 MG/DL (ref 8.3–10.6)
CHLORIDE BLD-SCNC: 100 MMOL/L (ref 99–110)
CO2: 21 MMOL/L (ref 21–32)
CREAT SERPL-MCNC: 7.3 MG/DL (ref 0.9–1.3)
GFR AFRICAN AMERICAN: 9
GFR NON-AFRICAN AMERICAN: 8
GLUCOSE BLD-MCNC: 129 MG/DL (ref 70–99)
GLUCOSE BLD-MCNC: 185 MG/DL (ref 70–99)
HCT VFR BLD CALC: 28 % (ref 40.5–52.5)
HEMOGLOBIN: 9.3 G/DL (ref 13.5–17.5)
MCH RBC QN AUTO: 32.1 PG (ref 26–34)
MCHC RBC AUTO-ENTMCNC: 33.2 G/DL (ref 31–36)
MCV RBC AUTO: 96.5 FL (ref 80–100)
PDW BLD-RTO: 18.4 % (ref 12.4–15.4)
PERFORMED ON: ABNORMAL
PLATELET # BLD: 293 K/UL (ref 135–450)
PMV BLD AUTO: 8 FL (ref 5–10.5)
POTASSIUM REFLEX MAGNESIUM: 4.5 MMOL/L (ref 3.5–5.1)
RBC # BLD: 2.9 M/UL (ref 4.2–5.9)
SODIUM BLD-SCNC: 135 MMOL/L (ref 136–145)
WBC # BLD: 5.7 K/UL (ref 4–11)

## 2019-10-07 PROCEDURE — 85027 COMPLETE CBC AUTOMATED: CPT

## 2019-10-07 PROCEDURE — 6360000002 HC RX W HCPCS: Performed by: NURSE PRACTITIONER

## 2019-10-07 PROCEDURE — 6370000000 HC RX 637 (ALT 250 FOR IP): Performed by: INTERNAL MEDICINE

## 2019-10-07 PROCEDURE — 36415 COLL VENOUS BLD VENIPUNCTURE: CPT

## 2019-10-07 PROCEDURE — 6370000000 HC RX 637 (ALT 250 FOR IP): Performed by: NURSE PRACTITIONER

## 2019-10-07 PROCEDURE — 80048 BASIC METABOLIC PNL TOTAL CA: CPT

## 2019-10-07 RX ADMIN — APIXABAN 2.5 MG: 2.5 TABLET, FILM COATED ORAL at 09:22

## 2019-10-07 RX ADMIN — OXYCODONE HYDROCHLORIDE AND ACETAMINOPHEN 1 TABLET: 5; 325 TABLET ORAL at 03:30

## 2019-10-07 RX ADMIN — VITAMIN D, TAB 1000IU (100/BT) 2000 UNITS: 25 TAB at 09:22

## 2019-10-07 RX ADMIN — CARVEDILOL 12.5 MG: 6.25 TABLET, FILM COATED ORAL at 09:22

## 2019-10-07 RX ADMIN — HEPARIN SODIUM 5000 UNITS: 5000 INJECTION INTRAVENOUS; SUBCUTANEOUS at 03:30

## 2019-10-07 RX ADMIN — NEPHROCAP 1 MG: 1 CAP ORAL at 09:22

## 2019-10-07 RX ADMIN — INSULIN LISPRO 1 UNITS: 100 INJECTION, SOLUTION INTRAVENOUS; SUBCUTANEOUS at 14:16

## 2019-10-07 RX ADMIN — PRAVASTATIN SODIUM 40 MG: 40 TABLET ORAL at 09:23

## 2019-10-07 RX ADMIN — INSULIN GLARGINE 25 UNITS: 100 INJECTION, SOLUTION SUBCUTANEOUS at 11:45

## 2019-10-07 RX ADMIN — DILTIAZEM HYDROCHLORIDE 120 MG: 120 CAPSULE, COATED, EXTENDED RELEASE ORAL at 09:23

## 2019-10-07 ASSESSMENT — PAIN DESCRIPTION - PROGRESSION: CLINICAL_PROGRESSION: NOT CHANGED

## 2019-10-07 ASSESSMENT — PAIN SCALES - GENERAL
PAINLEVEL_OUTOF10: 0
PAINLEVEL_OUTOF10: 0
PAINLEVEL_OUTOF10: 5
PAINLEVEL_OUTOF10: 0
PAINLEVEL_OUTOF10: 0
PAINLEVEL_OUTOF10: 8

## 2019-10-07 ASSESSMENT — PAIN DESCRIPTION - PAIN TYPE: TYPE: ACUTE PAIN

## 2019-10-07 ASSESSMENT — PAIN DESCRIPTION - LOCATION: LOCATION: CHEST

## 2019-10-07 ASSESSMENT — PAIN DESCRIPTION - ORIENTATION: ORIENTATION: RIGHT

## 2019-10-19 ENCOUNTER — HOSPITAL ENCOUNTER (INPATIENT)
Age: 59
LOS: 3 days | Discharge: HOME | DRG: 640 | End: 2019-10-22
Attending: EMERGENCY MEDICINE | Admitting: INTERNAL MEDICINE
Payer: MEDICARE

## 2019-10-19 ENCOUNTER — APPOINTMENT (OUTPATIENT)
Dept: GENERAL RADIOLOGY | Age: 59
DRG: 640 | End: 2019-10-19
Payer: MEDICARE

## 2019-10-19 DIAGNOSIS — R19.7 DIARRHEA, UNSPECIFIED TYPE: ICD-10-CM

## 2019-10-19 DIAGNOSIS — R09.89 PULMONARY VASCULAR CONGESTION: Primary | ICD-10-CM

## 2019-10-19 DIAGNOSIS — R06.02 SHORTNESS OF BREATH: ICD-10-CM

## 2019-10-19 DIAGNOSIS — Z91.15 NON-COMPLIANCE WITH RENAL DIALYSIS (HCC): ICD-10-CM

## 2019-10-19 LAB
A/G RATIO: 0.9 (ref 1.1–2.2)
ALBUMIN SERPL-MCNC: 3.5 G/DL (ref 3.4–5)
ALP BLD-CCNC: 78 U/L (ref 40–129)
ALT SERPL-CCNC: <5 U/L (ref 10–40)
ANION GAP SERPL CALCULATED.3IONS-SCNC: 20 MMOL/L (ref 3–16)
AST SERPL-CCNC: 14 U/L (ref 15–37)
BASOPHILS ABSOLUTE: 0.1 K/UL (ref 0–0.2)
BASOPHILS RELATIVE PERCENT: 2.1 %
BILIRUB SERPL-MCNC: 0.3 MG/DL (ref 0–1)
BUN BLDV-MCNC: 59 MG/DL (ref 7–20)
CALCIUM SERPL-MCNC: 8.4 MG/DL (ref 8.3–10.6)
CHLORIDE BLD-SCNC: 101 MMOL/L (ref 99–110)
CO2: 17 MMOL/L (ref 21–32)
CREAT SERPL-MCNC: 8.5 MG/DL (ref 0.9–1.3)
EKG ATRIAL RATE: 94 BPM
EKG DIAGNOSIS: NORMAL
EKG Q-T INTERVAL: 370 MS
EKG QRS DURATION: 82 MS
EKG QTC CALCULATION (BAZETT): 460 MS
EKG R AXIS: -1 DEGREES
EKG T AXIS: 27 DEGREES
EKG VENTRICULAR RATE: 93 BPM
EOSINOPHILS ABSOLUTE: 0.2 K/UL (ref 0–0.6)
EOSINOPHILS RELATIVE PERCENT: 3.1 %
GFR AFRICAN AMERICAN: 8
GFR NON-AFRICAN AMERICAN: 6
GLOBULIN: 4 G/DL
GLUCOSE BLD-MCNC: 131 MG/DL (ref 70–99)
GLUCOSE BLD-MCNC: 89 MG/DL (ref 70–99)
HCT VFR BLD CALC: 30.4 % (ref 40.5–52.5)
HEMOGLOBIN: 10.2 G/DL (ref 13.5–17.5)
LYMPHOCYTES ABSOLUTE: 1.4 K/UL (ref 1–5.1)
LYMPHOCYTES RELATIVE PERCENT: 22 %
MCH RBC QN AUTO: 32.7 PG (ref 26–34)
MCHC RBC AUTO-ENTMCNC: 33.7 G/DL (ref 31–36)
MCV RBC AUTO: 97.3 FL (ref 80–100)
MONOCYTES ABSOLUTE: 0.3 K/UL (ref 0–1.3)
MONOCYTES RELATIVE PERCENT: 5.2 %
NEUTROPHILS ABSOLUTE: 4.4 K/UL (ref 1.7–7.7)
NEUTROPHILS RELATIVE PERCENT: 67.6 %
PDW BLD-RTO: 19.4 % (ref 12.4–15.4)
PERFORMED ON: NORMAL
PLATELET # BLD: 331 K/UL (ref 135–450)
PMV BLD AUTO: 7.5 FL (ref 5–10.5)
POTASSIUM REFLEX MAGNESIUM: 5 MMOL/L (ref 3.5–5.1)
RBC # BLD: 3.13 M/UL (ref 4.2–5.9)
SODIUM BLD-SCNC: 138 MMOL/L (ref 136–145)
TOTAL PROTEIN: 7.5 G/DL (ref 6.4–8.2)
TROPONIN: 0.04 NG/ML
WBC # BLD: 6.5 K/UL (ref 4–11)

## 2019-10-19 PROCEDURE — 83036 HEMOGLOBIN GLYCOSYLATED A1C: CPT

## 2019-10-19 PROCEDURE — 6370000000 HC RX 637 (ALT 250 FOR IP): Performed by: INTERNAL MEDICINE

## 2019-10-19 PROCEDURE — 93010 ELECTROCARDIOGRAM REPORT: CPT | Performed by: INTERNAL MEDICINE

## 2019-10-19 PROCEDURE — 87040 BLOOD CULTURE FOR BACTERIA: CPT

## 2019-10-19 PROCEDURE — 2580000003 HC RX 258: Performed by: INTERNAL MEDICINE

## 2019-10-19 PROCEDURE — 6360000002 HC RX W HCPCS: Performed by: INTERNAL MEDICINE

## 2019-10-19 PROCEDURE — 5A1D70Z PERFORMANCE OF URINARY FILTRATION, INTERMITTENT, LESS THAN 6 HOURS PER DAY: ICD-10-PCS | Performed by: INTERNAL MEDICINE

## 2019-10-19 PROCEDURE — 71046 X-RAY EXAM CHEST 2 VIEWS: CPT

## 2019-10-19 PROCEDURE — 80053 COMPREHEN METABOLIC PANEL: CPT

## 2019-10-19 PROCEDURE — 90935 HEMODIALYSIS ONE EVALUATION: CPT

## 2019-10-19 PROCEDURE — 99285 EMERGENCY DEPT VISIT HI MDM: CPT

## 2019-10-19 PROCEDURE — 93005 ELECTROCARDIOGRAM TRACING: CPT | Performed by: EMERGENCY MEDICINE

## 2019-10-19 PROCEDURE — 84484 ASSAY OF TROPONIN QUANT: CPT

## 2019-10-19 PROCEDURE — 36415 COLL VENOUS BLD VENIPUNCTURE: CPT

## 2019-10-19 PROCEDURE — 85025 COMPLETE CBC W/AUTO DIFF WBC: CPT

## 2019-10-19 PROCEDURE — 1200000000 HC SEMI PRIVATE

## 2019-10-19 RX ORDER — SODIUM CHLORIDE 0.9 % (FLUSH) 0.9 %
10 SYRINGE (ML) INJECTION PRN
Status: DISCONTINUED | OUTPATIENT
Start: 2019-10-19 | End: 2019-10-22 | Stop reason: HOSPADM

## 2019-10-19 RX ORDER — SEVELAMER CARBONATE 800 MG/1
1600 TABLET, FILM COATED ORAL
Status: DISCONTINUED | OUTPATIENT
Start: 2019-10-20 | End: 2019-10-21

## 2019-10-19 RX ORDER — INSULIN GLARGINE 100 [IU]/ML
25 INJECTION, SOLUTION SUBCUTANEOUS 2 TIMES DAILY
Status: DISCONTINUED | OUTPATIENT
Start: 2019-10-19 | End: 2019-10-22 | Stop reason: HOSPADM

## 2019-10-19 RX ORDER — HEPARIN SODIUM 1000 [USP'U]/ML
1000 INJECTION, SOLUTION INTRAVENOUS; SUBCUTANEOUS ONCE
Status: COMPLETED | OUTPATIENT
Start: 2019-10-19 | End: 2019-10-19

## 2019-10-19 RX ORDER — HEPARIN SODIUM 1000 [USP'U]/ML
3800 INJECTION, SOLUTION INTRAVENOUS; SUBCUTANEOUS PRN
Status: DISCONTINUED | OUTPATIENT
Start: 2019-10-19 | End: 2019-10-22 | Stop reason: HOSPADM

## 2019-10-19 RX ORDER — PRAVASTATIN SODIUM 40 MG
40 TABLET ORAL DAILY
Status: DISCONTINUED | OUTPATIENT
Start: 2019-10-19 | End: 2019-10-22 | Stop reason: HOSPADM

## 2019-10-19 RX ORDER — PROCHLORPERAZINE EDISYLATE 5 MG/ML
10 INJECTION INTRAMUSCULAR; INTRAVENOUS EVERY 6 HOURS PRN
Status: DISCONTINUED | OUTPATIENT
Start: 2019-10-19 | End: 2019-10-22 | Stop reason: HOSPADM

## 2019-10-19 RX ORDER — DILTIAZEM HYDROCHLORIDE 120 MG/1
120 CAPSULE, COATED, EXTENDED RELEASE ORAL 2 TIMES DAILY
Status: DISCONTINUED | OUTPATIENT
Start: 2019-10-19 | End: 2019-10-22 | Stop reason: HOSPADM

## 2019-10-19 RX ORDER — SEVELAMER CARBONATE 800 MG/1
TABLET, FILM COATED ORAL
Refills: 6 | Status: ON HOLD | COMMUNITY
Start: 2019-10-08 | End: 2019-10-22 | Stop reason: HOSPADM

## 2019-10-19 RX ORDER — NICOTINE POLACRILEX 4 MG
15 LOZENGE BUCCAL PRN
Status: DISCONTINUED | OUTPATIENT
Start: 2019-10-19 | End: 2019-10-22 | Stop reason: HOSPADM

## 2019-10-19 RX ORDER — DEXTROSE MONOHYDRATE 25 G/50ML
12.5 INJECTION, SOLUTION INTRAVENOUS PRN
Status: DISCONTINUED | OUTPATIENT
Start: 2019-10-19 | End: 2019-10-22 | Stop reason: HOSPADM

## 2019-10-19 RX ORDER — POLYETHYLENE GLYCOL 3350 17 G/17G
17 POWDER, FOR SOLUTION ORAL DAILY PRN
Status: DISCONTINUED | OUTPATIENT
Start: 2019-10-19 | End: 2019-10-22 | Stop reason: HOSPADM

## 2019-10-19 RX ORDER — DEXTROSE MONOHYDRATE 50 MG/ML
100 INJECTION, SOLUTION INTRAVENOUS PRN
Status: DISCONTINUED | OUTPATIENT
Start: 2019-10-19 | End: 2019-10-22 | Stop reason: HOSPADM

## 2019-10-19 RX ORDER — SODIUM CHLORIDE 0.9 % (FLUSH) 0.9 %
10 SYRINGE (ML) INJECTION EVERY 12 HOURS SCHEDULED
Status: DISCONTINUED | OUTPATIENT
Start: 2019-10-19 | End: 2019-10-22 | Stop reason: HOSPADM

## 2019-10-19 RX ORDER — NICOTINE 21 MG/24HR
1 PATCH, TRANSDERMAL 24 HOURS TRANSDERMAL DAILY
Status: DISCONTINUED | OUTPATIENT
Start: 2019-10-19 | End: 2019-10-20

## 2019-10-19 RX ORDER — CARVEDILOL 6.25 MG/1
12.5 TABLET ORAL 2 TIMES DAILY
Status: DISCONTINUED | OUTPATIENT
Start: 2019-10-19 | End: 2019-10-22 | Stop reason: HOSPADM

## 2019-10-19 RX ORDER — CHOLECALCIFEROL (VITAMIN D3) 10 MCG
1 TABLET ORAL DAILY
Status: DISCONTINUED | OUTPATIENT
Start: 2019-10-19 | End: 2019-10-22 | Stop reason: HOSPADM

## 2019-10-19 RX ORDER — ACETAMINOPHEN 325 MG/1
650 TABLET ORAL EVERY 4 HOURS PRN
Status: DISCONTINUED | OUTPATIENT
Start: 2019-10-19 | End: 2019-10-22 | Stop reason: HOSPADM

## 2019-10-19 RX ADMIN — Medication 10 ML: at 23:02

## 2019-10-19 RX ADMIN — INSULIN GLARGINE 25 UNITS: 100 INJECTION, SOLUTION SUBCUTANEOUS at 22:54

## 2019-10-19 RX ADMIN — HEPARIN SODIUM 1000 UNITS: 1000 INJECTION, SOLUTION INTRAVENOUS; SUBCUTANEOUS at 20:01

## 2019-10-19 RX ADMIN — VANCOMYCIN HYDROCHLORIDE 2000 MG: 1 INJECTION, POWDER, LYOPHILIZED, FOR SOLUTION INTRAVENOUS at 17:33

## 2019-10-19 RX ADMIN — CARVEDILOL 12.5 MG: 6.25 TABLET, FILM COATED ORAL at 22:54

## 2019-10-19 RX ADMIN — APIXABAN 2.5 MG: 2.5 TABLET, FILM COATED ORAL at 22:54

## 2019-10-19 RX ADMIN — VITAMIN D, TAB 1000IU (100/BT) 2000 UNITS: 25 TAB at 22:54

## 2019-10-19 RX ADMIN — HEPARIN SODIUM 3800 UNITS: 1000 INJECTION, SOLUTION INTRAVENOUS; SUBCUTANEOUS at 17:34

## 2019-10-19 RX ADMIN — CEFTAZIDIME 2 G: 1 INJECTION, POWDER, FOR SOLUTION INTRAMUSCULAR; INTRAVENOUS at 22:53

## 2019-10-19 ASSESSMENT — ENCOUNTER SYMPTOMS
VOMITING: 0
ABDOMINAL PAIN: 0
CONSTIPATION: 0
CHEST TIGHTNESS: 0
COUGH: 0
BACK PAIN: 0
SORE THROAT: 0
SHORTNESS OF BREATH: 1
DIARRHEA: 1
NAUSEA: 0
TROUBLE SWALLOWING: 0
RHINORRHEA: 0

## 2019-10-19 ASSESSMENT — PAIN SCALES - GENERAL
PAINLEVEL_OUTOF10: 0

## 2019-10-20 LAB
A/G RATIO: 0.8 (ref 1.1–2.2)
ALBUMIN SERPL-MCNC: 3.3 G/DL (ref 3.4–5)
ALP BLD-CCNC: 76 U/L (ref 40–129)
ALT SERPL-CCNC: <5 U/L (ref 10–40)
ANION GAP SERPL CALCULATED.3IONS-SCNC: 17 MMOL/L (ref 3–16)
AST SERPL-CCNC: 13 U/L (ref 15–37)
BASOPHILS ABSOLUTE: 0.1 K/UL (ref 0–0.2)
BASOPHILS RELATIVE PERCENT: 1.6 %
BILIRUB SERPL-MCNC: 0.3 MG/DL (ref 0–1)
BUN BLDV-MCNC: 39 MG/DL (ref 7–20)
CALCIUM SERPL-MCNC: 8.2 MG/DL (ref 8.3–10.6)
CHLORIDE BLD-SCNC: 101 MMOL/L (ref 99–110)
CO2: 19 MMOL/L (ref 21–32)
CREAT SERPL-MCNC: 6.5 MG/DL (ref 0.9–1.3)
EOSINOPHILS ABSOLUTE: 0.1 K/UL (ref 0–0.6)
EOSINOPHILS RELATIVE PERCENT: 2.6 %
ESTIMATED AVERAGE GLUCOSE: 191.5 MG/DL
GFR AFRICAN AMERICAN: 11
GFR NON-AFRICAN AMERICAN: 9
GLOBULIN: 3.9 G/DL
GLUCOSE BLD-MCNC: 110 MG/DL (ref 70–99)
GLUCOSE BLD-MCNC: 112 MG/DL (ref 70–99)
GLUCOSE BLD-MCNC: 145 MG/DL (ref 70–99)
GLUCOSE BLD-MCNC: 157 MG/DL (ref 70–99)
GLUCOSE BLD-MCNC: 84 MG/DL (ref 70–99)
HBA1C MFR BLD: 8.3 %
HCT VFR BLD CALC: 30.5 % (ref 40.5–52.5)
HEMOGLOBIN: 10.2 G/DL (ref 13.5–17.5)
LYMPHOCYTES ABSOLUTE: 1.3 K/UL (ref 1–5.1)
LYMPHOCYTES RELATIVE PERCENT: 22.2 %
MCH RBC QN AUTO: 32.8 PG (ref 26–34)
MCHC RBC AUTO-ENTMCNC: 33.4 G/DL (ref 31–36)
MCV RBC AUTO: 98.1 FL (ref 80–100)
MONOCYTES ABSOLUTE: 0.4 K/UL (ref 0–1.3)
MONOCYTES RELATIVE PERCENT: 7.2 %
NEUTROPHILS ABSOLUTE: 3.7 K/UL (ref 1.7–7.7)
NEUTROPHILS RELATIVE PERCENT: 66.4 %
PDW BLD-RTO: 19.7 % (ref 12.4–15.4)
PERFORMED ON: ABNORMAL
PLATELET # BLD: 299 K/UL (ref 135–450)
PMV BLD AUTO: 7.5 FL (ref 5–10.5)
POTASSIUM REFLEX MAGNESIUM: 4.6 MMOL/L (ref 3.5–5.1)
RBC # BLD: 3.11 M/UL (ref 4.2–5.9)
SODIUM BLD-SCNC: 137 MMOL/L (ref 136–145)
TOTAL PROTEIN: 7.2 G/DL (ref 6.4–8.2)
WBC # BLD: 5.6 K/UL (ref 4–11)

## 2019-10-20 PROCEDURE — 6370000000 HC RX 637 (ALT 250 FOR IP): Performed by: INTERNAL MEDICINE

## 2019-10-20 PROCEDURE — 80053 COMPREHEN METABOLIC PANEL: CPT

## 2019-10-20 PROCEDURE — 2580000003 HC RX 258: Performed by: INTERNAL MEDICINE

## 2019-10-20 PROCEDURE — 85025 COMPLETE CBC W/AUTO DIFF WBC: CPT

## 2019-10-20 PROCEDURE — 1200000000 HC SEMI PRIVATE

## 2019-10-20 PROCEDURE — 36415 COLL VENOUS BLD VENIPUNCTURE: CPT

## 2019-10-20 RX ADMIN — INSULIN LISPRO 1 UNITS: 100 INJECTION, SOLUTION INTRAVENOUS; SUBCUTANEOUS at 22:14

## 2019-10-20 RX ADMIN — APIXABAN 2.5 MG: 2.5 TABLET, FILM COATED ORAL at 09:24

## 2019-10-20 RX ADMIN — INSULIN GLARGINE 25 UNITS: 100 INJECTION, SOLUTION SUBCUTANEOUS at 10:44

## 2019-10-20 RX ADMIN — SEVELAMER CARBONATE 1600 MG: 800 TABLET, FILM COATED ORAL at 12:32

## 2019-10-20 RX ADMIN — VITAMIN D, TAB 1000IU (100/BT) 2000 UNITS: 25 TAB at 09:24

## 2019-10-20 RX ADMIN — Medication 10 ML: at 09:25

## 2019-10-20 RX ADMIN — INSULIN LISPRO 1 UNITS: 100 INJECTION, SOLUTION INTRAVENOUS; SUBCUTANEOUS at 10:48

## 2019-10-20 RX ADMIN — SEVELAMER CARBONATE 1600 MG: 800 TABLET, FILM COATED ORAL at 16:55

## 2019-10-20 RX ADMIN — Medication 10 ML: at 21:59

## 2019-10-20 RX ADMIN — SEVELAMER CARBONATE 1600 MG: 800 TABLET, FILM COATED ORAL at 09:24

## 2019-10-20 RX ADMIN — DILTIAZEM HYDROCHLORIDE 120 MG: 120 CAPSULE, COATED, EXTENDED RELEASE ORAL at 09:23

## 2019-10-20 RX ADMIN — PRAVASTATIN SODIUM 40 MG: 40 TABLET ORAL at 09:23

## 2019-10-20 RX ADMIN — APIXABAN 2.5 MG: 2.5 TABLET, FILM COATED ORAL at 21:58

## 2019-10-20 RX ADMIN — INSULIN GLARGINE 25 UNITS: 100 INJECTION, SOLUTION SUBCUTANEOUS at 22:14

## 2019-10-20 RX ADMIN — CARVEDILOL 12.5 MG: 6.25 TABLET, FILM COATED ORAL at 09:24

## 2019-10-20 RX ADMIN — NEPHROCAP 1 MG: 1 CAP ORAL at 09:24

## 2019-10-20 RX ADMIN — DILTIAZEM HYDROCHLORIDE 120 MG: 120 CAPSULE, COATED, EXTENDED RELEASE ORAL at 21:58

## 2019-10-20 RX ADMIN — CARVEDILOL 12.5 MG: 6.25 TABLET, FILM COATED ORAL at 21:58

## 2019-10-20 ASSESSMENT — PAIN SCALES - GENERAL
PAINLEVEL_OUTOF10: 0

## 2019-10-21 LAB
GLUCOSE BLD-MCNC: 126 MG/DL (ref 70–99)
GLUCOSE BLD-MCNC: 160 MG/DL (ref 70–99)
GLUCOSE BLD-MCNC: 166 MG/DL (ref 70–99)
GLUCOSE BLD-MCNC: 246 MG/DL (ref 70–99)
LV EF: 55 %
LVEF MODALITY: NORMAL
PERFORMED ON: ABNORMAL

## 2019-10-21 PROCEDURE — 6370000000 HC RX 637 (ALT 250 FOR IP): Performed by: INTERNAL MEDICINE

## 2019-10-21 PROCEDURE — 1200000000 HC SEMI PRIVATE

## 2019-10-21 PROCEDURE — 2580000003 HC RX 258: Performed by: INTERNAL MEDICINE

## 2019-10-21 PROCEDURE — 93306 TTE W/DOPPLER COMPLETE: CPT

## 2019-10-21 RX ADMIN — Medication 10 ML: at 10:17

## 2019-10-21 RX ADMIN — INSULIN LISPRO 2 UNITS: 100 INJECTION, SOLUTION INTRAVENOUS; SUBCUTANEOUS at 10:04

## 2019-10-21 RX ADMIN — INSULIN GLARGINE 25 UNITS: 100 INJECTION, SOLUTION SUBCUTANEOUS at 21:26

## 2019-10-21 RX ADMIN — VITAMIN D, TAB 1000IU (100/BT) 2000 UNITS: 25 TAB at 09:30

## 2019-10-21 RX ADMIN — INSULIN LISPRO 2 UNITS: 100 INJECTION, SOLUTION INTRAVENOUS; SUBCUTANEOUS at 13:58

## 2019-10-21 RX ADMIN — INSULIN GLARGINE 25 UNITS: 100 INJECTION, SOLUTION SUBCUTANEOUS at 10:04

## 2019-10-21 RX ADMIN — DILTIAZEM HYDROCHLORIDE 120 MG: 120 CAPSULE, COATED, EXTENDED RELEASE ORAL at 09:30

## 2019-10-21 RX ADMIN — CARVEDILOL 12.5 MG: 6.25 TABLET, FILM COATED ORAL at 21:24

## 2019-10-21 RX ADMIN — NEPHROCAP 1 MG: 1 CAP ORAL at 09:30

## 2019-10-21 RX ADMIN — INSULIN LISPRO 2 UNITS: 100 INJECTION, SOLUTION INTRAVENOUS; SUBCUTANEOUS at 21:25

## 2019-10-21 RX ADMIN — APIXABAN 2.5 MG: 2.5 TABLET, FILM COATED ORAL at 09:30

## 2019-10-21 RX ADMIN — APIXABAN 2.5 MG: 2.5 TABLET, FILM COATED ORAL at 21:24

## 2019-10-21 RX ADMIN — PRAVASTATIN SODIUM 40 MG: 40 TABLET ORAL at 09:30

## 2019-10-21 RX ADMIN — DILTIAZEM HYDROCHLORIDE 120 MG: 120 CAPSULE, COATED, EXTENDED RELEASE ORAL at 21:24

## 2019-10-21 RX ADMIN — Medication 10 ML: at 21:33

## 2019-10-21 ASSESSMENT — PAIN SCALES - GENERAL
PAINLEVEL_OUTOF10: 0

## 2019-10-22 VITALS
HEART RATE: 82 BPM | HEIGHT: 72 IN | BODY MASS INDEX: 37.77 KG/M2 | RESPIRATION RATE: 18 BRPM | WEIGHT: 278.88 LBS | SYSTOLIC BLOOD PRESSURE: 149 MMHG | TEMPERATURE: 97.8 F | DIASTOLIC BLOOD PRESSURE: 102 MMHG | OXYGEN SATURATION: 97 %

## 2019-10-22 LAB
A/G RATIO: 0.9 (ref 1.1–2.2)
ALBUMIN SERPL-MCNC: 3.3 G/DL (ref 3.4–5)
ALP BLD-CCNC: 72 U/L (ref 40–129)
ALT SERPL-CCNC: <5 U/L (ref 10–40)
ANION GAP SERPL CALCULATED.3IONS-SCNC: 17 MMOL/L (ref 3–16)
AST SERPL-CCNC: 10 U/L (ref 15–37)
BILIRUB SERPL-MCNC: 0.3 MG/DL (ref 0–1)
BUN BLDV-MCNC: 57 MG/DL (ref 7–20)
CALCIUM SERPL-MCNC: 7.8 MG/DL (ref 8.3–10.6)
CHLORIDE BLD-SCNC: 100 MMOL/L (ref 99–110)
CO2: 18 MMOL/L (ref 21–32)
CREAT SERPL-MCNC: 8.4 MG/DL (ref 0.9–1.3)
GFR AFRICAN AMERICAN: 8
GFR NON-AFRICAN AMERICAN: 7
GLOBULIN: 3.7 G/DL
GLUCOSE BLD-MCNC: 105 MG/DL (ref 70–99)
GLUCOSE BLD-MCNC: 116 MG/DL (ref 70–99)
GLUCOSE BLD-MCNC: 92 MG/DL (ref 70–99)
HCT VFR BLD CALC: 27 % (ref 40.5–52.5)
HEMOGLOBIN: 9.2 G/DL (ref 13.5–17.5)
MCH RBC QN AUTO: 33.2 PG (ref 26–34)
MCHC RBC AUTO-ENTMCNC: 34.2 G/DL (ref 31–36)
MCV RBC AUTO: 97.3 FL (ref 80–100)
PDW BLD-RTO: 19.4 % (ref 12.4–15.4)
PERFORMED ON: ABNORMAL
PERFORMED ON: ABNORMAL
PLATELET # BLD: 248 K/UL (ref 135–450)
PMV BLD AUTO: 7.6 FL (ref 5–10.5)
POTASSIUM SERPL-SCNC: 4.7 MMOL/L (ref 3.5–5.1)
RBC # BLD: 2.77 M/UL (ref 4.2–5.9)
SODIUM BLD-SCNC: 135 MMOL/L (ref 136–145)
TOTAL PROTEIN: 7 G/DL (ref 6.4–8.2)
WBC # BLD: 5.8 K/UL (ref 4–11)

## 2019-10-22 PROCEDURE — 85027 COMPLETE CBC AUTOMATED: CPT

## 2019-10-22 PROCEDURE — 6370000000 HC RX 637 (ALT 250 FOR IP): Performed by: INTERNAL MEDICINE

## 2019-10-22 PROCEDURE — 02HV33Z INSERTION OF INFUSION DEVICE INTO SUPERIOR VENA CAVA, PERCUTANEOUS APPROACH: ICD-10-PCS | Performed by: INTERNAL MEDICINE

## 2019-10-22 PROCEDURE — 90935 HEMODIALYSIS ONE EVALUATION: CPT

## 2019-10-22 PROCEDURE — 2580000003 HC RX 258: Performed by: INTERNAL MEDICINE

## 2019-10-22 PROCEDURE — 36415 COLL VENOUS BLD VENIPUNCTURE: CPT

## 2019-10-22 PROCEDURE — 80053 COMPREHEN METABOLIC PANEL: CPT

## 2019-10-22 PROCEDURE — G0365 VESSEL MAPPING HEMO ACCESS: HCPCS

## 2019-10-22 RX ADMIN — DILTIAZEM HYDROCHLORIDE 120 MG: 120 CAPSULE, COATED, EXTENDED RELEASE ORAL at 14:20

## 2019-10-22 RX ADMIN — APIXABAN 2.5 MG: 2.5 TABLET, FILM COATED ORAL at 14:12

## 2019-10-22 RX ADMIN — NEPHROCAP 1 MG: 1 CAP ORAL at 14:11

## 2019-10-22 RX ADMIN — CARVEDILOL 12.5 MG: 6.25 TABLET, FILM COATED ORAL at 14:14

## 2019-10-22 RX ADMIN — PRAVASTATIN SODIUM 40 MG: 40 TABLET ORAL at 14:13

## 2019-10-22 RX ADMIN — VITAMIN D, TAB 1000IU (100/BT) 2000 UNITS: 25 TAB at 14:13

## 2019-10-22 RX ADMIN — INSULIN GLARGINE 25 UNITS: 100 INJECTION, SOLUTION SUBCUTANEOUS at 08:49

## 2019-10-22 RX ADMIN — Medication 10 ML: at 14:18

## 2019-10-22 ASSESSMENT — PAIN SCALES - GENERAL
PAINLEVEL_OUTOF10: 7
PAINLEVEL_OUTOF10: 0

## 2019-10-22 ASSESSMENT — PAIN DESCRIPTION - LOCATION
LOCATION: GENERALIZED
LOCATION: GENERALIZED

## 2019-10-24 LAB — BLOOD CULTURE, ROUTINE: NORMAL

## 2019-11-07 ENCOUNTER — TELEPHONE (OUTPATIENT)
Dept: VASCULAR SURGERY | Age: 59
End: 2019-11-07

## 2019-11-12 ENCOUNTER — TELEPHONE (OUTPATIENT)
Dept: VASCULAR SURGERY | Age: 59
End: 2019-11-12

## 2019-11-15 ENCOUNTER — OFFICE VISIT (OUTPATIENT)
Dept: VASCULAR SURGERY | Age: 59
End: 2019-11-15
Payer: MEDICARE

## 2019-11-15 VITALS
SYSTOLIC BLOOD PRESSURE: 130 MMHG | DIASTOLIC BLOOD PRESSURE: 62 MMHG | BODY MASS INDEX: 37.65 KG/M2 | HEIGHT: 72 IN | WEIGHT: 278 LBS

## 2019-11-15 DIAGNOSIS — N18.6 ENCOUNTER REGARDING VASCULAR ACCESS FOR DIALYSIS FOR ESRD (HCC): Primary | ICD-10-CM

## 2019-11-15 DIAGNOSIS — Z99.2 ENCOUNTER REGARDING VASCULAR ACCESS FOR DIALYSIS FOR ESRD (HCC): Primary | ICD-10-CM

## 2019-11-15 PROCEDURE — 99214 OFFICE O/P EST MOD 30 MIN: CPT | Performed by: SURGERY

## 2019-11-15 PROCEDURE — G8598 ASA/ANTIPLAT THER USED: HCPCS | Performed by: SURGERY

## 2019-11-15 PROCEDURE — G8427 DOCREV CUR MEDS BY ELIG CLIN: HCPCS | Performed by: SURGERY

## 2019-11-15 PROCEDURE — 4004F PT TOBACCO SCREEN RCVD TLK: CPT | Performed by: SURGERY

## 2019-11-15 PROCEDURE — G8417 CALC BMI ABV UP PARAM F/U: HCPCS | Performed by: SURGERY

## 2019-11-15 PROCEDURE — 3017F COLORECTAL CA SCREEN DOC REV: CPT | Performed by: SURGERY

## 2019-11-15 PROCEDURE — G8484 FLU IMMUNIZE NO ADMIN: HCPCS | Performed by: SURGERY

## 2019-11-15 PROCEDURE — 1111F DSCHRG MED/CURRENT MED MERGE: CPT | Performed by: SURGERY

## 2019-11-15 RX ORDER — HYDRALAZINE HYDROCHLORIDE 100 MG/1
100 TABLET, FILM COATED ORAL 2 TIMES DAILY
COMMUNITY
End: 2021-02-23

## 2019-11-15 RX ORDER — ISOSORBIDE MONONITRATE 60 MG/1
60 TABLET, EXTENDED RELEASE ORAL DAILY
COMMUNITY
End: 2020-05-16

## 2019-11-20 ENCOUNTER — TELEPHONE (OUTPATIENT)
Dept: VASCULAR SURGERY | Age: 59
End: 2019-11-20

## 2019-11-21 ENCOUNTER — HOSPITAL ENCOUNTER (OUTPATIENT)
Dept: CARDIAC CATH/INVASIVE PROCEDURES | Age: 59
Discharge: HOME | End: 2019-11-21
Attending: SURGERY | Admitting: SURGERY
Payer: MEDICARE

## 2019-11-21 VITALS — BODY MASS INDEX: 38.26 KG/M2 | HEIGHT: 72 IN | WEIGHT: 282.5 LBS

## 2019-11-21 LAB
ANION GAP SERPL CALCULATED.3IONS-SCNC: 16 MMOL/L (ref 3–16)
BUN BLDV-MCNC: 53 MG/DL (ref 7–20)
CALCIUM SERPL-MCNC: 8.2 MG/DL (ref 8.3–10.6)
CHLORIDE BLD-SCNC: 102 MMOL/L (ref 99–110)
CO2: 22 MMOL/L (ref 21–32)
CREAT SERPL-MCNC: 6.7 MG/DL (ref 0.9–1.3)
GFR AFRICAN AMERICAN: 10
GFR NON-AFRICAN AMERICAN: 9
GLUCOSE BLD-MCNC: 100 MG/DL (ref 70–99)
HCT VFR BLD CALC: 31.9 % (ref 40.5–52.5)
HEMOGLOBIN: 10.5 G/DL (ref 13.5–17.5)
INR BLD: 1 (ref 0.86–1.14)
MCH RBC QN AUTO: 34.3 PG (ref 26–34)
MCHC RBC AUTO-ENTMCNC: 32.8 G/DL (ref 31–36)
MCV RBC AUTO: 104.5 FL (ref 80–100)
PDW BLD-RTO: 21.2 % (ref 12.4–15.4)
PLATELET # BLD: 269 K/UL (ref 135–450)
PMV BLD AUTO: 7.4 FL (ref 5–10.5)
POTASSIUM SERPL-SCNC: 5 MMOL/L (ref 3.5–5.1)
PROTHROMBIN TIME: 11.6 SEC (ref 10–13.2)
RBC # BLD: 3.05 M/UL (ref 4.2–5.9)
SODIUM BLD-SCNC: 140 MMOL/L (ref 136–145)
WBC # BLD: 5.9 K/UL (ref 4–11)

## 2019-11-21 PROCEDURE — 6360000004 HC RX CONTRAST MEDICATION

## 2019-11-21 PROCEDURE — 80048 BASIC METABOLIC PNL TOTAL CA: CPT

## 2019-11-21 PROCEDURE — C1769 GUIDE WIRE: HCPCS

## 2019-11-21 PROCEDURE — 6360000002 HC RX W HCPCS

## 2019-11-21 PROCEDURE — C1887 CATHETER, GUIDING: HCPCS

## 2019-11-21 PROCEDURE — 2709999900 HC NON-CHARGEABLE SUPPLY

## 2019-11-21 PROCEDURE — 85027 COMPLETE CBC AUTOMATED: CPT

## 2019-11-21 PROCEDURE — 99152 MOD SED SAME PHYS/QHP 5/>YRS: CPT | Performed by: SURGERY

## 2019-11-21 PROCEDURE — 85610 PROTHROMBIN TIME: CPT

## 2019-11-21 PROCEDURE — 2780000010 HC IMPLANT OTHER

## 2019-11-21 PROCEDURE — C9755 RF MAGNETIC-GUIDE AV FISTULA: HCPCS

## 2019-11-21 PROCEDURE — 2580000003 HC RX 258

## 2019-11-21 PROCEDURE — 2500000003 HC RX 250 WO HCPCS

## 2019-11-21 PROCEDURE — 37799 UNLISTED PX VASCULAR SURGERY: CPT | Performed by: SURGERY

## 2019-11-21 PROCEDURE — 2720000010 HC SURG SUPPLY STERILE

## 2019-11-21 PROCEDURE — C1894 INTRO/SHEATH, NON-LASER: HCPCS

## 2019-11-21 RX ORDER — SODIUM CHLORIDE 9 MG/ML
INJECTION, SOLUTION INTRAVENOUS CONTINUOUS
Status: DISCONTINUED | OUTPATIENT
Start: 2019-11-21 | End: 2019-11-21 | Stop reason: HOSPADM

## 2019-11-21 RX ORDER — FENTANYL CITRATE 50 UG/ML
100 INJECTION, SOLUTION INTRAMUSCULAR; INTRAVENOUS ONCE
Status: COMPLETED | OUTPATIENT
Start: 2019-11-21 | End: 2019-11-21

## 2019-11-21 RX ORDER — MIDAZOLAM HYDROCHLORIDE 1 MG/ML
1 INJECTION INTRAMUSCULAR; INTRAVENOUS ONCE
Status: COMPLETED | OUTPATIENT
Start: 2019-11-21 | End: 2019-11-21

## 2019-11-21 RX ORDER — MIDAZOLAM HYDROCHLORIDE 1 MG/ML
2 INJECTION INTRAMUSCULAR; INTRAVENOUS ONCE
Status: COMPLETED | OUTPATIENT
Start: 2019-11-21 | End: 2019-11-21

## 2019-11-21 RX ORDER — FENTANYL CITRATE 50 UG/ML
25 INJECTION, SOLUTION INTRAMUSCULAR; INTRAVENOUS ONCE
Status: COMPLETED | OUTPATIENT
Start: 2019-11-21 | End: 2019-11-21

## 2019-11-21 RX ADMIN — MIDAZOLAM HYDROCHLORIDE 1 MG: 1 INJECTION INTRAMUSCULAR; INTRAVENOUS at 10:36

## 2019-11-21 RX ADMIN — FENTANYL CITRATE 100 MCG: 50 INJECTION, SOLUTION INTRAMUSCULAR; INTRAVENOUS at 10:25

## 2019-11-21 RX ADMIN — SODIUM CHLORIDE 30 ML/HR: 9 INJECTION, SOLUTION INTRAVENOUS at 08:27

## 2019-11-21 RX ADMIN — MIDAZOLAM HYDROCHLORIDE 1 MG: 1 INJECTION INTRAMUSCULAR; INTRAVENOUS at 11:08

## 2019-11-21 RX ADMIN — FENTANYL CITRATE 25 MCG: 50 INJECTION, SOLUTION INTRAMUSCULAR; INTRAVENOUS at 10:36

## 2019-11-21 RX ADMIN — FENTANYL CITRATE 25 MCG: 50 INJECTION, SOLUTION INTRAMUSCULAR; INTRAVENOUS at 10:52

## 2019-11-21 RX ADMIN — FENTANYL CITRATE 25 MCG: 50 INJECTION, SOLUTION INTRAMUSCULAR; INTRAVENOUS at 11:19

## 2019-11-21 RX ADMIN — MIDAZOLAM HYDROCHLORIDE 2 MG: 1 INJECTION INTRAMUSCULAR; INTRAVENOUS at 10:26

## 2019-11-21 RX ADMIN — MIDAZOLAM HYDROCHLORIDE 1 MG: 1 INJECTION INTRAMUSCULAR; INTRAVENOUS at 10:28

## 2019-11-21 RX ADMIN — FENTANYL CITRATE 25 MCG: 50 INJECTION, SOLUTION INTRAMUSCULAR; INTRAVENOUS at 11:09

## 2019-11-21 RX ADMIN — MIDAZOLAM HYDROCHLORIDE 1 MG: 1 INJECTION INTRAMUSCULAR; INTRAVENOUS at 10:52

## 2019-11-21 ASSESSMENT — PAIN SCALES - GENERAL
PAINLEVEL_OUTOF10: 0

## 2019-11-22 ENCOUNTER — TELEPHONE (OUTPATIENT)
Dept: VASCULAR SURGERY | Age: 59
End: 2019-11-22

## 2019-11-22 DIAGNOSIS — Z99.2 ENCOUNTER REGARDING VASCULAR ACCESS FOR DIALYSIS FOR ESRD (HCC): Primary | ICD-10-CM

## 2019-11-22 DIAGNOSIS — N18.6 ENCOUNTER REGARDING VASCULAR ACCESS FOR DIALYSIS FOR ESRD (HCC): Primary | ICD-10-CM

## 2019-11-25 ENCOUNTER — TELEPHONE (OUTPATIENT)
Dept: VASCULAR SURGERY | Age: 59
End: 2019-11-25

## 2019-12-04 ENCOUNTER — HOSPITAL ENCOUNTER (OUTPATIENT)
Dept: VASCULAR LAB | Age: 59
Discharge: HOME | End: 2019-12-04
Payer: MEDICARE

## 2019-12-04 DIAGNOSIS — N18.6 ENCOUNTER REGARDING VASCULAR ACCESS FOR DIALYSIS FOR ESRD (HCC): ICD-10-CM

## 2019-12-04 DIAGNOSIS — Z99.2 ENCOUNTER REGARDING VASCULAR ACCESS FOR DIALYSIS FOR ESRD (HCC): ICD-10-CM

## 2019-12-04 PROCEDURE — 93990 DOPPLER FLOW TESTING: CPT

## 2019-12-13 ENCOUNTER — OFFICE VISIT (OUTPATIENT)
Dept: VASCULAR SURGERY | Age: 59
End: 2019-12-13

## 2019-12-13 VITALS
WEIGHT: 280 LBS | DIASTOLIC BLOOD PRESSURE: 68 MMHG | HEIGHT: 72 IN | BODY MASS INDEX: 37.93 KG/M2 | SYSTOLIC BLOOD PRESSURE: 110 MMHG

## 2019-12-13 DIAGNOSIS — Z09 POSTOP CHECK: Primary | ICD-10-CM

## 2019-12-13 PROCEDURE — 99024 POSTOP FOLLOW-UP VISIT: CPT | Performed by: SURGERY

## 2020-03-25 PROBLEM — E87.5 HYPERKALEMIA: Status: RESOLVED | Noted: 2017-07-25 | Resolved: 2020-03-24

## 2020-03-25 PROBLEM — N17.9 ACUTE RENAL FAILURE (ARF) (HCC): Status: RESOLVED | Noted: 2017-07-25 | Resolved: 2020-03-24

## 2020-05-16 ENCOUNTER — APPOINTMENT (OUTPATIENT)
Dept: GENERAL RADIOLOGY | Age: 60
End: 2020-05-16
Payer: MEDICARE

## 2020-05-16 ENCOUNTER — HOSPITAL ENCOUNTER (EMERGENCY)
Age: 60
Discharge: HOME | End: 2020-05-17
Attending: EMERGENCY MEDICINE
Payer: MEDICARE

## 2020-05-16 VITALS
BODY MASS INDEX: 34.81 KG/M2 | DIASTOLIC BLOOD PRESSURE: 80 MMHG | WEIGHT: 257 LBS | HEART RATE: 95 BPM | RESPIRATION RATE: 16 BRPM | HEIGHT: 72 IN | OXYGEN SATURATION: 97 % | SYSTOLIC BLOOD PRESSURE: 102 MMHG | TEMPERATURE: 99.6 F

## 2020-05-16 PROCEDURE — 73110 X-RAY EXAM OF WRIST: CPT

## 2020-05-16 PROCEDURE — 6360000002 HC RX W HCPCS: Performed by: NURSE PRACTITIONER

## 2020-05-16 PROCEDURE — 6370000000 HC RX 637 (ALT 250 FOR IP): Performed by: NURSE PRACTITIONER

## 2020-05-16 PROCEDURE — 99283 EMERGENCY DEPT VISIT LOW MDM: CPT

## 2020-05-16 RX ORDER — DOXYCYCLINE 100 MG/1
100 TABLET ORAL 2 TIMES DAILY
Qty: 20 TABLET | Refills: 0 | Status: SHIPPED | OUTPATIENT
Start: 2020-05-16 | End: 2020-05-26

## 2020-05-16 RX ORDER — DEXAMETHASONE 4 MG/1
4 TABLET ORAL ONCE
Status: COMPLETED | OUTPATIENT
Start: 2020-05-16 | End: 2020-05-16

## 2020-05-16 RX ORDER — ACETAMINOPHEN 325 MG/1
650 TABLET ORAL ONCE
Status: COMPLETED | OUTPATIENT
Start: 2020-05-16 | End: 2020-05-16

## 2020-05-16 RX ORDER — DOXYCYCLINE HYCLATE 100 MG
100 TABLET ORAL ONCE
Status: COMPLETED | OUTPATIENT
Start: 2020-05-16 | End: 2020-05-16

## 2020-05-16 RX ADMIN — DEXAMETHASONE 4 MG: 4 TABLET ORAL at 22:54

## 2020-05-16 RX ADMIN — DOXYCYCLINE HYCLATE 100 MG: 100 TABLET, COATED ORAL at 22:54

## 2020-05-16 RX ADMIN — ACETAMINOPHEN 650 MG: 325 TABLET ORAL at 21:02

## 2020-05-16 ASSESSMENT — PAIN DESCRIPTION - LOCATION: LOCATION: WRIST

## 2020-05-16 ASSESSMENT — PAIN SCALES - GENERAL
PAINLEVEL_OUTOF10: 4
PAINLEVEL_OUTOF10: 10

## 2020-05-16 ASSESSMENT — ENCOUNTER SYMPTOMS
SORE THROAT: 0
SHORTNESS OF BREATH: 0
COLOR CHANGE: 0
ABDOMINAL PAIN: 0
RHINORRHEA: 0

## 2020-05-17 NOTE — ED PROVIDER NOTES
[address] ED  EMERGENCY DEPARTMENT ENCOUNTER        Pt Name: Kelsie Mosquera  MRN: [id number]  [place] 1960  Dateof evaluation: 5/16/2020  Provider: GIANLUCA Nina CNP  PCP: GIANLUCA Bryant CNP  ED Attending: No att. providers found    Sly Stevenswathi       Chief Complaint   Patient presents with    Wrist Pain     Pt states woke up and left wrist was hurting and unable to move it, no known injury. States pain does shoot up arm. Pt is dialysis patient, has fistula on left side. Pt has not taken any pain meds. HISTORY OF PRESENTILLNESS   (Location/Symptom, Timing/Onset, Context/Setting, Quality, Duration, Modifying Factors, Severity)  Note limiting factors. Kelsie Mosquera is a 63 y.o. male for left wrist. Onset was today. Duration has been since the onset. Context includes pt states he woke up this am with left sided wrist pain. Pt denies any injury. Alleviating factors include nothing. Aggravating factors include nothing. Pain is 10/10. nothing has been used for pain today. Nursing Notes were all reviewed and agreed with or any disagreements were addressed  in the HPI. REVIEW OF SYSTEMS    (2-9 systems for level 4, 10 or more for level 5)     Review of Systems   Constitutional: Negative for fever. HENT: Negative for congestion, rhinorrhea and sore throat. Respiratory: Negative for shortness of breath. Cardiovascular: Negative for chest pain. Gastrointestinal: Negative for abdominal pain. Genitourinary: Negative for decreased urine volume and difficulty urinating. Musculoskeletal: Negative for arthralgias and myalgias. Left wrist pain   Skin: Negative for color change and rash. Neurological: Negative for dizziness and light-headedness. Psychiatric/Behavioral: Negative for agitation. All other systems reviewed and are negative. Positives and Pertinent negatives as per HPI.   Except as noted above in the ROS, all other systems were reviewed and negative.        PAST MEDICAL HISTORY     Past Medical History:   Diagnosis Date    [facility] fibrillation ([facility]     CAD (coronary artery disease)     Cardiomyopathy ([facility] 7/13/2015    [facility]     Chronic kidney disease     COPD (chronic obstructive pulmonary disease) [facility]     [facility] 8[facility]7/2012    Depression     Diabetes mellitus ([facility])     Gastrointestinal bleed     GERD (gastroesophageal reflux disease)     Heme positive stool 01/2017    Hemodialysis patient ([facility]     History of blood transfusion     1/2017    Hx of blood clots     left leg; 5 plus years ago    Hyperlipidemia     Hypertension     Hypoglycemia     MDRO (multiple drug resistant organisms) resistance 2017    treated with antibiotics    MRSA (methicillin resistant staph aureus) culture positive 9/26/17,09/18/2017    posterior left calf    Primary osteoarthritis of both knees     Primary osteoarthritis of both knees          SURGICAL HISTORY       Past Surgical History:   Procedure Laterality Date    CARDIAC CATHETERIZATION  07/12/2011    COLONOSCOPY  01/17/2017    diverticulosis    FRACTURE SURGERY Left     ankle; age 13    HAND SURGERY      KIDNEY BIOPSY Right 06/20/2016    KIDNEY BIOPSY Left 09/20/2016    OPEN TREATMENT RADIAL SHAFT FRACTURE Right 8/7/2018    OPEN REDUCTION INTERNAL FIXATION RIGHT DISTAL RADIUS, RIGHT CARPAL TUNNEL RELEASE performed by Poncho Wang MD at [place] [address] Right 11/09/2017    BARD POWER PORT    UPPER GASTROINTESTINAL ENDOSCOPY  01/17/2017    mild chronic gastritis; duodenal biopsy normal    UPPER GASTROINTESTINAL ENDOSCOPY  10/24/2017         CURRENT MEDICATIONS       Previous Medications    AGAMATRIX ULTRA-THIN LANCETS MISC    Check sugars bid    APIXABAN (ELIQUIS) 2.5 MG TABS TABLET    Take 2.5 mg by mouth 2 times daily Take two tablets BID    BLOOD GLUCOSE MONITORING SUPPL

## 2020-08-27 ENCOUNTER — TELEPHONE (OUTPATIENT)
Dept: VASCULAR SURGERY | Age: 60
End: 2020-08-27

## 2020-08-27 NOTE — TELEPHONE ENCOUNTER
Called patient regarding scheduling a fistulagram next Tuesday 9/1/20 at [facility]. Arrive at 9:30am for a 11:30am. Will check with Ania Callahan regarding moving dialysis once patient calls me back if can do this. [email or web address]

## 2020-08-27 NOTE — TELEPHONE ENCOUNTER
Called patient regarding arterial duplex per Dr Elizabeth Greene. Looks okay. Repeat in 6 months. [email or web address]

## 2020-09-01 ENCOUNTER — HOSPITAL ENCOUNTER (OUTPATIENT)
Dept: CARDIAC CATH/INVASIVE PROCEDURES | Age: 60
Discharge: HOME | End: 2020-09-01
Attending: SURGERY | Admitting: SURGERY
Payer: MEDICARE

## 2020-09-01 VITALS — HEIGHT: 72 IN | BODY MASS INDEX: 38.99 KG/M2 | WEIGHT: 287.9 LBS

## 2020-09-01 LAB
A/G RATIO: 1.2 (ref 1.1–2.2)
ALBUMIN SERPL-MCNC: 4.3 G/DL (ref 3.4–5)
ALP BLD-CCNC: 91 U/L (ref 40–129)
ALT SERPL-CCNC: 16 U/L (ref 10–40)
ANION GAP SERPL CALCULATED.3IONS-SCNC: 18 MMOL/L (ref 3–16)
AST SERPL-CCNC: 17 U/L (ref 15–37)
BILIRUB SERPL-MCNC: 0.4 MG/DL (ref 0–1)
BUN BLDV-MCNC: 64 MG/DL (ref 7–20)
CALCIUM SERPL-MCNC: 8.6 MG/DL (ref 8.3–10.6)
CHLORIDE BLD-SCNC: 91 MMOL/L (ref 99–110)
CO2: 26 MMOL/L (ref 21–32)
CREAT SERPL-MCNC: 8.3 MG/DL (ref 0.9–1.3)
GFR AFRICAN AMERICAN: 8
GFR NON-AFRICAN AMERICAN: 7
GLOBULIN: 3.7 G/DL
GLUCOSE BLD-MCNC: 298 MG/DL (ref 70–99)
HCT VFR BLD CALC: 33.2 % (ref 40.5–52.5)
HEMATOLOGY PATH CONSULT: NORMAL
HEMATOLOGY PATH CONSULT: YES
HEMOGLOBIN: 11.3 G/DL (ref 13.5–17.5)
INR BLD: 1.02 (ref 0.86–1.14)
MCH RBC QN AUTO: 39.1 PG (ref 26–34)
MCHC RBC AUTO-ENTMCNC: 34.1 G/DL (ref 31–36)
MCV RBC AUTO: 114.6 FL (ref 80–100)
PDW BLD-RTO: 16.2 % (ref 12.4–15.4)
PLATELET # BLD: 258 K/UL (ref 135–450)
PMV BLD AUTO: 7.3 FL (ref 5–10.5)
POTASSIUM SERPL-SCNC: 4.2 MMOL/L (ref 3.5–5.1)
PROTHROMBIN TIME: 11.8 SEC (ref 10–13.2)
RBC # BLD: 2.9 M/UL (ref 4.2–5.9)
SLIDE REVIEW: ABNORMAL
SODIUM BLD-SCNC: 135 MMOL/L (ref 136–145)
TOTAL PROTEIN: 8 G/DL (ref 6.4–8.2)
WBC # BLD: 6.3 K/UL (ref 4–11)

## 2020-09-01 PROCEDURE — 85027 COMPLETE CBC AUTOMATED: CPT

## 2020-09-01 PROCEDURE — 99152 MOD SED SAME PHYS/QHP 5/>YRS: CPT | Performed by: SURGERY

## 2020-09-01 PROCEDURE — 99152 MOD SED SAME PHYS/QHP 5/>YRS: CPT

## 2020-09-01 PROCEDURE — 99153 MOD SED SAME PHYS/QHP EA: CPT

## 2020-09-01 PROCEDURE — C1725 CATH, TRANSLUMIN NON-LASER: HCPCS

## 2020-09-01 PROCEDURE — 2500000003 HC RX 250 WO HCPCS

## 2020-09-01 PROCEDURE — C1887 CATHETER, GUIDING: HCPCS

## 2020-09-01 PROCEDURE — 36415 COLL VENOUS BLD VENIPUNCTURE: CPT

## 2020-09-01 PROCEDURE — 85610 PROTHROMBIN TIME: CPT

## 2020-09-01 PROCEDURE — 76937 US GUIDE VASCULAR ACCESS: CPT | Performed by: SURGERY

## 2020-09-01 PROCEDURE — 80053 COMPREHEN METABOLIC PANEL: CPT

## 2020-09-01 PROCEDURE — C1894 INTRO/SHEATH, NON-LASER: HCPCS

## 2020-09-01 PROCEDURE — 2709999900 HC NON-CHARGEABLE SUPPLY

## 2020-09-01 PROCEDURE — 6360000002 HC RX W HCPCS

## 2020-09-01 PROCEDURE — C1773 RET DEV, INSERTABLE: HCPCS

## 2020-09-01 PROCEDURE — 36905 THRMBC/NFS DIALYSIS CIRCUIT: CPT

## 2020-09-01 PROCEDURE — 36905 THRMBC/NFS DIALYSIS CIRCUIT: CPT | Performed by: SURGERY

## 2020-09-01 PROCEDURE — C1769 GUIDE WIRE: HCPCS

## 2020-09-01 RX ORDER — FENTANYL CITRATE 50 UG/ML
INJECTION, SOLUTION INTRAMUSCULAR; INTRAVENOUS
Status: COMPLETED | OUTPATIENT
Start: 2020-09-01 | End: 2020-09-01

## 2020-09-01 RX ORDER — MIDAZOLAM HYDROCHLORIDE 5 MG/ML
INJECTION INTRAMUSCULAR; INTRAVENOUS
Status: COMPLETED | OUTPATIENT
Start: 2020-09-01 | End: 2020-09-01

## 2020-09-01 RX ORDER — SODIUM CHLORIDE 9 MG/ML
INJECTION, SOLUTION INTRAVENOUS CONTINUOUS
Status: DISCONTINUED | OUTPATIENT
Start: 2020-09-01 | End: 2020-09-02 | Stop reason: HOSPADM

## 2020-09-01 RX ORDER — RENO CAPS 100; 1.5; 1.7; 20; 10; 1; 150; 5; 6 MG/1; MG/1; MG/1; MG/1; MG/1; MG/1; UG/1; MG/1; UG/1
1 CAPSULE ORAL DAILY
COMMUNITY

## 2020-09-01 RX ORDER — HEPARIN SODIUM 1000 [USP'U]/ML
INJECTION, SOLUTION INTRAVENOUS; SUBCUTANEOUS
Status: COMPLETED | OUTPATIENT
Start: 2020-09-01 | End: 2020-09-01

## 2020-09-01 RX ADMIN — FENTANYL CITRATE 25 MCG: 50 INJECTION, SOLUTION INTRAMUSCULAR; INTRAVENOUS at 13:32

## 2020-09-01 RX ADMIN — FENTANYL CITRATE 75 MCG: 50 INJECTION, SOLUTION INTRAMUSCULAR; INTRAVENOUS at 12:40

## 2020-09-01 RX ADMIN — HEPARIN SODIUM 1000 UNITS: 1000 INJECTION, SOLUTION INTRAVENOUS; SUBCUTANEOUS at 13:21

## 2020-09-01 RX ADMIN — MIDAZOLAM HYDROCHLORIDE 2 MG: 5 INJECTION INTRAMUSCULAR; INTRAVENOUS at 12:40

## 2020-09-02 NOTE — OP NOTE
without  calcification. Using direct ultrasound visualization, the ulnar artery  at the wrist was cannulated and a 4/5 Citizen of [place] slender sheath was placed  in the ulnar artery. A cocktail of 2500 units of heparin, 2.5 mg of  verapamil and 250 mcg of nitroglycerin was slowly flushed through the  sideport of the sheath into the ulnar artery. Fistulogram was then  performed with retrograde injection in the ulnar artery demonstrating a  widely patent ulnar artery to ulnar vein anastomosis with flow in  through a  vein and primary outflow through a cephalic vein. There was filling of the basilic vein; however, this appeared to be via  collaterals not directly through a median cubital vein. There was mild  thrombus at a valve in the mid cephalic vein in approximately the mid  upper arm. There was however, good flow through the cephalic vein. Ultrasound was used to assess the cephalic vein and the area of thrombus  was noted on ultrasound. At this point, I did not feel I could  negotiate across the anastomosis through the perforating vein and into  the cephalic vein. Therefore using direct ultrasound guidance, the  cephalic vein at antecubital region was accessed. The vein here was as  noted on angiography and by ultrasound was patent and compressible and  free of thrombus. A 6-Citizen of [place] introducer sheath was placed. A direct  injection in the cephalic vein was performed identifying the area of  thrombus and then used multiple balloons to attempt to macerate the  thrombus, some of this was macerated and pulverized. There was an area  of residual thrombus. I thus used a  catheter and this was  advanced to the area of thrombus and it was deployed and the mechanical  catheter was turned in attempt to further macerate the thrombus and  remove it. A large portion of this thrombus remained; however, despite  multiple attempts, there still is a small area of residual thrombus.    There was some spasm in the distal vein. This was dilated using 6 mm  balloon. There was now improved flow across the cephalic vein. It was  widely patent from the antecubital region all the way draining into the  subclavian vein. The cephalic arch was intact with no evidence of  stenosis. There were several collaterals from the cephalic vein to the  basilic vein thus reconstituting the basilic vein, but as stated, there  was no direct flow from the fistula at the ulnar artery and ulnar vein  to the basilic vein. I did not feel there was any need to  thrombectomize the proximal portion of the basilic vein as this was not  being cannulated. The catheters were then removed. The venous sheath  was removed and direct pressure was held over the puncture site for  approximately 10 minutes achieving hemostasis. A compression band was  placed over the ulnar artery access site, this sheath was removed. The  patient was then transferred to the recovery area, where the compression  band will be slowly deflated and removed. There were no complications  to this procedure. At the end of procedure, the patient was transferred  to the recovery area. Estimated blood loss was minimal.    Angiographic findings were as noted throughout the body of this report  with the end result being a patent fistula and patent primary outflow  via cephalic vein with a small amount of residual thrombus that appeared  to be chronic and well attached to the wall, this is at a valve site. However, there is brisk flow across this region and no obvious  impairment to venous outflow. Ultrasound images obtained for access were saved and placed in patients record.          Perry Klein MD    D: 09/01/2020 17:47:11       T: 09/01/2020 17:53:53     [email or web address]  Job#: [id number]     Doc#: [id number]    CC:

## 2021-01-01 ENCOUNTER — CLINICAL DOCUMENTATION (OUTPATIENT)
Dept: OTHER | Age: 61
End: 2021-01-01

## 2021-01-01 ENCOUNTER — TELEPHONE (OUTPATIENT)
Dept: ENT CLINIC | Age: 61
End: 2021-01-01

## 2021-01-01 ENCOUNTER — TELEPHONE (OUTPATIENT)
Dept: CARDIOLOGY CLINIC | Age: 61
End: 2021-01-01

## 2021-01-01 ENCOUNTER — OFFICE VISIT (OUTPATIENT)
Dept: CARDIOLOGY CLINIC | Age: 61
End: 2021-01-01
Payer: COMMERCIAL

## 2021-01-01 VITALS
SYSTOLIC BLOOD PRESSURE: 102 MMHG | HEART RATE: 83 BPM | WEIGHT: 285 LBS | DIASTOLIC BLOOD PRESSURE: 68 MMHG | OXYGEN SATURATION: 98 % | BODY MASS INDEX: 38.6 KG/M2 | HEIGHT: 72 IN

## 2021-01-01 DIAGNOSIS — I50.32 CHRONIC DIASTOLIC CONGESTIVE HEART FAILURE (HCC): ICD-10-CM

## 2021-01-01 DIAGNOSIS — I25.5 ISCHEMIC CARDIOMYOPATHY: ICD-10-CM

## 2021-01-01 DIAGNOSIS — I25.10 CORONARY ARTERY DISEASE INVOLVING NATIVE CORONARY ARTERY OF NATIVE HEART WITHOUT ANGINA PECTORIS: ICD-10-CM

## 2021-01-01 DIAGNOSIS — I48.21 PERMANENT ATRIAL FIBRILLATION (HCC): Primary | ICD-10-CM

## 2021-01-01 PROCEDURE — 3017F COLORECTAL CA SCREEN DOC REV: CPT | Performed by: INTERNAL MEDICINE

## 2021-01-01 PROCEDURE — 99214 OFFICE O/P EST MOD 30 MIN: CPT | Performed by: INTERNAL MEDICINE

## 2021-01-01 PROCEDURE — G8417 CALC BMI ABV UP PARAM F/U: HCPCS | Performed by: INTERNAL MEDICINE

## 2021-01-01 PROCEDURE — 4004F PT TOBACCO SCREEN RCVD TLK: CPT | Performed by: INTERNAL MEDICINE

## 2021-01-01 PROCEDURE — G8484 FLU IMMUNIZE NO ADMIN: HCPCS | Performed by: INTERNAL MEDICINE

## 2021-01-01 PROCEDURE — G8427 DOCREV CUR MEDS BY ELIG CLIN: HCPCS | Performed by: INTERNAL MEDICINE

## 2021-01-01 RX ORDER — CARVEDILOL 12.5 MG/1
TABLET ORAL
COMMUNITY
Start: 2021-01-01

## 2021-01-01 RX ORDER — DILTIAZEM HYDROCHLORIDE 120 MG/1
CAPSULE, COATED, EXTENDED RELEASE ORAL
COMMUNITY
Start: 2021-01-01

## 2021-02-11 ENCOUNTER — TELEPHONE (OUTPATIENT)
Dept: VASCULAR SURGERY | Age: 61
End: 2021-02-11

## 2021-02-11 NOTE — TELEPHONE ENCOUNTER
Called patient home and cell phone numbers. Could not reach on cell and no vm box to leave message. Called home phone and left message regarding apt to see Dr Aubree Olvera.  pamlr

## 2021-02-18 ENCOUNTER — OFFICE VISIT (OUTPATIENT)
Dept: VASCULAR SURGERY | Age: 61
End: 2021-02-18
Payer: MEDICARE

## 2021-02-18 VITALS
SYSTOLIC BLOOD PRESSURE: 130 MMHG | WEIGHT: 280 LBS | BODY MASS INDEX: 37.93 KG/M2 | DIASTOLIC BLOOD PRESSURE: 80 MMHG | HEIGHT: 72 IN

## 2021-02-18 DIAGNOSIS — N18.6 ENCOUNTER REGARDING VASCULAR ACCESS FOR DIALYSIS FOR ESRD (HCC): Primary | ICD-10-CM

## 2021-02-18 DIAGNOSIS — Z99.2 ENCOUNTER REGARDING VASCULAR ACCESS FOR DIALYSIS FOR ESRD (HCC): Primary | ICD-10-CM

## 2021-02-18 PROCEDURE — 4004F PT TOBACCO SCREEN RCVD TLK: CPT | Performed by: SURGERY

## 2021-02-18 PROCEDURE — G8484 FLU IMMUNIZE NO ADMIN: HCPCS | Performed by: SURGERY

## 2021-02-18 PROCEDURE — 99214 OFFICE O/P EST MOD 30 MIN: CPT | Performed by: SURGERY

## 2021-02-18 PROCEDURE — G8417 CALC BMI ABV UP PARAM F/U: HCPCS | Performed by: SURGERY

## 2021-02-18 PROCEDURE — G8427 DOCREV CUR MEDS BY ELIG CLIN: HCPCS | Performed by: SURGERY

## 2021-02-18 PROCEDURE — 3017F COLORECTAL CA SCREEN DOC REV: CPT | Performed by: SURGERY

## 2021-02-18 NOTE — PROGRESS NOTES
Outpatient Follow Up Note    Emmy Roberts is 62 y.o. male who presents today for  follow up regarding:    Chief Complaint   Patient presents with    Other     patient coming in to discuss new dialysis access. pamlr        Patient S/P LUE Ulnar artery-Ulnar vein EndoAVF 11/2019. Now with poor flows, unable to cannulate. Underwent fistulagram at access center. Multiple outflow veins from deep system. He had tunneled Cath placed and here to discuss revision.      Past Medical History:   Diagnosis Date    Atrial fibrillation ([facility] [address]     CAD (coronary artery disease)     Cardiomyopathy ([facility] [address] 7/13/2015    [facility]est[facility] failure[facility]     Chronic kidney disease     COPD (chronic obstructive pulmonary disease) ([facility]     Deep vein thrombosis (DVT) ([facility] [address] 8/7/2012    Depression     Diabetes mellitus ([facility])     Gastrointestinal bleed     GERD (gastroesophageal reflux disease)     Heme positive stool 01/2017    Hemodialysis patient ([facility] [address]     History of blood transfusion     1/2017    Hx of blood clots     left leg; 5 plus years ago    Hyperlipidemia     Hypertension     Hypoglycemia     MDRO (multiple drug resistant organisms) resistance 2017    treated with antibiotics    MRSA (methicillin resistant staph aureus) culture positive 9/26/17,09/18/2017    posterior left calf    Primary osteoarthritis of both knees     Primary osteoarthritis of both knees        Past Surgical History:   Procedure Laterality Date    CARDIAC CATHETERIZATION  07/12/2011    COLONOSCOPY  01/17/2017    diverticulosis    FRACTURE SURGERY Left     ankle; age 13    HAND SURGERY      KIDNEY BIOPSY Right 06/20/2016    KIDNEY BIOPSY Left 09/20/2016    OPEN TREATMENT RADIAL SHAFT FRACTURE Right 8/7/2018    OPEN REDUCTION INTERNAL FIXATION RIGHT DISTAL RADIUS, RIGHT CARPAL TUNNEL RELEASE performed by Lesli De Los Santos MD at [facility] Right 11/09/2017    [email or web address] POWER PORT  UPPER GASTROINTESTINAL ENDOSCOPY  01/17/2017    mild chronic gastritis; duodenal biopsy normal    UPPER GASTROINTESTINAL ENDOSCOPY  10/24/2017     Social History:    Social History     Tobacco Use   Smoking Status Current Every Day Smoker    Packs/day: 0.50    Years: 15.00    Pack years: 7.50    Types: Cigarettes   Smokeless Tobacco Never Used   Tobacco Comment    patient has cut down      Current Medications:  Current Outpatient Medications   Medication Sig Dispense Refill    B Complex-C-Folic Acid (AYLA CAPS) 1 MG CAPS Take 1 capsule by mouth daily      hydrALAZINE (APRESOLINE) 100 MG tablet Take 100 mg by mouth 2 times daily      pravastatin (PRAVACHOL) 40 MG tablet Take 1 tablet by mouth daily 30 tablet 0    insulin glargine (LANTUS SOLOSTAR) 100 UNIT/ML injection pen Inject 25 Units into the skin 2 times daily 5 pen 0    blood glucose test strips (AGAMATRIX AMP TEST) strip 1 each by In Vitro route daily As needed. 100 each 3    Blood Glucose Monitoring Suppl (AGAMATRIX PRESTO) w/Device KIT Check twice daily 1 kit 0    AGAMATRIX ULTRA-THIN LANCETS MISC Check sugars bid 100 each 0    Cholecalciferol (VITAMIN D3) 2000 units CAPS Take 1 capsule by mouth daily      diltiazem (CARDIZEM 12 HR) 120 MG extended release capsule Take 120 mg by mouth 2 times daily       carvedilol (COREG) 12.5 MG tablet Take 12.5 mg by mouth 2 times daily      apixaban (ELIQUIS) 2.5 MG TABS tablet Take 2.5 mg by mouth 2 times daily Take two tablets BID       No current facility-administered medications for this visit. Allergies:  Codeine, Onion, and Penicillins    REVIEW OF SYSTEMS:   A 14 point review of systems was completed. Pertinent positives identified in the HPI, all other review of systems negative.       Objective:   PHYSICAL EXAM:        VITALS:  /80 (Site: Right Upper Arm)   Ht 6' (1.829 m)   Wt 280 lb (127 kg)   BMI 37.97 kg/m² CONSTITUTIONAL: Cooperative, no apparent distress, and appears well nourished / developed  NEUROLOGIC:  Awake and oriented to person, place and time. PSYCH: Calm affect. SKIN: Warm and dry. HEENT: Sclera non-icteric, normocephalic, neck supple, normal carotid pulses with no bruits and thyroid normal size. LUNGS:  No increased work of breathing and clear to auscultation, no crackles or wheezing  CARDIOVASCULAR:  Regular rate and rhythm with no murmurs, gallops, rubs, or abnormal heart sounds, normal PMI. Pulses:    brachial radial   RIGHT 2 2   LEFT 2 2     ABDOMEN:  Normal bowel sounds, non-distended and non-tender to palpation  EXT: Soft Bruit in cephalic vein above elbow. DATA:      Images from fistulagram personally reviewed. Bedside duplex performed today    Assessment:      Diagnosis Orders   1. Encounter regarding vascular access for dialysis for ESRD ([facility] [address]       Poorly functioning endoAVF. While possible to treat via endovascular approach with multiple coils and PTA, surgical conversion likely better option. Should be able to cannulate very soon after converting to Brachial cepahlic avf as vein already matured. Will need to shut down enodAVF originating from ulnar artery to lateral ulnar vein. Plan:     Surgical creation of Brachial cephalic AVF. Stent graft coverage of fistula. Procedure, risks, benefits, and alternatives explained and understood.             Janis Lee MD, FACS

## 2021-02-19 ENCOUNTER — TELEPHONE (OUTPATIENT)
Dept: VASCULAR SURGERY | Age: 61
End: 2021-02-19

## 2021-02-19 NOTE — TELEPHONE ENCOUNTER
Called patient and reminded him of his surgery for March 3, 2021. He is to arrive at 5:30am and surgery is 7:30am . Npo after midnight and hold Eliquis last dose being on March 1 am dose.  saleem

## 2021-02-22 NOTE — PROGRESS NOTES
Nichelle Saenz    Age 62 y.o.    male    1960    MRN [id number]    3/3/2021  Arrival Time_____________  OR Time____________[address]     Procedure(s):  LEFT UPPER ARM BRACHIAL CEPHALIC ARTERIAL VENOUS FISTULA; LEFT UPPER EXTREMITY ANGIOGRAM; STENT GRAFT ULNAR ARTERIAL VENOUS FISTULA                      General     Surgeon(s):  Sheffield Denver, MD      DAY ADMIT ___  SDS/OP ___  OUTPT IN BED ___         Phone [phone] (home)    PCP _____________________ Phone_________________ Epic ( ) Epic CE ( ) Appt ________    ADDITIONAL INFO __________________________________ Cardio/Consult _____________    NOTES _____________________________________________________________________    ____________________________________________________________________________    PAT APPT DATE:________ TIME: ________  FAXED QAD: _______  (__) H&P w/ hospitalist  ____________________________________________________________________________    COVID TEST: Date/Location______________        NURSING HISTORY COMPLETE: _______  (__) CBC       (__) W/ DIFF ___________  (__)  ECHO    __________  (__) Hgb A1C    ___________  (__) CHEST X RAY   __________  (__) LIPID PROFILE  ___________  (__) EKG   __________  (__) PT/PTT   ___________  (__) PFT's   __________  (__) BMP   ___________  (__) CAROTIDS  __________  (__) CMP   ___________  (__) VEIN MAPPING  __________  (__) U/A   ___________  (__) HISTORY & PHYSICAL __________  (__) URINE C & S  ___________  (__) CARDIAC CLEARANCE __________  (__) U/A W/ FLEX  ___________  (__) PULM.  CLEARANCE __________  (__) SERUM PREGNANCY ___________  (__) Check Epic DOS orders __________  (__) TYPE & SCREEN ________ repeat ( ) (__)  __________________ __________  (__) ALBUMIN   ___________  (__)  __________________ __________  (__) TRANSFERRIN  ___________  (__)  __________________ __________  (__) LIVER PROFILE  ___________  (__)  __________________ __________

## 2021-02-23 ENCOUNTER — PREP FOR PROCEDURE (OUTPATIENT)
Dept: VASCULAR SURGERY | Age: 61
End: 2021-02-23

## 2021-02-23 DIAGNOSIS — Z01.818 PREOP TESTING: Primary | ICD-10-CM

## 2021-02-23 RX ORDER — CHOLECALCIFEROL (VITAMIN D3) 1250 MCG
CAPSULE ORAL
COMMUNITY

## 2021-02-23 RX ORDER — SODIUM CHLORIDE 0.9 % (FLUSH) 0.9 %
10 SYRINGE (ML) INJECTION PRN
Status: CANCELLED | OUTPATIENT
Start: 2021-02-23

## 2021-02-23 RX ORDER — MIDODRINE HYDROCHLORIDE 2.5 MG/1
2.5 TABLET ORAL DAILY
COMMUNITY

## 2021-02-23 RX ORDER — DILTIAZEM HYDROCHLORIDE 120 MG/1
120 CAPSULE, COATED, EXTENDED RELEASE ORAL DAILY
Status: ON HOLD | COMMUNITY
End: 2021-04-14 | Stop reason: HOSPADM

## 2021-02-23 RX ORDER — ASPIRIN 81 MG/1
81 TABLET ORAL DAILY
COMMUNITY

## 2021-02-23 RX ORDER — FERRIC CITRATE 210 MG/1
3 TABLET, COATED ORAL
COMMUNITY

## 2021-02-23 RX ORDER — SODIUM CHLORIDE 0.9 % (FLUSH) 0.9 %
10 SYRINGE (ML) INJECTION EVERY 12 HOURS SCHEDULED
Status: CANCELLED | OUTPATIENT
Start: 2021-02-23

## 2021-02-23 NOTE — PROGRESS NOTES
Preoperative Screening for Elective Surgery/Invasive Procedures While COVID-19 present in the community    ? Have you had any of the following symptoms? o Fever, chills  o Cough  o Shortness of breath  o Muscle aches/pain  o Diarrhea  o Abdominal pain, nausea, vomiting  o Loss or decrease in taste and / or smell  ? Risk of Exposure  o Have you recently been hospitalized for COVID-19 or flu-like illness, if so when?  o Recently diagnosed with COVID-19, if so when?  o Recently tested for COVID-19, if so when?  o Have you been in close contact with a person or family member who currently has or recently had COVID-23? If yes, when and in what context?  o Do you live with anybody who in the last 14 days has had fever, chills, shortness of breath, muscle aches, flu-like illness?  o Do you have any close contacts or family members who are currently in the hospital for COVID-19 or flu-like illness? If yes, assess recent close contact with this person. Indicate if the patient has a positive screen by answering yes to one or more of the above questions. Patients who test positive or screen positive prior to surgery or on the day of surgery should be evaluated in conjunction with the surgeon/proceduralist/anesthesiologist to determine the urgency of the procedure.      NO TO ALL ABOVE

## 2021-03-02 ENCOUNTER — TELEPHONE (OUTPATIENT)
Dept: VASCULAR SURGERY | Age: 61
End: 2021-03-02

## 2021-03-02 NOTE — TELEPHONE ENCOUNTER
Called patient regarding reminder of procedure tomorrow at [facility]. Making sure has no questions. Making sure held Eliquis as discussed prior in office and on phone. Left message as pt did not answer phone.  pamlr

## 2021-03-03 ENCOUNTER — HOSPITAL ENCOUNTER (OUTPATIENT)
Age: 61
Setting detail: OUTPATIENT SURGERY
Discharge: HOME | End: 2021-03-03
Attending: SURGERY | Admitting: SURGERY
Payer: MEDICARE

## 2021-03-03 ENCOUNTER — ANESTHESIA EVENT (OUTPATIENT)
Dept: OPERATING ROOM | Age: 61
End: 2021-03-03
Payer: MEDICARE

## 2021-03-03 ENCOUNTER — ANESTHESIA (OUTPATIENT)
Dept: OPERATING ROOM | Age: 61
End: 2021-03-03
Payer: MEDICARE

## 2021-03-03 VITALS
DIASTOLIC BLOOD PRESSURE: 80 MMHG | OXYGEN SATURATION: 100 % | SYSTOLIC BLOOD PRESSURE: 117 MMHG | RESPIRATION RATE: 18 BRPM

## 2021-03-03 VITALS
HEIGHT: 71 IN | RESPIRATION RATE: 12 BRPM | OXYGEN SATURATION: 95 % | WEIGHT: 306.44 LBS | HEART RATE: 87 BPM | DIASTOLIC BLOOD PRESSURE: 56 MMHG | SYSTOLIC BLOOD PRESSURE: 101 MMHG | TEMPERATURE: 97.1 F | BODY MASS INDEX: 42.9 KG/M2

## 2021-03-03 DIAGNOSIS — T82.590A MALFUNCTION OF ARTERIOVENOUS DIALYSIS FISTULA, INITIAL ENCOUNTER (HCC): Primary | ICD-10-CM

## 2021-03-03 LAB
EKG ATRIAL RATE: 86 BPM
EKG DIAGNOSIS: NORMAL
EKG Q-T INTERVAL: 396 MS
EKG QRS DURATION: 90 MS
EKG QTC CALCULATION (BAZETT): 465 MS
EKG R AXIS: 13 DEGREES
EKG T AXIS: 33 DEGREES
EKG VENTRICULAR RATE: 83 BPM
GLUCOSE BLD-MCNC: 186 MG/DL (ref 70–99)
GLUCOSE BLD-MCNC: 257 MG/DL (ref 70–99)
PERFORMED ON: ABNORMAL
PERFORMED ON: ABNORMAL
SARS-COV-2, NAAT: NOT DETECTED

## 2021-03-03 PROCEDURE — 7100000010 HC PHASE II RECOVERY - FIRST 15 MIN: Performed by: SURGERY

## 2021-03-03 PROCEDURE — 75710 ARTERY X-RAYS ARM/LEG: CPT | Performed by: SURGERY

## 2021-03-03 PROCEDURE — 3700000001 HC ADD 15 MINUTES (ANESTHESIA): Performed by: SURGERY

## 2021-03-03 PROCEDURE — C1874 STENT, COATED/COV W/DEL SYS: HCPCS | Performed by: SURGERY

## 2021-03-03 PROCEDURE — 6370000000 HC RX 637 (ALT 250 FOR IP): Performed by: ANESTHESIOLOGY

## 2021-03-03 PROCEDURE — 87635 SARS-COV-2 COVID-19 AMP PRB: CPT

## 2021-03-03 PROCEDURE — 2500000003 HC RX 250 WO HCPCS: Performed by: NURSE ANESTHETIST, CERTIFIED REGISTERED

## 2021-03-03 PROCEDURE — 2580000003 HC RX 258: Performed by: NURSE ANESTHETIST, CERTIFIED REGISTERED

## 2021-03-03 PROCEDURE — 3700000000 HC ANESTHESIA ATTENDED CARE: Performed by: SURGERY

## 2021-03-03 PROCEDURE — 2709999900 HC NON-CHARGEABLE SUPPLY: Performed by: SURGERY

## 2021-03-03 PROCEDURE — 36821 AV FUSION DIRECT ANY SITE: CPT | Performed by: SURGERY

## 2021-03-03 PROCEDURE — 2580000003 HC RX 258: Performed by: SURGERY

## 2021-03-03 PROCEDURE — 37236 OPEN/PERQ PLACE STENT 1ST: CPT | Performed by: SURGERY

## 2021-03-03 PROCEDURE — 3600000015 HC SURGERY LEVEL 5 ADDTL 15MIN: Performed by: SURGERY

## 2021-03-03 PROCEDURE — C1894 INTRO/SHEATH, NON-LASER: HCPCS | Performed by: SURGERY

## 2021-03-03 PROCEDURE — C1769 GUIDE WIRE: HCPCS | Performed by: SURGERY

## 2021-03-03 PROCEDURE — 7100000001 HC PACU RECOVERY - ADDTL 15 MIN: Performed by: SURGERY

## 2021-03-03 PROCEDURE — C1725 CATH, TRANSLUMIN NON-LASER: HCPCS | Performed by: SURGERY

## 2021-03-03 PROCEDURE — 7100000000 HC PACU RECOVERY - FIRST 15 MIN: Performed by: SURGERY

## 2021-03-03 PROCEDURE — 6360000004 HC RX CONTRAST MEDICATION: Performed by: SURGERY

## 2021-03-03 PROCEDURE — 6360000002 HC RX W HCPCS: Performed by: NURSE ANESTHETIST, CERTIFIED REGISTERED

## 2021-03-03 PROCEDURE — 93010 ELECTROCARDIOGRAM REPORT: CPT | Performed by: INTERNAL MEDICINE

## 2021-03-03 PROCEDURE — 7100000011 HC PHASE II RECOVERY - ADDTL 15 MIN: Performed by: SURGERY

## 2021-03-03 PROCEDURE — 3600000005 HC SURGERY LEVEL 5 BASE: Performed by: SURGERY

## 2021-03-03 PROCEDURE — 93005 ELECTROCARDIOGRAM TRACING: CPT | Performed by: ANESTHESIOLOGY

## 2021-03-03 PROCEDURE — 6360000002 HC RX W HCPCS: Performed by: SURGERY

## 2021-03-03 PROCEDURE — 6360000002 HC RX W HCPCS: Performed by: ANESTHESIOLOGY

## 2021-03-03 DEVICE — IMPLANTABLE DEVICE: Type: IMPLANTABLE DEVICE | Site: ARM | Status: FUNCTIONAL

## 2021-03-03 RX ORDER — SODIUM CHLORIDE, SODIUM LACTATE, POTASSIUM CHLORIDE, CALCIUM CHLORIDE 600; 310; 30; 20 MG/100ML; MG/100ML; MG/100ML; MG/100ML
INJECTION, SOLUTION INTRAVENOUS CONTINUOUS
Status: DISCONTINUED | OUTPATIENT
Start: 2021-03-03 | End: 2021-03-03 | Stop reason: HOSPADM

## 2021-03-03 RX ORDER — CEFAZOLIN SODIUM 1 G/3ML
INJECTION, POWDER, FOR SOLUTION INTRAMUSCULAR; INTRAVENOUS PRN
Status: DISCONTINUED | OUTPATIENT
Start: 2021-03-03 | End: 2021-03-03 | Stop reason: SDUPTHER

## 2021-03-03 RX ORDER — HYDRALAZINE HYDROCHLORIDE 20 MG/ML
5 INJECTION INTRAMUSCULAR; INTRAVENOUS EVERY 30 MIN PRN
Status: DISCONTINUED | OUTPATIENT
Start: 2021-03-03 | End: 2021-03-03 | Stop reason: HOSPADM

## 2021-03-03 RX ORDER — SODIUM CHLORIDE 0.9 % (FLUSH) 0.9 %
10 SYRINGE (ML) INJECTION PRN
Status: DISCONTINUED | OUTPATIENT
Start: 2021-03-03 | End: 2021-03-03 | Stop reason: HOSPADM

## 2021-03-03 RX ORDER — SODIUM CHLORIDE 0.9 % (FLUSH) 0.9 %
10 SYRINGE (ML) INJECTION EVERY 12 HOURS SCHEDULED
Status: DISCONTINUED | OUTPATIENT
Start: 2021-03-03 | End: 2021-03-03 | Stop reason: HOSPADM

## 2021-03-03 RX ORDER — ONDANSETRON 2 MG/ML
INJECTION INTRAMUSCULAR; INTRAVENOUS PRN
Status: DISCONTINUED | OUTPATIENT
Start: 2021-03-03 | End: 2021-03-03 | Stop reason: SDUPTHER

## 2021-03-03 RX ORDER — PROPOFOL 10 MG/ML
INJECTION, EMULSION INTRAVENOUS PRN
Status: DISCONTINUED | OUTPATIENT
Start: 2021-03-03 | End: 2021-03-03 | Stop reason: SDUPTHER

## 2021-03-03 RX ORDER — LIDOCAINE HYDROCHLORIDE 10 MG/ML
0.3 INJECTION, SOLUTION EPIDURAL; INFILTRATION; INTRACAUDAL; PERINEURAL
Status: DISCONTINUED | OUTPATIENT
Start: 2021-03-03 | End: 2021-03-03 | Stop reason: HOSPADM

## 2021-03-03 RX ORDER — MIDAZOLAM HYDROCHLORIDE 1 MG/ML
INJECTION INTRAMUSCULAR; INTRAVENOUS PRN
Status: DISCONTINUED | OUTPATIENT
Start: 2021-03-03 | End: 2021-03-03 | Stop reason: SDUPTHER

## 2021-03-03 RX ORDER — ONDANSETRON 2 MG/ML
4 INJECTION INTRAMUSCULAR; INTRAVENOUS EVERY 30 MIN PRN
Status: DISCONTINUED | OUTPATIENT
Start: 2021-03-03 | End: 2021-03-03 | Stop reason: HOSPADM

## 2021-03-03 RX ORDER — FENTANYL CITRATE 50 UG/ML
INJECTION, SOLUTION INTRAMUSCULAR; INTRAVENOUS PRN
Status: DISCONTINUED | OUTPATIENT
Start: 2021-03-03 | End: 2021-03-03 | Stop reason: SDUPTHER

## 2021-03-03 RX ORDER — DIPHENHYDRAMINE HYDROCHLORIDE 50 MG/ML
6.25 INJECTION INTRAMUSCULAR; INTRAVENOUS
Status: DISCONTINUED | OUTPATIENT
Start: 2021-03-03 | End: 2021-03-03 | Stop reason: HOSPADM

## 2021-03-03 RX ORDER — ROCURONIUM BROMIDE 10 MG/ML
INJECTION, SOLUTION INTRAVENOUS PRN
Status: DISCONTINUED | OUTPATIENT
Start: 2021-03-03 | End: 2021-03-03 | Stop reason: SDUPTHER

## 2021-03-03 RX ORDER — HYDROCODONE BITARTRATE AND ACETAMINOPHEN 5; 325 MG/1; MG/1
1 TABLET ORAL EVERY 6 HOURS PRN
Qty: 20 TABLET | Refills: 0 | Status: SHIPPED | OUTPATIENT
Start: 2021-03-03 | End: 2021-03-08

## 2021-03-03 RX ORDER — SODIUM CHLORIDE 9 MG/ML
INJECTION, SOLUTION INTRAVENOUS CONTINUOUS PRN
Status: DISCONTINUED | OUTPATIENT
Start: 2021-03-03 | End: 2021-03-03 | Stop reason: SDUPTHER

## 2021-03-03 RX ORDER — OXYCODONE HYDROCHLORIDE AND ACETAMINOPHEN 5; 325 MG/1; MG/1
1 TABLET ORAL PRN
Status: DISCONTINUED | OUTPATIENT
Start: 2021-03-03 | End: 2021-03-03 | Stop reason: HOSPADM

## 2021-03-03 RX ORDER — LABETALOL HYDROCHLORIDE 5 MG/ML
5 INJECTION, SOLUTION INTRAVENOUS
Status: DISCONTINUED | OUTPATIENT
Start: 2021-03-03 | End: 2021-03-03 | Stop reason: HOSPADM

## 2021-03-03 RX ORDER — MEPERIDINE HYDROCHLORIDE 50 MG/ML
12.5 INJECTION INTRAMUSCULAR; INTRAVENOUS; SUBCUTANEOUS EVERY 5 MIN PRN
Status: DISCONTINUED | OUTPATIENT
Start: 2021-03-03 | End: 2021-03-03

## 2021-03-03 RX ORDER — OXYCODONE HYDROCHLORIDE AND ACETAMINOPHEN 5; 325 MG/1; MG/1
2 TABLET ORAL PRN
Status: DISCONTINUED | OUTPATIENT
Start: 2021-03-03 | End: 2021-03-03 | Stop reason: HOSPADM

## 2021-03-03 RX ADMIN — FENTANYL CITRATE 50 MCG: 50 INJECTION INTRAMUSCULAR; INTRAVENOUS at 08:20

## 2021-03-03 RX ADMIN — ROCURONIUM BROMIDE 20 MG: 10 SOLUTION INTRAVENOUS at 08:06

## 2021-03-03 RX ADMIN — CEFAZOLIN 3000 MG: 1 INJECTION, POWDER, FOR SOLUTION INTRAMUSCULAR; INTRAVENOUS at 07:32

## 2021-03-03 RX ADMIN — ONDANSETRON 4 MG: 2 INJECTION INTRAMUSCULAR; INTRAVENOUS at 09:07

## 2021-03-03 RX ADMIN — INSULIN LISPRO 4 UNITS: 100 INJECTION, SOLUTION INTRAVENOUS; SUBCUTANEOUS at 07:01

## 2021-03-03 RX ADMIN — PROPOFOL 200 MG: 10 INJECTION, EMULSION INTRAVENOUS at 07:37

## 2021-03-03 RX ADMIN — ROCURONIUM BROMIDE 80 MG: 10 SOLUTION INTRAVENOUS at 07:37

## 2021-03-03 RX ADMIN — HYDROMORPHONE HYDROCHLORIDE 0.5 MG: 1 INJECTION, SOLUTION INTRAMUSCULAR; INTRAVENOUS; SUBCUTANEOUS at 10:15

## 2021-03-03 RX ADMIN — SUGAMMADEX 400 MG: 100 INJECTION, SOLUTION INTRAVENOUS at 09:58

## 2021-03-03 RX ADMIN — SODIUM CHLORIDE: 9 INJECTION, SOLUTION INTRAVENOUS at 07:32

## 2021-03-03 RX ADMIN — MIDAZOLAM HYDROCHLORIDE 2 MG: 2 INJECTION, SOLUTION INTRAMUSCULAR; INTRAVENOUS at 07:37

## 2021-03-03 ASSESSMENT — PAIN DESCRIPTION - FREQUENCY
FREQUENCY: CONTINUOUS
FREQUENCY: CONTINUOUS

## 2021-03-03 ASSESSMENT — PULMONARY FUNCTION TESTS
PIF_VALUE: 30
PIF_VALUE: 31
PIF_VALUE: 19
PIF_VALUE: 32
PIF_VALUE: 32
PIF_VALUE: 31
PIF_VALUE: 29
PIF_VALUE: 32
PIF_VALUE: 32
PIF_VALUE: 19
PIF_VALUE: 33
PIF_VALUE: 31
PIF_VALUE: 31
PIF_VALUE: 32
PIF_VALUE: 30
PIF_VALUE: 33
PIF_VALUE: 32
PIF_VALUE: 30
PIF_VALUE: 32
PIF_VALUE: 31
PIF_VALUE: 30
PIF_VALUE: 31
PIF_VALUE: 0
PIF_VALUE: 29
PIF_VALUE: 30
PIF_VALUE: 28
PIF_VALUE: 31
PIF_VALUE: 29
PIF_VALUE: 32
PIF_VALUE: 0
PIF_VALUE: 29
PIF_VALUE: 30
PIF_VALUE: 19
PIF_VALUE: 31
PIF_VALUE: 30
PIF_VALUE: 31
PIF_VALUE: 31
PIF_VALUE: 30
PIF_VALUE: 30
PIF_VALUE: 31
PIF_VALUE: 19
PIF_VALUE: 29
PIF_VALUE: 32
PIF_VALUE: 2
PIF_VALUE: 27
PIF_VALUE: 30
PIF_VALUE: 30
PIF_VALUE: 19
PIF_VALUE: 31
PIF_VALUE: 31
PIF_VALUE: 32
PIF_VALUE: 31
PIF_VALUE: 23
PIF_VALUE: 31
PIF_VALUE: 29
PIF_VALUE: 30
PIF_VALUE: 29
PIF_VALUE: 32
PIF_VALUE: 31
PIF_VALUE: 0
PIF_VALUE: 29
PIF_VALUE: 29
PIF_VALUE: 22
PIF_VALUE: 29

## 2021-03-03 ASSESSMENT — LIFESTYLE VARIABLES: SMOKING_STATUS: 1

## 2021-03-03 ASSESSMENT — PAIN SCALES - GENERAL: PAINLEVEL_OUTOF10: 8

## 2021-03-03 ASSESSMENT — PAIN DESCRIPTION - ONSET
ONSET: ON-GOING
ONSET: ON-GOING

## 2021-03-03 ASSESSMENT — PAIN DESCRIPTION - LOCATION
LOCATION: ARM
LOCATION: ARM

## 2021-03-03 ASSESSMENT — ENCOUNTER SYMPTOMS: SHORTNESS OF BREATH: 1

## 2021-03-03 ASSESSMENT — PAIN DESCRIPTION - ORIENTATION: ORIENTATION: LEFT

## 2021-03-03 ASSESSMENT — PAIN DESCRIPTION - DESCRIPTORS: DESCRIPTORS: BURNING

## 2021-03-03 ASSESSMENT — PAIN - FUNCTIONAL ASSESSMENT: PAIN_FUNCTIONAL_ASSESSMENT: 0-10

## 2021-03-03 NOTE — BRIEF OP NOTE
Brief Postoperative Note      Patient: Jeromy Qurioz  YOB: 1960  MRN: [id number]    Date of Procedure: 3/3/2021    Pre-Op Diagnosis: END STAGE RENAL DISEASE, Fistula malfunction    Post-Op Diagnosis: Same       Procedure(s):  LEFT UPPER ARM BRACHIAL CEPHALIC ARTERIAL VENOUS FISTULA; LEFT UPPER EXTREMITY ANGIOGRAM; STENT GRAFT ULNAR ARTERIAL VENOUS FISTULA    Surgeon(s):  Светлана Shipman MD    Assistant:  Surgical Assistant: Jonas Wolf    Anesthesia: General    Estimated Blood Loss (mL): less than 50     Complications: None    Specimens:   * No specimens in log *    Implants:  Implant Name Type Inv.  Item Serial No.  Lot No. LRB No. Used Action   STENT PERIPH L25MM DIA5MM CATH L120CM [id number] 0.035IN HEP - [id number]  STENT PERIPH L25MM DIA5MM CATH L120CM DIA6FR 0.035IN HEP [id number]  [employer]  Left 1 Implanted         Drains: * No LDAs found *        Electronically signed by Светлана Shipman MD on 3/3/2021 at 9:46 AM

## 2021-03-03 NOTE — PROGRESS NOTES
D: pt completed PACU stay, now in phase 2. VSS, some left arm pain, states it is tolerable, still coughing up a lot of mucous, however it is slowing down. Denies nausea.

## 2021-03-03 NOTE — ANESTHESIA PRE PROCEDURE
Department of Anesthesiology  Preprocedure Note       Name:  Hiral Barnett   Age:  62 y.o.  :  1960                                          MRN:  [id number]         Date:  3/3/2021      Surgeon: Ryland Miller):  Luiz Foreman MD    Procedure: Procedure(s):  LEFT UPPER ARM BRACHIAL CEPHALIC ARTERIAL VENOUS FISTULA; LEFT UPPER EXTREMITY ANGIOGRAM; STENT GRAFT ULNAR ARTERIAL VENOUS FISTULA    Medications prior to admission:   Prior to Admission medications    Medication Sig Start Date End Date Taking? Authorizing Provider   Ferric Citrate (AURYXIA) 1  MG(Fe) TABS Take 3 tablets by mouth 3 times daily (with meals)   Yes Historical Provider, MD   Cholecalciferol (VITAMIN D3) 1.25 MG (79381 UT) CAPS Take by mouth every 30 days   Yes Historical Provider, MD   dilTIAZem (CARDIZEM CD) 120 MG extended release capsule Take 120 mg by mouth daily   Yes Historical Provider, MD   aspirin 81 MG EC tablet Take 81 mg by mouth daily   Yes Historical Provider, MD   midodrine (PROAMATINE) 2.5 MG tablet Take 2.5 mg by mouth daily ON DIALYSIS DAYS   Yes Historical Provider, MD   B Complex-C-Folic Acid (AYLA CAPS) 1 MG CAPS Take 1 capsule by mouth daily   Yes Historical Provider, MD   carvedilol (COREG) 12.5 MG tablet Take 12.5 mg by mouth 2 times daily   Yes Historical Provider, MD   pravastatin (PRAVACHOL) 40 MG tablet Take 1 tablet by mouth daily  Patient taking differently: Take 40 mg by mouth nightly  7/10/19   Suzanne Vinson MD   insulin glargine (LANTUS SOLOSTAR) 100 UNIT/ML injection pen Inject 25 Units into the skin 2 times daily 19   Suzanne Vinson MD   blood glucose test strips (AGAMATRIX AMP TEST) strip 1 each by In Vitro route daily As needed.  19   Suzanne Vinson MD   Blood Glucose Monitoring [facility]) w/Device KIT Check twice daily 19   MD Nirmal Galvan ULTRA-THIN LANCETS MISC Check sugars bid 19   Suzanne Vinson MD apixaban (ELIQUIS) 2.5 MG TABS tablet Take 2.5 mg by mouth 2 times daily Take two tablets BID    Historical Provider, MD       Current medications:    Current Facility-Administered Medications   Medication Dose Route Frequency Provider Last Rate Last Admin    lactated ringers infusion   Intravenous Continuous Marifer Lee MD        sodium chloride flush 0.9 % injection 10 mL  10 mL Intravenous 2 times per day Marifer Lee MD        sodium chloride flush 0.9 % injection 10 mL  10 mL Intravenous PRN Marifer Lee MD        lidocaine PF 1 % injection 0.3 mL  0.3 mL Intradermal Once PRN Marifer Lee MD        ceFAZolin (ANCEF) 3000 mg in dextrose 5 % 100 mL IVPB  3,000 mg Intravenous On Call to Sarah Machado MD        sodium chloride flush 0.9 % injection 10 mL  10 mL Intravenous 2 times per day Marissa Castro MD        sodium chloride flush 0.9 % injection 10 mL  10 mL Intravenous PRN Marissa Castro MD        insulin lispro (HUMALOG) 100 UNIT/ML injection vial             meperidine (DEMEROL) injection 12.5 mg  12.5 mg Intravenous Q5 Min PRN Eric Connors MD        HYDROmorphone (DILAUDID) injection 0.5 mg  0.5 mg Intravenous Q10 Min PRN Eric Connors MD        HYDROmorphone (DILAUDID) injection 0.5 mg  0.5 mg Intravenous Q5 Min PRN Eric Connors MD        oxyCODONE-acetaminophen (PERCOCET) 5-325 MG per tablet 1 tablet  1 tablet Oral PRN Eric Connors MD        Or    oxyCODONE-acetaminophen (PERCOCET) 5-325 MG per tablet 2 tablet  2 tablet Oral PRN Eric Connors MD        ondansetron [facility]) injection 4 mg  4 mg Intravenous Q30 Min PRN Eric Connors MD        diphenhydrAMINE (BENADRYL) injection 6.25 mg  6.25 mg Intravenous Once PRN Eric Connors MD        labetalol (NORMODYNE;TRANDATE) injection 5 mg  5 mg Intravenous Q15 Min PRN Eric Connors MD  hydrALAZINE (APRESOLINE) injection 5 mg  5 mg Intravenous Q30 Min PRN Rosa Maria Florentino MD           Allergies: Allergies   Allergen Reactions    Codeine Itching    Onion Other (See Comments)     diarrhea    Penicillins Hives and Rash     Unknown reaction. Childhood problem       Problem List:    Patient Active Problem List   Diagnosis Code    Type 2 diabetes mellitus with stage 4 chronic kidney disease, without long-term current use of insulin ([facility]) E11.22, N18.4    Cigarette smoker F17.210    Atrial fibrillation, chronic ([facility]) I48.20    Essential (primary) hypertension I10    Normocytic anemia D64.9    Primary osteoarthritis of both knees M17.0    Calciphylaxis E83.59    Depression F32.9    Hyperlipidemia E78.5    Morbid obesity due to excess calories ([facility]) E66.01    Furunculosis (recurrent, on chronic suppressive Abx) L02.92    Tobacco use disorder F17.200    Ulcer of left calf with fat layer exposed ([facility] L97.222    Hyperphosphatemia E83.39    Vitamin D deficiency E55.9    Secondary hyperparathyroidism of renal origin ([facility] N25.81    Iron deficiency E61.1    White matter changes, severe by MRI brain [id number]    Coronary artery disease involving native coronary artery I25.10    Renal arteriosclerosis I12.9    Other disorder of calcium metabolism E83.59    CKD (chronic kidney disease), stage III N18.30    Gastrointestinal hemorrhage C38.7    Metabolic acidosis O05.9    Leg wound, left S81.802A    Deep vein thrombosis (DVT) ([facility]) I82.409    Tobacco abuse Z72.0    Hyponatremia E87.1    ANA (acute kidney injury) ([facility] N17.9    Wound infection T14. 8XXA, L08.9    Acute on chronic renal failure ([facility]) N17.9, N18.9    Gastric outlet obstruction K31.1    Potassium excess E87.5    DM2 (diabetes mellitus, type 2) ([facility]) E11.9    Hyperkalemia E87.5    Stage 5 chronic kidney disease not on chronic dialysis ([facility]) N18.5    Shortness of breath R06.02    Hypervolemia E87.70  Encounter regarding vascular access for dialysis for ESRD ([facility] N18.6, Z99.2    Sepsis ([facility] A41.9    Acute febrile illness R50.9    Mild malnutrition ([facility] E44.1    Dialysis AV fistula malfunction (HCC) T82.590A       Past Medical History:        Diagnosis Date    Atrial fibrillation ([facility]     CAD (coronary artery disease)     Cardiomyopathy ([facility] 7/13/2015    CHF (congestive heart failure) ([facility])     Chronic kidney disease     COPD (chronic obstructive pulmonary disease) ([facility])     Deep vein thrombosis (DVT) ([facility] 8/7/2012    Depression     Diabetes mellitus ([facility])     Gastrointestinal bleed     GERD (gastroesophageal reflux disease)     Heme positive stool 01/2017    [facility]     [facility]    History of blood transfusion     1/2017    Hx of blood clots     left leg; 5 plus years ago    Hyperlipidemia     Hypertension     Hypoglycemia     MDRO (multiple drug resistant organisms) resistance 2017    treated with antibiotics    MRSA (methicillin resistant staph aureus) culture positive 9/26/17,09/18/2017    posterior left calf    Primary osteoarthritis of both knees     Primary osteoarthritis of both knees        Past Surgical History:        Procedure Laterality Date    CARDIAC CATHETERIZATION  07/12/2011    COLONOSCOPY  01/17/2017    diverticulosis    DIALYSIS FISTULA CREATION Left 2020    FRACTURE SURGERY Left     ankle; age 13    HAND SURGERY      KIDNEY BIOPSY Right 06/20/2016    KIDNEY BIOPSY Left 09/20/2016    OPEN TREATMENT RADIAL SHAFT FRACTURE Right 8/7/2018    OPEN REDUCTION INTERNAL FIXATION RIGHT DISTAL RADIUS, RIGHT CARPAL TUNNEL RELEASE performed by Marysol Beasley MD at [facility] [address] Right 11/09/2017    BARD POWER PORT    UPPER GASTROINTESTINAL ENDOSCOPY  01/17/2017    mild chronic gastritis; duodenal biopsy normal    UPPER GASTROINTESTINAL ENDOSCOPY  10/24/2017       Social History:    Social History Tobacco Use    Smoking status: Current Every Day Smoker     Packs/day: 0.50     Years: 15.00     Pack years: 7.50     Types: Cigarettes    Smokeless tobacco: Never Used    Tobacco comment: patient has cut down    Substance Use Topics    Alcohol use: No     Comment: rarely                                Ready to quit: Not Answered  Counseling given: Not Answered  Comment: patient has cut down       Vital Signs (Current):   Vitals:    02/23/21 1625 03/03/21 0626   BP:  124/85   Pulse:  78   Resp:  16   Temp:  98.8 °F (37.1 °C)   TempSrc:  Temporal   SpO2:  97%   Weight: 280 lb (127 kg) (!) 306 lb 7 oz (139 kg)   Height: 6' (1.829 m) 5' 10.5\" (1.791 m)                                              BP Readings from Last 3 Encounters:   03/03/21 124/85   02/18/21 130/80   05/16/20 102/80       NPO Status: Time of last liquid consumption: 2300                        Time of last solid consumption: 2300                        Date of last liquid consumption: 03/02/21                        Date of last solid food consumption: 03/02/21    BMI:   Wt Readings from Last 3 Encounters:   03/03/21 (!) 306 lb 7 oz (139 kg)   02/18/21 280 lb (127 kg)   09/01/20 287 lb 14.4 oz (130.6 kg)     Body mass index is 43.35 kg/m².     CBC:   Lab Results   Component Value Date    WBC 6.3 09/01/2020    RBC 2.90 09/01/2020    HGB 11.3 09/01/2020    HCT 33.2 09/01/2020    .6 09/01/2020    RDW 16.2 09/01/2020     09/01/2020       CMP:   Lab Results   Component Value Date     09/01/2020    K 4.2 09/01/2020    K 4.6 10/20/2019    CL 91 09/01/2020    CO2 26 09/01/2020    BUN 64 09/01/2020    CREATININE 8.3 09/01/2020    GFRAA 8 09/01/2020    GFRAA 27 12/16/2012    AGRATIO 1.2 09/01/2020    LABGLOM 7 09/01/2020    GLUCOSE 298 09/01/2020    PROT 8.0 09/01/2020    PROT 7.6 12/16/2012    CALCIUM 8.6 09/01/2020    BILITOT 0.4 09/01/2020    ALKPHOS 91 09/01/2020    AST 17 09/01/2020    ALT 16 09/01/2020       POC Tests: Recent Labs     03/03/21  0653   POCGLU 257*       Coags:   Lab Results   Component Value Date    PROTIME 11.8 09/01/2020    INR 1.02 09/01/2020    APTT 32.4 10/24/2017       HCG (If Applicable): No results found for: PREGTESTUR, PREGSERUM, HCG, HCGQUANT     ABGs: No results found for: PHART, PO2ART, DDJ9FZB, OOB2YBH, BEART, W0MQZICV     Type & Screen (If Applicable):  No results found for: LABABO, LABRH    Drug/Infectious Status (If Applicable):  No results found for: HIV, HEPCAB    COVID-19 Screening (If Applicable):   Lab Results   Component Value Date    COVID19 Not Detected 03/03/2021         Anesthesia Evaluation  Patient summary reviewed and Nursing notes reviewed no history of anesthetic complications:   Airway: Mallampati: III     Neck ROM: full   Dental:          Pulmonary:   (+) COPD:  shortness of breath:  current smoker                           Cardiovascular:    (+) hypertension:, CAD:, CHF:,                   Neuro/Psych:   (+) psychiatric history:            GI/Hepatic/Renal:   (+) GERD:, morbid obesity          Endo/Other:    (+) Diabetes, . Abdominal:           Vascular:                                        Anesthesia Plan      general     ASA 3       Induction: intravenous. Anesthetic plan and risks discussed with patient. Plan discussed with CRNA.                   Anne Bauer MD   3/3/2021

## 2021-03-03 NOTE — PROGRESS NOTES
Daughter updated on phone. Discharge instructions reviewed with patient and responsible adult. Discharge instructions signed and copy given with no additional questions. Patient to be discharged home with belongings. Norco script sent to outpatient pharmacy and daughter to  script.

## 2021-03-03 NOTE — H&P
I have reviewed the history and physical (See note dated 2/18/2021) and examined the patient and find no relevant changes. I have reviewed with the patient and/or family the risks, benefits, and alternatives to the procedure.

## 2021-03-03 NOTE — PROGRESS NOTES
D: Pt brought to PACU. Report obtained from OR RN and anesthesia. Pt placed on monitor, VSS, awake, denies pain or nausea at this time.

## 2021-03-04 NOTE — OP NOTE
[facility]                                OPERATIVE REPORT    PATIENT NAME: Jany Dee                      :        1960  MED REC NO:   [id number]                          ROOM:  ACCOUNT NO:   [de-identified]                           ADMIT DATE: 2021  PROVIDER:     Candice Rubin MD    DATE OF PROCEDURE:  2021    PREOPERATIVE DIAGNOSES:  1. Dialysis fistula malfunction. 2.  Prior percutaneous ulnar artery to ulnar vein fistula creation. PROCEDURES PERFORMED:  1. Left upper extremity brachiocephalic AV fistula. 2.  Left upper extremity angiogram.  3.  Covered stenting of ulnar artery to seal off ulnar artery to ulnar  vein fistula using a 5 mm x 2.5 cm VIABAHN stent graft. SURGEON:  Candice Rubin MD    ANESTHESIA:  General endotracheal.    INDICATIONS:  The patient is a 81-year-old male with end-stage renal  disease. He had previously several years ago undergone a percutaneous  AV fistula creation from the ulnar artery to an ulnar vein. The fistula  has functioned well; however, recently there has been noted to be poor  flow within the fistula. Fistulograms were performed showing  significant outflow through the deep venous system impairing outflow  through the dilated cephalic vein. Rather than attempt endovascular  repairs, a surgical fistula is being created with closing off of the  deep venous fistula. OPERATIVE PROCEDURE:  The patient was brought to the angio suite, placed  in a supine position. General endotracheal anesthesia was induced. The  left upper extremity was then prepped and draped in a sterile fashion. A small incision was made just above the antecubital crease over the  brachial artery. The brachial artery was dilated due to the presence of  the fistula. It was mobilized for approximately 1 cm and encircled  proximally and distally with vessel loops.   The lateral brachial vein  was also encircled with 2-0 silk ties as the fistula had been created  between the ulnar artery and lateral ulnar vein. I had planned to access the ulnar vein to place a stent in the ulnar  vein to close off the fistula; however, this did not appear possible as  arteriograms performed showed a coil had been placed by someone other  than myself in the outflow ulnar vein, which likely contributed to poor  flow in the fistula. This was identified by accessing the brachial artery first using a  5-Cambodian introducer sheath, then performing retrograde brachial  angiogram.  This showed excellent flow into the radial and interosseous  arteries and into the ulnar artery with the fistula between the ulnar  artery and lateral ulnar vein. The fistula itself was widely patent,  but there was a coil just distal to the anastomosis in the ulnar vein. It was obvious I could not place a stent graft in the ulnar vein as it  would not get a seal due to the presence of this coil. Therefore, the 5-Cambodian brachial sheath was upsized over a Nitrex wire  to a 6-Cambodian introducer sheath. A V-18 control wire was passed  retrograde into the brachial artery, then the distal ulnar artery. A 5  mm x 2.5 cm VIABAHN covered stent was then deployed from the origin of  the ulnar vein and distally thus covering up the fistula. Post stent  deployment, this was dilated using a 5 x 2 cm balloon, sealing off the  fistula as determined by repeat arteriography. Once this was completed, the brachial vein was also ligated where it had  been exposed in the proximal upper arm. A second incision was made over  the cephalic vein slightly more laterally in the antecubital region. It  was dissected for several centimeters and ligated proximally using a 2-0  silk tie. A subcutaneous tunnel was created between the two incisions  and the cephalic vein was then thus brought over to the brachial artery.   The brachial artery

## 2021-04-03 ENCOUNTER — HOSPITAL ENCOUNTER (INPATIENT)
Age: 61
LOS: 10 days | Discharge: NURSING, REHAB OR LONG-TERM CARE | DRG: 286 | End: 2021-04-14
Attending: EMERGENCY MEDICINE | Admitting: INTERNAL MEDICINE
Payer: MEDICARE

## 2021-04-03 DIAGNOSIS — R00.1 SYMPTOMATIC BRADYCARDIA: Primary | ICD-10-CM

## 2021-04-03 DIAGNOSIS — I95.9 HYPOTENSION, UNSPECIFIED HYPOTENSION TYPE: ICD-10-CM

## 2021-04-03 DIAGNOSIS — R11.2 NAUSEA AND VOMITING, INTRACTABILITY OF VOMITING NOT SPECIFIED, UNSPECIFIED VOMITING TYPE: ICD-10-CM

## 2021-04-03 DIAGNOSIS — R42 LIGHTHEADEDNESS: ICD-10-CM

## 2021-04-03 PROCEDURE — 96375 TX/PRO/DX INJ NEW DRUG ADDON: CPT

## 2021-04-03 PROCEDURE — 96376 TX/PRO/DX INJ SAME DRUG ADON: CPT

## 2021-04-03 PROCEDURE — 5A1D70Z PERFORMANCE OF URINARY FILTRATION, INTERMITTENT, LESS THAN 6 HOURS PER DAY: ICD-10-PCS | Performed by: INTERNAL MEDICINE

## 2021-04-03 PROCEDURE — 6360000002 HC RX W HCPCS: Performed by: EMERGENCY MEDICINE

## 2021-04-03 PROCEDURE — 96365 THER/PROPH/DIAG IV INF INIT: CPT

## 2021-04-03 PROCEDURE — 2580000003 HC RX 258: Performed by: EMERGENCY MEDICINE

## 2021-04-03 PROCEDURE — 99285 EMERGENCY DEPT VISIT HI MDM: CPT

## 2021-04-03 PROCEDURE — 93005 ELECTROCARDIOGRAM TRACING: CPT | Performed by: EMERGENCY MEDICINE

## 2021-04-03 RX ORDER — ATROPINE SULFATE 0.1 MG/ML
INJECTION INTRAVENOUS DAILY PRN
Status: COMPLETED | OUTPATIENT
Start: 2021-04-03 | End: 2021-04-03

## 2021-04-03 RX ORDER — 0.9 % SODIUM CHLORIDE 0.9 %
INTRAVENOUS SOLUTION INTRAVENOUS CONTINUOUS PRN
Status: COMPLETED | OUTPATIENT
Start: 2021-04-03 | End: 2021-04-03

## 2021-04-03 RX ADMIN — ATROPINE SULFATE 1 MG: 0.1 INJECTION, SOLUTION INTRAVENOUS at 23:54

## 2021-04-03 RX ADMIN — ATROPINE SULFATE 1 MG: 0.1 INJECTION, SOLUTION INTRAVENOUS at 23:57

## 2021-04-03 RX ADMIN — SODIUM CHLORIDE 1000 ML: 9 INJECTION, SOLUTION INTRAVENOUS at 23:57

## 2021-04-04 ENCOUNTER — APPOINTMENT (OUTPATIENT)
Dept: GENERAL RADIOLOGY | Age: 61
DRG: 286 | End: 2021-04-04
Payer: MEDICARE

## 2021-04-04 PROBLEM — I48.91 ATRIAL FIBRILLATION WITH SLOW VENTRICULAR RESPONSE (HCC): Status: ACTIVE | Noted: 2021-04-04

## 2021-04-04 PROBLEM — R00.1 SYMPTOMATIC BRADYCARDIA: Status: ACTIVE | Noted: 2021-04-04

## 2021-04-04 LAB
A/G RATIO: 1.2 (ref 1.1–2.2)
A/G RATIO: 1.3 (ref 1.1–2.2)
ALBUMIN SERPL-MCNC: 3.9 G/DL (ref 3.4–5)
ALBUMIN SERPL-MCNC: 4 G/DL (ref 3.4–5)
ALP BLD-CCNC: 105 U/L (ref 40–129)
ALP BLD-CCNC: 109 U/L (ref 40–129)
ALT SERPL-CCNC: 15 U/L (ref 10–40)
ALT SERPL-CCNC: 24 U/L (ref 10–40)
ANION GAP SERPL CALCULATED.3IONS-SCNC: 13 MMOL/L (ref 3–16)
ANION GAP SERPL CALCULATED.3IONS-SCNC: 18 MMOL/L (ref 3–16)
ANISOCYTOSIS: ABNORMAL
APTT: 31.6 SEC (ref 24.2–36.2)
AST SERPL-CCNC: 33 U/L (ref 15–37)
AST SERPL-CCNC: 36 U/L (ref 15–37)
BASE EXCESS VENOUS: -3.4 MMOL/L (ref -3–3)
BASOPHILS ABSOLUTE: 0.1 K/UL (ref 0–0.2)
BASOPHILS ABSOLUTE: 0.2 K/UL (ref 0–0.2)
BASOPHILS RELATIVE PERCENT: 1 %
BASOPHILS RELATIVE PERCENT: 2 %
BILIRUB SERPL-MCNC: 0.4 MG/DL (ref 0–1)
BILIRUB SERPL-MCNC: 0.4 MG/DL (ref 0–1)
BUN BLDV-MCNC: 33 MG/DL (ref 7–20)
BUN BLDV-MCNC: 36 MG/DL (ref 7–20)
CALCIUM SERPL-MCNC: 8.7 MG/DL (ref 8.3–10.6)
CALCIUM SERPL-MCNC: 8.8 MG/DL (ref 8.3–10.6)
CARBOXYHEMOGLOBIN: 7.7 % (ref 0–1.5)
CHLORIDE BLD-SCNC: 94 MMOL/L (ref 99–110)
CHLORIDE BLD-SCNC: 97 MMOL/L (ref 99–110)
CO2: 21 MMOL/L (ref 21–32)
CO2: 27 MMOL/L (ref 21–32)
CREAT SERPL-MCNC: 5.7 MG/DL (ref 0.9–1.3)
CREAT SERPL-MCNC: 5.9 MG/DL (ref 0.9–1.3)
EKG ATRIAL RATE: 31 BPM
EKG DIAGNOSIS: NORMAL
EKG Q-T INTERVAL: 626 MS
EKG QRS DURATION: 100 MS
EKG QTC CALCULATION (BAZETT): 442 MS
EKG R AXIS: 63 DEGREES
EKG T AXIS: 96 DEGREES
EKG VENTRICULAR RATE: 30 BPM
EOSINOPHILS ABSOLUTE: 0 K/UL (ref 0–0.6)
EOSINOPHILS ABSOLUTE: 0.2 K/UL (ref 0–0.6)
EOSINOPHILS RELATIVE PERCENT: 0.3 %
EOSINOPHILS RELATIVE PERCENT: 2.3 %
GFR AFRICAN AMERICAN: 12
GFR AFRICAN AMERICAN: 12
GFR NON-AFRICAN AMERICAN: 10
GFR NON-AFRICAN AMERICAN: 10
GLOBULIN: 3 G/DL
GLOBULIN: 3.3 G/DL
GLUCOSE BLD-MCNC: 146 MG/DL (ref 70–99)
GLUCOSE BLD-MCNC: 163 MG/DL (ref 70–99)
GLUCOSE BLD-MCNC: 170 MG/DL (ref 70–99)
GLUCOSE BLD-MCNC: 176 MG/DL (ref 70–99)
GLUCOSE BLD-MCNC: 297 MG/DL (ref 70–99)
GLUCOSE BLD-MCNC: 305 MG/DL (ref 70–99)
GLUCOSE BLD-MCNC: 314 MG/DL (ref 70–99)
HCO3 VENOUS: 22 MMOL/L (ref 23–29)
HCT VFR BLD CALC: 31.3 % (ref 40.5–52.5)
HCT VFR BLD CALC: 31.8 % (ref 40.5–52.5)
HEMATOLOGY PATH CONSULT: NO
HEMATOLOGY PATH CONSULT: YES
HEMOGLOBIN: 10.4 G/DL (ref 13.5–17.5)
HEMOGLOBIN: 10.4 G/DL (ref 13.5–17.5)
HYPOCHROMIA: ABNORMAL
INR BLD: 1.2 (ref 0.86–1.14)
INR BLD: 1.25 (ref 0.86–1.14)
LACTIC ACID: 6.8 MMOL/L (ref 0.4–2)
LYMPHOCYTES ABSOLUTE: 0.8 K/UL (ref 1–5.1)
LYMPHOCYTES ABSOLUTE: 2.3 K/UL (ref 1–5.1)
LYMPHOCYTES RELATIVE PERCENT: 23.1 %
LYMPHOCYTES RELATIVE PERCENT: 6.1 %
MACROCYTES: ABNORMAL
MAGNESIUM: 2.2 MG/DL (ref 1.8–2.4)
MCH RBC QN AUTO: 37.9 PG (ref 26–34)
MCH RBC QN AUTO: 38 PG (ref 26–34)
MCHC RBC AUTO-ENTMCNC: 32.7 G/DL (ref 31–36)
MCHC RBC AUTO-ENTMCNC: 33.2 G/DL (ref 31–36)
MCV RBC AUTO: 114.7 FL (ref 80–100)
MCV RBC AUTO: 115.9 FL (ref 80–100)
METHEMOGLOBIN VENOUS: 0 %
MONOCYTES ABSOLUTE: 0.9 K/UL (ref 0–1.3)
MONOCYTES ABSOLUTE: 1 K/UL (ref 0–1.3)
MONOCYTES RELATIVE PERCENT: 7.5 %
MONOCYTES RELATIVE PERCENT: 8.9 %
NEUTROPHILS ABSOLUTE: 11.1 K/UL (ref 1.7–7.7)
NEUTROPHILS ABSOLUTE: 6.4 K/UL (ref 1.7–7.7)
NEUTROPHILS RELATIVE PERCENT: 63.7 %
NEUTROPHILS RELATIVE PERCENT: 85.1 %
O2 CONTENT, VEN: 12 VOL %
O2 SAT, VEN: 82 %
O2 THERAPY: ABNORMAL
PCO2, VEN: 41.1 MMHG (ref 40–50)
PDW BLD-RTO: 18.2 % (ref 12.4–15.4)
PDW BLD-RTO: 18.5 % (ref 12.4–15.4)
PERFORMED ON: ABNORMAL
PH VENOUS: 7.35 (ref 7.35–7.45)
PHOSPHORUS: 5.1 MG/DL (ref 2.5–4.9)
PLATELET # BLD: 289 K/UL (ref 135–450)
PLATELET # BLD: 342 K/UL (ref 135–450)
PLATELET SLIDE REVIEW: ADEQUATE
PMV BLD AUTO: 6.9 FL (ref 5–10.5)
PMV BLD AUTO: 7.7 FL (ref 5–10.5)
PO2, VEN: 48.7 MMHG (ref 25–40)
POTASSIUM SERPL-SCNC: 4.5 MMOL/L (ref 3.5–5.1)
POTASSIUM SERPL-SCNC: 4.6 MMOL/L (ref 3.5–5.1)
PRO-BNP: 6184 PG/ML (ref 0–124)
PROTHROMBIN TIME: 13.9 SEC (ref 10–13.2)
PROTHROMBIN TIME: 14.5 SEC (ref 10–13.2)
RBC # BLD: 2.73 M/UL (ref 4.2–5.9)
RBC # BLD: 2.74 M/UL (ref 4.2–5.9)
SODIUM BLD-SCNC: 133 MMOL/L (ref 136–145)
SODIUM BLD-SCNC: 137 MMOL/L (ref 136–145)
SPECIMEN STATUS: NORMAL
TCO2 CALC VENOUS: 23 MMOL/L
TOTAL PROTEIN: 6.9 G/DL (ref 6.4–8.2)
TOTAL PROTEIN: 7.3 G/DL (ref 6.4–8.2)
TROPONIN: 0.05 NG/ML
WBC # BLD: 10 K/UL (ref 4–11)
WBC # BLD: 13 K/UL (ref 4–11)

## 2021-04-04 PROCEDURE — 83735 ASSAY OF MAGNESIUM: CPT

## 2021-04-04 PROCEDURE — 6370000000 HC RX 637 (ALT 250 FOR IP): Performed by: INTERNAL MEDICINE

## 2021-04-04 PROCEDURE — 2700000000 HC OXYGEN THERAPY PER DAY

## 2021-04-04 PROCEDURE — 83036 HEMOGLOBIN GLYCOSYLATED A1C: CPT

## 2021-04-04 PROCEDURE — 2000000000 HC ICU R&B

## 2021-04-04 PROCEDURE — 99223 1ST HOSP IP/OBS HIGH 75: CPT | Performed by: INTERNAL MEDICINE

## 2021-04-04 PROCEDURE — 80053 COMPREHEN METABOLIC PANEL: CPT

## 2021-04-04 PROCEDURE — 83880 ASSAY OF NATRIURETIC PEPTIDE: CPT

## 2021-04-04 PROCEDURE — 6360000002 HC RX W HCPCS

## 2021-04-04 PROCEDURE — 71045 X-RAY EXAM CHEST 1 VIEW: CPT

## 2021-04-04 PROCEDURE — 6360000002 HC RX W HCPCS: Performed by: EMERGENCY MEDICINE

## 2021-04-04 PROCEDURE — 84100 ASSAY OF PHOSPHORUS: CPT

## 2021-04-04 PROCEDURE — 85730 THROMBOPLASTIN TIME PARTIAL: CPT

## 2021-04-04 PROCEDURE — 85025 COMPLETE CBC W/AUTO DIFF WBC: CPT

## 2021-04-04 PROCEDURE — 36415 COLL VENOUS BLD VENIPUNCTURE: CPT

## 2021-04-04 PROCEDURE — 6360000002 HC RX W HCPCS: Performed by: INTERNAL MEDICINE

## 2021-04-04 PROCEDURE — 83605 ASSAY OF LACTIC ACID: CPT

## 2021-04-04 PROCEDURE — 72170 X-RAY EXAM OF PELVIS: CPT

## 2021-04-04 PROCEDURE — 99222 1ST HOSP IP/OBS MODERATE 55: CPT | Performed by: INTERNAL MEDICINE

## 2021-04-04 PROCEDURE — 85610 PROTHROMBIN TIME: CPT

## 2021-04-04 PROCEDURE — 84484 ASSAY OF TROPONIN QUANT: CPT

## 2021-04-04 PROCEDURE — 94761 N-INVAS EAR/PLS OXIMETRY MLT: CPT

## 2021-04-04 PROCEDURE — 36556 INSERT NON-TUNNEL CV CATH: CPT

## 2021-04-04 PROCEDURE — 36592 COLLECT BLOOD FROM PICC: CPT

## 2021-04-04 PROCEDURE — 82803 BLOOD GASES ANY COMBINATION: CPT

## 2021-04-04 PROCEDURE — 93010 ELECTROCARDIOGRAM REPORT: CPT | Performed by: INTERNAL MEDICINE

## 2021-04-04 RX ORDER — SODIUM CHLORIDE 0.9 % (FLUSH) 0.9 %
10 SYRINGE (ML) INJECTION PRN
Status: DISCONTINUED | OUTPATIENT
Start: 2021-04-04 | End: 2021-04-14 | Stop reason: HOSPADM

## 2021-04-04 RX ORDER — ACETAMINOPHEN 650 MG/1
650 SUPPOSITORY RECTAL EVERY 4 HOURS PRN
Status: DISCONTINUED | OUTPATIENT
Start: 2021-04-04 | End: 2021-04-14 | Stop reason: HOSPADM

## 2021-04-04 RX ORDER — INSULIN GLARGINE 100 [IU]/ML
25 INJECTION, SOLUTION SUBCUTANEOUS 2 TIMES DAILY
Status: DISCONTINUED | OUTPATIENT
Start: 2021-04-04 | End: 2021-04-14 | Stop reason: HOSPADM

## 2021-04-04 RX ORDER — ONDANSETRON 2 MG/ML
INJECTION INTRAMUSCULAR; INTRAVENOUS
Status: COMPLETED
Start: 2021-04-04 | End: 2021-04-04

## 2021-04-04 RX ORDER — ONDANSETRON 2 MG/ML
4 INJECTION INTRAMUSCULAR; INTRAVENOUS EVERY 6 HOURS PRN
Status: DISCONTINUED | OUTPATIENT
Start: 2021-04-04 | End: 2021-04-14 | Stop reason: HOSPADM

## 2021-04-04 RX ORDER — DOPAMINE HYDROCHLORIDE 160 MG/100ML
2-20 INJECTION, SOLUTION INTRAVENOUS CONTINUOUS
Status: DISCONTINUED | OUTPATIENT
Start: 2021-04-04 | End: 2021-04-08

## 2021-04-04 RX ORDER — ACETAMINOPHEN 325 MG/1
650 TABLET ORAL EVERY 4 HOURS PRN
Status: DISCONTINUED | OUTPATIENT
Start: 2021-04-04 | End: 2021-04-14 | Stop reason: HOSPADM

## 2021-04-04 RX ORDER — SODIUM CHLORIDE 9 MG/ML
25 INJECTION, SOLUTION INTRAVENOUS PRN
Status: DISCONTINUED | OUTPATIENT
Start: 2021-04-04 | End: 2021-04-14 | Stop reason: HOSPADM

## 2021-04-04 RX ORDER — ONDANSETRON 2 MG/ML
4 INJECTION INTRAMUSCULAR; INTRAVENOUS ONCE
Status: COMPLETED | OUTPATIENT
Start: 2021-04-04 | End: 2021-04-04

## 2021-04-04 RX ORDER — DEXTROSE MONOHYDRATE 50 MG/ML
100 INJECTION, SOLUTION INTRAVENOUS PRN
Status: DISCONTINUED | OUTPATIENT
Start: 2021-04-04 | End: 2021-04-14 | Stop reason: HOSPADM

## 2021-04-04 RX ORDER — MIDODRINE HYDROCHLORIDE 5 MG/1
2.5 TABLET ORAL DAILY PRN
Status: DISCONTINUED | OUTPATIENT
Start: 2021-04-06 | End: 2021-04-14 | Stop reason: HOSPADM

## 2021-04-04 RX ORDER — FERROUS SULFATE 325(65) MG
325 TABLET ORAL
Status: DISCONTINUED | OUTPATIENT
Start: 2021-04-04 | End: 2021-04-14 | Stop reason: HOSPADM

## 2021-04-04 RX ORDER — ASPIRIN 81 MG/1
81 TABLET ORAL DAILY
Status: DISCONTINUED | OUTPATIENT
Start: 2021-04-04 | End: 2021-04-14 | Stop reason: HOSPADM

## 2021-04-04 RX ORDER — DOPAMINE HYDROCHLORIDE 160 MG/100ML
2-20 INJECTION, SOLUTION INTRAVENOUS CONTINUOUS
Status: DISCONTINUED | OUTPATIENT
Start: 2021-04-04 | End: 2021-04-04

## 2021-04-04 RX ORDER — DEXTROSE MONOHYDRATE 25 G/50ML
12.5 INJECTION, SOLUTION INTRAVENOUS PRN
Status: DISCONTINUED | OUTPATIENT
Start: 2021-04-04 | End: 2021-04-14 | Stop reason: HOSPADM

## 2021-04-04 RX ORDER — CHOLECALCIFEROL (VITAMIN D3) 10 MCG
1 TABLET ORAL DAILY
Status: DISCONTINUED | OUTPATIENT
Start: 2021-04-04 | End: 2021-04-14 | Stop reason: HOSPADM

## 2021-04-04 RX ORDER — NICOTINE POLACRILEX 4 MG
15 LOZENGE BUCCAL PRN
Status: DISCONTINUED | OUTPATIENT
Start: 2021-04-04 | End: 2021-04-14 | Stop reason: HOSPADM

## 2021-04-04 RX ORDER — PRAVASTATIN SODIUM 40 MG
40 TABLET ORAL NIGHTLY
Status: DISCONTINUED | OUTPATIENT
Start: 2021-04-04 | End: 2021-04-14 | Stop reason: HOSPADM

## 2021-04-04 RX ADMIN — INSULIN LISPRO 1 UNITS: 100 INJECTION, SOLUTION INTRAVENOUS; SUBCUTANEOUS at 11:36

## 2021-04-04 RX ADMIN — INSULIN GLARGINE 25 UNITS: 100 INJECTION, SOLUTION SUBCUTANEOUS at 08:13

## 2021-04-04 RX ADMIN — INSULIN LISPRO 4 UNITS: 100 INJECTION, SOLUTION INTRAVENOUS; SUBCUTANEOUS at 03:18

## 2021-04-04 RX ADMIN — ONDANSETRON 4 MG: 2 INJECTION INTRAMUSCULAR; INTRAVENOUS at 00:28

## 2021-04-04 RX ADMIN — INSULIN LISPRO 1 UNITS: 100 INJECTION, SOLUTION INTRAVENOUS; SUBCUTANEOUS at 16:22

## 2021-04-04 RX ADMIN — INSULIN GLARGINE 25 UNITS: 100 INJECTION, SOLUTION SUBCUTANEOUS at 21:24

## 2021-04-04 RX ADMIN — DOPAMINE HYDROCHLORIDE 2 MCG/KG/MIN: 160 INJECTION, SOLUTION INTRAVENOUS at 00:09

## 2021-04-04 RX ADMIN — INSULIN LISPRO 1 UNITS: 100 INJECTION, SOLUTION INTRAVENOUS; SUBCUTANEOUS at 08:11

## 2021-04-04 RX ADMIN — FERROUS SULFATE TAB 325 MG (65 MG ELEMENTAL FE) 325 MG: 325 (65 FE) TAB at 08:12

## 2021-04-04 RX ADMIN — DOPAMINE HYDROCHLORIDE 3 MCG/KG/MIN: 160 INJECTION, SOLUTION INTRAVENOUS at 09:18

## 2021-04-04 RX ADMIN — ASPIRIN 81 MG: 81 TABLET, COATED ORAL at 08:12

## 2021-04-04 RX ADMIN — NEPHROCAP 1 MG: 1 CAP ORAL at 08:12

## 2021-04-04 RX ADMIN — PRAVASTATIN SODIUM 40 MG: 40 TABLET ORAL at 21:24

## 2021-04-04 ASSESSMENT — PAIN SCALES - GENERAL
PAINLEVEL_OUTOF10: 0
PAINLEVEL_OUTOF10: 0

## 2021-04-04 ASSESSMENT — PAIN DESCRIPTION - PAIN TYPE: TYPE: ACUTE PAIN

## 2021-04-04 NOTE — ED NOTES
Bed: 01  Expected date:   Expected time:   Means of arrival:   Comments:  [id number] Tiago Amaya RN  04/03/21 1422

## 2021-04-04 NOTE — CONSULTS
[address] [facility]   Cardiovascular Evaluation    PATIENT: Danna Yee  DATE: 2021  MRN: [id number]  CSN: [id number]  : 1960    Primary Care Doctor/Referring provider: GIANLUCA Blackburn CNP, Lia Maria MD     Reason for evaluation/Chief complaint:   Dizziness (dialysis pt. dizziness starting around 11pm and abdominal cramping; bradycardic and hypotension in route;400ml NS in route )      Subjective:    History of present illness on initial date of evaluation:   Danna Yee is a 62 y.o. patient who presented to the ER with dizziness. The patient is not able to give detailed information about the events of hospitalization due to their underlying medical illness. The majority of the information is taken from the chart, medical staff, and available family. The patient was found to be in junctional rhythm last night and admitted to the ICU. He was started on dopamine and responded appropriately. He is currently stable. We have been asked to see the patient for possible needs for temporary pacemaker. The patient is not very engaged with HPI this AM. No acute complaints.        Patient Active Problem List   Diagnosis    Type 2 diabetes mellitus with stage 4 chronic kidney disease, without long-term current use of insulin ([address]    Cigarette smoker    Atrial fibrillation, chronic (HCC)    Essential (primary) hypertension    Chronic anticoagulation    Normocytic anemia    Primary osteoarthritis of both knees    Calciphylaxis    Depression    Hyperlipidemia    Morbid obesity ([address]    Furunculosis (recurrent, on chronic suppressive Abx)    Tobacco use disorder    Ulcer of left calf with fat layer exposed ([address]    Hyperphosphatemia    Vitamin D deficiency    Secondary hyperparathyroidism of renal origin ([address]    Iron deficiency    White matter changes, severe by MRI brain    Coronary artery disease involving native coronary artery    Renal arteriosclerosis  Other disorder of calcium metabolism    CKD (chronic kidney disease), stage III    Gastrointestinal hemorrhage    Metabolic acidosis    Leg wound, left    [facility]DVT) ([facility])    Tobacco abuse    Hyponatremia    ANA (acute kidney injury) ([address]    Wound infection    Acute on chronic renal failure (HCC)    Gastric outlet obstruction    Potassium excess    DM2 (diabetes mellitus, type 2) ([facility])    Hyperkalemia    Stage 5 chronic kidney disease not on chronic dialysis (HCC)    Shortness of breath    Hypervolemia    ESRD (end stage renal disease) ([address]    Sepsis ([facility])    Acute febrile illness    Mild malnutrition (HCC)    Dialysis AV fistula malfunction ([facility])    Symptomatic bradycardia    Atrial fibrillation with slow ventricular response ([facility])    Hypotension    Lightheadedness    Chronic obstructive pulmonary disease ([facility])    Suspected sleep apnea         Cardiac Testing: I have reviewed the findings below. EKG:  ECHO:   STRESS TEST:  CATH:  BYPASS:  VASCULAR:    Past Medical History:   has a past medical history of Atrial fibrillation ([address] CAD (coronary artery disease), Cardiomyopathy ([address] CHF (congestive heart failure) ([address] Chronic kidney disease, COPD (chronic obstructive pulmonary disease) ([address] Deep vein thrombosis (DVT) ([address] Depression, Diabetes mellitus ([address] Gastrointestinal bleed, GERD (gastroesophageal reflux disease), Heme positive stool, Hemodialysis patient ([address] History of blood transfusion, Hx of blood clots, Hyperlipidemia, Hypertension, Hypoglycemia, MDRO (multiple drug resistant organisms) resistance, MRSA (methicillin resistant staph aureus) culture positive, Primary osteoarthritis of both knees, and Primary osteoarthritis of both knees. Surgical History:   has a past surgical history that includes Hand surgery; Kidney biopsy (Right, 06/20/2016); Upper gastrointestinal endoscopy (01/17/2017); Colonoscopy (01/17/2017);  Kidney biopsy (Left, trachea midline, no adenopathy, thyroid: not enlarged, symmetric, no tenderness/mass/nodules, no carotid bruit or JVD       Lungs:   reduced to auscultation bilaterally, respirations unlabored   Chest Wall:  No tenderness or deformity   Heart:  irregular rhythm and slow rate; Abdomen:   Soft, non-tender, bowel sounds active all four quadrants,  no masses, no organomegaly           Extremities: Extremities with left BKA, RLE with edema chronnic   Pulses: 2+ and symmetric   Skin: Skin color, texture, turgor normal, no rashes or lesions   Pysch: Normal mood and flat affect   Neurologic: Normal gross motor and sensory exam.         Labs  Recent Labs     04/04/21  0000 04/04/21  0442   WBC 10.0 13.0*   HGB 10.4* 10.4*   HCT 31.8* 31.3*   .9* 114.7*    289     Recent Labs     04/04/21  0000 04/04/21  0442   CREATININE 5.7* 5.9*   BUN 33* 36*   * 137   K 4.6 4.5   CL 94* 97*   CO2 21 27     Recent Labs     04/04/21  0000 04/04/21  0442   INR 1.20* 1.25*   PROTIME 13.9* 14.5*     Recent Labs     04/04/21  0000   TROPONINI 0.05*     Invalid input(s): PRO-BNP  No results for input(s): CHOL, HDL in the last 72 hours. Invalid input(s): LDL, TG      Imaging:  I have reviewed the below testing personally and my interpretation is below. EKG:  Junctional bradycardiaLow voltage QRSST elevation consider inferior injury or acute infarct    CXR:      Assessment:  62 y.o. patient with:  Problem List Items Addressed This Visit     Symptomatic bradycardia - Primary    Hypotension    Lightheadedness        Plan:  1. Continue dopamine to support heart rate  2. EP to see AM to consult for PPM needs  3. Stop beta-blockers and rate control medications  4. Check TSH  5. Correct acidosis  6. ESRD management as necessary     All questions and concerns were addressed to the patient/family. Alternatives to my treatment were discussed. The note was completed using EMR.  Every effort was made to ensure accuracy; however, inadvertent computerized transcription errors may be present.     Honey Keita MD, [address], [place], [place]  [phone] Susette Kanner office  [phone] [facility]  4/4/2021  12:08 PM

## 2021-04-04 NOTE — ED NOTES
Code stemi called @ [phone]   Cardiology @ [phone]   Cardiology returned @ 0000   Dr. Mukesh Stone disagree stating the pt isnt a stemi.       Mago Culver  04/04/21 0010

## 2021-04-04 NOTE — H&P
Hospital Medicine History & Physical      Patient:  Nargis Lopez  :   1960  MRN:   [id number]  Date of Service: 21    CHIEF COMPLAINT:  Fatigue, weakness    HISTORY OF PRESENT ILLNESS:    Nargis Lopez is a 62 y.o. male. He presented from home via ambulance. He activated EMS because he suddenly felt very fatigued and having difficulty staying awake. EMS arrived to find his HR roughly 30 bpm and his systolic BP in the 42N. Initially in the ER he was paced transcutaneously. His HR and BP both came up after dopamine infusion was started. Mr. Aundrea Ventura has multiple chronic medical problems including chronic atrial fibrillation and ESRD. He relates he was in his usual state of health tonight, just watching TV, when he suddenly felt very tired, weak, and had trouble staying awake. He denies any prodromal symptoms. He attended his usual dialysis session earlier in the day. He now feels much more normal with dopamine infusing. Review of Systems:  All pertinent positives and negatives are as noted in the HPI section. All other systems were reviewed and are negative.     Past Medical History:   Diagnosis Date    Atrial fibrillation ([facility]     CAD (coronary artery disease)     Cardiomyopathy ([facility] 2015    CHF [facility]congestive heart failure) [facility])     Chronic kidney disease     COPD (chronic obstructive pulmonary disease) ([facility])     Deep vein thrombosis (DVT) ([facility] 2012    Depression     Diabetes mellitus (HCC)     Gastrointestinal bleed     GERD (gastroesophageal reflux disease)     Heme positive stool 2017    Hemodialysis patient ([facility]     [facility]  [facility]    History of blood transfusion     2017    Hx of blood clots     left leg; 5 plus years ago    Hyperlipidemia     Hypertension     Hypoglycemia     MDRO (multiple drug resistant organisms) resistance     treated with antibiotics    MRSA (methicillin resistant staph aureus) culture positive 9/26/17,09/18/2017    posterior left calf    Primary osteoarthritis of both knees     Primary osteoarthritis of both knees        Past Surgical History:   Procedure Laterality Date    CARDIAC CATHETERIZATION  07/12/2011    COLONOSCOPY  01/17/2017    diverticulosis    DIALYSIS FISTULA CREATION Left 2020    DIALYSIS FISTULA CREATION Left 3/3/2021    LEFT UPPER ARM BRACHIAL CEPHALIC ARTERIAL VENOUS FISTULA; LEFT UPPER EXTREMITY ANGIOGRAM; STENT GRAFT ULNAR ARTERIAL VENOUS FISTULA performed by Khoa Myles MD at [address] Left     ankle; age 13    HAND SURGERY      KIDNEY BIOPSY Right 06/20/2016    KIDNEY BIOPSY Left 09/20/2016    OPEN TREATMENT RADIAL SHAFT FRACTURE Right 8/7/2018    OPEN REDUCTION INTERNAL FIXATION RIGHT DISTAL RADIUS, RIGHT CARPAL TUNNEL RELEASE performed by Renae Rodrigues MD at [facility] TUNNELED [address] Right 11/09/2017    BARD POWER PORT    UPPER GASTROINTESTINAL ENDOSCOPY  01/17/2017    mild chronic gastritis; duodenal biopsy normal    UPPER GASTROINTESTINAL ENDOSCOPY  10/24/2017         Prior to Admission medications    Medication Sig Start Date End Date Taking?  Authorizing Provider   Ferric Citrate (AURYXIA) 1  MG(Fe) TABS Take 3 tablets by mouth 3 times daily (with meals)    Historical Provider, MD   Cholecalciferol (VITAMIN D3) 1.25 MG (07732 UT) CAPS Take by mouth every 30 days    Historical Provider, MD   dilTIAZem (CARDIZEM CD) 120 MG extended release capsule Take 120 mg by mouth daily    Historical Provider, MD   aspirin 81 MG EC tablet Take 81 mg by mouth daily    Historical Provider, MD   midodrine (PROAMATINE) 2.5 MG tablet Take 2.5 mg by mouth daily ON DIALYSIS DAYS    Historical Provider, MD MONTOYA Complex-C-Folic Acid (AYLA CAPS) 1 MG CAPS Take 1 capsule by mouth daily    Historical Provider, MD   pravastatin (PRAVACHOL) 40 MG tablet Take 1 tablet by mouth daily  Patient taking differently: Take 40 mg by mouth nightly  7/10/19 Noreene Goodpasture, MD   insulin glargine (LANTUS SOLOSTAR) 100 UNIT/ML injection pen Inject 25 Units into the skin 2 times daily 6/25/19   Noreene Goodpasture, MD   blood glucose test strips (AGAMATRIX AMP TEST) strip 1 each by In Vitro route daily As needed. 6/24/19   Noreene Goodpasture, MD   Blood Glucose Monitoring Suppl S. [facility]) w/Device KIT Check twice daily 6/24/19   Noreene Goodpasture, MD Paschal Math ULTRA-THIN LANCETS MISC Check sugars bid 6/24/19   Noreene Goodpasture, MD   carvedilol (COREG) 12.5 MG tablet Take 12.5 mg by mouth 2 times daily    Historical Provider, MD   apixaban (ELIQUIS) 2.5 MG TABS tablet Take 2.5 mg by mouth 2 times daily Take two tablets BID    Historical Provider, MD       Allergies:   Codeine, Onion, and Penicillins    Social:   reports that he has been smoking cigarettes. He has a 7.50 pack-year smoking history. He has never used smokeless tobacco.   reports no history of alcohol use. Social History     Substance and Sexual Activity   Drug Use No       Family History   Problem Relation Age of Onset    Arthritis Mother     Depression Mother     High Blood Pressure Mother     Mental Illness Mother 80        dementia    Obesity Mother     Osteoporosis Mother        PHYSICAL EXAM:  I performed this physical examination.     Vitals:  Patient Vitals for the past 24 hrs:   BP Temp Temp src Pulse Resp SpO2 Height Weight   04/04/21 0111 114/68   73 15 99 %     04/04/21 0107 110/60   72 16 (!) 84 %     04/04/21 0102 108/68   71 19 (!) 88 %     04/04/21 0057 113/63   71 15 95 %     04/04/21 0052 114/72   71 16 97 %     04/04/21 0047 111/61   70 24 98 %     04/04/21 0046  97 °F (36.1 °C) Oral        04/04/21 0042 120/74   71 14      04/04/21 0037 107/77   75 19 97 %     04/04/21 0032 114/63   92 15 95 %     04/04/21 0029 (!) 106/58   94 15 99 %     04/04/21 0017      98 %     04/04/21 0012 (!) 63/47   77 16 100 %   04/04/21 0011    81 14 100 %     04/04/21 0010    58 14      04/04/21 0009    57 28      04/04/21 0008 (!) 56/44   59 15 (!) 83 %     04/04/21 0007    64 21      04/04/21 0006 (!) 63/35   68 16      04/04/21 0005    58 16  5' 10\" (1.778 m) (!) 306 lb (138.8 kg)   04/04/21 0004    66 17 99 %     04/04/21 0003      100 %     04/04/21 0003    58 15      04/04/21 0002 (!) 85/49   58 18      04/04/21 0002 (!) 85/49   58 18      04/03/21 2358 (!) 73/32   (!) 39 20 100 %     04/03/21 2353     18 100 %     04/03/21 2350    (!) 33 18        No intake or output data in the 24 hours ending 04/04/21 0131    5L/min O2    GEN:  Appearance:  Obese, chronically ill appearing male in NAD . Level of Consciousness:  alert . Orientation:  full    HEENT: Sclera anicteric.  no conjunctival chemosis. moist mucus membranes. no specific or diagnostic oral lesions. NECK:  no signs of meningismus. Jugular veins not discernible. Carotid pulses  2+.  no cervical lymphadenopathy. no thyromegaly. CV:  Irregularly irregular rhythm. normal S1 & S2.    no murmur. no rub.  no gallop. CHEST: Right upper chest tunneled HD catheter    PULM:  Chest excursion is symmetric. Breath sounds are distant but vesicular. Adventitious sounds:  Bibasilar crackles    AB:  Abdominal shape is obese. Bowel sounds are hypoactive but present. Generally soft to palpation. no tenderness is present. no involuntary guarding. no rebound guarding. EXTR:  Skin is warm. Capillary refill brisk. no specific or pathognomic rash. no clubbing. Trace pitting edema. About the ankles  Skin of both legs is dry, woody, and scaling. No active wound or ulcer. Left brachial AV fistula present w/ palpable thrill.     LABS:  Lab Results   Component Value Date    WBC 10.0 04/04/2021    HGB 10.4 (L) 04/04/2021    HCT 31.8 (L) 04/04/2021    .9 (H) 04/04/2021

## 2021-04-04 NOTE — CONSULTS
PULMONARY AND CRITICAL CARE INPATIENT CONSULTATION      4/4/2021    Patient Name:  Kavtia Wall       1960       Evaluation was requested by Dr. Santy Flood regarding symptomatic bradycardia due to afib w/ slow ventricular response. HPI: Vonnie Marker. Angie James is a 62 y.o. male who has a significant medical history including ESRD on HD, HTN, diabetes mellitus, HLD, DVT, cardiomyopathy, CHF, atrial fibrillation, CAD, COPD, depression, GERD, DJD. The patient is a long-term smoker, smoked up to 2 PPD, recently 0.5 PPD. He does have a history of alcohol use, although rare. He was a . The patient lives by himself, has 2 daughters, his daughter Marcelino Cornejo is his healthcare power of . He is on hemodialysis Tuesday/Thursday/Saturday. Patient was in his usual state of health until a few days ago. The patient said he developed sudden onset of feeling extremely weak with a sensation that he would pass out. He was sitting up and watching TV. He was profusely diaphoretic. He denied chest pain, leg edema, PND and orthopnea. The patient does not have a regular smoker's cough, and denies wheezing. He is not on any inhalers. He thinks he was told he had COPD in the past.  He has not had a PFT to his recollection. The patient has no GI or  complaints. He has a good appetite, his weight has been stable. He denies symptoms suggestive of JAQUELIN, has not had an evaluation. The rest of his ROS was unremarkable. The patient was brought to the ER, his heart rate was between 28 and 30, blood pressure was 74 systolic. He failed to respond to 1 mg of atropine, was placed transcutaneously. After discussion with cardiology he was placed on a dopamine drip with improved heart rate. STEMI was ruled out by Dr. Roger Razo. He was admitted to ICU, his Coreg was held. Since admission to ICU, he has remained hemodynamically stable, dopamine was weaned from 7 mcg/kg/min to 3 mcg/kg/min.   He is on oxygen at 2 [facility] 08/07/2012    Essential (primary) hypertension 08/02/2011    Atrial fibrillation, chronic ([facility] 07/12/2011    Type 2 diabetes mellitus with stage 4 chronic kidney disease, without long-term current use of insulin ([facility] 07/09/2011    Cigarette smoker 07/09/2011       Past Medical History:   Diagnosis Date    [facility]     CAD (coronary artery disease)     [facility] 7/13/2015    [facility]     Chronic kidney disease     COPD [facility]chronic obstructive pulmonary disease) ([facility]     [facility] 8/7/2012    Depression     Diabetes mellitus ([facility])     Gastrointestinal bleed     GERD (gastroesophageal reflux disease)     Heme positive stool 01/2017    Hemod[facility]     T-TH-S  [facility]    History of blood transfusion     1/2017    Hx of blood clots     left leg; 5 plus years ago    Hyperlipidemia     Hypertension     Hypoglycemia     MDRO (multiple drug resistant organisms) resistance 2017    treated with antibiotics    MRSA (methicillin resistant staph aureus) culture positive 9/26/17,09/18/2017    posterior left calf    Primary osteoarthritis of both knees     Primary osteoarthritis of both knees         Past Surgical History:   Procedure Laterality Date    CARDIAC CATHETERIZATION  07/12/2011    COLONOSCOPY  01/17/2017    diverticulosis    DIALYSIS FISTULA CREATION Left 2020    DIALYSIS FISTULA CREATION Left 3/3/2021    LEFT UPPER ARM BRACHIAL CEPHALIC ARTERIAL VENOUS FISTULA; LEFT UPPER EXTREMITY ANGIOGRAM; STENT GRAFT ULNAR ARTERIAL VENOUS FISTULA performed by Patrice Reese MD at [address] [facility] Left     ankle; age 13    HAND SURGERY      KIDNEY BIOPSY Right 06/20/2016    KIDNEY BIOPSY Left 09/20/2016    OPEN TREATMENT RADIAL SHAFT FRACTURE Right 8/7/2018    OPEN REDUCTION INTERNAL FIXATION RIGHT DISTAL RADIUS, RIGHT CARPAL TUNNEL RELEASE performed by Guerita Robertson MD Johnnie at [address] [facility] TUNNELED VENOUS PORT PLACEMENT Right 11/09/2017    BARD POWER PORT    UPPER GASTROINTESTINAL ENDOSCOPY  01/17/2017    mild chronic gastritis; duodenal biopsy normal    UPPER GASTROINTESTINAL ENDOSCOPY  10/24/2017        Family History   Problem Relation Age of Onset    Arthritis Mother     Depression Mother     High Blood Pressure Mother     Mental Illness Mother 80        dementia    Obesity Mother     Osteoporosis Mother         Social History     Tobacco Use    Smoking status: Current Every Day Smoker     Packs/day: 0.50     Years: 15.00     Pack years: 7.50     Types: Cigarettes    Smokeless tobacco: Never Used    Tobacco comment: patient has cut down    Substance Use Topics    Alcohol use: No     Comment: rarely        Allergies   Allergen Reactions    Codeine Itching    Onion Other (See Comments)     diarrhea    Penicillins Hives and Rash     Unknown reaction. Childhood problem         Vital Signs:  Blood pressure 97/68, pulse 84, temperature 97.7 °F (36.5 °C), temperature source Oral, resp. rate 13, height 5' 10\" (1.778 m), weight 285 lb 7.9 oz (129.5 kg), SpO2 97 %.' on RA  Body mass index is 40.96 kg/m². Intake/Output Summary (Last 24 hours) at 4/4/2021 0809  Last data filed at 4/4/2021 7111  Gross per 24 hour   Intake 1052 ml   Output 0 ml   Net 1052 ml         PHYSICAL EXAM:  General: Patient was woken up from sleep, answers all questions appropriately, but did not make eye contact. He would close his eyes after answering the question. He is well-built, overweight, in no respiratory distress. Eyes:  No scleral icterus or periorbital edema. Head: Atraumatic, normocephalic. ENMT: Class IV airway, slightly pale and dry mucosa. No bleeding of lips or gums. No ulceration. No oral candidiasis. Neck: Short and large neck. No palpable goiter, no lymphadenopathy, no JVD. No tracheal deviation. No S/Q emphysema. No stiff neck.   Lymphadenopathy: No cervical, supraclavicular adenopathy. CV: Heart sounds somewhat distant. S1, S2, no murmurs, gallops, clicks or rubs. Pulses palpable and symmetrical, strong. Capillary refills in nail beds are brisk. Chronic venous changes of the legs distally  Respiratory: Clear to auscultation  Chest: Equal rise and fall of chest.   GI: Abdomen soft, fatty, protuberant. Nontender. No organomegaly. : Deferred. Skin: No rashes, lesions, bruises, induration, discharge. Color, texture, turgor normal.  Musculoskeletal: Supple, no contractures or muscle atrophy. No clubbing or cyanosis of nailbeds. No joint swelling, redness. Edema: None  Neuro: Detailed exam not performed, moves all extremities. No obvious neurologic deficit, detailed exam not performed      Results:  CBC:   Recent Labs     04/04/21  0000 04/04/21  0442   WBC 10.0 13.0*   HGB 10.4* 10.4*   HCT 31.8* 31.3*   .9* 114.7*    289     BMP:   Recent Labs     04/04/21  0000 04/04/21  0442   * 137   K 4.6 4.5   CL 94* 97*   CO2 21 27   PHOS  --  5.1*   BUN 33* 36*   CREATININE 5.7* 5.9*       Imaging:  I have reviewed radiology images personally. Xr Chest Portable    Result Date: 4/4/2021  Cardiomegaly, with mild central venous congestion      ASSESSMENT AND PLAN:  Symptomatic bradycardia, hypotension, lactic acidosis. Likely primary cardiogenic process. May need a pacemaker. Cardiology following. Dopamine weaned. Will need to remain in ICU  Atrial fibrillation with slow ventricular response. Per cardiology  ESRD. Compliant with HD, nephrology to follow  Macrocytic anemia. Per IM  Atrial fibrillation, chronic anticoagulation, aortic regurgitation. Per IM/cardiology  COPD. Clinical diagnosis, significant smoking history. Denies shortness of breath until a couple of days ago. If he is symptomatic, he should undergo outpatient PFT  DM 2, CAD, cardiomyopathy, GERD, HTN, HLD, DJD. Per IM  JAQUELIN.   His body habitus suggests JAQUELIN, he has no bed partner, denies symptoms. Increases risk of JAQUELIN in patients with CKD  Smoker. Should quit smoking altogether with his vascular disease and possible COPD  DVT prophylaxis. Eliquis 2.5 mg twice daily as an outpatient. Held for possible procedure    Echocardiogram 10/19/2019:  Irregular rhythm. Left ventricular systolic function is normal with ejection fraction estimated at 55 %. No regional wall motion abnormalities are noted. There is moderate concentric left ventricular hypertrophy. Diastolic dysfunction grade and filing pressure are indeterminate. Biatrial enlargement. The aortic root is moderately dilated. The ascending aorta is moderately dilated. Aortic valve appears sclerotic but opens adequately. Mild-to-moderate aortic regurgitation is present. Mild calcification of the posterior leaflet of the mitral valve. Mild mitral, tricuspid and pulmonic regurgitation. Previous echo done 7/2015 - EF 55%  PFT:  None in EMR      I have examined this patient and available medical records, including all the radiographic and all relevant lab data/studies personally on this date and have made the above observations, conclusions and recommendations. I have discussed with patient, nursing staff. No active pulmonary issues at present. We will sign off. Please call with questions and thank you for the consultation.     Electronically signed by:  Pablito Mauro MD    4/4/2021    8:09 AM.

## 2021-04-04 NOTE — ED PROVIDER NOTES
Emergency Physician Note  [address]  ED  [address] [address]  Dept: [phone]  Dept Fax: [phone]  Loc: [phone]  Open Note Time:  12:28 AM EDT    Chief Complaint  Dizziness (dialysis pt. dizziness starting around 11pm and abdominal cramping; bradycardic and hypotension in route;400ml NS in route )       History of Present Illness  Danna Yee is a [address] y.o. male  has a past medical history of Atrial fibrillation ([facility] [address] CAD (coronary artery disease), Cardiomyopathy ([facility] [address] CHF (congestive heart failure) ([facility] [address] Chronic kidney disease, COPD (chronic obstructive pulmonary disease) ([facility] [address] Deep vein thrombosis (DVT) ([facility] [address] Depression, Diabetes mellitus ([facility] [address] Gastrointestinal bleed, GERD (gastroesophageal reflux disease), Heme positive stool, Hemodialysis patient ([facility] [address] History of blood transfusion, Hx of blood clots, Hyperlipidemia, Hypertension, Hypoglycemia, MDRO (multiple drug resistant organisms) resistance, MRSA (methicillin resistant staph aureus) culture positive, Primary osteoarthritis of both knees, and Primary osteoarthritis of both knees. who presents to the ED for bradycardia. Patient had sudden onset of generalized malaise and feeling not right. He called EMS and found to have a heart rate between 28 and 30 and his blood pressure was initially a systolic of 74. Patient has chronic kidney disease on dialysis Tuesday Thursday Saturday. He did complete dialysis on a full third. He states he still produces urine. Denies any chest pain. Patient is also diaphoretic. EMS reports that he would become in and out of consciousness, but patient denies that he is ever lost consciousness. .    Denies fever, chest pain, shortness of breath, cough, abdominal pain, nausea, vomiting, diarrhea, headache. No palliative/provocative factors.        Unless otherwise stated in this report or unable to obtain because of the patient's clinical or mental status as evidenced by the medical record, this patient's positive and negative responses for review of systems, constitutional, psych, eyes, ENT, cardiovascular, respiratory, gastrointestinal, neurological, genitourinary, musculoskeletal, integument systems and systems related to the presenting problem are either stated in the preceding paragraph or were not pertinent or were negative for the symptoms and/or complaints related to the medical problem. I have reviewed the following from the nursing documentation:      Prior to Admission medications    Medication Sig Start Date End Date Taking? Authorizing Provider   Ferric Citrate (AURYXIA) 1  MG(Fe) TABS Take 3 tablets by mouth 3 times daily (with meals)    Historical Provider, MD   Cholecalciferol (VITAMIN D3) 1.25 MG (73005 UT) CAPS Take by mouth every 30 days    Historical Provider, MD   dilTIAZem (CARDIZEM CD) 120 MG extended release capsule Take 120 mg by mouth daily    Historical Provider, MD   aspirin 81 MG EC tablet Take 81 mg by mouth daily    Historical Provider, MD   midodrine (PROAMATINE) 2.5 MG tablet Take 2.5 mg by mouth daily ON DIALYSIS DAYS    Historical Provider, MD   B Complex-C-Folic Acid (AYLA CAPS) 1 MG CAPS Take 1 capsule by mouth daily    Historical Provider, MD   pravastatin (PRAVACHOL) 40 MG tablet Take 1 tablet by mouth daily  Patient taking differently: Take 40 mg by mouth nightly  7/10/19   Stephanie Acosta MD   insulin glargine (LANTUS SOLOSTAR) 100 UNIT/ML injection pen Inject 25 Units into the skin 2 times daily 6/25/19   Stephanie Acosta MD   blood glucose test strips (AGAMATRIX AMP TEST) strip 1 each by In Vitro route daily As needed.  6/24/19   Stephanie Acosta MD   Blood Glucose Monitoring Suppl [facility]) w/Device KIT Check twice daily 6/24/19   Stephanie Acosta MD   [employer] ULTRA-THIN LANCETS MISC Check sugars bid 6/24/19   Stephanie Acosta MD   carvedilol (COREG) 12.5 MG tablet Take 12.5 mg OPEN REDUCTION INTERNAL FIXATION RIGHT DISTAL RADIUS, RIGHT CARPAL TUNNEL RELEASE performed by Jonh Caicedo MD at [facility] TUNNELED VENOUS PORT PLACEMENT Right 11/09/2017    BARD POWER PORT    UPPER GASTROINTESTINAL ENDOSCOPY  01/17/2017    mild chronic gastritis; duodenal biopsy normal    UPPER GASTROINTESTINAL ENDOSCOPY  10/24/2017        Family History:    Family History   Problem Relation Age of Onset    Arthritis Mother     Depression Mother     High Blood Pressure Mother     Mental Illness Mother 80        dementia    Obesity Mother     Osteoporosis Mother        Social History     Socioeconomic History    Marital status:      Spouse name: Not on file    Number of children: Not on file    Years of education: Not on file    Highest education level: Not on file   Occupational History    Occupation: disabled   Social Needs    Financial resource strain: Not on file    Food insecurity     Worry: Not on file     Inability: Not on file   [employer] needs     Medical: Not on file     Non-medical: Not on file   Tobacco Use    Smoking status: Current Every Day Smoker     Packs/day: 0.50     Years: 15.00     Pack years: 7.50     Types: Cigarettes    Smokeless tobacco: Never Used    Tobacco comment: patient has cut down    Substance and Sexual Activity    Alcohol use: No     Comment: rarely    Drug use: No    Sexual activity: Yes     Partners: Female   Lifestyle    Physical activity     Days per week: Not on file     Minutes per session: Not on file    Stress: Not on file   Relationships    Social connections     Talks on phone: Not on file     Gets together: Not on file     Attends Spiritism service: Not on file     Active member of club or organization: Not on file     Attends meetings of clubs or organizations: Not on file     Relationship status: Not on file    Intimate partner violence     Fear of current or ex partner: Not on file     Emotionally abused: Not on file Physically abused: Not on file     Forced sexual activity: Not on file   Other Topics Concern    Not on file   Social History Narrative    Not on file       Nursing notes reviewed. ED Triage Vitals   Enc Vitals Group      BP 04/03/21 2358 (!) 73/32      Pulse 04/03/21 2350 (!) 33      Resp 04/03/21 2350 18      Temp --       Temp src --       SpO2 04/03/21 2353 100 %      Weight 04/04/21 0005 (!) 306 lb (138.8 kg)      Height 04/04/21 0005 5' 10\" (1.778 m)      Head Circumference --       Peak Flow --       Pain Score --       Pain Loc --       Pain Edu? --       Excl. in GC? --        GENERAL:   Body mass index is 43.91 kg/m². Somnolent but easily arousable and awake, alert. Well developed, well nourished with no apparent distress. toxic-appearing, ill-appearing. HENT:   Normocephalic, Atraumatic, no lacerations. No ENT exam due to PPE. EYES:   Conjunctiva normal,   Pupils equal round and reactive to light,   Extraocular movements normal.  NECK:  Trachea is midline. No stridor. CHEST:  Bradycardic rate and regular rhythm, no murmurs/rubs/gallops,  normal S1/S2, chest wall non-tender. He has palpable radial pulses in both arms. LUNGS:  No respiratory distress. No abdominal retractions, no sternal retractions  Clear to auscultation bilaterally, no wheezing, no rhochi, no rales  Speaking comfortably in full sentences  ABDOMEN:  Soft, non-tender, distended. No guarding. No rebound. No costovertebral angle tenderness to palpation. Normal BS, no organomegaly, no abdominal masses  EXTREMITIES:  Moves extremities x4 with purpose. Normal range of motion, no edema,  No tenderness, no deformity,  distal pulses present and equal bilaterally. SKIN:  Cool, diaphoretic and intact. Ashen color. NEUROLOGIC:  Normal mental status. Moving all extremities to command. Alert and oriented x4  without focal motor deficit or gross sensory deficit. Normal speech.   PSYCHIATRIC:  Not anxious,  normal mood and affect,  Appropriate eye contact,  thoughts are linear and organized,  without delusions/hallucinations,  Not responding to internal stimuli,  responds appropriately to questions    LABS and DIAGNOSTIC RESULTS  EKG  The Ekg interpreted by me shows  sinus bradycardia, rate=30 with a rate of 30  Axis is   Normal  QTc is  normal  Intervals and Durations are unremarkable. ST Segments: normal  Delta waves, Brugada Syndrome, and Short CO are not present. Prior EKG to compare with was available. No significant changes compared to prior EKG from March 3, 2021, of note patient has atrial fibrillation rate controlled on the previous EKG      RADIOLOGY  X-RAYS:  I have reviewed radiologic plain film image(s). ALL OTHER NON-PLAIN FILM IMAGES SUCH AS CT, ULTRASOUND AND MRI HAVE BEEN READ BY THE RADIOLOGIST. XR PELVIS (1-2 VIEWS)   Final Result   Status post right femoral line placement as above.          XR CHEST PORTABLE   Final Result   Cardiomegaly, with mild central venous congestion                LABS  Results for orders placed or performed during the hospital encounter of 04/03/21   CBC Auto Differential   Result Value Ref Range    WBC 10.0 4.0 - 11.0 K/uL    RBC 2.74 (L) 4.20 - 5.90 M/uL    Hemoglobin 10.4 (L) 13.5 - 17.5 g/dL    Hematocrit 31.8 (L) 40.5 - 52.5 %    .9 (H) 80.0 - 100.0 fL    MCH 37.9 (H) 26.0 - 34.0 pg    MCHC 32.7 31.0 - 36.0 g/dL    RDW 18.5 (H) 12.4 - 15.4 %    Platelets 736 992 - 130 K/uL    MPV 7.7 5.0 - 10.5 fL    PLATELET SLIDE REVIEW Adequate     Path Consult Yes     Neutrophils % 63.7 %    Lymphocytes % 23.1 %    Monocytes % 8.9 %    Eosinophils % 2.3 %    Basophils % 2.0 %    Neutrophils Absolute 6.4 1.7 - 7.7 K/uL    Lymphocytes Absolute 2.3 1.0 - 5.1 K/uL    Monocytes Absolute 0.9 0.0 - 1.3 K/uL    Eosinophils Absolute 0.2 0.0 - 0.6 K/uL    Basophils Absolute 0.2 0.0 - 0.2 K/uL    Anisocytosis 2+ (A)     Macrocytes 2+ (A)     Hypochromia 1+ (A)    Comprehensive Metabolic Panel Result Value Ref Range    Sodium 133 (L) 136 - 145 mmol/L    Potassium 4.6 3.5 - 5.1 mmol/L    Chloride 94 (L) 99 - 110 mmol/L    CO2 21 21 - 32 mmol/L    Anion Gap 18 (H) 3 - 16    Glucose 297 (H) 70 - 99 mg/dL    BUN 33 (H) 7 - 20 mg/dL    CREATININE 5.7 (HH) 0.9 - 1.3 mg/dL    GFR Non-African American 10 (A) >60    GFR  12 (A) >60    Calcium 8.8 8.3 - 10.6 mg/dL    Total Protein 6.9 6.4 - 8.2 g/dL    Albumin 3.9 3.4 - 5.0 g/dL    Albumin/Globulin Ratio 1.3 1.1 - 2.2    Total Bilirubin 0.4 0.0 - 1.0 mg/dL    Alkaline Phosphatase 105 40 - 129 U/L    ALT 15 10 - 40 U/L    AST 36 15 - 37 U/L    Globulin 3.0 g/dL   Troponin   Result Value Ref Range    Troponin 0.05 (H) <0.01 ng/mL   Protime-INR   Result Value Ref Range    Protime 13.9 (H) 10.0 - 13.2 sec    INR 1.20 (H) 0.86 - 1.14   Brain Natriuretic Peptide   Result Value Ref Range    Pro-BNP 6,184 (H) 0 - 124 pg/mL   Lactic Acid, Plasma   Result Value Ref Range    Lactic Acid 6.8 (HH) 0.4 - 2.0 mmol/L   APTT   Result Value Ref Range    aPTT 31.6 24.2 - 36.2 sec   Blood Gas, Venous   Result Value Ref Range    pH, Giovany 7.347 (L) 7.350 - 7.450    pCO2, Giovany 41.1 40.0 - 50.0 mmHg    pO2, Giovany 48.7 (H) 25 - 40 mmHg    HCO3, Venous 22.0 (L) 23.0 - 29.0 mmol/L    Base Excess, Giovany -3.4 (L) -3.0 - 3.0 mmol/L    O2 Sat, Giovany 82 Not Established %    Carboxyhemoglobin 7.7 (H) 0.0 - 1.5 %    MetHgb, Giovany 0.0 <1.5 %    TC02 (Calc), Giovany 23 Not Established mmol/L    O2 Content, Giovany 12 Not Established VOL %    O2 Therapy Unknown    Sample possible blood bank testing   Result Value Ref Range    Specimen Status ELIER    EKG 12 Lead   Result Value Ref Range    Ventricular Rate 30 BPM    Atrial Rate 31 BPM    QRS Duration 100 ms    Q-T Interval 626 ms    QTc Calculation (Bazett) 442 ms    R Axis 63 degrees    T Axis 96 degrees    Diagnosis       Junctional bradycardiaLow voltage QRSST elevation consider inferior injury or acute infarct    ACUTE MI  onsider right ventricular involvement in acute inferior infarctAbnormal ECGWhen compared with ECG of 03-MAR-2021 07:03,Significant changes have occurred       SCREENINGS  NIH Score     [facility]     [facility]    Heart Score       PROCEDURES  CENTRAL VENOUS CATHETER   The benefits, risks and possible complications of the procedure were explained to the patient who clearly understood and gave verbal consent without reservation. Strict sterile technique used throughout procedure. The area was prepped with chlorhexidine and anesthetized with 1% lidocaine without epi. Catheter placed in right femoral vein. Blood return noted from individual lumen(s). Flushed easily with Normal Saline. Post procedure pelvic x-ray was obtained. Patient tolerated procedure well. No complications. MEDICAL DECISION MAKING    Procedures/interventions/images ordered for this visit  Orders Placed This Encounter   Procedures    XR CHEST PORTABLE    XR PELVIS (1-2 VIEWS)    CBC Auto Differential    Comprehensive Metabolic Panel    Troponin    Protime-INR    Brain Natriuretic Peptide    Lactic Acid, Plasma    APTT    Blood Gas, Venous    Sample possible blood bank testing    EKG 12 Lead       Medications ordered for this visit  Orders Placed This Encounter   Medications    atropine injection    0.9 % sodium chloride bolus    DOPamine (INTROPIN) 400 mg in dextrose 5 % 250 mL infusion    ondansetron (ZOFRAN) 4 MG/2ML injection     DANISHA VARELA: cabinet override    ondansetron (ZOFRAN) injection 4 mg       ED course notes for this visit       I wore surgical mask and gloves when I evaluated the patient. I evaluated the patient in room 01/01    When patient arrived in the ER he was hypotensive with bradycardia and diaphoretic and he was difficult to keep awake. We did try to doses of 1 mg atropine 3 minutes apart and did not change his heart rate. I then started externally pacing the patient.   I had a discussion with the interventional cardiologist as he did have some abnormal ST elevations on his EKG, Dr. Yecenia Bustillos reviewed the EKG and he did not feel it was a STEMI however he was concerned about the bradycardia. At this point we did not have any labs back to we did not know what his potassium and sodium levels were at. I did place a right femoral CVC please see above procedure note. Patient now has stable vital signs while on dopamine. He seems much more awake however still mildly somnolent after treatment. Please note, critical care time was at least 90 minutes, obtaining history, conducting a physical exam, performing and monitoring interventions, ordering, collecting and interpreting tests, and establishing medical decision-making and discussion with the patient and/or family, specifically for management of the presenting complaint and symptoms initially, direct bedside care, reevaluation, review of records, and consultation. There was a high probability of clinically significant life-threatening deterioration in the patient's condition, which required my urgent intervention. This time does not include separately billable procedures. Secure text message was sent to Dr. Arley Sykes. We thoroughly discussed the history, physical exam, laboratory and imaging studies, as well as, emergency department course. Based upon that discussion, we've decided to admit Natalia Castro for further observation and evaluation of Norvell Goodell Snider's symptomatic bradycardia.   As I have deemed necessary from their history, physical, and studies, I have considered and evaluated Natalia Castro for the following diagnoses:  Differential Diagnosis:    Hypoxemia/ischemic encephalopathy, hepatic encephalopathy   Seizure or postictal state   Alterations of glucose such as hypoglycemia and hyperglycemia    Alterations in perfusions such as hypotension and hypoperfusion    Alterations in electrolytes such as disturbances in sodium or calcium   Infectious processes such as sepsis from a pneumonia or urinary tract infection    Substance use or withdrawal, especially alcohol and drugs    Medication adverse event or interaction    Vitamin deficiencies such as Wernicke's encephalopathy    CNS lesion, injury, infection (CVA, subdural hematoma, meningitis, encephalitis)    Alterations in hormones such as thyroid or adrenal abnormalities    Alterations in cardiac functioning such as arrhythmia, MI or CHF    Alteration in temperature such as hyperthermia or hypothermia    Dehydration, sleep deprivation   Change in medical regimen    Alteration in lifestyle, environment, or personal relationships        FINAL IMPRESSION  1. Symptomatic bradycardia    2. Lightheadedness    3. Nausea and vomiting, intractability of vomiting not specified, unspecified vomiting type    4. Hypotension, unspecified hypotension type        Vitals:  Blood pressure 119/74, pulse 88, temperature 97.2 °F (36.2 °C), temperature source Oral, resp. rate 16, height 5' 10\" (1.778 m), weight (!) 306 lb (138.8 kg), SpO2 92 %. Disposition  Patient understands that they are going to be admitted to the hospital.  There was shared decision making between myself as well as the patient and/or their surrogate and we are in agreement with admission. There is an opportunity for questions and all questions answered to the best of my ability and to the satisfaction of the patient and/or patient's family. Pt is in stable condition upon Admit to CCU/ICU. The note was completed using Dragon voice recognition transcription. Every effort was made to ensure accuracy; however, inadvertent transcription errors may be present despite my best efforts to edit errors.     Sophia Alonzo MD  [address] [place] [address]        Sophia Alonzo MD  04/04/21 6939

## 2021-04-04 NOTE — CONSULTS
Remainder of 10 point review of systems were reviewed and were negative. Physical exam:   Constitutional:  VITALS:  BP 99/88   Pulse 54   Temp 98.7 °F (37.1 °C) (Oral)   Resp 15   Ht 5' 10\" (1.778 m)   Wt 285 lb 7.9 oz (129.5 kg)   SpO2 96%   BMI 40.96 kg/m²   Gen: alert, ill-appearing, nad  Skin: no rash, turgor wnl  Heent:  eomi, mmm  Neck: no bruits or jvd noted, thyroid normal  Cardiovascular:  S1, S2, irregular without m/r/g  Respiratory: CTA B without w/r/r; respiratory effort normal  Abdomen:  +bs, soft, nt, nd, no hepatosplenomegaly  Ext: no lower extremity edema  Access: [facility]  Psychiatric: mood and affect limited; judgement and insight limited  Musculoskeletal:  Rom, muscular strength limited; digits, nails normal    Data/  CBC:   Lab Results   Component Value Date    WBC 13.0 04/04/2021    RBC 2.73 04/04/2021    HGB 10.4 04/04/2021    HCT 31.3 04/04/2021    .7 04/04/2021    MCH 38.0 04/04/2021    MCHC 33.2 04/04/2021    RDW 18.2 04/04/2021     04/04/2021    MPV 6.9 04/04/2021     BMP:    Lab Results   Component Value Date     04/04/2021    K 4.5 04/04/2021    K 4.6 10/20/2019    CL 97 04/04/2021    CO2 27 04/04/2021    BUN 36 04/04/2021    LABALBU 4.0 04/04/2021    CREATININE 5.9 04/04/2021    CALCIUM 8.7 04/04/2021    GFRAA 12 04/04/2021    GFRAA 27 12/16/2012    LABGLOM 10 04/04/2021    GLUCOSE 314 04/04/2021         Assessment/    - Symptomatic bradycardia - on dopamine, plans per Cardiology/EP    - End stage renal disease - on HD Tues-Thurs-Sat    - Atrial fibrillation    - Anemia of chronic disease    - DM2      Plan/    - No acute dialysis needs - next HD Tuesday per schedule  - Dose PRISCA with HD  - Trend labs, bp's, heart rate      Thank you for the consultation. Please do not hesitate to call with questions.     [employer]

## 2021-04-05 PROBLEM — E87.5 HYPERKALEMIA: Status: RESOLVED | Noted: 2019-04-11 | Resolved: 2021-04-05

## 2021-04-05 PROBLEM — E87.1 HYPONATREMIA: Status: RESOLVED | Noted: 2017-09-27 | Resolved: 2021-04-05

## 2021-04-05 PROBLEM — I44.30 AV BLOCK: Status: ACTIVE | Noted: 2021-04-05

## 2021-04-05 PROBLEM — R00.1 SYMPTOMATIC BRADYCARDIA: Status: RESOLVED | Noted: 2021-04-04 | Resolved: 2021-04-05

## 2021-04-05 PROBLEM — R50.9 ACUTE FEBRILE ILLNESS: Status: RESOLVED | Noted: 2019-09-25 | Resolved: 2021-04-05

## 2021-04-05 PROBLEM — F17.200 TOBACCO USE DISORDER: Status: RESOLVED | Noted: 2017-06-30 | Resolved: 2021-04-05

## 2021-04-05 PROBLEM — N17.9 AKI (ACUTE KIDNEY INJURY) (HCC): Status: RESOLVED | Noted: 2017-09-27 | Resolved: 2021-04-05

## 2021-04-05 LAB
A/G RATIO: 1.1 (ref 1.1–2.2)
ALBUMIN SERPL-MCNC: 3.8 G/DL (ref 3.4–5)
ALP BLD-CCNC: 93 U/L (ref 40–129)
ALT SERPL-CCNC: 41 U/L (ref 10–40)
ANION GAP SERPL CALCULATED.3IONS-SCNC: 15 MMOL/L (ref 3–16)
APTT: 41.5 SEC (ref 24.2–36.2)
AST SERPL-CCNC: 210 U/L (ref 15–37)
BASOPHILS ABSOLUTE: 0.1 K/UL (ref 0–0.2)
BASOPHILS RELATIVE PERCENT: 1.1 %
BILIRUB SERPL-MCNC: 0.4 MG/DL (ref 0–1)
BUN BLDV-MCNC: 45 MG/DL (ref 7–20)
CALCIUM SERPL-MCNC: 8.9 MG/DL (ref 8.3–10.6)
CHLORIDE BLD-SCNC: 95 MMOL/L (ref 99–110)
CO2: 25 MMOL/L (ref 21–32)
CREAT SERPL-MCNC: 7.2 MG/DL (ref 0.9–1.3)
EOSINOPHILS ABSOLUTE: 0 K/UL (ref 0–0.6)
EOSINOPHILS RELATIVE PERCENT: 0.1 %
ESTIMATED AVERAGE GLUCOSE: 157.1 MG/DL
GFR AFRICAN AMERICAN: 9
GFR NON-AFRICAN AMERICAN: 8
GLOBULIN: 3.5 G/DL
GLUCOSE BLD-MCNC: 168 MG/DL (ref 70–99)
GLUCOSE BLD-MCNC: 192 MG/DL (ref 70–99)
GLUCOSE BLD-MCNC: 222 MG/DL (ref 70–99)
GLUCOSE BLD-MCNC: 222 MG/DL (ref 70–99)
GLUCOSE BLD-MCNC: 228 MG/DL (ref 70–99)
HBA1C MFR BLD: 7.1 %
HCT VFR BLD CALC: 29.5 % (ref 40.5–52.5)
HEMATOLOGY PATH CONSULT: NO
HEMATOLOGY PATH CONSULT: NORMAL
HEMOGLOBIN: 9.7 G/DL (ref 13.5–17.5)
INR BLD: 1.29 (ref 0.86–1.14)
LV EF: 50 %
LVEF MODALITY: NORMAL
LYMPHOCYTES ABSOLUTE: 0.6 K/UL (ref 1–5.1)
LYMPHOCYTES RELATIVE PERCENT: 4.5 %
MAGNESIUM: 2.2 MG/DL (ref 1.8–2.4)
MCH RBC QN AUTO: 37.6 PG (ref 26–34)
MCHC RBC AUTO-ENTMCNC: 32.9 G/DL (ref 31–36)
MCV RBC AUTO: 114.2 FL (ref 80–100)
MONOCYTES ABSOLUTE: 1 K/UL (ref 0–1.3)
MONOCYTES RELATIVE PERCENT: 7.6 %
NEUTROPHILS ABSOLUTE: 11.5 K/UL (ref 1.7–7.7)
NEUTROPHILS RELATIVE PERCENT: 86.7 %
PDW BLD-RTO: 18 % (ref 12.4–15.4)
PERFORMED ON: ABNORMAL
PHOSPHORUS: 5.5 MG/DL (ref 2.5–4.9)
PLATELET # BLD: 266 K/UL (ref 135–450)
PMV BLD AUTO: 7.5 FL (ref 5–10.5)
POTASSIUM SERPL-SCNC: 4.9 MMOL/L (ref 3.5–5.1)
PROTHROMBIN TIME: 15 SEC (ref 10–13.2)
RBC # BLD: 2.58 M/UL (ref 4.2–5.9)
SODIUM BLD-SCNC: 135 MMOL/L (ref 136–145)
TOTAL PROTEIN: 7.3 G/DL (ref 6.4–8.2)
TROPONIN: 5.74 NG/ML
TSH REFLEX FT4: 0.78 UIU/ML (ref 0.27–4.2)
WBC # BLD: 13.3 K/UL (ref 4–11)

## 2021-04-05 PROCEDURE — C1887 CATHETER, GUIDING: HCPCS

## 2021-04-05 PROCEDURE — 2000000000 HC ICU R&B

## 2021-04-05 PROCEDURE — 85025 COMPLETE CBC W/AUTO DIFF WBC: CPT

## 2021-04-05 PROCEDURE — 6370000000 HC RX 637 (ALT 250 FOR IP): Performed by: INTERNAL MEDICINE

## 2021-04-05 PROCEDURE — 99152 MOD SED SAME PHYS/QHP 5/>YRS: CPT | Performed by: INTERNAL MEDICINE

## 2021-04-05 PROCEDURE — 93005 ELECTROCARDIOGRAM TRACING: CPT | Performed by: INTERNAL MEDICINE

## 2021-04-05 PROCEDURE — 6360000002 HC RX W HCPCS

## 2021-04-05 PROCEDURE — 84100 ASSAY OF PHOSPHORUS: CPT

## 2021-04-05 PROCEDURE — 02HV33Z INSERTION OF INFUSION DEVICE INTO SUPERIOR VENA CAVA, PERCUTANEOUS APPROACH: ICD-10-PCS | Performed by: INTERNAL MEDICINE

## 2021-04-05 PROCEDURE — 85730 THROMBOPLASTIN TIME PARTIAL: CPT

## 2021-04-05 PROCEDURE — 84484 ASSAY OF TROPONIN QUANT: CPT

## 2021-04-05 PROCEDURE — 99223 1ST HOSP IP/OBS HIGH 75: CPT | Performed by: INTERNAL MEDICINE

## 2021-04-05 PROCEDURE — B2111ZZ FLUOROSCOPY OF MULTIPLE CORONARY ARTERIES USING LOW OSMOLAR CONTRAST: ICD-10-PCS | Performed by: INTERNAL MEDICINE

## 2021-04-05 PROCEDURE — 93458 L HRT ARTERY/VENTRICLE ANGIO: CPT

## 2021-04-05 PROCEDURE — 2700000000 HC OXYGEN THERAPY PER DAY

## 2021-04-05 PROCEDURE — 93458 L HRT ARTERY/VENTRICLE ANGIO: CPT | Performed by: INTERNAL MEDICINE

## 2021-04-05 PROCEDURE — C1769 GUIDE WIRE: HCPCS

## 2021-04-05 PROCEDURE — 6360000002 HC RX W HCPCS: Performed by: INTERNAL MEDICINE

## 2021-04-05 PROCEDURE — 4A023N7 MEASUREMENT OF CARDIAC SAMPLING AND PRESSURE, LEFT HEART, PERCUTANEOUS APPROACH: ICD-10-PCS | Performed by: INTERNAL MEDICINE

## 2021-04-05 PROCEDURE — 94761 N-INVAS EAR/PLS OXIMETRY MLT: CPT

## 2021-04-05 PROCEDURE — 2709999900 HC NON-CHARGEABLE SUPPLY

## 2021-04-05 PROCEDURE — 83735 ASSAY OF MAGNESIUM: CPT

## 2021-04-05 PROCEDURE — 84443 ASSAY THYROID STIM HORMONE: CPT

## 2021-04-05 PROCEDURE — 6360000004 HC RX CONTRAST MEDICATION

## 2021-04-05 PROCEDURE — C1894 INTRO/SHEATH, NON-LASER: HCPCS

## 2021-04-05 PROCEDURE — 85610 PROTHROMBIN TIME: CPT

## 2021-04-05 PROCEDURE — 93306 TTE W/DOPPLER COMPLETE: CPT

## 2021-04-05 PROCEDURE — B2131ZZ FLUOROSCOPY OF MULTIPLE CORONARY ARTERY BYPASS GRAFTS USING LOW OSMOLAR CONTRAST: ICD-10-PCS | Performed by: INTERNAL MEDICINE

## 2021-04-05 PROCEDURE — B2151ZZ FLUOROSCOPY OF LEFT HEART USING LOW OSMOLAR CONTRAST: ICD-10-PCS | Performed by: INTERNAL MEDICINE

## 2021-04-05 PROCEDURE — 36592 COLLECT BLOOD FROM PICC: CPT

## 2021-04-05 PROCEDURE — 2500000003 HC RX 250 WO HCPCS

## 2021-04-05 PROCEDURE — 80053 COMPREHEN METABOLIC PANEL: CPT

## 2021-04-05 RX ORDER — MIDAZOLAM HYDROCHLORIDE 5 MG/ML
INJECTION INTRAMUSCULAR; INTRAVENOUS
Status: COMPLETED | OUTPATIENT
Start: 2021-04-05 | End: 2021-04-05

## 2021-04-05 RX ORDER — HEPARIN SODIUM 10000 [USP'U]/100ML
5-30 INJECTION, SOLUTION INTRAVENOUS CONTINUOUS
Status: DISCONTINUED | OUTPATIENT
Start: 2021-04-05 | End: 2021-04-05 | Stop reason: SDUPTHER

## 2021-04-05 RX ORDER — HEPARIN SODIUM 10000 [USP'U]/100ML
1280 INJECTION, SOLUTION INTRAVENOUS CONTINUOUS
Status: DISCONTINUED | OUTPATIENT
Start: 2021-04-05 | End: 2021-04-07

## 2021-04-05 RX ADMIN — FERROUS SULFATE TAB 325 MG (65 MG ELEMENTAL FE) 325 MG: 325 (65 FE) TAB at 18:16

## 2021-04-05 RX ADMIN — INSULIN LISPRO 1 UNITS: 100 INJECTION, SOLUTION INTRAVENOUS; SUBCUTANEOUS at 20:52

## 2021-04-05 RX ADMIN — FERROUS SULFATE TAB 325 MG (65 MG ELEMENTAL FE) 325 MG: 325 (65 FE) TAB at 12:11

## 2021-04-05 RX ADMIN — ACETAMINOPHEN 650 MG: 325 TABLET ORAL at 20:58

## 2021-04-05 RX ADMIN — MIDAZOLAM HYDROCHLORIDE 1 MG: 5 INJECTION INTRAMUSCULAR; INTRAVENOUS at 16:06

## 2021-04-05 RX ADMIN — NEPHROCAP 1 MG: 1 CAP ORAL at 09:13

## 2021-04-05 RX ADMIN — INSULIN LISPRO 7 UNITS: 100 INJECTION, SOLUTION INTRAVENOUS; SUBCUTANEOUS at 18:16

## 2021-04-05 RX ADMIN — INSULIN GLARGINE 25 UNITS: 100 INJECTION, SOLUTION SUBCUTANEOUS at 20:53

## 2021-04-05 RX ADMIN — FERROUS SULFATE TAB 325 MG (65 MG ELEMENTAL FE) 325 MG: 325 (65 FE) TAB at 08:11

## 2021-04-05 RX ADMIN — HEPARIN SODIUM 1000 UNITS/HR: 10000 INJECTION, SOLUTION INTRAVENOUS at 18:29

## 2021-04-05 RX ADMIN — ASPIRIN 81 MG: 81 TABLET, COATED ORAL at 09:13

## 2021-04-05 RX ADMIN — INSULIN LISPRO 1 UNITS: 100 INJECTION, SOLUTION INTRAVENOUS; SUBCUTANEOUS at 08:10

## 2021-04-05 RX ADMIN — INSULIN LISPRO 2 UNITS: 100 INJECTION, SOLUTION INTRAVENOUS; SUBCUTANEOUS at 18:17

## 2021-04-05 RX ADMIN — MUPIROCIN: 20 OINTMENT TOPICAL at 09:13

## 2021-04-05 RX ADMIN — PRAVASTATIN SODIUM 40 MG: 40 TABLET ORAL at 20:55

## 2021-04-05 RX ADMIN — INSULIN GLARGINE 25 UNITS: 100 INJECTION, SOLUTION SUBCUTANEOUS at 08:10

## 2021-04-05 RX ADMIN — INSULIN LISPRO 7 UNITS: 100 INJECTION, SOLUTION INTRAVENOUS; SUBCUTANEOUS at 08:10

## 2021-04-05 ASSESSMENT — PAIN DESCRIPTION - LOCATION: LOCATION: CHEST

## 2021-04-05 ASSESSMENT — PAIN DESCRIPTION - ORIENTATION: ORIENTATION: MID

## 2021-04-05 ASSESSMENT — PAIN - FUNCTIONAL ASSESSMENT: PAIN_FUNCTIONAL_ASSESSMENT: ACTIVITIES ARE NOT PREVENTED

## 2021-04-05 ASSESSMENT — PAIN DESCRIPTION - PROGRESSION: CLINICAL_PROGRESSION: NOT CHANGED

## 2021-04-05 NOTE — FLOWSHEET NOTE
04/04/21 2000   Assessment   Charting Type Shift assessment   Neurological   Neuro (WDL) X   Level of Consciousness Alert (0)   Orientation Level Oriented X4   Cognition Follows commands;Poor judgement;Poor attention/concentration   Language Clear   Size R Pupil (mm) 3   R Pupil Shape Round   R Pupil Reaction Brisk   Size L Pupil (mm) 3   L Pupil Shape Round   L Pupil Reaction Brisk   R Hand  Weak   L Hand  Weak   R Foot Dorsiflexion Weak   L Foot Dorsiflexion Weak   R Foot Plantar Flexion Weak   L Foot Plantar Flexion Weak   RUE Motor Response Responds to command   Sensation RUE Full sensation   LUE Motor Response Responds to command   Sensation LUE Full sensation   RLE Motor Response Responds to command   Sensation RLE Full sensation;Pain   LLE Motor Response Responds to command   Sensation LLE Full sensation;Pain   Gag Present   Lyman Coma Scale   Eye Opening 4   Best Verbal Response 5   Best Motor Response 6   Lyman Coma Scale Score 15   HEENT   HEENT (WDL) X   Right Eye Impaired vision   Left Eye Impaired vision   Nose Intact   Throat Intact   Mucous Membrane Moist;Pink   Teeth Missing teeth   Respiratory   Respiratory (WDL) X   Respiratory Pattern Regular   Respiratory Depth Normal   Respiratory Quality/Effort Dyspnea with exertion;Dyspnea at rest   Chest Assessment Chest expansion symmetrical;Trachea midline   L Breath Sounds Diminished   R Breath Sounds Diminished   Cardiac   Cardiac (WDL) X   Cardiac Regularity Irregular   Heart Sounds S1, S2   Cardiac Rhythm Atrial fibrillation   Rhythm Interpretation   Pulse 78   Cardiac Monitor   Telemetry Monitor On Yes   Telemetry Audible Yes   Telemetry Alarms Set Yes   Telemetry Box Number ICU bedside monitor   Gastrointestinal   Abdominal (WDL) X   Abdomen Inspection Rounded; Soft   Tenderness Soft   RUQ Bowel Sounds Active   LUQ Bowel Sounds Active   RLQ Bowel Sounds Active   LLQ Bowel Sounds Active   Peripheral Vascular   Peripheral Vascular (WDL) X

## 2021-04-05 NOTE — PRE SEDATION
([facility]     T-TH-S  Trae Sanchez    History of blood transfusion     1/2017    Hx of blood clots     left leg; 5 plus years ago    Hyperlipidemia     Hypertension     Hypoglycemia     MDRO (multiple drug resistant organisms) resistance 2017    treated with antibiotics    MRSA (methicillin resistant staph aureus) culture positive 9/26/17,09/18/2017    posterior left calf    Primary osteoarthritis of both knees     Primary osteoarthritis of both knees          Surgical History:  Past Surgical History:   Procedure Laterality Date    CARDIAC CATHETERIZATION  07/12/2011    COLONOSCOPY  01/17/2017    diverticulosis    DIALYSIS FISTULA CREATION Left 2020    DIALYSIS FISTULA CREATION Left 3/3/2021    LEFT UPPER ARM BRACHIAL CEPHALIC ARTERIAL VENOUS FISTULA; LEFT UPPER EXTREMITY ANGIOGRAM; STENT GRAFT ULNAR ARTERIAL VENOUS FISTULA performed by Tomasa Amador MD at [address] Left     ankle; age 13    HAND SURGERY      KIDNEY BIOPSY Right 06/20/2016    KIDNEY BIOPSY Left 09/20/2016    OPEN TREATMENT RADIAL SHAFT FRACTURE Right 8/7/2018    OPEN REDUCTION INTERNAL FIXATION RIGHT DISTAL RADIUS, RIGHT CARPAL TUNNEL RELEASE performed by Raquel Cormier MD at [place] [address] Right 11/09/2017    BARD POWER PORT    UPPER GASTROINTESTINAL ENDOSCOPY  01/17/2017    mild chronic gastritis; duodenal biopsy normal    UPPER GASTROINTESTINAL ENDOSCOPY  10/24/2017         Medications:  Current Facility-Administered Medications   Medication Dose Route Frequency Provider Last Rate Last Admin    mupirocin (BACTROBAN) 2 % ointment   Nasal BID Mckenna Scruggs MD   Given at 04/05/21 0913    aspirin EC tablet 81 mg  81 mg Oral Daily Franklin Corado MD   81 mg at 04/05/21 0913    b complex-C-folic acid (NEPHROCAPS) capsule 1 mg  1 capsule Oral Daily Franklin Corado MD   1 mg at 04/05/21 0913    ferrous sulfate (IRON 325) tablet 325 mg  325 mg Oral TID  Franklin Corado MD   325 mg at 04/05/21 1211    [START ON 4/6/2021] midodrine (PROAMATINE) tablet 2.5 mg  2.5 mg Oral Daily PRN Robert Casillas MD        pravastatin (PRAVACHOL) tablet 40 mg  40 mg Oral Nightly Robert Casillas MD   40 mg at 04/04/21 2124    glucose (GLUTOSE) 40 % oral gel 15 g  15 g Oral PRN Robert Casillas MD        dextrose 50 % IV solution  12.5 g Intravenous PRN Robert Casillas MD        glucagon (rDNA) injection 1 mg  1 mg Intramuscular PRN Robert Casillas MD        dextrose 5 % solution  100 mL/hr Intravenous PRN Robert Casillas MD        sodium chloride flush 0.9 % injection 10 mL  10 mL Intravenous PRN Robert Casillas MD        0.9 % sodium chloride infusion  25 mL Intravenous PRN Robert Casillas MD        ondansetron [facility]) injection 4 mg  4 mg Intravenous Q6H PRN Robert Casillas MD        acetaminophen (TYLENOL) tablet 650 mg  650 mg Oral Q4H PRN Robert Casillas MD        Or    acetaminophen (TYLENOL) suppository 650 mg  650 mg Rectal Q4H PRN Robert Casillas MD        insulin glargine (LANTUS) injection vial 25 Units  25 Units Subcutaneous BID Robert Casillas MD   25 Units at 04/05/21 0810    insulin lispro (HUMALOG) injection vial 7 Units  0.05 Units/kg Subcutaneous TID  Robert Casillas MD   Stopped at 04/05/21 1128    insulin lispro (HUMALOG) injection vial 0-6 Units  0-6 Units Subcutaneous TID  Robert Casillas MD   1 Units at 04/05/21 0810    insulin lispro (HUMALOG) injection vial 0-3 Units  0-3 Units Subcutaneous Nightly Robert Casillas MD   Stopped at 04/04/21 2004    DOPamine (INTROPIN) 400 mg in dextrose 5 % 250 mL infusion  2-20 mcg/kg/min Intravenous Continuous Robert Casillas MD 14.6 mL/hr at 04/05/21 0730 3 mcg/kg/min at 04/05/21 0730           Pre-Sedation:    Pre-Sedation Documentation and Exam:  I have assessed the patient and agree with the H&P present on the chart.     Prior History of Anesthesia Complications:   none    Modified Mallampati:  II (soft palate, uvula, fauces visible)    ASA Classification:  Class 3 - A patient with severe systemic disease that limits activity but is not incapacitating      Lalo Scale: Activity:  2 - Able to move 4 extremities voluntarily on command  Respiration:  2 - Able to breathe deeply and cough freely  Circulation:  1 - BP+/- 20-50mmHg of normal  Consciousness:  1 - Arousable on calling  Oxygen Saturation (color):  1 - Needs oxygen to maintain oxygen saturation >90%    Sedation/Anesthesia Plan:  Guard the patient's safety and welfare. Minimize physical discomfort and pain. Minimize negative psychological responses to treatment by providing sedation and analgesia and maximize the potential amnesia. Patient to meet pre-procedure discharge plan.     Medication Planned:  midazolam intravenously    Patient is an appropriate candidate for plan of sedation: yes      Electronically signed by Pipe Gross MD on 4/5/2021 at 4:33 PM

## 2021-04-05 NOTE — PROGRESS NOTES
Hospitalist Progress Note      PCP: GIANLUCA Soliz - CNP    Date of Admission: 4/3/2021    Chief Complaint: Fatigue, weakness    Hospital Course: Reviewed H&P    Subjective: Chart reviewed. Pt is not very interactive with author. Await and apprec cards recs. Nephro consulted given pt is HD pt.  -Patient currently with heart rate in the 88 on dopamine gtt. Offers no new complaints at this time. Discussed with RN regarding care plan    Medications:  Reviewed    Infusion Medications    dextrose      sodium chloride      DOPamine 3 mcg/kg/min (04/05/21 0730)     Scheduled Medications    mupirocin   Nasal BID    aspirin  81 mg Oral Daily    b complex-C-folic acid  1 capsule Oral Daily    ferrous sulfate  325 mg Oral TID WC    pravastatin  40 mg Oral Nightly    insulin glargine  25 Units Subcutaneous BID    insulin lispro  0.05 Units/kg Subcutaneous TID WC    insulin lispro  0-6 Units Subcutaneous TID WC    insulin lispro  0-3 Units Subcutaneous Nightly     PRN Meds: [START ON 4/6/2021] midodrine, glucose, dextrose, glucagon (rDNA), dextrose, sodium chloride flush, sodium chloride, ondansetron, acetaminophen **OR** acetaminophen      Intake/Output Summary (Last 24 hours) at 4/5/2021 1331  Last data filed at 4/5/2021 4794  Gross per 24 hour   Intake 882.92 ml   Output 350 ml   Net 532.92 ml       Physical Exam Performed:  BP (!) 92/59   Pulse 71   Temp 98 °F (36.7 °C) (Oral)   Resp 23   Ht 5' 10\" (1.778 m)   Wt 285 lb 7.9 oz (129.5 kg)   SpO2 92%   BMI 40.96 kg/m²     General appearance: No apparent distress, appears stated age and cooperative. HEENT: Pupils equal, round, and reactive to light. Conjunctivae/corneas clear. Neck: Supple, with full range of motion. No jugular venous distention. Trachea midline. Respiratory:  Normal respiratory effort. Clear to auscultation, bilaterally without Rales/Wheezes/Rhonchi.   Cardiovascular: Regular rate and rhythm with normal S1/S2 without murmurs, rubs or gallops. Abdomen: Soft, non-tender, non-distended with normal bowel sounds. Musculoskeletal: No clubbing, cyanosis or edema bilaterally. Full range of motion without deformity. Skin: Skin color, texture, turgor normal.  No rashes or lesions. Neurologic:  Neurovascularly intact without any focal sensory/motor deficits. Cranial nerves: II-XII intact, grossly non-focal.  Psychiatric: Alert and oriented, thought content appropriate, normal insight  Capillary Refill: Brisk,< 3 seconds   Peripheral Pulses: +2 palpable, equal bilaterally       Labs:   Recent Labs     04/04/21  0000 04/04/21 0442 04/05/21  0355   WBC 10.0 13.0* 13.3*   HGB 10.4* 10.4* 9.7*   HCT 31.8* 31.3* 29.5*    289 266     Recent Labs     04/04/21  0000 04/04/21 0442 04/05/21  0355   * 137 135*   K 4.6 4.5 4.9   CL 94* 97* 95*   CO2 21 27 25   BUN 33* 36* 45*   CREATININE 5.7* 5.9* 7.2*   CALCIUM 8.8 8.7 8.9   PHOS  --  5.1* 5.5*     Recent Labs     04/04/21  0000 04/04/21  0442 04/05/21  0355   AST 36 33 210*   ALT 15 24 41*   BILITOT 0.4 0.4 0.4   ALKPHOS 105 109 93     Recent Labs     04/04/21  0000 04/04/21 0442 04/05/21  0355   INR 1.20* 1.25* 1.29*     Recent Labs     04/04/21  0000   TROPONINI 0.05*     Radiology:  XR PELVIS (1-2 VIEWS)   Final Result   Status post right femoral line placement as above.          XR CHEST PORTABLE   Final Result   Cardiomegaly, with mild central venous congestion           Active Hospital Problems    Diagnosis Date Noted    AV block [I44.30] 04/05/2021    Atrial fibrillation with slow ventricular response (HCC) [I48.91] 04/04/2021    Hypotension [I95.9]     Lightheadedness [R42]     Chronic obstructive pulmonary disease (HCC) [J44.9]     Suspected sleep apnea [R29.818]     ESRD (end stage renal disease) ([address] [N18.6]     DM2 (diabetes mellitus, type 2) ([place] [address] [E11.9] 04/11/2019    Morbid obesity ([facility] [E66.01] 01/25/2017    Chronic anticoagulation [Z79.01] 11/30/2012  Essential (primary) hypertension [I10] 08/02/2011    Atrial fibrillation, chronic (HCC) [I48.20] 07/12/2011     Assessment/Plan:  Afib w/ SVR (POA)   -  HR and BP significantly improved on dopamine, currently running at 3mcg/kg/min. Patient symptomatically feels better too. -  Carvedilol and diltiazem held. -  Home ASA and statin continued. Apixaban held in case a procedure is performed   such as pacemaker implantation. - EP consulted and await recs      ESRD on HD  -  Tues, Thurs, Sat dialysis schedule. -  Dialysis access is a tunneled R IJ HD catheter. -  Left brachiocephalic fistula was just created 3/3/2021.  -  Continue home nephrocaps, ferric citrate, and prn midodrine.  -Nephrology consulted to assist with maintenance dialysis while inpatient.       DM2  -  Hold all oral antidiabetic agents. Start s.c. Insulin regimen based on home regimen. DVT Prophylaxis: Heparin  Diet: Diet NPO Effective Now Exceptions are: Ice Chips, Sips of Water with Meds  Code Status: Full Code    PT/OT Eval Status: Not yet ordered    Dispo -likely 2 days pending clinical improvement       The note was completed using Dragon -speech recognition software & EMR  . Every effort was made to ensure accuracy; however, inadvertent computerized transcription errors may be present.     Hussain Rahman MD

## 2021-04-05 NOTE — CONSULTS
Pharmacy to Manage Heparin Infusion per Hospital Policy    Severe multi-vessel coronary artery disease with sub-acute occlusion of the RCA  Pt wt = __95.6 Kg (Using adjusted wt )  Baseline aPTT = _____  (ordered)    Low dose heparin to start 2 hours after sheath pulled - 1830    Low Dose Heparin Infusion  Heparin 60 units/kg IVP bolus followed by Heparin infusion at 12 units/kg/hr (recommended initial max dose 1000 units./hr). No initial bolus was given. Recheck aPTT in 6 hours. - 4/6/21 0030  Goal aPTT = 49-76 seconds. 4/6 0406  aptt = 43.7 sec  2000 bolus; rate = 11.9 ml/hr  Next aptt 1100  [facility] Pharm D.4/6/2021 4:43 AM    aPTT 43.9 seconds at 1115. Heparin 2000 units IVP bolus then increase Heparin infusion 1380 units/hr = 13.8 mL/hr  Recheck aPTT in 6 hours. Alva Briones. Christopher AlvesD, BCPS   4/6/2021 at 11:48 AM    4/6 1856  aPTT - 87.2 sec  Hold heparin drip for 1 hour, then decrease heparin drip to 10.9 mL/hr   Next aPTT at [address] PharmD 4/6/2021 7:42 PM    aPTT 39.1 seconds at 0412  Heparin 2000 units IVP bolus then increase Heparin infusion to 1280 units/hr = 12.8 mL/hr  Recheck aPTT in 6 hours. Alva Briones. Christopher AlvesD, BCPS   4/7/2021 at 8:03 AM    4/7 1435  aPTT = 47.8 sec  Heparin 2000 units bolus and increase heparin drip rate to 14.7 ml/hr  Recheck aPTT in 6 hours at [address]  4/7/2021 at 3:55 PM    4/8 2213  aPTT = 48.9 sec  Borderline, will continue with current rate of 14.7 ml/hr for now  Recheck aPTT in 6 hours at [address] PharmD  4/7/2021 at 10:42 PM    4/8/2021  Recent Labs     04/08/21  0440   APTT 50.8*     Continue heparin gtt at 14.7 mL/hr. Recheck aPTT in 6 hours. Camelia Albrecht PharmD  4/8/2021 5:49 AM    Aptt = 71.3 sec at 2041. Continue heparin drip at 22.3ml/hr.  Next aptt at 0300 4/13  Cinda Jeannine [address]  4/12/2021 at 9:59 PM

## 2021-04-05 NOTE — PROCEDURES
[address]       Cardiac Catheterization Lab Report    PATIENT: Nirmal Madison  DATE: 2021  MRN: [id number]  CSN: [id number]  : 1960      Performing Physician: Radha Bazan MD, [address], [place], [place]  Primary Care Physician: Shiv Randolph, APRN - CNP  Admitting/Referring provider: James Blake MD     Procedures Performed:   1. Left heart catheterization  2. Selective left and right coronary angiogram  3. Left ventriculography     Procedure Findings:  1. Severe multi vessel coronary artery diease   ~100% mid RCA -sub acute occlusion. ~70-80% LAD   ~70% OM  2. Normal left ventricular function with EF estimated at 55-60%  3.  Normal left heart hemodynamics    Indications:   Patient Active Problem List   Diagnosis    Type 2 diabetes mellitus with stage 4 chronic kidney disease, without long-term current use of insulin ([address]    Cigarette smoker    At[facility] fibrillation[facility] chronic ([facility])    Essential (primary) hypertension    Chronic anticoagulation    Normocytic anemia    Primary osteoarthritis of both knees    Calciphylaxis    Depression    Hyperlipidemia    Morbid obesity ([address]    Furunculosis (recurrent, on chronic suppressive Abx)    Ulcer of left calf with fat layer exposed ([address]    Hyperphosphatemia    Vitamin D deficiency    Secondary hyperparathyroidism of renal origin ([address]    Iron deficiency    White matter changes, severe by MRI brain    Coronary artery disease involving native coronary artery    Renal arteriosclerosis    Other disorder of calcium metabolism    CKD (chronic kidney disease), stage III    Gastrointestinal hemorrhage    Metabolic acidosis    Leg wound, left    Deep vein thrombosis (DVT) ([facility])    Tobacco abuse    Wound infection    Gastric outlet obstruction    Potassium excess    DM2 (diabetes mellitus, type 2) ([facility])    Stage 5 chronic kidney disease not on chronic dialysis ([facility])    Shortness of breath    ESRD (end stage renal disease) ([address]    Sepsis ([address]    Mild malnutrition ([address]    Dialysis AV fistula malfunction (HCC)    Atrial fibrillation with slow ventricular response (HCC)    Hypotension    Lightheadedness    Chronic obstructive pulmonary disease (HCC)    Suspected sleep apnea    AV block       Details:   Mei Goncalves was brought to the cardiac catheterization lab in a fasting state after informed consent was obtained. If the patient was able to provide written consent, it was obtained. The patient's vitals were monitored through out the procedure. The patient was sedated using the appropriate levels of sedation and ASA guidelines. The appropriate right radial access site area was prepped and drapped in a sterile fashion. The area was anesthetized with 2% lidocaine. Using the modified Seldinger technique, an arterial sheath was introduced into the arterial access site using a single anterior wall puncture. The sheath was flushed and prepped in usual fashion. Catheters used during the procedure included 5 Wallisian TIG 4.0 catheter. In addition, we used a EBU 3.5, EBU 4.06 Western Jeri catheters. The catheters were advanced and removed over a .035\" wire, into the appropriate positions. Multiple angiographic views were obtained. An LV gram was obtained. Findings:    1. Left main coronary artery was normal. It gave off the left anterior descending artery and left circumflex. 2. Left anterior descending artery has severe atherosclerotic disease. It was moderate in size. It gave off septal perforators and a moderate sized diagonal branch. The LAD covered the entire apex of the left ventricle.    ~70% mid    3. Left circumflex has severe atherosclerotic disease. It was large in size. There was a severe sized obtuse marginal branch.   ~70% OM    4. Right coronary artery has severe atherosclerotic disease. It is exceedingly large vessel.   Diameter is 6 to 7 mm minimum and ranging to near 8 mm in segments.   ~100% mid RCA    5. Left ventriculogram showed normal LVEF at 55-60%. Wall motion was normal . There was no significant mitral valve or aortic valve disease noted. LVEDP was normal. There was no gradient noted across the aortic valve during pullback of the catheter. /61   Pulse 86   Temp 98 °F (36.7 °C) (Oral)   Resp 24   Ht 5' 10\" (1.778 m)   Wt 285 lb 7.9 oz (129.5 kg)   SpO2 95%   BMI 40.96 kg/m²     The access site was controlled with manual pressure and/or appropriate closure device. Moderate Conscious Sedation Details: An independent trained observer pushed medications at my direction. We monitoring the patient's level of consciousness and vital signs/physiologic status throughout the procedure. CPT codes 05595 and 76725    Start time:   Stop time: 1605  ASA class: 1628    Sedation totals:  Versad - 1mg  Fentanyl - 0mcg    EBL - minimal <5 cc blood loss    The patient was monitored continuously with the ECG, pulse oximetry, blood pressure, and direct observation. CONCLUSIONS:    1. Severe multi-vessel coronary artery disease with sub-acute occlusion of the RCA    ASSESSMENT/RECOMMENDATIONS:    1. Coronary anatomy not favorable for interventional treatment. Patient's blood vessel size are not compatible with stent insertion. Recommend medical management and consultation with cardiothoracic surgery for consideration of coronary bypass grafting.       Edouard Burns MD, JASON, [address] [place] [address] Cardiology  [facility]  [phone] Main central office  [phone] [facility] office  4/5/2021  4:27 PM

## 2021-04-05 NOTE — CONSULTS
[address]                                  CONSULTATION    PATIENT NAME: Adelia Ortiz                      :        1960  MED REC NO:   [id number]                          ROOM:       [id number]  ACCOUNT NO:   [de-identified]                           ADMIT DATE: 2021  PROVIDER:     Rosi Martinez MD    CONSULT DATE:  2021    CARDIAC ELECTROPHYSIOLOGY CONSULTATION    REASON FOR CONSULTATION:  Bradycardia. HISTORY OF PRESENT ILLNESS:  The patient is a 64-year-old man with  history of permanent atrial fibrillation, coronary artery disease and  end-stage renal disease - on dialysis, who is admitted with fatigue. EMS was summoned to the scene and demonstrated bradycardia and arterial  hypotension. He was initially treated with IV atropine without much  success. He underwent transcutaneous pacing for a time. He was then  started on dopamine which improved both the arterial hypotension and the  bradycardia. ECG at the time of admission on 2021 at 2353 hours demonstrates  junctional bradycardia at 30 beats per minute. There are ST-segment and  T-wave abnormalities primarily in the right precordial and high lateral  leads. The patient had no chest pain or shortness of breath noted at  the time of admission and currently denies chest pain or shortness of  breath. He does have some fatigue. He recently underwent revision of a  left forearm AV fistula for dialysis. Preop ECG on 2021 demonstrated atrial fibrillation with an average  ventricular rate of 83 beats per minute. He has remained on IV  dopamine. IV dopamine was decreased from 3 mcg/kg per minute to 1  mcg/kg per minute at about 03:00 p.m. on 2021. In the absence of  the dopamine, the heart rate and atrial fibrillation remained 65 to 75  beats per minute with a decline on arterial pressure to the mid 70s.    New ECG demonstrates some ischemic changes in the inferior leads. The  troponin level from 04/03/2021 at midnight was 0.05. PAST MEDICAL HISTORY:  1. Permanent atrial fibrillation. 2.  End-stage renal disease, on hemodialysis. 3.  Coronary artery disease - status post cardiac cath in 2011. 4.  Chronic diastolic congestive heart failure. 5.  GI bleed. 6.  Anemia. 7.  Diabetes mellitus. MEDICATIONS:  At the time of admission include apixaban 2.5 mg p.o.  b.i.d., aspirin 81 mg p.o. daily, Coreg 12.5 mg p.o. b.i.d., long-acting  diltiazem 120 mg p.o. daily, midodrine 2.5 mg p.o. daily on dialysis  days, pravastatin 40 mg p.o. at bedtime. ALLERGIES:  Include CODEINE, PENICILLINS and ONIONS. SOCIAL HISTORY:  The patient is a current everyday cigarette smoker. He  does not consume alcohol at this time. FAMILY HISTORY:  Remarkable for hypertension and arthritis in the  mother. There is no known family history of cardiac rhythm disturbance,  syncope or sudden cardiac death. REVIEW OF SYSTEMS:  Demonstrates no recent change in appetite or change  in weight. The patient has not had recent fever or chills of which he  is aware. He does describe some drainage from his fistula revision site  on 04/03/2021. He denies chest pain or shortness of breath. He has not  had palpitations. He did describe the above-mentioned fatigue on  admission. All other components of the 14-system review are negative. PHYSICAL EXAMINATION:  VITAL SIGNS:  Blood pressure is 89/76, heart rate is 90 beats per minute  and irregular. GENERAL:  The patient is awake and alert. He does appear somewhat  fatigued. HEENT:  Exam demonstrates normocephalic, atraumatic head. There is no  scleral icterus. NECK:  Supple without thyromegaly. There is no cervical  lymphadenopathy. LUNGS:  Clear anteriorly. CARDIOVASCULAR:  Exam reveals an irregular rhythm. The apical impulse  is discrete.   S1 and S2 are normal.  There is no audible murmur or  gallop. The jugular venous pressure is difficult to assess. ABDOMEN:  Obese, soft, nontender. EXTREMITIES:  Demonstrate no pitting, pretibial or dependent edema. There is no cyanosis. There is no evidence for chronic venous stasis. SKIN:  Otherwise warm and dry without skin rash. DIAGNOSTIC DATA:  A 12-lead ECG from 04/05/2021 demonstrates atrial  fibrillation with an average ventricular rate of 71 beats per minute. There is low QRS voltage in the limb and precordial leads. There is  evidence of evolving inferior myocardial infarction with new Q-waves and  subtle ST-segment elevation. IMPRESSIONS:  1. Probable recent inferior myocardial infarction. 2.  Persistent atrial fibrillation with transient AV block. 3.  Arterial hypotension. 4.  End-stage renal disease, on hemodialysis. 3.  Recent left forearm AV fistula revision. The patient was admitted on 04/03/2021 with arterial hypotension and  bradycardia. He has persistent atrial fibrillation, so the bradycardia  is a reflection of AV block. Of interest, his preop ECG from 03/03/2021  demonstrated a very good AV conduction with a heart rate in the 80s. The etiology of the abrupt onset AV block is not clear. There were no  clear acid-base electrolyte disturbances at the time of admission, which  might explain this. Inferior ischemia and a reflex-mediated  hypotension/bradycardia syndrome (Bezold-Jarisch reflex) would be a  possibility. On dopamine, the heart rate has been adequate. In the absence of the  dopamine, he has fairly prompt arterial hypotension. He had heart rates  in the 65 to 75 beats per minute range. RECOMMENDATIONS:  1. Ischemic evaluation. 2.  Pressors as necessary to support the arterial blood pressure. 3.  Could consider leadless pacemaker if symptomatic bradycardia is  clearly identified.   At this time, the heart rate appears to have  recovered adequately and the primary problem appears to be arterial  hypotension and new ECG abnormalities. Thank you for the opportunity to assist in the care of the patient. Please contact me if you have any questions regarding his evaluation.         Kathi Lopez MD    D: 04/05/2021 15:41:59       T: 04/05/2021 15:46:50     [email or web address]  Job#: [id number]     Doc#: [id number]    CC:

## 2021-04-05 NOTE — PLAN OF CARE
[address]     Electrophysiology                                     Plan of Care    Admission date:  4/3/2021      HPI/CC: Maritza Mendoza is a 62year old male with a history of permanent atrial fibrillation, HTN, CAD, chronic diastolic CHF, ESRD, GI bleed, chronic macrocytic anemia, DM, and calciphylaxis. LHC in 2011 showed moderate diffuse CAD. He was admitted in 2017 with GI bleed and Coumadin was stopped. Most recent echo in 10/2019 showed an EF of 55%. He has had multiple interventions to his left arm fistula (most recent 3/2021). On 4/3/2021 he came to the ER with sudden fatigue. Notes indicate EMS reported a HR of 28-30 and intermittent LOC (records unavailable). EKG on admission showed afib with a HR of 30 and ER notes state he required transcutaneous pacing and Dopamine. Home Cardizem, Coreg, and Eliquis have been held. This admission he has been treated for acidosis, transaminitis, and now WBC is increasing. Rhythm is currently afib with an average HR of 75 on Dopamine gtt at 3mcg/kg/min. Brief NSVT noted. Vitals:  Blood pressure (!) 99/56, pulse 76, temperature 98 °F (36.7 °C), temperature source Oral, resp. rate 22, height 5' 10\" (1.778 m), weight 285 lb 7.9 oz (129.5 kg), SpO2 95 %.   Temp  Av.5 °F (36.9 °C)  Min: 98 °F (36.7 °C)  Max: 98.7 °F (37.1 °C)  Pulse  Av.8  Min: 54  Max: 91  BP  Min: 90/75  Max: 122/57  SpO2  Av.3 %  Min: 88 %  Max: 100 %    24 hour I/O    Intake/Output Summary (Last 24 hours) at 2021 0959  Last data filed at 2021 0682  Gross per 24 hour   Intake 882.92 ml   Output 400 ml   Net 482.92 ml     Current Facility-Administered Medications   Medication Dose Route Frequency Provider Last Rate Last Admin    mupirocin (BACTROBAN) 2 % ointment   Nasal BID Jabari River MD   Given at 21 0913    aspirin EC tablet 81 mg  81 mg Oral Daily Rodney Morrissey MD   81 mg at 21 0913    b complex-C-folic acid (NEPHROCAPS) Data    Echo 10/201/2019:  Irregular rhythm  Left ventricular systolic function is normal with ejection fraction estimated at 55%. No regional wall motion abnormalities are noted. There is moderate concentric left ventricular hypertrophy. Diastolic dysfunction grade and filing pressure are indeterminate. Biatrial enlargement. The aortic root is moderately dilated. The ascending aorta is moderately dilated. Aortic valve appears sclerotic but opens adequately. Mild-to-moderate aortic regurgitation is present. Mild calcification of the posterior leaflet of the mitral valve. Mild mitral, tricuspid and pulmonic regurgitation. Previous echo done 7/2015 - EF 55%     Cardiac catheterization 7/12/2011 [facility]):   Moderate diffuse CAD with ectatic RCA  No LVG      Recommend   Medical therapy nitrates/hydralazine/coreg/asa/coumadin  Mucomyst and hydration      EP consult this afternoon for VT during treadmill     All labs and testing reviewed.   Lab Review     Renal Profile:   Lab Results   Component Value Date    CREATININE 7.2 04/05/2021    BUN 45 04/05/2021     04/05/2021    K 4.9 04/05/2021    K 4.6 10/20/2019    CL 95 04/05/2021    CO2 25 04/05/2021     CBC:    Lab Results   Component Value Date    WBC 13.3 04/05/2021    RBC 2.58 04/05/2021    HGB 9.7 04/05/2021    HCT 29.5 04/05/2021    .2 04/05/2021    RDW 18.0 04/05/2021     04/05/2021     BNP:    Lab Results   Component Value Date     08/27/2013     Fasting Lipid Panel:    Lab Results   Component Value Date    CHOL 153 07/10/2011    HDL 27 07/10/2011    TRIG 284 07/10/2011     Cardiac Enzymes:  CK/MbTroponin  Lab Results   Component Value Date    TROPONINI 0.05 04/04/2021     PT/ INR   Lab Results   Component Value Date    INR 1.29 04/05/2021    INR 1.25 04/04/2021    INR 1.20 04/04/2021    PROTIME 15.0 04/05/2021    PROTIME 14.5 04/04/2021    PROTIME 13.9 04/04/2021     PTT No results found for: PTT   Lab Results   Component Value Date    MG 2.20 04/05/2021      Lab Results   Component Value Date    TSH 4.37 07/09/2011       Assessment:  Permanent atrial fibrillation   -HR now stable on Dopamine    -[id number] score 4 (HTN, CHF, DM, vascular disease)  AV block: noted on admission  HTN: controlled (BP soft)   Chronic diastolic CHF  CAD   -moderate per [facility] 2011  ESRD: on HD    -s/p intervention to left arm fistula 3/2021  Chronic macrocytic anemia  History of GI bleed  DM  History of calciphylaxis and leg wounds   Transaminitis   Leukocytosis    Plan:   1. Continue ASA, statin and PRN midodrine  2. Continue Dopamine gtt until further evaluation  3. Restart home Eliquis if no procedures are planned  4. Continue to hold home Cardizem and Coreg (last dose likely 4/3)  5. Echo and TSH (ordered 4/5/2021)  6.  Full EP consult to follow     GIANLUCA Monroe-CNP  [facility]  [phone]

## 2021-04-05 NOTE — CARE COORDINATION
CASE MANAGEMENT INITIAL ASSESSMENT      Reviewed chart and completed assessment via telephone with: Pt daughter Wetzel   Explained Case Management role/services. Primary contact information:Ximena Kaur, Daughter     Health Care Decision Maker :    Phil Malave, Daughter [phone]       Can this person be reached and be able to respond quickly, such as within a few minutes or hours? Yes      Admit date/status:04/04/21  Diagnosis: Atrial Fibrillation with SVR, ESRD on HD  Is this a Readmission?:  No      Insurance:medicare with medicaid   Precert required for SNF: No       3 night stay required: No    Living arrangements, Adls, care needs, prior to admission:lives alone in one story house. Has basement, but doesn't go down there. Transportation:drives     Durable Medical Equipment at home:  [facility]_Cane__RTS__ BSC__Shower Chair_X_  02__ HHN__ CPAP__  BiPap__  Hospital Bed__ W/C___ Other__________    Services in the home and/or outpatient, prior to admission: none    Dialysis Facility (if applicable)   · Name:[facility]   · Dialysis Schedule: Tues- Thurs.- Sat  · Phone: [phone] · [phone]    PT/OT recs:n/a at this time. Hospital Exemption Notification (HEN): n/a at this time. Will be needed for SNF placement. Barriers to discharge:none identified    Plan/comments: Assessment per rocio Shipley. She states that the Pt would be open to [place] [id number] or SNF if needed. PT/OT when appropriate to assist with dc planning as the Pt lives home alone at this time. Will follow.

## 2021-04-06 LAB
A/G RATIO: 1.1 (ref 1.1–2.2)
ALBUMIN SERPL-MCNC: 3.4 G/DL (ref 3.4–5)
ALP BLD-CCNC: 88 U/L (ref 40–129)
ALT SERPL-CCNC: 33 U/L (ref 10–40)
ANION GAP SERPL CALCULATED.3IONS-SCNC: 13 MMOL/L (ref 3–16)
APTT: 43.7 SEC (ref 24.2–36.2)
APTT: 43.9 SEC (ref 24.2–36.2)
APTT: 87.2 SEC (ref 24.2–36.2)
AST SERPL-CCNC: 109 U/L (ref 15–37)
BASOPHILS ABSOLUTE: 0.1 K/UL (ref 0–0.2)
BASOPHILS RELATIVE PERCENT: 0.9 %
BILIRUB SERPL-MCNC: 0.5 MG/DL (ref 0–1)
BUN BLDV-MCNC: 63 MG/DL (ref 7–20)
CALCIUM SERPL-MCNC: 8.4 MG/DL (ref 8.3–10.6)
CHLORIDE BLD-SCNC: 94 MMOL/L (ref 99–110)
CO2: 23 MMOL/L (ref 21–32)
CREAT SERPL-MCNC: 9.7 MG/DL (ref 0.9–1.3)
EKG ATRIAL RATE: 56 BPM
EKG DIAGNOSIS: NORMAL
EKG Q-T INTERVAL: 422 MS
EKG QRS DURATION: 94 MS
EKG QTC CALCULATION (BAZETT): 458 MS
EKG R AXIS: 5 DEGREES
EKG T AXIS: 48 DEGREES
EKG VENTRICULAR RATE: 71 BPM
EOSINOPHILS ABSOLUTE: 0 K/UL (ref 0–0.6)
EOSINOPHILS RELATIVE PERCENT: 0 %
GFR AFRICAN AMERICAN: 7
GFR NON-AFRICAN AMERICAN: 6
GLOBULIN: 3.1 G/DL
GLUCOSE BLD-MCNC: 117 MG/DL (ref 70–99)
GLUCOSE BLD-MCNC: 169 MG/DL (ref 70–99)
GLUCOSE BLD-MCNC: 170 MG/DL (ref 70–99)
GLUCOSE BLD-MCNC: 185 MG/DL (ref 70–99)
GLUCOSE BLD-MCNC: 224 MG/DL (ref 70–99)
HCT VFR BLD CALC: 26.5 % (ref 40.5–52.5)
HEMATOLOGY PATH CONSULT: NO
HEMOGLOBIN: 8.8 G/DL (ref 13.5–17.5)
INR BLD: 1.5 (ref 0.86–1.14)
LV EF: 58 %
LVEF MODALITY: NORMAL
LYMPHOCYTES ABSOLUTE: 0.8 K/UL (ref 1–5.1)
LYMPHOCYTES RELATIVE PERCENT: 5.5 %
MAGNESIUM: 2.2 MG/DL (ref 1.8–2.4)
MCH RBC QN AUTO: 37.8 PG (ref 26–34)
MCHC RBC AUTO-ENTMCNC: 33.1 G/DL (ref 31–36)
MCV RBC AUTO: 114 FL (ref 80–100)
MONOCYTES ABSOLUTE: 1.2 K/UL (ref 0–1.3)
MONOCYTES RELATIVE PERCENT: 8.4 %
NEUTROPHILS ABSOLUTE: 12.4 K/UL (ref 1.7–7.7)
NEUTROPHILS RELATIVE PERCENT: 85.2 %
PDW BLD-RTO: 18.2 % (ref 12.4–15.4)
PERFORMED ON: ABNORMAL
PHOSPHORUS: 5.3 MG/DL (ref 2.5–4.9)
PLATELET # BLD: 221 K/UL (ref 135–450)
PMV BLD AUTO: 7.8 FL (ref 5–10.5)
POTASSIUM SERPL-SCNC: 4.6 MMOL/L (ref 3.5–5.1)
PROTHROMBIN TIME: 17.5 SEC (ref 10–13.2)
RBC # BLD: 2.33 M/UL (ref 4.2–5.9)
SODIUM BLD-SCNC: 130 MMOL/L (ref 136–145)
TOTAL PROTEIN: 6.5 G/DL (ref 6.4–8.2)
TROPONIN: 7.09 NG/ML
WBC # BLD: 14.5 K/UL (ref 4–11)

## 2021-04-06 PROCEDURE — 84100 ASSAY OF PHOSPHORUS: CPT

## 2021-04-06 PROCEDURE — 6360000002 HC RX W HCPCS: Performed by: INTERNAL MEDICINE

## 2021-04-06 PROCEDURE — 85730 THROMBOPLASTIN TIME PARTIAL: CPT

## 2021-04-06 PROCEDURE — 87040 BLOOD CULTURE FOR BACTERIA: CPT

## 2021-04-06 PROCEDURE — 2580000003 HC RX 258: Performed by: INTERNAL MEDICINE

## 2021-04-06 PROCEDURE — 36415 COLL VENOUS BLD VENIPUNCTURE: CPT

## 2021-04-06 PROCEDURE — 6370000000 HC RX 637 (ALT 250 FOR IP): Performed by: INTERNAL MEDICINE

## 2021-04-06 PROCEDURE — 85025 COMPLETE CBC W/AUTO DIFF WBC: CPT

## 2021-04-06 PROCEDURE — 99223 1ST HOSP IP/OBS HIGH 75: CPT | Performed by: NURSE PRACTITIONER

## 2021-04-06 PROCEDURE — 85610 PROTHROMBIN TIME: CPT

## 2021-04-06 PROCEDURE — 84484 ASSAY OF TROPONIN QUANT: CPT

## 2021-04-06 PROCEDURE — 80053 COMPREHEN METABOLIC PANEL: CPT

## 2021-04-06 PROCEDURE — 2000000000 HC ICU R&B

## 2021-04-06 PROCEDURE — 83735 ASSAY OF MAGNESIUM: CPT

## 2021-04-06 PROCEDURE — 94761 N-INVAS EAR/PLS OXIMETRY MLT: CPT

## 2021-04-06 PROCEDURE — 90935 HEMODIALYSIS ONE EVALUATION: CPT

## 2021-04-06 PROCEDURE — 2700000000 HC OXYGEN THERAPY PER DAY

## 2021-04-06 PROCEDURE — 87186 SC STD MICRODIL/AGAR DIL: CPT

## 2021-04-06 PROCEDURE — 99233 SBSQ HOSP IP/OBS HIGH 50: CPT | Performed by: NURSE PRACTITIONER

## 2021-04-06 PROCEDURE — 87150 DNA/RNA AMPLIFIED PROBE: CPT

## 2021-04-06 PROCEDURE — 93010 ELECTROCARDIOGRAM REPORT: CPT | Performed by: INTERNAL MEDICINE

## 2021-04-06 RX ORDER — HEPARIN SODIUM 1000 [USP'U]/ML
2000 INJECTION, SOLUTION INTRAVENOUS; SUBCUTANEOUS PRN
Status: DISCONTINUED | OUTPATIENT
Start: 2021-04-06 | End: 2021-04-08

## 2021-04-06 RX ORDER — HEPARIN SODIUM 1000 [USP'U]/ML
2000 INJECTION, SOLUTION INTRAVENOUS; SUBCUTANEOUS ONCE
Status: COMPLETED | OUTPATIENT
Start: 2021-04-06 | End: 2021-04-06

## 2021-04-06 RX ORDER — HEPARIN SODIUM 1000 [USP'U]/ML
4000 INJECTION, SOLUTION INTRAVENOUS; SUBCUTANEOUS PRN
Status: DISCONTINUED | OUTPATIENT
Start: 2021-04-06 | End: 2021-04-08

## 2021-04-06 RX ADMIN — MUPIROCIN: 20 OINTMENT TOPICAL at 08:42

## 2021-04-06 RX ADMIN — NEPHROCAP 1 MG: 1 CAP ORAL at 08:41

## 2021-04-06 RX ADMIN — ACETAMINOPHEN 650 MG: 325 TABLET ORAL at 13:46

## 2021-04-06 RX ADMIN — ASPIRIN 81 MG: 81 TABLET, COATED ORAL at 08:41

## 2021-04-06 RX ADMIN — MUPIROCIN: 20 OINTMENT TOPICAL at 21:57

## 2021-04-06 RX ADMIN — FERROUS SULFATE TAB 325 MG (65 MG ELEMENTAL FE) 325 MG: 325 (65 FE) TAB at 17:58

## 2021-04-06 RX ADMIN — CEFEPIME HYDROCHLORIDE 2000 MG: 2 INJECTION, POWDER, FOR SOLUTION INTRAVENOUS at 17:58

## 2021-04-06 RX ADMIN — HEPARIN SODIUM 2000 UNITS: 1000 INJECTION INTRAVENOUS; SUBCUTANEOUS at 05:37

## 2021-04-06 RX ADMIN — DOPAMINE HYDROCHLORIDE 5 MCG/KG/MIN: 160 INJECTION, SOLUTION INTRAVENOUS at 04:25

## 2021-04-06 RX ADMIN — FERROUS SULFATE TAB 325 MG (65 MG ELEMENTAL FE) 325 MG: 325 (65 FE) TAB at 11:19

## 2021-04-06 RX ADMIN — INSULIN LISPRO 1 UNITS: 100 INJECTION, SOLUTION INTRAVENOUS; SUBCUTANEOUS at 20:09

## 2021-04-06 RX ADMIN — INSULIN GLARGINE 25 UNITS: 100 INJECTION, SOLUTION SUBCUTANEOUS at 20:09

## 2021-04-06 RX ADMIN — DOPAMINE HYDROCHLORIDE 5 MCG/KG/MIN: 160 INJECTION, SOLUTION INTRAVENOUS at 15:41

## 2021-04-06 RX ADMIN — PRAVASTATIN SODIUM 40 MG: 40 TABLET ORAL at 21:55

## 2021-04-06 RX ADMIN — ACETAMINOPHEN 650 MG: 325 TABLET ORAL at 17:58

## 2021-04-06 RX ADMIN — FERROUS SULFATE TAB 325 MG (65 MG ELEMENTAL FE) 325 MG: 325 (65 FE) TAB at 08:42

## 2021-04-06 RX ADMIN — HEPARIN SODIUM 2000 UNITS: 1000 INJECTION, SOLUTION INTRAVENOUS; SUBCUTANEOUS at 13:39

## 2021-04-06 RX ADMIN — VANCOMYCIN HYDROCHLORIDE 2000 MG: 1 INJECTION, POWDER, LYOPHILIZED, FOR SOLUTION INTRAVENOUS at 15:37

## 2021-04-06 ASSESSMENT — PAIN SCALES - GENERAL
PAINLEVEL_OUTOF10: 0
PAINLEVEL_OUTOF10: 0

## 2021-04-06 NOTE — PROGRESS NOTES
Treatment time: 4 hours  Net UF: 1000 ml    Pre weight: 127.5 kg   Post weight: 126.1 kg  EDW: 129.5 kg    Access used: RIJ tunneled line  Access function: good without incident. No blood return from arterial port. Lines reversed    Medications or blood products given: vanc 2000mg    Regular outpatient schedule: [facility]    Summary of response to treatment: Tolerated good. VSS. Dopamine gtt at 5mcg and heparin gtt still infusing. Pt not as short of breath at end of treatment but still is slightly    Copy of dialysis treatment record placed in chart, to be scanned into EMR.

## 2021-04-06 NOTE — FLOWSHEET NOTE
04/06/21 0444   Assessment   Charting Type Shift assessment   Neurological   Neuro (WDL) X   Level of Consciousness Alert (0)   Orientation Level Oriented X4   Cognition Follows commands;Poor judgement;Poor attention/concentration   Language Clear   Size R Pupil (mm) 3   R Pupil Shape Round   R Pupil Reaction Brisk   Size L Pupil (mm) 3   L Pupil Shape Round   L Pupil Reaction Brisk   R Hand  Weak   L Hand  Weak   R Foot Dorsiflexion Weak   L Foot Dorsiflexion Weak   R Foot Plantar Flexion Weak   L Foot Plantar Flexion Weak   RUE Motor Response Responds to command   Sensation RUE Full sensation   LUE Motor Response Responds to command   Sensation LUE Full sensation   RLE Motor Response Responds to command   Sensation RLE Full sensation;Pain   LLE Motor Response Responds to command   Sensation LLE Full sensation;Pain   Gag Present   [place] Coma Scale   Eye Opening 4   Best Verbal Response 5   Best Motor Response 6   [place] Coma Scale Score 15   HEENT   HEENT (WDL) X   Right Eye Impaired vision   Left Eye Impaired vision   Nose Intact   Throat Intact   Mucous Membrane Moist;Pink   Teeth Missing teeth   Respiratory   Respiratory (WDL) X   Respiratory Pattern Regular   Respiratory Depth Normal   Respiratory Quality/Effort Dyspnea with exertion;Dyspnea at rest   Chest Assessment Chest expansion symmetrical;Trachea midline   L Breath Sounds Diminished   R Breath Sounds Diminished   Breath Sounds   Right Upper Lobe Diminished   Right Middle Lobe Diminished   Right Lower Lobe Diminished   Left Upper Lobe Diminished   Left Lower Lobe Diminished   Cardiac   Cardiac (WDL) X  (MVD)   Cardiac Regularity Irregular   Heart Sounds S1, S2   Cardiac Rhythm Atrial fibrillation   Cardiac Monitor   Telemetry Monitor On Yes   Telemetry Audible Yes   Telemetry Alarms Set Yes   Telemetry Box Number ICU bedside monitor   Gastrointestinal   Abdominal (WDL) X   Abdomen Inspection Rounded; Soft   Tenderness Soft   RUQ Bowel Sounds Active   LUQ Bowel Sounds Active   RLQ Bowel Sounds Active   LLQ Bowel Sounds Active   Peripheral Vascular   Peripheral Vascular (WDL) X   Edema Right lower extremity; Left lower extremity   RLE Edema Trace   LLE Edema Trace   Puncture Site Assessment 1   Location Radial - right   Site Assessment No redness, drainage, swelling or hematoma   Dressing Applied Transparent occlusive dressing   Multiple puncture sites No   Skin Color/Condition   Skin Color/Condition (WDL) X   Skin Color Bronze   Skin Condition/Temp Dry; Warm   Skin Integrity   Skin Integrity (WDL) X   Skin Integrity Other (Comment)  (scattered scarring )   Location Scattered    Preventative Dressing Yes   Dressing Site Sacrum   Date Applied 04/04/21   Assessed this shift? Yes   Musculoskeletal   Musculoskeletal (WDL) X  (generalized weakness)   Genitourinary   Genitourinary (WDL) X  (oliguric)   Anus/Rectum   Anus/Rectum (WDL) WDL   Psychosocial   Psychosocial (WDL) X   Patient Behaviors Uncooperative; Withdrawn   Provider Notification   Reason for Communication Critical Value (comment)  (Trop 7.09, Cre 9.7)   Provider Name [facility]   Provider Notification Physician   Method of Communication Secure Message   Response No new orders   Notification Time [id number]

## 2021-04-06 NOTE — PROGRESS NOTES
Progress Note    HISTORY     CC:   Weakness, bradycardia            We are following for ESRD      Subjective/   HPI:  BP stable. He is sleepy. Possible pacemaker todsy? Denies issues with dialysis or dialysis catheter.       ROS:  Constitutional:  Noted fevers, No Chills, + weakness  Cardiovascular:  + arrhythmia     Social Hx:  No family at bedside     Past Medical and Surgical History:  - Reviewed, no changes     EXAM       Objective/     Vitals:    04/05/21 2001 04/05/21 2100 04/05/21 2302 04/06/21 0300   BP: (!) 96/57 (!) 93/57 102/80 87/65   Pulse: 88 86 83 66   Resp: 28 23 20 18   Temp: 100.8 °F (38.2 °C)   98.4 °F (36.9 °C)   TempSrc: Oral   Oral   SpO2: 95% 96%     Weight:       Height:         24HR INTAKE/OUTPUT:      Intake/Output Summary (Last 24 hours) at 4/6/2021 0829  Last data filed at 4/6/2021 0425  Gross per 24 hour   Intake 713.33 ml   Output 375 ml   Net 338.33 ml     Constitutional:  Ill appearing, somnolent   Eyes:  Pupils reactive, sclera clear   Neck:  Normal thyroid, no masses   Cardiovascular:  Irregular, no rub  Respiratory:  No distress, no wheezing  Psychiatry:  Flat mood/affect, alert  Abdomen: +bs, soft, nt, no masses   Musculoskeletal: No LE edema, no clubbing   Lymphatics:  No LAD in neck, no supraclavicular nodes   Access:  Right [facility]       MEDICAL DECISION MAKING       Data/  Recent Labs     04/04/21 0442 04/05/21 0355 04/06/21  0406   WBC 13.0* 13.3* 14.5*   HGB 10.4* 9.7* 8.8*   HCT 31.3* 29.5* 26.5*   .7* 114.2* 114.0*    266 221     Recent Labs     04/04/21 0442 04/05/21  0355 04/06/21  0406    135* 130*   K 4.5 4.9 4.6   CL 97* 95* 94*   CO2 27 25 23   GLUCOSE 314* 222* 170*   PHOS 5.1* 5.5* 5.3*   MG 2.20 2.20 2.20   BUN 36* 45* 63*   CREATININE 5.9* 7.2* 9.7*   LABGLOM 10* 8* 6*   GFRAA 12* 9* 7*       Assessment/Plan        Symptomatic bradycardia - on dopamine, plans per Cardiology/EP     End stage renal disease - on HD Tues-Thurs-Sat -for HD today     Atrial fibrillation - EP following      Anemia of chronic disease - at target      DM2    Fever- cultures drawn, empiric Vanc/Cefepime

## 2021-04-06 NOTE — CONSULTS
Pharmacy Note  Vancomycin Consult    Rashaad Gonsales is a 62 y.o. male started on empiric Vancomycin for leukocytosis; consult received from Dr. Gerard Patterson to manage therapy. Also receiving the following antibiotics: Cefepime. Allergies:  Codeine, Onion, and Penicillins     Recent Labs     04/05/21  0355 04/06/21  0406   CREATININE 7.2* 9.7*     Recent Labs     04/05/21  0355 04/06/21  0406   WBC 13.3* 14.5*     Estimated Creatinine Clearance: 11 mL/min (A) (based on SCr of 9.7 mg/dL [facility])). Intake/Output Summary (Last 24 hours) at 4/6/2021 1407  Last data filed at 4/6/2021 0902  Gross per 24 hour   Intake 713.33 ml   Output 425 ml   Net 288.33 ml       Wt Readings from Last 1 Encounters:   04/04/21 285 lb 7.9 oz (129.5 kg)         Body mass index is 40.96 kg/m². Goal Vancomycin trough: 15-20 mcg/mL   Goal Vancomycin AUC: 400-600     Assessment/Plan:  Will initiate Vancomycin 2000 mg IVPB x 1. Will pulse dose Vancomycin given ESRD on HD T/Th/Sat. Vancomycin level 4/8 at 0600. Thank you for the consult. Eloisa Anguiano. Brooks PharmD, BCPS   4/6/2021 2:10 PM    4/8  Vanc random = 11.4 mcg/mL at 0440. Will give vancomycin 1500 mg x 1. Recheck vanc random tomorrow in the AM (4/9 at 0600).   Long WhiteD  4/8/2021 5:51 AM

## 2021-04-06 NOTE — PROGRESS NOTES
Summary (Last 24 hours) at 4/6/2021 1346  Last data filed at 4/6/2021 0902  Gross per 24 hour   Intake 713.33 ml   Output 425 ml   Net 288.33 ml       Physical Exam Performed:  /70   Pulse 75   Temp 99.7 °F (37.6 °C) (Oral)   Resp 27   Ht 5' 10\" (1.778 m)   Wt 285 lb 7.9 oz (129.5 kg)   SpO2 96%   BMI 40.96 kg/m²     General appearance: No apparent distress, appears stated age and cooperative. HEENT: Pupils equal, round, and reactive to light. Conjunctivae/corneas clear. Neck: Supple, with full range of motion. No jugular venous distention. Trachea midline. Respiratory:  Normal respiratory effort. Clear to auscultation, bilaterally without Rales/Wheezes/Rhonchi. Cardiovascular: Regular rate and rhythm with normal S1/S2 without murmurs, rubs or gallops. Abdomen: Soft, non-tender, non-distended with normal bowel sounds. Musculoskeletal: No clubbing, cyanosis or edema bilaterally. Full range of motion without deformity. Skin: Skin color, texture, turgor normal.  No rashes or lesions. Neurologic:  Neurovascularly intact without any focal sensory/motor deficits.  Cranial nerves: II-XII intact, grossly non-focal.  Psychiatric: Alert and oriented, thought content appropriate, normal insight  Capillary Refill: Brisk,< 3 seconds   Peripheral Pulses: +2 palpable, equal bilaterally       Labs:   Recent Labs     04/04/21 0442 04/05/21 0355 04/06/21 0406   WBC 13.0* 13.3* 14.5*   HGB 10.4* 9.7* 8.8*   HCT 31.3* 29.5* 26.5*    266 221     Recent Labs     04/04/21 0442 04/05/21  0355 04/06/21  0406    135* 130*   K 4.5 4.9 4.6   CL 97* 95* 94*   CO2 27 25 23   BUN 36* 45* 63*   CREATININE 5.9* 7.2* 9.7*   CALCIUM 8.7 8.9 8.4   PHOS 5.1* 5.5* 5.3*     Recent Labs     04/04/21 0442 04/05/21  0355 04/06/21  0406   AST 33 210* 109*   ALT 24 41* 33   BILITOT 0.4 0.4 0.5   ALKPHOS 109 93 88     Recent Labs     04/04/21  0442 04/05/21  0355 04/06/21  0406   INR 1.25* 1.29* 1.50*     Recent Labs 04/04/21  0000 04/05/21  0355 04/06/21  0406   TROPONINI 0.05* 5.74* 7.09*     Radiology:  XR PELVIS (1-2 VIEWS)   Final Result   Status post right femoral line placement as above. XR CHEST PORTABLE   Final Result   Cardiomegaly, with mild central venous congestion           Active Hospital Problems    Diagnosis Date Noted    AV block [I44.30] 04/05/2021    Atrial fibrillation with slow ventricular response (HCC) [I48.91] 04/04/2021    Hypotension [I95.9]     Lightheadedness [R42]     Chronic obstructive pulmonary disease (HCC) [J44.9]     Suspected sleep apnea [R29.818]     ESRD (end stage renal disease) ([facility] [address] [N18.6]     DM2 (diabetes mellitus, type 2) ([facility] [address] [E11.9] 04/11/2019    Morbid obesity ([facility] [address] [E66.01] 01/25/2017    Chronic anticoagulation [Z79.01] 11/30/2012    Essential (primary) hypertension [I10] 08/02/2011    Atrial fibrillation, chronic (HCC) [I48.20] 07/12/2011     Assessment/Plan:  Afib w/ SVR (POA)   -  HR and BP significantly improved on dopamine, currently running at 3mcg/kg/min. Patient symptomatically feels better too. -  Carvedilol and diltiazem held. -  Home ASA and statin continued. Apixaban held in case a procedure is performed   such as pacemaker implantation. - EP consulted and recommended cardiology evaluation. Cardiology did [address] on 4/6/2021 which showed severe multivessel disease with subacute occlusion of RCA. Recommended CT surgery evaluation. CT surgery evaluated the same day and stated that patient is not a surgical candidate. Cardiology to decide further PCI once infection resolves. .     ESRD on HD  -  Tues, Thurs, Sat dialysis schedule. -  Dialysis access is a tunneled R IJ HD catheter. -  Left brachiocephalic fistula was just created 3/3/2021.  -  Continue home nephrocaps, ferric citrate, and prn midodrine.  -Nephrology consulted to assist with maintenance dialysis while inpatient. Fever with unclear source of infection -noted on 4/6/2021. Persistent leukocytosis noted. Chest x-ray on admission negative. Will start empiric vancomycin/cefepime for coverage of bloodstream infection after obtaining blood cultures x2 (1 including from the femoral line) . Will discuss with nephrology.      DM2  -  Hold all oral antidiabetic agents. Start s.c. Insulin regimen based on home regimen. DVT Prophylaxis: Heparin GTT  Diet: DIET CARDIAC;  Code Status: Full Code    PT/OT Eval Status: Not yet ordered    Dispo -at least 3 to 4 days pending  clinical improvement, cardiology intervention. The note was completed using Dragon -speech recognition software & EMR  . Every effort was made to ensure accuracy; however, inadvertent computerized transcription errors may be present.     Julio Hardy MD

## 2021-04-06 NOTE — CONSULTS
posterior left calf    Primary osteoarthritis of both knees     Primary osteoarthritis of both knees        Past Surgical History:        Procedure Laterality Date    CARDIAC CATHETERIZATION  07/12/2011    COLONOSCOPY  01/17/2017    diverticulosis    DIALYSIS FISTULA CREATION Left 2020    DIALYSIS FISTULA CREATION Left 3/3/2021    LEFT UPPER ARM BRACHIAL CEPHALIC ARTERIAL VENOUS FISTULA; LEFT UPPER EXTREMITY ANGIOGRAM; STENT GRAFT ULNAR ARTERIAL VENOUS FISTULA performed by Ignacio Gautam MD at [address] Left     ankle; age 13    HAND SURGERY      KIDNEY BIOPSY Right 06/20/2016    KIDNEY BIOPSY Left 09/20/2016    OPEN TREATMENT RADIAL SHAFT FRACTURE Right 8/7/2018    OPEN REDUCTION INTERNAL FIXATION RIGHT DISTAL RADIUS, RIGHT CARPAL TUNNEL RELEASE performed by Casey Felder MD at [address] TUNNELED [address] Right 11/09/2017    BARD POWER PORT    UPPER GASTROINTESTINAL ENDOSCOPY  01/17/2017    mild chronic gastritis; duodenal biopsy normal    UPPER GASTROINTESTINAL ENDOSCOPY  10/24/2017       Medications Prior to Admission:   Medications Prior to Admission: Ferric Citrate (AURYXIA) 1  MG(Fe) TABS, Take 3 tablets by mouth 3 times daily (with meals)  Cholecalciferol (VITAMIN D3) 1.25 MG (75245 UT) CAPS, Take by mouth every 30 days  dilTIAZem (CARDIZEM CD) 120 MG extended release capsule, Take 120 mg by mouth daily  aspirin 81 MG EC tablet, Take 81 mg by mouth daily  midodrine (PROAMATINE) 2.5 MG tablet, Take 2.5 mg by mouth daily ON DIALYSIS DAYS  B Complex-C-Folic Acid (AYLA CAPS) 1 MG CAPS, Take 1 capsule by mouth daily  pravastatin (PRAVACHOL) 40 MG tablet, Take 1 tablet by mouth daily (Patient taking differently: Take 40 mg by mouth nightly )  insulin glargine (LANTUS SOLOSTAR) 100 UNIT/ML injection pen, Inject 25 Units into the skin 2 times daily  blood glucose test strips (AGAMATRIX AMP TEST) strip, 1 each by In Vitro route daily As needed.   Blood Glucose Monitoring Suppl (AGAMATRIX PRESTO) w/Device KIT, Check twice daily  AGAMATRIX ULTRA-THIN LANCETS MISC, Check sugars bid  carvedilol (COREG) 12.5 MG tablet, Take 12.5 mg by mouth 2 times daily  apixaban (ELIQUIS) 2.5 MG TABS tablet, Take 2.5 mg by mouth 2 times daily Take two tablets BID    Allergies:  Codeine, Onion, and Penicillins    Social History:    TOBACCO:   reports that he has been smoking cigarettes. He has a 7.50 pack-year smoking history. He has never used smokeless tobacco.  ETOH:   reports no history of alcohol use. CAFFEINE ABUSE:  No  DRUGS:   reports no history of drug use. LIFESTYLE: sedentary    MARITAL STATUS:    OCCUPATION:  Unknown      Family History:        Problem Relation Age of Onset    Arthritis Mother     Depression Mother     High Blood Pressure Mother     Mental Illness Mother 80        dementia    Obesity Mother     Osteoporosis Mother        REVIEW OF SYSTEMS:      Constitutional:  No night sweats, headaches, weight loss. Eyes:  No glaucoma, cataracts. ENMT:  No nosebleeds, deviated septum. Cardiac:  No chest pain. +arrhythmias  Vascular:  No claudication, varicosities. GI:  No PUD, +GERD  :  No kidney stones, frequent UTI's. +ESRD on HD  Musculoskeletal:  No gout. +OA  Respiratory:  No SOB, emphysema, asthma. +COPD  Integumentary:  No dermatitis, itching, rash. Neurological:  No stroke, TIAs, seizures. Psychiatric:  No anxiety. +depression  Endocrine: No thyroid issues. +diabetes   Hematologic:  No bleeding, easy bruising. +Hx of DVT   Immunologic:  No known cancer, steroid therapies. PHYSICAL EXAM:    VITALS:  BP 87/65   Pulse 66   Temp 98.4 °F (36.9 °C) (Oral)   Resp 18   Ht 5' 10\" (1.778 m)   Wt 285 lb 7.9 oz (129.5 kg)   SpO2 96%   BMI 40.96 kg/m²     Constitutional:   Well developed and nourished male. No acute distress. +morbid obesity.     Eyes:  lids and lashes normal, pupils equal, round and reactive to light, extra ocular muscles intact, Component Value Date    WBC 14.5 04/06/2021    RBC 2.33 04/06/2021    HGB 8.8 04/06/2021    HCT 26.5 04/06/2021     04/06/2021    .0 04/06/2021    MCH 37.8 04/06/2021    MCHC 33.1 04/06/2021    RDW 18.2 04/06/2021    SEGSPCT 84.2 12/16/2012    BANDSPCT 2 04/30/2018    METASPCT 2 01/17/2017    LYMPHOPCT 5.5 04/06/2021    MONOPCT 8.4 04/06/2021    EOSPCT 1.9 07/09/2011    BASOPCT 0.9 04/06/2021    MONOSABS 1.2 04/06/2021    LYMPHSABS 0.8 04/06/2021    EOSABS 0.0 04/06/2021    BASOSABS 0.1 04/06/2021    DIFFTYPE Auto 12/16/2012     CMP:    Lab Results   Component Value Date     04/06/2021    K 4.6 04/06/2021    K 4.6 10/20/2019    CL 94 04/06/2021    CO2 23 04/06/2021    BUN 63 04/06/2021    CREATININE 9.7 04/06/2021    GFRAA 7 04/06/2021    GFRAA 27 12/16/2012    AGRATIO 1.1 04/06/2021    LABGLOM 6 04/06/2021    GLUCOSE 170 04/06/2021    PROT 6.5 04/06/2021    PROT 7.6 12/16/2012    LABALBU 3.4 04/06/2021    CALCIUM 8.4 04/06/2021    BILITOT 0.5 04/06/2021    ALKPHOS 88 04/06/2021     04/06/2021    ALT 33 04/06/2021     Hepatic Function Panel:    Lab Results   Component Value Date    ALKPHOS 88 04/06/2021    ALT 33 04/06/2021     04/06/2021    PROT 6.5 04/06/2021    PROT 7.6 12/16/2012    BILITOT 0.5 04/06/2021    BILIDIR 0.19 12/16/2012    IBILI 0.28 12/16/2012    LABALBU 3.4 04/06/2021     Last 3 Troponin:    Lab Results   Component Value Date    TROPONINI 7.09 04/06/2021    TROPONINI 5.74 04/05/2021    TROPONINI 0.05 04/04/2021     CXR: 4/4/21   Impression   Cardiomegaly, with mild central venous congestion     ECHO: 4/5/21 with Dr. Daniel Jaffe   Summary:   Technically difficult examination. Low systolic function with an estimated ejection fraction of 50%. Left ventricular diastolic filling pressure are indeterminate. Bi-atrial enlargement. Moderate posterior mitral annular calcification with restricted movement of posterior leaflet.    The maximum mitral valve pressure gradient is 9 mmHg and the mean pressure gradient is 3 mmHg with max velocity of 1.48 m/sec. Mild MS. Moderate mitral regurgitation. Trace aortic and pulmonic regurgitation. Systolic pulmonary artery pressure (SPAP) is normal and estimated at 32 mmHg (right atrial pressure 8 mmHg). C: 4/5/21 with Dr. Gunnar Nguyen   Findings:     1. Left main coronary artery was normal. It gave off the left anterior descending artery and left circumflex.     2. Left anterior descending artery has severe atherosclerotic disease. It was moderate in size. It gave off septal perforators and a moderate sized diagonal branch. The LAD covered the entire apex of the left ventricle.               ~70% mid     3. Left circumflex has severe atherosclerotic disease. It was large in size. There was a severe sized obtuse marginal branch.              ~70% OM     4. Right coronary artery has severe atherosclerotic disease. It is exceedingly large vessel. Diameter is 6 to 7 mm minimum and ranging to near 8 mm in segments.              ~100% mid RCA     5. Left ventriculogram showed normal LVEF at 55-60%. Wall motion was normal . There was no significant mitral valve or aortic valve disease noted.  LVEDP was normal. There was no gradient noted across the aortic valve during pullback of the catheter.     /61   Pulse 86   Temp 98 °F (36.7 °C) (Oral)   Resp 24   Ht 5' 10\" (1.778 m)   Wt 285 lb 7.9 oz (129.5 kg)   SpO2 95%   BMI 40.96 kg/m²      [id number] Score for Atrial Fibrillation Stroke Risk   Risk   Factors  Component Value   C CHF No 1   H HTN Yes 1   A2 Age >= 76 No,  (65 y.o.) 0   D DM Yes 1   S2 Prior Stroke/TIA No 0   V Vascular Disease No 0   A Age 74-69 No,  (65 y.o.) 0   Sc Sex male 0    [id number]  Score  3   Score last updated 4/6/21 03:53 AM EDT    Click here for a link to the UpToDate guideline \"Atrial Fibrillation: Anticoagulation therapy to prevent embolization    Disclaimer: Risk Score calculation is dependent on accuracy of patient problem list and past encounter diagnosis. ASSESSMENT AND PLAN:  STS Cardiac Surgery Risk profile: CABG     Mortality:  33.79%  Renal Failure:  N/A; ESRD on HD  Permanent Stroke:  1.96%  Prolonged Ventilation:  85.55%  Deep Sternal Infection:  1.40%  Reoperation:  6.34%  Morbidity and Mortality:  87.46%  Short LOS:  1.38%  Long LOS:  69.32%    Pt is a morbidly obese 62 y.o. male current smoker with ESRD on HD as well as multiple other comorbidities. After calculating his STS risk score, discussed with Cardiology. They will proceed with PCI possible stent to the LAD if feasible. We will sign off. Thank you for the consult.        Nataliia Sam, CNP   4/6/2021  9:10 AM

## 2021-04-06 NOTE — PROGRESS NOTES
[address]     Electrophysiology                                     Progress Note    Admission date:  4/3/2021    Reason for follow up visit: bradycardia    HPI/CC: Terrie Ortega was admitted on 4/3/2021 with fatigue. EKG on admission showed junctional with HR of 30. Pt has also been treated for acidosis, transaminitis, MI, and leukocytosis. LHC 2021 showed severe multivessel CAD. Rhythm is currently afib with an average HR of 75 on Dopamine gtt at 5mcg/kg/min. Subjective:  Pt complains of shortness of breath which started last Thursday. Denies chest pain, palpitations, and dizziness. Vitals:  Blood pressure 117/67, pulse 74, temperature 99.7 °F (37.6 °C), temperature source Oral, resp. rate 21, height 5' 10\" (1.778 m), weight 285 lb 7.9 oz (129.5 kg), SpO2 96 %.   Temp  Av.3 °F (37.4 °C)  Min: 98.2 °F (36.8 °C)  Max: 100.8 °F (38.2 °C)  Pulse  Av.7  Min: 66  Max: 113  BP  Min: 82/28  Max: 133/99  SpO2  Av.1 %  Min: 89 %  Max: 98 %    24 hour I/O    Intake/Output Summary (Last 24 hours) at 2021 1257  Last data filed at 2021 1459  Gross per 24 hour   Intake 713.33 ml   Output 425 ml   Net 288.33 ml     Current Facility-Administered Medications   Medication Dose Route Frequency Provider Last Rate Last Admin    heparin (porcine) injection 4,000 Units  4,000 Units Intravenous PRN Erin Leon MD        heparin (porcine) injection 2,000 Units  2,000 Units Intravenous PRN Erin Leon MD        heparin (porcine) injection 2,000 Units  2,000 Units Intravenous Once Erin Leon MD        mupirocin (BACTROBAN) 2 % ointment   Nasal BID Johnny Corado MD   Given at 21 0842    heparin 25,000 units in dextrose 5% 250 mL (premix) infusion  1,380 Units/hr Intravenous Continuous Erin Leon MD 11.9 mL/hr at 21 0535 1,190 Units/hr at 21 0535    aspirin EC tablet 81 mg  81 mg Oral Daily Johnny Corado MD   81 mg at 04/06/21 0841    b complex-C-folic acid (NEPHROCAPS) capsule 1 mg  1 capsule Oral Daily Rosette Devine MD   1 mg at 04/06/21 0841    ferrous sulfate (IRON 325) tablet 325 mg  325 mg Oral TID  Rosette Devine MD   325 mg at 04/06/21 1119    midodrine (PROAMATINE) tablet 2.5 mg  2.5 mg Oral Daily PRN Rosette Devine MD        pravastatin (PRAVACHOL) tablet 40 mg  40 mg Oral Nightly Rosette Devine MD   40 mg at 04/05/21 2055    glucose (GLUTOSE) 40 % oral gel 15 g  15 g Oral PRN Rosette Devine MD        dextrose 50 % IV solution  12.5 g Intravenous PRN Rosette Devine MD        glucagon (rDNA) injection 1 mg  1 mg Intramuscular PRN Rosette Devine MD        dextrose 5 % solution  100 mL/hr Intravenous PRN Rosette Devine MD        sodium chloride flush 0.9 % injection 10 mL  10 mL Intravenous PRN Rosette Devine MD        0.9 % sodium chloride infusion  25 mL Intravenous PRN Rosette Devine MD        ondansetron [facility]) injection 4 mg  4 mg Intravenous Q6H PRN Rosette Devine MD        acetaminophen (TYLENOL) tablet 650 mg  650 mg Oral Q4H PRN Rosette Devine MD   650 mg at 04/05/21 2058    Or    acetaminophen (TYLENOL) suppository 650 mg  650 mg Rectal Q4H PRN Rosette Devine MD        insulin glargine (LANTUS) injection vial 25 Units  25 Units Subcutaneous BID Rosette Devine MD   25 Units at 04/05/21 2053    insulin lispro (HUMALOG) injection vial 7 Units  0.05 Units/kg Subcutaneous TID  Rosette Devine MD   7 Units at 04/05/21 1816    insulin lispro (HUMALOG) injection vial 0-6 Units  0-6 Units Subcutaneous TID REI Devine MD   2 Units at 04/05/21 1817    insulin lispro (HUMALOG) injection vial 0-3 Units  0-3 Units Subcutaneous Nightly Rosette Devine MD   1 Units at 04/05/21 2052    DOPamine (INTROPIN) 400 mg in dextrose 5 % 250 mL infusion  2-20 mcg/kg/min Intravenous Continuous Rosette Devine MD 24.3 mL/hr at 04/06/21 0425 5 mcg/kg/min at 04/06/21 0425       Objective:     Telemetry monitor: afib    Physical Exam:  Constitutional and general appearance: alert, cooperative, no distress and appears older than stated age  [de-identified]: PERRL, no cervical lymphadenopathy. No masses palpable. Normal oral mucosa  Respiratory:  · Normal excursion and expansion without use of accessory muscles  · Resp auscultation: Clear but diminished breath sounds without wheezing, rhonchi, and rales  Cardiovascular:  · The apical impulse is not displaced  · Heart tones are crisp and normal. irregular S1 and S2.  · Jugular venous pulsation difficult to assess  · The carotid upstroke is normal in amplitude and contour without delay or bruit  · Peripheral pulses are symmetrical and full   Abdomen:  · No masses or tenderness  · Bowel sounds present  Extremities:  ·  No cyanosis or clubbing  ·  No lower extremity edema  ·  Skin: warm and dry  Neurological:  · Alert and oriented  · Moves all extremities well  · No abnormalities of mood, affect, memory, mentation, or behavior are noted    Data  EKG 4/5/2021: Afib with PVCs HR 71    Echo 4/5/2021:  Technically difficult examination. Low systolic function with an estimated ejection fraction of 50%. Left ventricular diastolic filling pressure are indeterminate. Bi-atrial enlargement. Moderate posterior mitral annular calcification with restricted movement of  posterior leaflet. The maximum mitral valve pressure gradient is 9 mmHg and the mean pressure  gradient is 3 mmHg with max velocity of 1.48 m/sec. Mild MS. Moderate mitral regurgitation. Trace aortic and pulmonic regurgitation. Systolic pulmonary artery pressure (SPAP) is normal and estimated at 32 mmHg  (right atrial pressure 8 mmHg). Echo 10/21/2019:   Irregular rhythm  Left ventricular systolic function is normal with ejection fraction estimated at 55%. No regional wall motion abnormalities are noted. There is moderate concentric left ventricular hypertrophy. Diastolic dysfunction grade and filing pressure are indeterminate. Biatrial enlargement. The aortic root is moderately dilated. The ascending aorta is moderately dilated. Aortic valve appears sclerotic but opens adequately. Mild-to-moderate aortic regurgitation is present. Mild calcification of the posterior leaflet of the mitral valve. Mild mitral, tricuspid and pulmonic regurgitation. Previous echo done 7/2015 - EF 55%     Cardiac catheterization 4/06/2021 Lethaniel Severe):  Severe multi-vessel coronary artery disease with sub-acute occlusion of the RCA    RECOMMENDATIONS:  Coronary anatomy not favorable for interventional treatment. Patient's blood vessel size are not compatible with stent insertion. Recommend medical management and consultation with cardiothoracic surgery for consideration of coronary bypass grafting. Cardiac catheterization 7/12/2011 Lethaniel Severe): Moderate diffuse CAD with ectatic RCA  No LVG      Recommend   Medical therapy nitrates/hydralazine/coreg/asa/coumadin  Mucomyst and hydration        EP consult this afternoon for VT during treadmill   All labs and testing reviewed.   Lab Review     Renal Profile:   Lab Results   Component Value Date    CREATININE 9.7 04/06/2021    BUN 63 04/06/2021     04/06/2021    K 4.6 04/06/2021    K 4.6 10/20/2019    CL 94 04/06/2021    CO2 23 04/06/2021     CBC:    Lab Results   Component Value Date    WBC 14.5 04/06/2021    RBC 2.33 04/06/2021    HGB 8.8 04/06/2021    HCT 26.5 04/06/2021    .0 04/06/2021    RDW 18.2 04/06/2021     04/06/2021     BNP:    Lab Results   Component Value Date     08/27/2013     Fasting Lipid Panel:    Lab Results   Component Value Date    CHOL 153 07/10/2011    HDL 27 07/10/2011    TRIG 284 07/10/2011     Cardiac Enzymes:  CK/MbTroponin  Lab Results   Component Value Date    TROPONINI 7.09 04/06/2021     PT/ INR   Lab Results   Component Value Date    INR 1.50 04/06/2021    INR 1.29 04/05/2021    INR 1.25

## 2021-04-06 NOTE — FLOWSHEET NOTE
04/06/21 1250 04/06/21 1657   Treatment   Time On 1250  --    Time Off  --  1657   Treatment Goal 1000  --    Observations & Evaluations   Level of Consciousness Alert (0) Alert (0)   Oriented X 3 3   Vital Signs   BP  --  (!) 139/91   Temp 101.4 °F (38.6 °C) 99.9 °F (37.7 °C)   Resp  --  24   Weight 281 lb 1.4 oz (127.5 kg) 278 lb (126.1 kg)   Weight Method Bed scale Bed scale   Percent Weight Change -1.54 -1.1   Dry Weight 285 lb 7.9 oz (129.5 kg)  --    Pain Assessment   Pain Assessment 0-10 0-10   Pain Level 0 0   Technical Checks   RO Machine Log Sheet Completed Yes  --    Machine Alarm Self Test Completed  --    Machine Autotest Completed  --    Air Foam Detector Tested  --    Extracorporeal Circuit Tested for Integrity Yes  --    Treatment Initiation   Dialyze Hours 4  --    Treatment  Initiation Universal Precautions maintained;Lines secured to patient; Connections secured;Prime given;Venous Parameters set; Arterial Parameters set; Air foam detector engaged; Hemosafe Device; Saline line double clamped; Hemo-Safe Applied;Dialyzer;F180  --    During Hemodialysis Assessment   Blood Flow Rate (ml/min) 400 ml/min  --    Ultrafiltration Rate (ml/hr) 350 ml/hr  --    Arterial Pressure (mmHg) -170 mmHg  --    Venous Pressure (mmHg) 200  --    TMP 40  --      --    Access Visible Yes  --    Dialysis Bath   K+ (Potassium) 3  --    Ca+ (Calcium) 2.5  --    Na+ (Sodium) 138  --    HCO3 (Bicarb) 36  --    Post-Hemodialysis Assessment   Post-Treatment Procedures  --  Blood returned;Catheter capped, clamped and heparinized x 2 ports   Machine Disinfection Process  --  Acid/Vinegar Clean;Heat Disinfect; Exterior Machine Disinfection   Rinseback Volume (ml)  --  400 ml   Total Liters Processed (l/min)  --  79.8 l/min   Dialyzer Clearance  --  Lightly streaked   Duration of Treatment (minutes)  --  240 minutes   Hemodialysis Intake (ml)  --  900 ml   Hemodialysis Output (ml)  --  1830 ml   NET Removed (ml)  --  1000 ml Tolerated Treatment  --  Good

## 2021-04-07 LAB
A/G RATIO: 0.9 (ref 1.1–2.2)
ALBUMIN SERPL-MCNC: 3.4 G/DL (ref 3.4–5)
ALP BLD-CCNC: 77 U/L (ref 40–129)
ALT SERPL-CCNC: 25 U/L (ref 10–40)
ANION GAP SERPL CALCULATED.3IONS-SCNC: 14 MMOL/L (ref 3–16)
APTT: 39.1 SEC (ref 24.2–36.2)
APTT: 47.8 SEC (ref 24.2–36.2)
APTT: 48.9 SEC (ref 24.2–36.2)
AST SERPL-CCNC: 56 U/L (ref 15–37)
BASOPHILS ABSOLUTE: 0.1 K/UL (ref 0–0.2)
BASOPHILS RELATIVE PERCENT: 0.8 %
BILIRUB SERPL-MCNC: 0.6 MG/DL (ref 0–1)
BUN BLDV-MCNC: 43 MG/DL (ref 7–20)
CALCIUM SERPL-MCNC: 8.7 MG/DL (ref 8.3–10.6)
CHLORIDE BLD-SCNC: 94 MMOL/L (ref 99–110)
CO2: 26 MMOL/L (ref 21–32)
CREAT SERPL-MCNC: 5.9 MG/DL (ref 0.9–1.3)
EOSINOPHILS ABSOLUTE: 0 K/UL (ref 0–0.6)
EOSINOPHILS RELATIVE PERCENT: 0.1 %
GFR AFRICAN AMERICAN: 12
GFR NON-AFRICAN AMERICAN: 10
GLOBULIN: 3.6 G/DL
GLUCOSE BLD-MCNC: 110 MG/DL (ref 70–99)
GLUCOSE BLD-MCNC: 114 MG/DL (ref 70–99)
GLUCOSE BLD-MCNC: 203 MG/DL (ref 70–99)
GLUCOSE BLD-MCNC: 229 MG/DL (ref 70–99)
GLUCOSE BLD-MCNC: 234 MG/DL (ref 70–99)
HCT VFR BLD CALC: 24.8 % (ref 40.5–52.5)
HEMATOLOGY PATH CONSULT: NO
HEMOGLOBIN: 8.4 G/DL (ref 13.5–17.5)
INR BLD: 1.6 (ref 0.86–1.14)
LYMPHOCYTES ABSOLUTE: 0.5 K/UL (ref 1–5.1)
LYMPHOCYTES RELATIVE PERCENT: 4.7 %
MAGNESIUM: 1.8 MG/DL (ref 1.8–2.4)
MCH RBC QN AUTO: 38.5 PG (ref 26–34)
MCHC RBC AUTO-ENTMCNC: 33.9 G/DL (ref 31–36)
MCV RBC AUTO: 113.3 FL (ref 80–100)
MONOCYTES ABSOLUTE: 1.1 K/UL (ref 0–1.3)
MONOCYTES RELATIVE PERCENT: 9.9 %
NEUTROPHILS ABSOLUTE: 9 K/UL (ref 1.7–7.7)
NEUTROPHILS RELATIVE PERCENT: 84.5 %
PDW BLD-RTO: 18 % (ref 12.4–15.4)
PERFORMED ON: ABNORMAL
PHOSPHORUS: 3.9 MG/DL (ref 2.5–4.9)
PLATELET # BLD: 212 K/UL (ref 135–450)
PMV BLD AUTO: 8 FL (ref 5–10.5)
POTASSIUM SERPL-SCNC: 4.2 MMOL/L (ref 3.5–5.1)
PROTHROMBIN TIME: 18.6 SEC (ref 10–13.2)
RBC # BLD: 2.18 M/UL (ref 4.2–5.9)
REPORT: NORMAL
SODIUM BLD-SCNC: 134 MMOL/L (ref 136–145)
TOTAL PROTEIN: 7 G/DL (ref 6.4–8.2)
WBC # BLD: 10.7 K/UL (ref 4–11)

## 2021-04-07 PROCEDURE — 6370000000 HC RX 637 (ALT 250 FOR IP): Performed by: INTERNAL MEDICINE

## 2021-04-07 PROCEDURE — 6360000002 HC RX W HCPCS: Performed by: INTERNAL MEDICINE

## 2021-04-07 PROCEDURE — 83735 ASSAY OF MAGNESIUM: CPT

## 2021-04-07 PROCEDURE — 99233 SBSQ HOSP IP/OBS HIGH 50: CPT | Performed by: NURSE PRACTITIONER

## 2021-04-07 PROCEDURE — 2580000003 HC RX 258: Performed by: INTERNAL MEDICINE

## 2021-04-07 PROCEDURE — 99223 1ST HOSP IP/OBS HIGH 75: CPT | Performed by: INTERNAL MEDICINE

## 2021-04-07 PROCEDURE — 85025 COMPLETE CBC W/AUTO DIFF WBC: CPT

## 2021-04-07 PROCEDURE — 85730 THROMBOPLASTIN TIME PARTIAL: CPT

## 2021-04-07 PROCEDURE — 94761 N-INVAS EAR/PLS OXIMETRY MLT: CPT

## 2021-04-07 PROCEDURE — 2000000000 HC ICU R&B

## 2021-04-07 PROCEDURE — 80053 COMPREHEN METABOLIC PANEL: CPT

## 2021-04-07 PROCEDURE — 99232 SBSQ HOSP IP/OBS MODERATE 35: CPT | Performed by: NURSE PRACTITIONER

## 2021-04-07 PROCEDURE — 2700000000 HC OXYGEN THERAPY PER DAY

## 2021-04-07 PROCEDURE — 85610 PROTHROMBIN TIME: CPT

## 2021-04-07 PROCEDURE — 84100 ASSAY OF PHOSPHORUS: CPT

## 2021-04-07 PROCEDURE — 36592 COLLECT BLOOD FROM PICC: CPT

## 2021-04-07 RX ORDER — HEPARIN SODIUM 1000 [USP'U]/ML
2000 INJECTION, SOLUTION INTRAVENOUS; SUBCUTANEOUS ONCE
Status: COMPLETED | OUTPATIENT
Start: 2021-04-07 | End: 2021-04-07

## 2021-04-07 RX ORDER — HEPARIN SODIUM 10000 [USP'U]/100ML
14.7 INJECTION, SOLUTION INTRAVENOUS CONTINUOUS
Status: DISCONTINUED | OUTPATIENT
Start: 2021-04-07 | End: 2021-04-08

## 2021-04-07 RX ADMIN — INSULIN GLARGINE 25 UNITS: 100 INJECTION, SOLUTION SUBCUTANEOUS at 22:30

## 2021-04-07 RX ADMIN — HEPARIN SODIUM 2000 UNITS: 1000 INJECTION, SOLUTION INTRAVENOUS; SUBCUTANEOUS at 16:10

## 2021-04-07 RX ADMIN — HEPARIN SODIUM 14.7 ML/HR: 10000 INJECTION, SOLUTION INTRAVENOUS at 16:10

## 2021-04-07 RX ADMIN — DOPAMINE HYDROCHLORIDE 5 MCG/KG/MIN: 160 INJECTION, SOLUTION INTRAVENOUS at 16:12

## 2021-04-07 RX ADMIN — HEPARIN SODIUM 1280 UNITS/HR: 10000 INJECTION, SOLUTION INTRAVENOUS at 14:17

## 2021-04-07 RX ADMIN — HEPARIN SODIUM 2000 UNITS: 1000 INJECTION, SOLUTION INTRAVENOUS; SUBCUTANEOUS at 09:27

## 2021-04-07 RX ADMIN — INSULIN LISPRO 2 UNITS: 100 INJECTION, SOLUTION INTRAVENOUS; SUBCUTANEOUS at 12:08

## 2021-04-07 RX ADMIN — PRAVASTATIN SODIUM 40 MG: 40 TABLET ORAL at 22:23

## 2021-04-07 RX ADMIN — INSULIN GLARGINE 25 UNITS: 100 INJECTION, SOLUTION SUBCUTANEOUS at 09:31

## 2021-04-07 RX ADMIN — FERROUS SULFATE TAB 325 MG (65 MG ELEMENTAL FE) 325 MG: 325 (65 FE) TAB at 17:16

## 2021-04-07 RX ADMIN — INSULIN LISPRO 7 UNITS: 100 INJECTION, SOLUTION INTRAVENOUS; SUBCUTANEOUS at 12:07

## 2021-04-07 RX ADMIN — DOPAMINE HYDROCHLORIDE 5 MCG/KG/MIN: 160 INJECTION, SOLUTION INTRAVENOUS at 04:16

## 2021-04-07 RX ADMIN — FERROUS SULFATE TAB 325 MG (65 MG ELEMENTAL FE) 325 MG: 325 (65 FE) TAB at 13:01

## 2021-04-07 RX ADMIN — MUPIROCIN: 20 OINTMENT TOPICAL at 22:18

## 2021-04-07 RX ADMIN — INSULIN LISPRO 2 UNITS: 100 INJECTION, SOLUTION INTRAVENOUS; SUBCUTANEOUS at 09:32

## 2021-04-07 RX ADMIN — CEFEPIME HYDROCHLORIDE 1000 MG: 1 INJECTION, POWDER, FOR SOLUTION INTRAMUSCULAR; INTRAVENOUS at 12:01

## 2021-04-07 ASSESSMENT — ENCOUNTER SYMPTOMS
GASTROINTESTINAL NEGATIVE: 1
SHORTNESS OF BREATH: 1

## 2021-04-07 NOTE — PROGRESS NOTES
Physician Progress Note      PATIENT:               Rancho Tong  CSN #:                  [id number]  :                       1960  ADMIT DATE:       4/3/2021 11:48 PM  DISCH DATE:  RESPONDING  PROVIDER #:        Heraclio Naranjo MD          QUERY TEXT:    Pt admitted with  \"Afib w/ SVR (POA)\"   Cardiology PN- - \"  NSTEMI\". If   possible, please document in progress notes and discharge summary:      The medical record reflects the following:  Risk Factors: CHF, afib, ESRD    Clinical Indicators:  PN-admitted on 4/3/2021 with fatigue. EKG on   admission showed junctional with HR of 30. Pt has also been treated for   acidosis, transaminitis, MI, and MRSA bacteremia. EP consult-\"ECG at the time   of admission on 2021 at 2353 hours demonstrates  junctional bradycardia at 30 beats per minute. There are ST-segment and  T-wave abnormalities primarily in the right precordial and high lateral leads. New ECG demonstrates some ischemic changes in the inferior leads. The   troponin level from 2021 at midnight was 0.05. ECG per cardiology   read-\" EKG:  Junctional bradycardiaLow voltage QRSST elevation consider inferior injury or   acute infarct\"    Treatment: ECG, troponin monitoring, Ischemic evaluation,  Cardiology consult,   EP consult, LHC, planning PCI, Continue dopamine, Check TSH Correct acidosis,   supportive care,  Options provided:  -- NSTEMI confirmed present on admission  -- NSTEMI ruled out  -- Other - I will add my own diagnosis  -- Disagree - Not applicable / Not valid  -- Disagree - Clinically unable to determine / Unknown  -- Refer to Clinical Documentation Reviewer    PROVIDER RESPONSE TEXT:    The diagnosis of NSTEMI was ruled out. Query created by: Cj Okeefe on 2021 3:41 PM      QUERY TEXT:    Pt admitted with  Afib w/ SVR (POA). PN- - Fever with unclear source of   infection -noted on 2021.   Persistent leukocytosis noted\"  please document   in the progress notes and discharge summary if you are evaluating and /or   treating any of the following: The medical record reflects the following:  Risk Factors: ESRD with revision of line, C femoral access,  3/3/21-   dialysis fistula creation left upper arm  Clinical Indicators: WBC OA 13.0 increased to 14.5 on 4/6. LA OA-6.8. First   fever 100.8 noted on 4/5 day of C. -101. 4. HR ,    PN 4/7- \"blood   cultures x2+ for MRSA bacteremia; likely source femoral line related BSI. Chest x-ray on admission negative, Femoral Line remains in place. Will obtain   ID consultation to assist with Mx  4/4 Pulmonology consult- \"Symptomatic   bradycardia, hypotension, lactic acidosis\"  Treatment:  Will start empiric vancomycin/cefepime for coverage of bloodstream   infection after obtaining blood cultures x2 (1 including from the femoral   line) . ID consult, serial labs, bld cultures drawn x2 including fem line,   supportive care  Options provided:  -- Evolving Sepsis, present on arrival  -- Sepsis, not present on admission  -- No Sepsis, MRSA bacteremia  only  -- Other - I will add my own diagnosis  -- Disagree - Not applicable / Not valid  -- Disagree - Clinically unable to determine / Unknown  -- Refer to Clinical Documentation Reviewer    PROVIDER RESPONSE TEXT:    This patient has MRSA bacteremia only, patient is not septic.     Query created by: Javier Sloan on 4/7/2021 4:02 PM      Electronically signed by:  Sofia Blanton MD 4/7/2021 4:59 PM

## 2021-04-07 NOTE — PROGRESS NOTES
RN educated pt on importance of keeping tele leads/pulse O2. . PT leads replaced, O2 sensor in place, PT assisted to bed, pt given urinal and asked to please refrain from ambulating while fem line is in place with heparin running. RN re educated pt on fem line precautions, pt continues to stand pivot to commode with out assistance. RN bedside to discuss with pt.

## 2021-04-07 NOTE — PROGRESS NOTES
[address]  Cardiology  Progress Note    Admission date:  4/3/2021    Reason for follow up visit: CAD    HPI/CC: Cathy Hammer is a 62 y.o. male who was admitted 4/3/2021 for fatigue. EKG showed junctional rhythm. Troponin 7. Echo showed EF 50% moderate MR. LHC showed severe CAD, CT surgery consulted and not felt to be surgical candidate. Also treated for MRSA bacteremia, anemia, ESRD. Rhythm has been AF. Subjective: No chest pain, ongoing shortness of breath. Vitals:  Blood pressure 132/88, pulse 88, temperature 97.8 °F (36.6 °C), temperature source Oral, resp. rate 13, height 5' 10\" (1.778 m), weight 278 lb (126.1 kg), SpO2 (!) 77 %.   Temp  Av.5 °F (37.5 °C)  Min: 97.4 °F (36.3 °C)  Max: 101.4 °F (38.6 °C)  Pulse  Av.9  Min: 59  Max: 119  BP  Min: 101/70  Max: 151/80  SpO2  Av %  Min: 77 %  Max: 98 %    24 hour I/O    Intake/Output Summary (Last 24 hours) at 2021 1527  Last data filed at 2021 8998  Gross per 24 hour   Intake 1725.29 ml   Output 1830 ml   Net -104.71 ml     Current Facility-Administered Medications   Medication Dose Route Frequency Provider Last Rate Last Admin    heparin (porcine) injection 4,000 Units  4,000 Units Intravenous PRN Julio Hardy MD        heparin (porcine) injection 2,000 Units  2,000 Units Intravenous PRN Julio Hardy MD        cefepime (MAXIPIME) 1000 mg IVPB minibag  1,000 mg Intravenous Q24H Julio Hardy MD   Stopped at 21 1231    vancomycin (VANCOCIN) intermittent dosing (placeholder)   Other RX Placeholder Julio Hardy MD        mupirocin (BACTROBAN) 2 % ointment   Nasal BID Xi Glaser MD   Given at 21 2157    heparin 25,000 units in dextrose 5% 250 mL (premix) infusion  1,280 Units/hr Intravenous Continuous Julio Hardy MD 12.8 mL/hr at 21 1417 1,280 Units/hr at 21 1417    aspirin EC tablet 81 mg  81 mg Oral Daily Xi Glaser MD   Stopped at 04/07/21 0940    b complex-C-folic acid (NEPHROCAPS) capsule 1 mg  1 capsule Oral Daily Elizabeth Townsend MD   Stopped at 04/07/21 2659    ferrous sulfate (IRON 325) tablet 325 mg  325 mg Oral TID  Elizabeth Townsend MD   325 mg at 04/07/21 1301    midodrine (PROAMATINE) tablet 2.5 mg  2.5 mg Oral Daily PRN Elizabeth Townsend MD        pravastatin (PRAVACHOL) tablet 40 mg  40 mg Oral Nightly Elizabeth Townsend MD   40 mg at 04/06/21 2155    glucose (GLUTOSE) 40 % oral gel 15 g  15 g Oral PRN Elizabeth Townsend MD        dextrose 50 % IV solution  12.5 g Intravenous PRN Elizabeth Townsend MD        glucagon (rDNA) injection 1 mg  1 mg Intramuscular PRN Elizabeth Townsend MD        dextrose 5 % solution  100 mL/hr Intravenous PRN Elizabeth Townsend MD        sodium chloride flush 0.9 % injection 10 mL  10 mL Intravenous PRN Elizabeth Townsend MD        0.9 % sodium chloride infusion  25 mL Intravenous PRN Elizabeth Townsend MD        ondansetron [facility]) injection 4 mg  4 mg Intravenous Q6H PRN Elizabeth Townsend MD        acetaminophen (TYLENOL) tablet 650 mg  650 mg Oral Q4H PRN Elizabeth Townsend MD   650 mg at 04/06/21 1758    Or    acetaminophen (TYLENOL) suppository 650 mg  650 mg Rectal Q4H PRN Elizabeth Townsend MD        insulin glargine (LANTUS) injection vial 25 Units  25 Units Subcutaneous BID Elizbaeth Townsend MD   25 Units at 04/07/21 0931    insulin lispro (HUMALOG) injection vial 7 Units  0.05 Units/kg Subcutaneous TID  Elizabeth Townsend MD   7 Units at 04/07/21 1207    insulin lispro (HUMALOG) injection vial 0-6 Units  0-6 Units Subcutaneous TID REI Townsend MD   2 Units at 04/07/21 1208    insulin lispro (HUMALOG) injection vial 0-3 Units  0-3 Units Subcutaneous Nightly Elizabeth Townsend MD   1 Units at 04/06/21 2009    DOPamine (INTROPIN) 400 mg in dextrose 5 % 250 mL infusion  2-20 mcg/kg/min Intravenous Continuous Elizabeth Townsend MD 24.3 mL/hr at 04/07/21 0416 5.004 mcg/kg/min at 04/07/21 0416     Review of Systems   Constitutional: Positive for fatigue. Respiratory: Positive for shortness of breath. Cardiovascular: Negative. Gastrointestinal: Negative. Neurological: Negative. Objective:     Telemetry monitor: AF    Physical Exam:  Constitutional:  Alert, NAD, appears older than stated age, disheveled   Eyes: PERRL, sclera nonicteric  Neck:  Supple, no masses, no thyroidmegaly, JVD difficult to assess  Skin:  Warm and dry; no rash or lesions  Heart: Irregular, normal apex, S1 and S2 normal, no M/G/R  Lungs:  Normal respiratory effort; clear; no wheezing/rhonchi/rales  Abdomen: soft, non tender, + bowel sounds  Extremities:  No edema or cyanosis; no clubbing  Neuro: alert and oriented, moves legs and arms equally, normal mood and affect  Right radial site soft, no hematoma, 2+ pulse    Data Reviewed:    Echo 4/5/2021:  Technically difficult examination. Low systolic function with an estimated ejection fraction of 50%. Left ventricular diastolic filling pressure are indeterminate. Bi-atrial enlargement. Moderate posterior mitral annular calcification with restricted movement of   posterior leaflet. The maximum mitral valve pressure gradient is 9 mmHg and the mean pressure   gradient is 3 mmHg with max velocity of 1.48 m/sec. Mild MS. Moderate mitral regurgitation. Trace aortic and pulmonic regurgitation. Systolic pulmonary artery pressure (SPAP) is normal and estimated at 32 mmHg   (right atrial pressure 8 mmHg). Coronary angiogram 4/5/2021:  1. Left heart catheterization  2. Selective left and right coronary angiogram  3. Left ventriculography   Procedure Findings:  1. Severe multi vessel coronary artery diease              ~100% mid RCA -sub acute occlusion. ~70-80% LAD              ~70% OM  2. Normal left ventricular function with EF estimated at 55-60%  3.  Normal left heart hemodynamics  Details:              Sinan Fuentes was brought to Severe multi-vessel coronary artery disease with sub-acute occlusion of the RCA  ASSESSMENT/RECOMMENDATIONS:  1. Coronary anatomy not favorable for interventional treatment. Patient's blood vessel size are not compatible with stent insertion. Recommend medical management and consultation with cardiothoracic surgery for consideration of coronary bypass grafting. Lab Reviewed:     Renal Profile:  Lab Results   Component Value Date    CREATININE 5.9 04/07/2021    BUN 43 04/07/2021     04/07/2021    K 4.2 04/07/2021    K 4.6 10/20/2019    CL 94 04/07/2021    CO2 26 04/07/2021     CBC:    Lab Results   Component Value Date    WBC 10.7 04/07/2021    RBC 2.18 04/07/2021    HGB 8.4 04/07/2021    HCT 24.8 04/07/2021    .3 04/07/2021    RDW 18.0 04/07/2021     04/07/2021     BNP:    Lab Results   Component Value Date    PROBNP 6,184 04/04/2021    PROBNP 7,665 06/20/2019    PROBNP 2,015 01/13/2017     Fasting Lipid Panel:    Lab Results   Component Value Date    CHOL 153 07/10/2011    HDL 27 07/10/2011    TRIG 284 07/10/2011     Cardiac Enzymes:  CK/MbTroponin  Lab Results   Component Value Date    TROPONINI 7.09 04/06/2021     PT/ INR   Lab Results   Component Value Date    INR 1.60 04/07/2021    INR 1.50 04/06/2021    INR 1.29 04/05/2021    PROTIME 18.6 04/07/2021    PROTIME 17.5 04/06/2021    PROTIME 15.0 04/05/2021     PTT No results found for: PTT   Lab Results   Component Value Date    MG 1.80 04/07/2021      Lab Results   Component Value Date    TSH 4.37 07/09/2011     All labs and imaging reviewed today    Assessment:  NSTEMI/CAD: consider PCI, declined by CT surgery, multi vessel on angiogram 2/1/1988  Chronic diastolic CHF: ongoing  Permanent atrial fibrillation: stable   - [id number] score 4 on eliquis  Hypotension: stable  ESRD  Anemia  History of GI bleed  DM  MRSA bacteremia  Tobacco abuse    Plan:   1. Continue medical management for NSTEMI/CAD.  Once infection resolves, could consider high risk PCI vs continued medical management. 2. Wean IV dopamine  3. Continue hepatin gtt until procedural plan finalized; will restart eliquis when able  4. Restart carvedilol once BP stable off dopamine  5. Continue aspirin, statin  6.  Daily weights, strict I/Os    GIANLUCA Barreto-CNP  [facility]  [phone]

## 2021-04-07 NOTE — PROGRESS NOTES
Patient refuses to use bedpan and is walking to the bathroom. Patient was instructed not to get out of bed but continues to> He has been explained to risks involved in regards to being on a Hepatin gtt and having a Femoral line in place. Will cont to monitor.

## 2021-04-07 NOTE — PROGRESS NOTES
Following secure message sent to on call [facility] - EAT NPRE: Jeovany Castano 1960 RM: [id number] Pt admitted 04/03/21 with fatigue and weakness. Pt currently has an oral temp 101.4. Pt was given Tylenol 650mg PO PRN Q4 @ 9265 for temp 101.3. Current /78 MAP 96 / O2 saturation 92 %on 6L/NC R18 even unlabored. Please advise.

## 2021-04-07 NOTE — PROGRESS NOTES
Hospitalist Progress Note      PCP: Carlos Andrews, APRN - CNP    Date of Admission: 4/3/2021    Chief Complaint: Fatigue, weakness    Hospital Course: Reviewed H&P    Subjective: Patient seen and examined at bedside with ICU RN. Offers no new complaints and minimally interactive with this provider. Noted blood cultures x2+ for MRSA bacteremia; likely source femoral line related BSI. On empiric vancomycin/cefepime-continue pending final cultures. Leukocytosis resolved. Cardiology plan for high risk PCI later today. Discussed care plan with patient and RN at bedside. Medications:  Reviewed    Infusion Medications    heparin (PORCINE) Infusion 1,280 Units/hr (04/07/21 0983)    dextrose      sodium chloride      DOPamine 5.004 mcg/kg/min (04/07/21 1776)     Scheduled Medications    cefepime  1,000 mg Intravenous Q24H    vancomycin (VANCOCIN) intermittent dosing (placeholder)   Other RX Placeholder    mupirocin   Nasal BID    aspirin  81 mg Oral Daily    b complex-C-folic acid  1 capsule Oral Daily    ferrous sulfate  325 mg Oral TID WC    pravastatin  40 mg Oral Nightly    insulin glargine  25 Units Subcutaneous BID    insulin lispro  0.05 Units/kg Subcutaneous TID WC    insulin lispro  0-6 Units Subcutaneous TID WC    insulin lispro  0-3 Units Subcutaneous Nightly     PRN Meds: heparin (porcine), heparin (porcine), midodrine, glucose, dextrose, glucagon (rDNA), dextrose, sodium chloride flush, sodium chloride, ondansetron, acetaminophen **OR** acetaminophen      Intake/Output Summary (Last 24 hours) at 4/7/2021 1336  Last data filed at 4/7/2021 4561  Gross per 24 hour   Intake 1725.29 ml   Output 1830 ml   Net -104.71 ml       Physical Exam Performed:  /76   Pulse 101   Temp 97.8 °F (36.6 °C) (Oral)   Resp 26   Ht 5' 10\" (1.778 m)   Wt 278 lb (126.1 kg)   SpO2 98%   BMI 39.89 kg/m²     General appearance: No apparent distress, appears stated age and cooperative.   HEENT: 04/04/2021    Hypotension [I95.9]     Lightheadedness [R42]     Chronic obstructive pulmonary disease (HCC) [J44.9]     Suspected sleep apnea [R29.818]     ESRD (end stage renal disease) ([facility] [N18.6]     DM2 (diabetes mellitus, type 2) [facility] [E11.9] 04/11/2019    Morbid obesity ([facility] [E66.01] 01/25/2017    Chronic anticoagulation [Z79.01] 11/30/2012    Essential (primary) hypertension [I10] 08/02/2011    Atrial fibrillation, chronic (HCC) [I48.20] 07/12/2011     Assessment/Plan:  Afib w/ SVR (POA)   -  HR and BP significantly improved on dopamine, currently running at 3mcg/kg/min. Patient symptomatically feels better too. -  Carvedilol and diltiazem held. -  Home ASA and statin continued. Apixaban held in case a procedure is performed   such as pacemaker implantation. - EP consulted and recommended cardiology evaluation. Severe multivessel coronary artery disease with subacute occlusion of the RCA   -As evidenced on [facility]   - cardiology did LHC on 4/6/2021 which showed severe multivessel disease with subacute occlusion of RCA. Recommended CT surgery evaluation. CT surgery evaluated the same day and stated that patient is not a surgical candidate. Cardiology plan to proceed with high risk PCI later today.     ESRD on HD  -  Tues, Thurs, Sat dialysis schedule. -  Dialysis access is a tunneled R IJ HD catheter. -  Left brachiocephalic fistula was just created 3/3/2021.  -  Continue home nephrocaps, ferric citrate, and prn midodrine.  -Nephrology consulted to assist with maintenance dialysis while inpatient. Fever and leukocytosis secondary to central line related BSI-noted on 4/6/2021. Chest x-ray on admission negative. Started on empiric vancomycin/cefepime on 4/6/2021after obtaining blood cultures x2 (1 including from the femoral line) . Showed MRSA . Continue Abx . Leukocytosis resolved. Femoral Line remains in place.  Will obtain ID consultation to assist with Mx     DM2  -  Hold all oral antidiabetic agents. Start s.c. Insulin regimen based on home regimen. DVT Prophylaxis: Heparin GTT  Diet: DIET CARDIAC;  Code Status: Full Code    PT/OT Eval Status: Not yet ordered    Dispo -at least 3 to 4 days pending  clinical improvement, cardiology intervention. The note was completed using Dragon -speech recognition software & EMR  . Every effort was made to ensure accuracy; however, inadvertent computerized transcription errors may be present.     Hussain Rahman MD

## 2021-04-07 NOTE — CONSULTS
 Hx of blood clots     left leg; 5 plus years ago    Hyperlipidemia     Hypertension     Hypoglycemia     MDRO (multiple drug resistant organisms) resistance 2017    treated with antibiotics    MRSA (methicillin resistant staph aureus) culture positive 9/26/17,09/18/2017    posterior left calf    Primary osteoarthritis of both knees     Primary osteoarthritis of both knees          Procedure Laterality Date    CARDIAC CATHETERIZATION  07/12/2011    COLONOSCOPY  01/17/2017    diverticulosis    DIALYSIS FISTULA CREATION Left 2020    DIALYSIS FISTULA CREATION Left 3/3/2021    LEFT UPPER ARM BRACHIAL CEPHALIC ARTERIAL VENOUS FISTULA; LEFT UPPER EXTREMITY ANGIOGRAM; STENT GRAFT ULNAR ARTERIAL VENOUS FISTULA performed by James Ray MD at [address] Left     ankle; age 13    HAND SURGERY      KIDNEY BIOPSY Right 06/20/2016    KIDNEY BIOPSY Left 09/20/2016    OPEN TREATMENT RADIAL SHAFT FRACTURE Right 8/7/2018    OPEN REDUCTION INTERNAL FIXATION RIGHT DISTAL RADIUS, RIGHT CARPAL TUNNEL RELEASE performed by Lexy Hampton MD at [facility] TUNNELED [address] Right 11/09/2017    BARD POWER PORT    UPPER GASTROINTESTINAL ENDOSCOPY  01/17/2017    mild chronic gastritis; duodenal biopsy normal    UPPER GASTROINTESTINAL ENDOSCOPY  10/24/2017       Social History:    TOBACCO:   reports that he has been smoking cigarettes. He has a 7.50 pack-year smoking history. He has never used smokeless tobacco.  ETOH:   reports no history of alcohol use. There is no history of illicit drug use or other significant epidemiologic exposures.       Family History:       Problem Relation Age of Onset    Arthritis Mother     Depression Mother     High Blood Pressure Mother     Mental Illness Mother 80        dementia    Obesity Mother     Osteoporosis Mother        Current Medications:    Current Facility-Administered Medications: heparin (porcine) injection 4,000 Units, 4,000 Units, Intravenous, PRN  heparin (porcine) injection 2,000 Units, 2,000 Units, Intravenous, PRN  cefepime (MAXIPIME) 1000 mg IVPB minibag, 1,000 mg, Intravenous, Q24H  vancomycin (VANCOCIN) intermittent dosing (placeholder), , Other, RX Placeholder  mupirocin (BACTROBAN) 2 % ointment, , Nasal, BID  heparin 25,000 units in dextrose 5% 250 mL (premix) infusion, 1,280 Units/hr, Intravenous, Continuous  aspirin EC tablet 81 mg, 81 mg, Oral, Daily  b complex-C-folic acid (NEPHROCAPS) capsule 1 mg, 1 capsule, Oral, Daily  ferrous sulfate (IRON 325) tablet 325 mg, 325 mg, Oral, TID WC  midodrine (PROAMATINE) tablet 2.5 mg, 2.5 mg, Oral, Daily PRN  pravastatin (PRAVACHOL) tablet 40 mg, 40 mg, Oral, Nightly  glucose (GLUTOSE) 40 % oral gel 15 g, 15 g, Oral, PRN  dextrose 50 % IV solution, 12.5 g, Intravenous, PRN  glucagon (rDNA) injection 1 mg, 1 mg, Intramuscular, PRN  dextrose 5 % solution, 100 mL/hr, Intravenous, PRN  sodium chloride flush 0.9 % injection 10 mL, 10 mL, Intravenous, PRN  0.9 % sodium chloride infusion, 25 mL, Intravenous, PRN  ondansetron (ZOFRAN) injection 4 mg, 4 mg, Intravenous, Q6H PRN  acetaminophen (TYLENOL) tablet 650 mg, 650 mg, Oral, Q4H PRN **OR** acetaminophen (TYLENOL) suppository 650 mg, 650 mg, Rectal, Q4H PRN  insulin glargine (LANTUS) injection vial 25 Units, 25 Units, Subcutaneous, BID  insulin lispro (HUMALOG) injection vial 7 Units, 0.05 Units/kg, Subcutaneous, TID WC  insulin lispro (HUMALOG) injection vial 0-6 Units, 0-6 Units, Subcutaneous, TID WC  insulin lispro (HUMALOG) injection vial 0-3 Units, 0-3 Units, Subcutaneous, Nightly  DOPamine (INTROPIN) 400 mg in dextrose 5 % 250 mL infusion, 2-20 mcg/kg/min, Intravenous, Continuous      Allergies   Allergen Reactions    Codeine Itching    Onion Other (See Comments)     diarrhea    Penicillins Hives and Rash     Unknown reaction.   Childhood problem        REVIEW OF SYSTEMS:    CONSTITUTIONAL:   Per HP   EYES:  negative for acte visual disturbance and icterus  HEENT:  negative for acute hearing loss, tinnitus, ear drainage, sinus pressure, nasal congestion, epistaxis and snoring  RESPIRATORY:  No cough, shortness of breath, hemoptysis  CARDIOVASCULAR:  negative for chest pain, palpitations, exertional chest pressure/discomfort, edema, syncope  GASTROINTESTINAL:  negative for nausea, vomiting, diarrhea, constipation, blood in stool and abdominal pain  GENITOURINARY:  negative for frequency, dysuria, urinary incontinence, decreased urine volume, and hematuria  HEMATOLOGIC/LYMPHATIC:  negative for easy bruising, bleeding and lymphadenopathy  ALLERGIC/IMMUNOLOGIC:  negative for recurrent infections, angioedema, anaphylaxis and drug reactions  ENDOCRINE:  negative for weight changes and diabetic symptoms including polyuria, polydipsia and polyphagia  MUSCULOSKELETAL:  negative for acute joint swelling, decreased range of motion and muscle weakness  NEUROLOGICAL:  negative for headaches, slurred speech, unilateral weakness  PSYCHIATRIC/BEHAVIORAL: negative for hallucinations, behavioral problems, confusion and agitation. Objective:   PHYSICAL EXAM:      VITALS:  /88   Pulse 88   Temp 97.8 °F (36.6 °C) (Oral)   Resp 13   Ht 5' 10\" (1.778 m)   Wt 278 lb (126.1 kg)   SpO2 (!) 77%   BMI 39.89 kg/m²      24HR INTAKE/OUTPUT:      Intake/Output Summary (Last 24 hours) at 4/7/2021 1524  Last data filed at 4/7/2021 6598  Gross per 24 hour   Intake 1725.29 ml   Output 1830 ml   Net -104.71 ml     CONSTITUTIONAL:  Awake, alert, cooperative, no apparent distress, and appears stated age  Flat affect   HEENT: NCAT, PERRL, EOMI. Sclera white, conjunctiva full. OP with moist mucosal membranes, no thrush, tongue protrudes midline  NECK:  Supple, symmetrical, trachea midline, no adenopathy  LUNGS:  no increased work of breathing, CTA april without W/R/R  CARDIOVASCULAR:  Irregular.   No murmur  ABDOMEN:  normal bowel sounds, soft, NT  Protuberant PSYCHIATRIC: Oriented to person place and time. No obvious depression or anxiety. MUSCULOSKELETAL: No obvious misalignment or effusion of the joints. No clubbing, cyanosis of the digits. SKIN:   Brawny LE edema with hyperkeratotic skin   Onychomycosis  Thrill over LUE AVF   NEUROLOGIC: nonfocal exam  ACCESS:  R fem TLC in place, site [facility] [place], operated by [facility] R chest       DATA:    Old records have been reviewed    CBC:  Recent Labs     04/05/21  0355 04/06/21  0406 04/07/21  0412   WBC 13.3* 14.5* 10.7   RBC 2.58* 2.33* 2.18*   HGB 9.7* 8.8* 8.4*   HCT 29.5* 26.5* 24.8*    221 212   .2* 114.0* 113.3*   MCH 37.6* 37.8* 38.5*   MCHC 32.9 33.1 33.9   RDW 18.0* 18.2* 18.0*      BMP:  Recent Labs     04/05/21  0355 04/06/21  0406 04/07/21  0412   * 130* 134*   K 4.9 4.6 4.2   CL 95* 94* 94*   CO2 25 23 26   BUN 45* 63* 43*   CREATININE 7.2* 9.7* 5.9*   CALCIUM 8.9 8.4 8.7   GLUCOSE 222* 170* 203*        Cultures:   4/6 BC x2 MRSA      Radiology Review:  All pertinent images / reports were reviewed as a part of this visit. TTE 4/5/21   Summary   Technically difficult examination. Low systolic function with an estimated ejection fraction of 50%. Left ventricular diastolic filling pressure are indeterminate. Bi-atrial enlargement. Moderate posterior mitral annular calcification with restricted movement of   posterior leaflet. The maximum mitral valve pressure gradient is 9 mmHg and the mean pressure   gradient is 3 mmHg with max velocity of 1.48 m/sec. Mild MS. Moderate mitral regurgitation. Trace aortic and pulmonic regurgitation. Systolic pulmonary artery pressure (SPAP) is normal and estimated at 32 mmHg   (right atrial pressure 8 mmHg).     Assessment:     Patient Active Problem List   Diagnosis    Type 2 diabetes mellitus with stage 4 chronic kidney disease, without long-term current use of insulin ([facility])    Cigarette smoker    Atrial fibrillation, chronic ([address]    Essential (primary) hypertension    Chronic anticoagulation    Normocytic anemia    Primary osteoarthritis of both knees    Calciphylaxis    Depression    Hyperlipidemia    Morbid obesity (HCC)    Furunculosis (recurrent, on chronic suppressive Abx)    Ulcer of left calf with fat layer exposed ([address]    Hyperphosphatemia    Vitamin D deficiency    Secondary hyperparathyroidism of renal origin ([address]    Iron deficiency    White matter changes, severe by MRI brain    Coronary artery disease involving native coronary artery    Renal arteriosclerosis    Other disorder of calcium metabolism    CKD (chronic kidney disease), stage III    Gastrointestinal hemorrhage    Metabolic acidosis    Leg wound, left    [facility] vein thrombosis (DVT) ([facility]    Tobacco abuse    Wound infection    Gastric outlet obstruction    Potassium excess    DM2 (diabetes mellitus, type 2) ([facility])    Stage 5 chronic kidney disease not on chronic dialysis (HCC)    Shortness of breath    ESRD (end stage renal disease) ([address]    Sepsis ([address]    Mild malnutrition ([address]    Dialysis AV fistula malfunction ([facility])    Atrial fibrillation with slow ventricular response ([facility])    Hypotension    Lightheadedness    Chronic obstructive pulmonary disease ([address]    Suspected sleep apnea    AV block       Bud Fletcher is a 57yoM who is evaluated for the following:    MMP     ESRD on HD TTS. S/p revision LUE with stent graft 3/3/21, recently dialyzing via [facility] R chest     Admitted 4/4/21 with symptomatic bradycardia, afib with slow VR  Multivessel CAD by [facility]   Echo with EF 50%, moderate MR, no vegetation noted    Fever on HD #2 associated with leukocytosis     MRSA bacteremia, 2/2 sets collected on HD #3, from peripheral site and femoral TLC. Meets criteria for nosocomial CLABSI from the femoral TLC. Yet, the WBC was elevated very shortly after admission and CLABSI from the [facility] present on admission with delayed recognition is possible.   In either case, both

## 2021-04-07 NOTE — PROGRESS NOTES
[facility]     Electrophysiology                                     Progress Note    Admission date:  4/3/2021    Reason for follow up visit: bradycardia    HPI/CC: Terrie Ortega was admitted on 4/3/2021 with fatigue. EKG on admission showed junctional with HR of 30. Pt has also been treated for acidosis, transaminitis, MI, and MRSA bacteremia. [facility] 2021 showed severe multivessel CAD. Rhythm is currently afib with an average HR of 85 on Dopamine gtt at 5mcg/kg/min. Subjective:  Pt complains of SOB. Denies chest pain and palpitations. He is upset over not having PCI today. Vitals:  Blood pressure 113/76, pulse 101, temperature 97.8 °F (36.6 °C), temperature source Oral, resp. rate 26, height 5' 10\" (1.778 m), weight 278 lb (126.1 kg), SpO2 98 %.   Temp  Av.8 °F (37.7 °C)  Min: 97.4 °F (36.3 °C)  Max: 101.4 °F (38.6 °C)  Pulse  Av.6  Min: 59  Max: 119  BP  Min: 101/70  Max: 151/80  SpO2  Av.7 %  Min: 95 %  Max: 100 %    24 hour I/O    Intake/Output Summary (Last 24 hours) at 2021 1147  Last data filed at 2021 6174  Gross per 24 hour   Intake 1725.29 ml   Output 1830 ml   Net -104.71 ml     Current Facility-Administered Medications   Medication Dose Route Frequency Provider Last Rate Last Admin    heparin (porcine) injection 4,000 Units  4,000 Units Intravenous PRN Erin Leon MD        heparin (porcine) injection 2,000 Units  2,000 Units Intravenous PRN Erin Leon MD        cefepime (MAXIPIME) 1000 mg IVPB minibag  1,000 mg Intravenous Q24H Erin Leon MD        vancomycin Barbie Jay) intermittent dosing (placeholder)   Other RX Placeholder Erin Leon MD        mupirocin (BACTROBAN) 2 % ointment   Nasal BID Johnny Corado MD   Given at 21 6291    heparin 25,000 units in dextrose 5% 250 mL (premix) infusion  1,280 Units/hr Intravenous Continuous Erin Leon MD 12.8 mL/hr at 21 0927 1,280 Units/hr at 04/07/21 0927    aspirin EC tablet 81 mg  81 mg Oral Daily Xi Glaser MD   Stopped at 04/07/21 0940    b complex-C-folic acid (NEPHROCAPS) capsule 1 mg  1 capsule Oral Daily Xi Glaser MD   Stopped at 04/07/21 0942    ferrous sulfate (IRON 325) tablet 325 mg  325 mg Oral TID REI Glaser MD   Stopped at 04/07/21 3403    midodrine (PROAMATINE) tablet 2.5 mg  2.5 mg Oral Daily PRN Xi Glaser MD        pravastatin (PRAVACHOL) tablet 40 mg  40 mg Oral Nightly Xi Glaser MD   40 mg at 04/06/21 2155    glucose (GLUTOSE) 40 % oral gel 15 g  15 g Oral PRN Xi Glaser MD        dextrose 50 % IV solution  12.5 g Intravenous PRN Xi Glsaer MD        glucagon (rDNA) injection 1 mg  1 mg Intramuscular PRN Xi Glaser MD        dextrose 5 % solution  100 mL/hr Intravenous PRN Xi Glaser MD        sodium chloride flush 0.9 % injection 10 mL  10 mL Intravenous PRN Xi Glaser MD        0.9 % sodium chloride infusion  25 mL Intravenous SGIIFREDON Xi Glaser MD        ondansetron [facility]) injection 4 mg  4 mg Intravenous Q6H PRN Xi Glaser MD        acetaminophen (TYLENOL) tablet 650 mg  650 mg Oral Q4H PRN Xi Glaser MD   650 mg at 04/06/21 1758    Or    acetaminophen (TYLENOL) suppository 650 mg  650 mg Rectal Q4H PRN Xi Glaser MD        insulin glargine (LANTUS) injection vial 25 Units  25 Units Subcutaneous BID Xi Glaser MD   25 Units at 04/07/21 0931    insulin lispro (HUMALOG) injection vial 7 Units  0.05 Units/kg Subcutaneous TID REI Glaser MD   7 Units at 04/05/21 1816    insulin lispro (HUMALOG) injection vial 0-6 Units  0-6 Units Subcutaneous TID REI Glaser MD   2 Units at 04/07/21 0932    insulin lispro (HUMALOG) injection vial 0-3 Units  0-3 Units Subcutaneous Nightly Xi Glaser MD   1 Units at 04/06/21 2009    DOPamine (INTROPIN) 400 mg in dextrose 5 % 250 mL infusion  2-20 mcg/kg/min Intravenous Continuous Alex Ponce MD 24.3 mL/hr at 04/07/21 0416 5.004 mcg/kg/min at 04/07/21 0416       Objective:     Telemetry monitor: afib    Physical Exam:  Constitutional and general appearance: alert, cooperative, no distress and appears older than stated age  [de-identified]: PERRL, no cervical lymphadenopathy. No masses palpable. Normal oral mucosa  Respiratory:  · Normal excursion and expansion without use of accessory muscles  · Resp auscultation: Clear but diminished breath sounds without wheezing, rhonchi, and rales  Cardiovascular:  · The apical impulse is not displaced  · Heart tones are crisp and normal. irregular S1 and S2.  · Jugular venous pulsation difficult to assess  · The carotid upstroke is normal in amplitude and contour without delay or bruit  · Peripheral pulses are symmetrical and full   Abdomen:  · No masses or tenderness  · Bowel sounds present  Extremities:  ·  No cyanosis or clubbing  ·  No lower extremity edema  ·  Skin: warm and dry  Neurological:  · Alert and oriented  · Moves all extremities well  · + irritable     Data  EKG 4/5/2021: Afib with PVCs HR 71    Echo 4/5/2021:  Technically difficult examination. Low systolic function with an estimated ejection fraction of 50%. Left ventricular diastolic filling pressure are indeterminate. Bi-atrial enlargement. Moderate posterior mitral annular calcification with restricted movement of  posterior leaflet. The maximum mitral valve pressure gradient is 9 mmHg and the mean pressure  gradient is 3 mmHg with max velocity of 1.48 m/sec. Mild MS. Moderate mitral regurgitation. Trace aortic and pulmonic regurgitation. Systolic pulmonary artery pressure (SPAP) is normal and estimated at 32 mmHg  (right atrial pressure 8 mmHg). Echo 10/21/2019:   Irregular rhythm  Left ventricular systolic function is normal with ejection fraction estimated at 55%. No regional wall motion abnormalities are noted.   There is moderate concentric left ventricular hypertrophy. Diastolic dysfunction grade and filing pressure are indeterminate. Biatrial enlargement. The aortic root is moderately dilated. The ascending aorta is moderately dilated. Aortic valve appears sclerotic but opens adequately. Mild-to-moderate aortic regurgitation is present. Mild calcification of the posterior leaflet of the mitral valve. Mild mitral, tricuspid and pulmonic regurgitation. Previous echo done 7/2015 - EF 55%     Cardiac catheterization 4/06/2021 Leona Foster):  Severe multi-vessel coronary artery disease with sub-acute occlusion of the RCA    RECOMMENDATIONS:  Coronary anatomy not favorable for interventional treatment. Patient's blood vessel size are not compatible with stent insertion. Recommend medical management and consultation with cardiothoracic surgery for consideration of coronary bypass grafting. Cardiac catheterization 7/12/2011 Leona Foster): Moderate diffuse CAD with ectatic RCA  No LVG      Recommend   Medical therapy nitrates/hydralazine/coreg/asa/coumadin  Mucomyst and hydration        EP consult this afternoon for VT during treadmill   All labs and testing reviewed.   Lab Review     Renal Profile:   Lab Results   Component Value Date    CREATININE 5.9 04/07/2021    BUN 43 04/07/2021     04/07/2021    K 4.2 04/07/2021    K 4.6 10/20/2019    CL 94 04/07/2021    CO2 26 04/07/2021     CBC:    Lab Results   Component Value Date    WBC 10.7 04/07/2021    RBC 2.18 04/07/2021    HGB 8.4 04/07/2021    HCT 24.8 04/07/2021    .3 04/07/2021    RDW 18.0 04/07/2021     04/07/2021     BNP:    Lab Results   Component Value Date     08/27/2013     Fasting Lipid Panel:    Lab Results   Component Value Date    CHOL 153 07/10/2011    HDL 27 07/10/2011    TRIG 284 07/10/2011     Cardiac Enzymes:  CK/MbTroponin  Lab Results   Component Value Date    TROPONINI 7.09 04/06/2021     PT/ INR   Lab Results   Component Value Date    INR 1.60

## 2021-04-07 NOTE — PROGRESS NOTES
Progress Note    HISTORY     CC:   Weakness, bradycardia            We are following for ESRD      Subjective/   HPI:     Admitted for bradycardia    ROS:  Sob on minimal effort      Social Hx:  daughter at bedside     Past Medical and Surgical History:  - Reviewed, no changes     EXAM       Objective/     Vitals:    04/06/21 2303 04/07/21 0307 04/07/21 0800 04/07/21 0900   BP:  121/73 125/82 101/70   Pulse:  91 91    Resp:  18 17    Temp: 99.4 °F (37.4 °C) 97.4 °F (36.3 °C) 97.8 °F (36.6 °C)    TempSrc: Oral Axillary Oral    SpO2:  97% 98%    Weight:       Height:         24HR INTAKE/OUTPUT:      Intake/Output Summary (Last 24 hours) at 4/7/2021 1131  Last data filed at 4/7/2021 8329  Gross per 24 hour   Intake 1725.29 ml   Output 1830 ml   Net -104.71 ml     Constitutional:  Ill appearing, somnolent   Eyes:  Pupils reactive, sclera clear   Neck:  Normal thyroid, no masses   Cardiovascular:  Irregular, no rub  Respiratory:  No distress, no wheezing  Psychiatry:  Flat mood/affect, alert  Abdomen: +bs, soft, nt, no masses   Musculoskeletal: No LE edema, no clubbing   Lymphatics:  No LAD in neck, no supraclavicular nodes   Access:  Right [facility]       MEDICAL DECISION MAKING       Data/  Recent Labs     04/05/21 0355 04/06/21  0406 04/07/21  0412   WBC 13.3* 14.5* 10.7   HGB 9.7* 8.8* 8.4*   HCT 29.5* 26.5* 24.8*   .2* 114.0* 113.3*    221 212     Recent Labs     04/05/21  0355 04/06/21  0406 04/07/21  0412   * 130* 134*   K 4.9 4.6 4.2   CL 95* 94* 94*   CO2 25 23 26   GLUCOSE 222* 170* 203*   PHOS 5.5* 5.3* 3.9   MG 2.20 2.20 1.80   BUN 45* 63* 43*   CREATININE 7.2* 9.7* 5.9*   LABGLOM 8* 6* 10*   GFRAA 9* 7* 12*       Assessment/Plan        1. Symptomatic bradycardia   - no plans for pacemaker right now  2. CAD- not a candidate for surgery  3. End stage renal disease -HD TTS  -next HD tomorrow  4. Anemia of chronic disease - at target   5. DM2  6. Fever- cultures drawn, empiric Vanc/Cefepime

## 2021-04-07 NOTE — PROGRESS NOTES
Patient continues to get out of bed and pulled on primary tubing hard enough to break in half when trying to get to the bathroom. He continues to be on a heparin gtt and the risk of bleeding out were explained to him. He still continues to disobey orders.

## 2021-04-08 LAB
A/G RATIO: 0.9 (ref 1.1–2.2)
ALBUMIN SERPL-MCNC: 3.4 G/DL (ref 3.4–5)
ALP BLD-CCNC: 73 U/L (ref 40–129)
ALT SERPL-CCNC: 29 U/L (ref 10–40)
ANION GAP SERPL CALCULATED.3IONS-SCNC: 17 MMOL/L (ref 3–16)
APTT: 50.8 SEC (ref 24.2–36.2)
AST SERPL-CCNC: 48 U/L (ref 15–37)
BASOPHILS ABSOLUTE: 0.1 K/UL (ref 0–0.2)
BASOPHILS RELATIVE PERCENT: 1.5 %
BILIRUB SERPL-MCNC: 0.7 MG/DL (ref 0–1)
BUN BLDV-MCNC: 63 MG/DL (ref 7–20)
CALCIUM SERPL-MCNC: 8.9 MG/DL (ref 8.3–10.6)
CHLORIDE BLD-SCNC: 92 MMOL/L (ref 99–110)
CO2: 24 MMOL/L (ref 21–32)
CREAT SERPL-MCNC: 7.5 MG/DL (ref 0.9–1.3)
EOSINOPHILS ABSOLUTE: 0.1 K/UL (ref 0–0.6)
EOSINOPHILS RELATIVE PERCENT: 1 %
GFR AFRICAN AMERICAN: 9
GFR NON-AFRICAN AMERICAN: 7
GLOBULIN: 3.6 G/DL
GLUCOSE BLD-MCNC: 100 MG/DL (ref 70–99)
GLUCOSE BLD-MCNC: 121 MG/DL (ref 70–99)
GLUCOSE BLD-MCNC: 98 MG/DL (ref 70–99)
HCT VFR BLD CALC: 22.9 % (ref 40.5–52.5)
HEMATOLOGY PATH CONSULT: NO
HEMOGLOBIN: 7.8 G/DL (ref 13.5–17.5)
INR BLD: 1.43 (ref 0.86–1.14)
LYMPHOCYTES ABSOLUTE: 0.9 K/UL (ref 1–5.1)
LYMPHOCYTES RELATIVE PERCENT: 11.4 %
MAGNESIUM: 1.9 MG/DL (ref 1.8–2.4)
MCH RBC QN AUTO: 38 PG (ref 26–34)
MCHC RBC AUTO-ENTMCNC: 34 G/DL (ref 31–36)
MCV RBC AUTO: 111.8 FL (ref 80–100)
MONOCYTES ABSOLUTE: 0.9 K/UL (ref 0–1.3)
MONOCYTES RELATIVE PERCENT: 11.4 %
NEUTROPHILS ABSOLUTE: 5.8 K/UL (ref 1.7–7.7)
NEUTROPHILS RELATIVE PERCENT: 74.7 %
PDW BLD-RTO: 18 % (ref 12.4–15.4)
PERFORMED ON: ABNORMAL
PERFORMED ON: NORMAL
PHOSPHORUS: 3.7 MG/DL (ref 2.5–4.9)
PLATELET # BLD: 215 K/UL (ref 135–450)
PMV BLD AUTO: 8 FL (ref 5–10.5)
POTASSIUM SERPL-SCNC: 3.6 MMOL/L (ref 3.5–5.1)
PROTHROMBIN TIME: 16.7 SEC (ref 10–13.2)
RBC # BLD: 2.04 M/UL (ref 4.2–5.9)
SODIUM BLD-SCNC: 133 MMOL/L (ref 136–145)
TOTAL PROTEIN: 7 G/DL (ref 6.4–8.2)
VANCOMYCIN RANDOM: 11.4 UG/ML
WBC # BLD: 7.8 K/UL (ref 4–11)

## 2021-04-08 PROCEDURE — 83735 ASSAY OF MAGNESIUM: CPT

## 2021-04-08 PROCEDURE — 85025 COMPLETE CBC W/AUTO DIFF WBC: CPT

## 2021-04-08 PROCEDURE — 2060000000 HC ICU INTERMEDIATE R&B

## 2021-04-08 PROCEDURE — 99232 SBSQ HOSP IP/OBS MODERATE 35: CPT | Performed by: INTERNAL MEDICINE

## 2021-04-08 PROCEDURE — 84100 ASSAY OF PHOSPHORUS: CPT

## 2021-04-08 PROCEDURE — 87040 BLOOD CULTURE FOR BACTERIA: CPT

## 2021-04-08 PROCEDURE — 2700000000 HC OXYGEN THERAPY PER DAY

## 2021-04-08 PROCEDURE — 6360000002 HC RX W HCPCS: Performed by: INTERNAL MEDICINE

## 2021-04-08 PROCEDURE — 85730 THROMBOPLASTIN TIME PARTIAL: CPT

## 2021-04-08 PROCEDURE — 2580000003 HC RX 258: Performed by: INTERNAL MEDICINE

## 2021-04-08 PROCEDURE — 6370000000 HC RX 637 (ALT 250 FOR IP): Performed by: INTERNAL MEDICINE

## 2021-04-08 PROCEDURE — 80053 COMPREHEN METABOLIC PANEL: CPT

## 2021-04-08 PROCEDURE — 99232 SBSQ HOSP IP/OBS MODERATE 35: CPT | Performed by: NURSE PRACTITIONER

## 2021-04-08 PROCEDURE — 90935 HEMODIALYSIS ONE EVALUATION: CPT

## 2021-04-08 PROCEDURE — P9047 ALBUMIN (HUMAN), 25%, 50ML: HCPCS | Performed by: INTERNAL MEDICINE

## 2021-04-08 PROCEDURE — 6370000000 HC RX 637 (ALT 250 FOR IP): Performed by: NURSE PRACTITIONER

## 2021-04-08 PROCEDURE — 94761 N-INVAS EAR/PLS OXIMETRY MLT: CPT

## 2021-04-08 PROCEDURE — 36415 COLL VENOUS BLD VENIPUNCTURE: CPT

## 2021-04-08 PROCEDURE — 85610 PROTHROMBIN TIME: CPT

## 2021-04-08 PROCEDURE — 80202 ASSAY OF VANCOMYCIN: CPT

## 2021-04-08 RX ORDER — ALBUMIN (HUMAN) 12.5 G/50ML
25 SOLUTION INTRAVENOUS ONCE
Status: COMPLETED | OUTPATIENT
Start: 2021-04-08 | End: 2021-04-08

## 2021-04-08 RX ORDER — MIDODRINE HYDROCHLORIDE 5 MG/1
10 TABLET ORAL
Status: COMPLETED | OUTPATIENT
Start: 2021-04-08 | End: 2021-04-08

## 2021-04-08 RX ORDER — ALBUMIN (HUMAN) 12.5 G/50ML
SOLUTION INTRAVENOUS
Status: DISPENSED
Start: 2021-04-08 | End: 2021-04-09

## 2021-04-08 RX ORDER — HEPARIN SODIUM 1000 [USP'U]/ML
4700 INJECTION, SOLUTION INTRAVENOUS; SUBCUTANEOUS PRN
Status: DISCONTINUED | OUTPATIENT
Start: 2021-04-08 | End: 2021-04-14 | Stop reason: HOSPADM

## 2021-04-08 RX ADMIN — MIDODRINE HYDROCHLORIDE 10 MG: 5 TABLET ORAL at 12:23

## 2021-04-08 RX ADMIN — VANCOMYCIN HYDROCHLORIDE 1500 MG: 10 INJECTION, POWDER, LYOPHILIZED, FOR SOLUTION INTRAVENOUS at 06:18

## 2021-04-08 RX ADMIN — HEPARIN SODIUM 4700 UNITS: 1000 INJECTION INTRAVENOUS; SUBCUTANEOUS at 12:30

## 2021-04-08 RX ADMIN — MUPIROCIN: 20 OINTMENT TOPICAL at 21:20

## 2021-04-08 RX ADMIN — INSULIN GLARGINE 25 UNITS: 100 INJECTION, SOLUTION SUBCUTANEOUS at 21:23

## 2021-04-08 RX ADMIN — DOPAMINE HYDROCHLORIDE 4 MCG/KG/MIN: 160 INJECTION, SOLUTION INTRAVENOUS at 06:00

## 2021-04-08 RX ADMIN — HEPARIN SODIUM 14.7 ML/HR: 10000 INJECTION, SOLUTION INTRAVENOUS at 06:02

## 2021-04-08 RX ADMIN — PRAVASTATIN SODIUM 40 MG: 40 TABLET ORAL at 21:19

## 2021-04-08 RX ADMIN — Medication 10 ML: at 21:19

## 2021-04-08 RX ADMIN — EPOETIN ALFA-EPBX 10000 UNITS: 10000 INJECTION, SOLUTION INTRAVENOUS; SUBCUTANEOUS at 12:40

## 2021-04-08 RX ADMIN — ALBUMIN (HUMAN) 25 G: 0.25 INJECTION, SOLUTION INTRAVENOUS at 11:50

## 2021-04-08 RX ADMIN — APIXABAN 2.5 MG: 2.5 TABLET, FILM COATED ORAL at 21:19

## 2021-04-08 ASSESSMENT — ENCOUNTER SYMPTOMS
SHORTNESS OF BREATH: 0
GASTROINTESTINAL NEGATIVE: 1

## 2021-04-08 ASSESSMENT — PAIN SCALES - GENERAL: PAINLEVEL_OUTOF10: 0

## 2021-04-08 NOTE — PROGRESS NOTES
PT asked to leave BP cuff in place, RN educated pt r/t dopamine infusion and the need to monitor BP to accurately titrate drip.

## 2021-04-08 NOTE — PROGRESS NOTES
Hospitalist Progress Note      PCP: GIANLUCA Regan - CNP    Date of Admission: 4/3/2021    Chief Complaint: Fatigue, weakness    Hospital Course: Reviewed H&P    Subjective: Patient seen and examined at bedside with ICU RN. He is undergoing scheduled maintenance dialysis. Offers no new complaints . Noted ID recommendations. Cardiology awaiting improvement in infection prior to considering high risk PCI. Patient reports that he got the news that his mom passed away yesterday and is wanting to leave. Discussed with patient that his condition continues to be critical given MRSA bacteremia, catheter related bloodstream infection, multivessel severe CAD. Continue IV vancomycin and cefepime discontinued by ID on 4/7/2021. Leukocytosis resolved.      Medications:  Reviewed    Infusion Medications    dextrose      sodium chloride       Scheduled Medications    albumin human        apixaban  2.5 mg Oral BID    vancomycin (VANCOCIN) intermittent dosing (placeholder)   Other RX Placeholder    mupirocin   Nasal BID    aspirin  81 mg Oral Daily    b complex-C-folic acid  1 capsule Oral Daily    ferrous sulfate  325 mg Oral TID WC    pravastatin  40 mg Oral Nightly    insulin glargine  25 Units Subcutaneous BID    insulin lispro  0.05 Units/kg Subcutaneous TID WC    insulin lispro  0-6 Units Subcutaneous TID WC    insulin lispro  0-3 Units Subcutaneous Nightly     PRN Meds: heparin (porcine), midodrine, glucose, dextrose, glucagon (rDNA), dextrose, sodium chloride flush, sodium chloride, ondansetron, acetaminophen **OR** acetaminophen      Intake/Output Summary (Last 24 hours) at 4/8/2021 1402  Last data filed at 4/8/2021 4451  Gross per 24 hour   Intake 1595.95 ml   Output 275 ml   Net 1320.95 ml       Physical Exam Performed:  BP (!) 78/52   Pulse 73   Temp 98.6 °F (37 °C) (Oral)   Resp 22   Ht 5' 10\" (1.778 m)   Wt 282 lb 3 oz (128 kg)   SpO2 94%   BMI 40.49 kg/m²     General appearance: No apparent distress, appears stated age and cooperative. HEENT: Pupils equal, round, and reactive to light. Conjunctivae/corneas clear. Neck: Supple, with full range of motion. No jugular venous distention. Trachea midline. Respiratory:  Normal respiratory effort. Clear to auscultation, bilaterally without Rales/Wheezes/Rhonchi. Cardiovascular: Regular rate and rhythm with normal S1/S2 without murmurs, rubs or gallops. Abdomen: Soft, non-tender, non-distended with normal bowel sounds. Musculoskeletal: No clubbing, cyanosis or edema bilaterally. Full range of motion without deformity. Skin: Skin color, texture, turgor normal.  No rashes or lesions. Neurologic:  Neurovascularly intact without any focal sensory/motor deficits. Cranial nerves: II-XII intact, grossly non-focal.  Psychiatric: Alert and oriented, thought content appropriate, normal insight  Capillary Refill: Brisk,< 3 seconds   Peripheral Pulses: +2 palpable, equal bilaterally       Labs:   Recent Labs     04/06/21 0406 04/07/21 0412 04/08/21 0440   WBC 14.5* 10.7 7.8   HGB 8.8* 8.4* 7.8*   HCT 26.5* 24.8* 22.9*    212 215     Recent Labs     04/06/21 0406 04/07/21 0412 04/08/21  0440   * 134* 133*   K 4.6 4.2 3.6   CL 94* 94* 92*   CO2 23 26 24   BUN 63* 43* 63*   CREATININE 9.7* 5.9* 7.5*   CALCIUM 8.4 8.7 8.9   PHOS 5.3* 3.9 3.7     Recent Labs     04/06/21 0406 04/07/21  0412 04/08/21  0440   * 56* 48*   ALT 33 25 29   BILITOT 0.5 0.6 0.7   ALKPHOS 88 77 73     Recent Labs     04/06/21 0406 04/07/21  0412 04/08/21  0440   INR 1.50* 1.60* 1.43*     Recent Labs     04/06/21 0406   TROPONINI 7.09*     Radiology:  XR PELVIS (1-2 VIEWS)   Final Result   Status post right femoral line placement as above.          XR CHEST PORTABLE   Final Result   Cardiomegaly, with mild central venous congestion           Active Hospital Problems    Diagnosis Date Noted    AV block [I44.30] 04/05/2021    Atrial fibrillation with slow ventricular response (HCC) [I48.91] 04/04/2021    Hypotension [I95.9]     Lightheadedness [R42]     Chronic obstructive pulmonary disease (HCC) [J44.9]     Suspected sleep apnea [R29.818]     [facility]) [facility] [N18.6]     DM2 (diabetes mellitus, type 2) ([facility] [E11.9] 04/11/2019    Morbid obesity ([facility] [E66.01] 01/25/2017    Chronic anticoagulation [Z79.01] 11/30/2012    Essential (primary) hypertension [I10] 08/02/2011    Atrial fibrillation, chronic (HCC) [I48.20] 07/12/2011     Assessment/Plan:  Afib w/ SVR (POA)   -  HR and BP significantly improved on dopamine, currently running at 3mcg/kg/min. Patient symptomatically feels better too. -  Carvedilol and diltiazem held. -  Home ASA and statin continued. Apixaban held in case a procedure is performed   such as pacemaker implantation. - EP consulted and recommended cardiology evaluation. Severe multivessel coronary artery disease with subacute occlusion of the RCA   -As evidenced on [facility]   - cardiology did LHC on 4/6/2021 which showed severe multivessel disease with subacute occlusion of RCA. Recommended CT surgery evaluation. CT surgery evaluated the same day and stated that patient is not a surgical candidate. Cardiology plan to proceed with high risk PCI once infection improves     ESRD on HD  -  Tues, Thurs, Sat dialysis schedule. -  Dialysis access is a tunneled R IJ HD catheter. -  Left brachiocephalic fistula was just created 3/3/2021.  -  Continue home nephrocaps, ferric citrate, and prn midodrine.  -Nephrology consulted and assisting with maintenance dialysis while inpatient. Fever and leukocytosis secondary to central line related BSI-noted on 4/6/2021. Sepsis ruled out. MRSA bacteremia only. Chest x-ray on admission negative. Started on empiric vancomycin/cefepime on 4/6/2021after obtaining blood cultures x2 (1 including from the femoral line) . Showed MRSA bacteremia. Continued Abx . Leukocytosis resolved. Femoral Line remains in place. Requested ID consultation to assist with Mx -discontinued cefepime and recommended to continue IV vancomycin. Recommended to remove femoral central line. Recommended to consider DREW. Ordered repeat blood cultures-we will follow.     DM2  -  Hold all oral antidiabetic agents. Start s.c. Insulin regimen based on home regimen. DVT Prophylaxis: Heparin GTT  Diet: DIET CARDIAC;  Code Status: Full Code    PT/OT Eval Status: Not yet ordered    Dispo -at least 3 to 4 days pending  clinical improvement, cardiology intervention. The note was completed using Dragon -speech recognition software & EMR  . Every effort was made to ensure accuracy; however, inadvertent computerized transcription errors may be present.     Jessi Adams MD

## 2021-04-08 NOTE — PROGRESS NOTES
Infectious Disease Follow up Notes    CC :  MRSA bacteremia      Antibiotics:   vanc by level     Admit Date:   4/3/2021  Hospital Day: 6    Subjective:   No fever in the last 24 hours +  Dopamine and heparin infusions both off   On room air   Several loose stools, he says this is chronic issue for him and is not associated with abd pain, nausea. Objective:     Patient Vitals for the past 8 hrs:   BP Temp Temp src Pulse Resp SpO2 Weight   04/08/21 0817  98.6 °F (37 °C)  106 22 94 % 282 lb 3 oz (128 kg)   04/08/21 0729    97      04/08/21 0608 120/77 97.8 °F (36.6 °C) Oral 100 17     04/08/21 0447 111/66 97 °F (36.1 °C) Axillary 85 18         EXAM:  General:  Flat affect, withdrawn. Obese.  NAD     HEENT:  NCAT, PERRL, sclera anicteric  NECK:  supple       LUNGS:  CTA upper lobes april     CV:   RRR  ABD: Soft, NT.  BS present    EXT: Stasis changes, hyperkeratotic skin LE         LINE: IV site ok         Scheduled Meds:   [COMPLETED] epoetin kelin-epbx  10,000 Units Intravenous Once in dialysis    albumin human  25 g Intravenous Once    albumin human        midodrine  10 mg Oral Once in dialysis    vancomycin (VANCOCIN) intermittent dosing (placeholder)   Other RX Placeholder    mupirocin   Nasal BID    aspirin  81 mg Oral Daily    b complex-C-folic acid  1 capsule Oral Daily    ferrous sulfate  325 mg Oral TID WC    pravastatin  40 mg Oral Nightly    insulin glargine  25 Units Subcutaneous BID    insulin lispro  0.05 Units/kg Subcutaneous TID WC    insulin lispro  0-6 Units Subcutaneous TID WC    insulin lispro  0-3 Units Subcutaneous Nightly       Continuous Infusions:   heparin (PORCINE) Infusion 14.7 mL/hr (04/08/21 0614)    dextrose      sodium chloride      DOPamine 4 mcg/kg/min (04/08/21 0614)          Data Review:    Lab Results   Component Value Date    WBC 7.8 04/08/2021    HGB 7.8 (L) 04/08/2021 HCT 22.9 (L) 04/08/2021    .8 (H) 04/08/2021     04/08/2021     Lab Results   Component Value Date    CREATININE 7.5 (HH) 04/08/2021    BUN 63 (H) 04/08/2021     (L) 04/08/2021    K 3.6 04/08/2021    CL 92 (L) 04/08/2021    CO2 24 04/08/2021       Hepatic Function Panel:   Lab Results   Component Value Date    ALKPHOS 73 04/08/2021    ALT 29 04/08/2021    AST 48 04/08/2021    PROT 7.0 04/08/2021    PROT 7.6 12/16/2012    BILITOT 0.7 04/08/2021    BILIDIR 0.19 12/16/2012    IBILI 0.28 12/16/2012    LABALBU 3.4 04/08/2021       Cultures:   4/6       BC x2 MRSA  4/8 BC x2 NGTD         Radiology Review:  All pertinent images / reports were reviewed as a part of this visit. TTE 4/5/21   Summary   Technically difficult examination.   Low systolic function with an estimated ejection fraction of 50%.   Left ventricular diastolic filling pressure are indeterminate.   Bi-atrial enlargement.   Moderate posterior mitral annular calcification with restricted movement of   posterior leaflet.   The maximum mitral valve pressure gradient is 9 mmHg and the mean pressure   gradient is 3 mmHg with max velocity of 1.48 m/sec. Mild MS.   Moderate mitral regurgitation.   Trace aortic and pulmonic regurgitation.   Systolic pulmonary artery pressure (SPAP) is normal and estimated at 32 mmHg   (right atrial pressure 8 mmHg).        Assessment:     Patient Active Problem List    Diagnosis Date Noted    AV block 04/05/2021    Atrial fibrillation with slow ventricular response ([address] 04/04/2021    Hypotension     Lightheadedness     Chronic obstructive pulmonary disease ([address]     Suspected sleep apnea     Dialysis AV fistula malfunction (HCC)     Mild malnutrition ([address] 09/26/2019    Sepsis ([address] 09/25/2019    ESRD (end stage renal disease) ([address]     Shortness of breath     Potassium excess 04/11/2019    DM2 (diabetes mellitus, type 2) ([address] 04/11/2019    Stage 5 chronic kidney disease not on chronic dialysis ([facility] 04/11/2019    Gastric outlet obstruction 10/25/2017    Tobacco abuse 09/27/2017    Wound infection     Leg wound, left     CKD (chronic kidney disease), stage III 07/25/2017    Gastrointestinal hemorrhage 00/30/1443    Metabolic acidosis 39/06/3480    Other disorder of calcium metabolism 07/12/2017    Depression 06/30/2017    Furunculosis (recurrent, on chronic suppressive Abx) 06/30/2017    Ulcer of left calf with fat layer exposed ([address] 06/30/2017    Hyperphosphatemia 06/30/2017    Vitamin D deficiency 06/30/2017    Secondary hyperparathyroidism of renal origin ([address] 06/30/2017    Iron deficiency 06/30/2017    White matter changes, severe by MRI brain 06/30/2017    Coronary artery disease involving native coronary artery 06/30/2017    Renal arteriosclerosis 06/30/2017    Calciphylaxis 06/08/2017    Primary osteoarthritis of both knees 02/23/2017    Morbid obesity ([address] 01/25/2017    Normocytic anemia 01/13/2017    Chronic anticoagulation 11/30/2012    Hyperlipidemia 08/07/2012    Deep vein thrombosis (DVT) ([address] 08/07/2012    Essential (primary) hypertension 08/02/2011    Atrial fibrillation, chronic ([address] 07/12/2011    Type 2 diabetes mellitus with stage 4 chronic kidney disease, without long-term current use of insulin ([address] 07/09/2011    Cigarette smoker 07/09/2011       MMP      ESRD on HD TTS. S/p revision LUE with stent graft 3/3/21, recently dialyzing via [facility] R chest      Admitted 4/4/21 with symptomatic bradycardia, afib with slow VR  Multivessel CAD by [facility]   Echo with EF 50%, moderate MR, no vegetation noted     Fever on HD #2 associated with leukocytosis - resolved      MRSA bacteremia, 2/2 sets collected on HD #3, from peripheral site and femoral TLC. ? Nosocomial CLABSI from the femoral TLC vs CLABSI from the [facility] present on admission with delayed recognition  In either case, both the femoral TLC and the [facility] should be removed to effect clearance of MRSA bacteremia. -repeat BC in process  -remove fem line, place PIC - I d/w RN   -exchange TDC  -continue IV Vanc   -Echo was reassuring as far as signs of endocarditis but DREW will need to be considered.   The stent graft LUE is another potential complicating factor/nidus for persistence of bacteremia.   -watch for s/s metastatic foci of infection.      PCN allergy - hives/rash      Discussed with patient/family, all questions answered    D/w RN       Mikey Tran MD  Phone: [phone]   Fax : [phone]

## 2021-04-08 NOTE — FLOWSHEET NOTE
Treatment time: 4 hours  Net UF: 700 ml    Pre weight: 128 kg   Post weight: 126 kg  EDW: TBD kg ( outpt HD clinic DW: 129.5 KG)    Access used: Right chest wall TDC  Another HD access: left upper arm AVF, not in use, thrill and bruit present  Access function: Good with  ml/min, lines reversed for better BFR    Medications or blood products given: 25% albumin once, Midodrine, Retacrit (EPO)    Regular outpatient schedule: M.W.F    Summary of response to treatment: Pt tolerated well with HD for first 3 hours, Pt went to use commode for BM c/o light headed and hypotension, saline given and terminated HD. Pt got lots of watering BM. Dr Wilbert Messer at bedside, order continue HD and give saline 500 during the rest of HD, albumin and midodrine give for BP support. HD completed in full, heparin dwell in TDC, capped and clamped. Pt got tired, BP improved after HD. Rest on bed. Reported to primary nurse. Cirt Line: Initial Hct: 23.8;   End Profile : A; Refill ( Hct.1 -23.0  subtract Hct 2- 23.4): +0.4 (no Refill); BV: +1.7%      Copy of dialysis treatment record placed in chart, to be scanned into EMR. 04/08/21 0817 04/08/21 1245   Vital Signs   /86 (!) 95/51   Temp 98.6 °F (37 °C) 97.6 °F (36.4 °C)   Pulse 106 62   Resp 22  --    SpO2 94 % 100 %   Weight 282 lb 3 oz (128 kg) 277 lb 12.5 oz (126 kg)   Weight Method Actual;Standing scale Actual;Bed scale   Percent Weight Change 1.51 -1.56   Post-Hemodialysis Assessment   Post-Treatment Procedures  --  Blood returned;Catheter capped, clamped and heparinized x 2 ports   Machine Disinfection Process  --  Acid/Vinegar Clean;Heat Disinfect; Exterior Machine Disinfection   Rinseback Volume (ml)  --  400 ml   Total Liters Processed (l/min)  --  91.1 l/min   Dialyzer Clearance  --  Lightly streaked   Duration of Treatment (minutes)  --  240 minutes   Heparin amount administered during treatment (units)  --  0 units   Hemodialysis Intake (ml)  --  2100 ml Hemodialysis Output (ml)  --  2800 ml   NET Removed (ml)  --  700 ml   Tolerated Treatment  --  Fair

## 2021-04-08 NOTE — PROGRESS NOTES
[address]  Cardiology  Progress Note    Admission date:  4/3/2021    Reason for follow up visit: CAD    HPI/CC: Terrie Ortega is a 62 y.o. male who was admitted 4/3/2021 for fatigue. EKG showed junctional rhythm. Troponin 7. Echo showed EF 50% moderate MR. LHC showed severe CAD, CT surgery consulted and not felt to be surgical candidate. Also treated for MRSA bacteremia, anemia, ESRD. Rhythm has been AF. Subjective: Feels somewhat better today. No chest pain or current shortness of breath. +fatigue    Vitals:  Blood pressure (!) 78/52, pulse 73, temperature 98.6 °F (37 °C), temperature source Oral, resp. rate 22, height 5' 10\" (1.778 m), weight 282 lb 3 oz (128 kg), SpO2 94 %.   Temp  Av.9 °F (36.6 °C)  Min: 97 °F (36.1 °C)  Max: 98.6 °F (37 °C)  Pulse  Av.4  Min: 73  Max: 113  BP  Min: 77/51  Max: 134/86  SpO2  Av.6 %  Min: 91 %  Max: 96 %    24 hour I/O    Intake/Output Summary (Last 24 hours) at 2021 1313  Last data filed at 2021 1380  Gross per 24 hour   Intake 1595.95 ml   Output 275 ml   Net 1320.95 ml     Current Facility-Administered Medications   Medication Dose Route Frequency Provider Last Rate Last Admin    heparin (porcine) injection 4,700 Units  4,700 Units Intracatheter PRN Arlen Mayer MD   4,700 Units at 21 1230    albumin human 25 % IV solution             heparin 25,000 units in dextrose 5% 250 mL (premix) infusion  14.7 mL/hr Intravenous Continuous Erin Leon MD 14.7 mL/hr at 21 0614 14.7 mL/hr at 21 8372    heparin (porcine) injection 4,000 Units  4,000 Units Intravenous PRN Erin Leon MD        heparin (porcine) injection 2,000 Units  2,000 Units Intravenous PRN Erin Leon MD        vancomycin Barbie Jay) intermittent dosing (placeholder)   Other RX Placeholder Erin Leon MD        mupirocin (BACTROBAN) 2 % ointment   Nasal BID Johnny Corado MD   Given at 21 6490    aspirin EC tablet 81 mg  81 mg Oral Daily Jeffy Menendez MD   Stopped at 04/07/21 0940    b complex-C-folic acid (NEPHROCAPS) capsule 1 mg  1 capsule Oral Daily Jeffy Menendez MD   Stopped at 04/07/21 8711    ferrous sulfate (IRON 325) tablet 325 mg  325 mg Oral TID REI Bardales Aas, MD   325 mg at 04/07/21 1716    midodrine (PROAMATINE) tablet 2.5 mg  2.5 mg Oral Daily PRN Jeffy Menendez MD        pravastatin (PRAVACHOL) tablet 40 mg  40 mg Oral Nightly Jeffy Menendez MD   40 mg at 04/07/21 2223    glucose (GLUTOSE) 40 % oral gel 15 g  15 g Oral PRN Jeffy Menendez MD        dextrose 50 % IV solution  12.5 g Intravenous PRN Jeffy Menendez MD        glucagon (rDNA) injection 1 mg  1 mg Intramuscular PRN Jeffy Menendez MD        dextrose 5 % solution  100 mL/hr Intravenous PRN eJffy Menendez MD        sodium chloride flush 0.9 % injection 10 mL  10 mL Intravenous PRN Jeffy Menendez MD        0.9 % sodium chloride infusion  25 mL Intravenous PRN Jeffy Menendez MD        ondansetron [facility] injection 4 mg  4 mg Intravenous Q6H PRN Jeffy Menendez MD        acetaminophen (TYLENOL) tablet 650 mg  650 mg Oral Q4H PRN Jeffy Menendez MD   650 mg at 04/06/21 1758    Or    acetaminophen (TYLENOL) suppository 650 mg  650 mg Rectal Q4H PRN Jeffy Menendez MD        insulin glargine (LANTUS) injection vial 25 Units  25 Units Subcutaneous BID Jeffy Menendez MD   25 Units at 04/07/21 2230    insulin lispro (HUMALOG) injection vial 7 Units  0.05 Units/kg Subcutaneous TID REI Bardales Aas, MD   Stopped at 04/07/21 1718    insulin lispro (HUMALOG) injection vial 0-6 Units  0-6 Units Subcutaneous TID REI Bardales Aas, MD   Stopped at 04/07/21 1717    insulin lispro (HUMALOG) injection vial 0-3 Units  0-3 Units Subcutaneous Nightly Jeffy Menendez MD   1 Units at 04/06/21 2009    DOPamine (INTROPIN) 400 mg in dextrose 5 % 250 mL infusion  2-20 mcg/kg/min Intravenous Continuous Johnny Corado MD 19.4 mL/hr at 04/08/21 0614 4 mcg/kg/min at 04/08/21 0039     Review of Systems   Constitutional: Positive for fatigue. Respiratory: Negative for shortness of breath. Cardiovascular: Negative. Gastrointestinal: Negative. Neurological: Negative. Objective:     Telemetry monitor: AF    Physical Exam:  Constitutional:  Alert, NAD, appears older than stated age, disheveled   Eyes: PERRL, sclera nonicteric  Neck:  Supple, no masses, no thyroidmegaly, JVD difficult to assess  Skin:  Warm and dry; no rash or lesions  Heart: Irregular, normal apex, S1 and S2 normal, no M/G/R  Lungs:  Normal respiratory effort; clear; no wheezing/rhonchi/rales  Abdomen: soft, non tender, + bowel sounds  Extremities:  No edema or cyanosis; no clubbing  Neuro: alert and oriented, moves legs and arms equally, normal mood and affect  Right radial site soft, no hematoma, 2+ pulse    Data Reviewed:    Echo 4/5/2021:  Technically difficult examination. Low systolic function with an estimated ejection fraction of 50%. Left ventricular diastolic filling pressure are indeterminate. Bi-atrial enlargement. Moderate posterior mitral annular calcification with restricted movement of   posterior leaflet. The maximum mitral valve pressure gradient is 9 mmHg and the mean pressure   gradient is 3 mmHg with max velocity of 1.48 m/sec. Mild MS. Moderate mitral regurgitation. Trace aortic and pulmonic regurgitation. Systolic pulmonary artery pressure (SPAP) is normal and estimated at 32 mmHg   (right atrial pressure 8 mmHg). Coronary angiogram 4/5/2021:  1. Left heart catheterization  2. Selective left and right coronary angiogram  3. Left ventriculography   Procedure Findings:  1. Severe multi vessel coronary artery diease              ~100% mid RCA -sub acute occlusion. ~70-80% LAD              ~70% OM  2. Normal left ventricular function with EF estimated at 55-60%  3.  Normal left heart hemodynamics  Details:              Nargis Lopez was brought to the cardiac catheterization lab in a fasting state after informed consent was obtained. If the patient was able to provide written consent, it was obtained. The patient's vitals were monitored through out the procedure. The patient was sedated using the appropriate levels of sedation and ASA guidelines. The appropriate right radial access site area was prepped and drapped in a sterile fashion. The area was anesthetized with 2% lidocaine. Using the modified Seldinger technique, an arterial sheath was introduced into the arterial access site using a single anterior wall puncture. The sheath was flushed and prepped in usual fashion. Catheters used during the procedure included 5 Tajik TIG 4.0 catheter. In addition, we used a EBU 3.5, EBU 4.06 Western Jeri catheters. The catheters were advanced and removed over a .035\" wire, into the appropriate positions. Multiple angiographic views were obtained. An LV gram was obtained. Findings:  1. Left main coronary artery was normal. It gave off the left anterior descending artery and left circumflex. 2. Left anterior descending artery has severe atherosclerotic disease. It was moderate in size. It gave off septal perforators and a moderate sized diagonal branch. The LAD covered the entire apex of the left ventricle.               ~70% mid  3. Left circumflex has severe atherosclerotic disease. It was large in size. There was a severe sized obtuse marginal branch.              ~70% OM  4. Right coronary artery has severe atherosclerotic disease. It is exceedingly large vessel. Diameter is 6 to 7 mm minimum and ranging to near 8 mm in segments.              ~100% mid RCA  5. Left ventriculogram showed normal LVEF at 55-60%. Wall motion was normal . There was no significant mitral valve or aortic valve disease noted.  LVEDP was normal. There was no gradient noted across the aortic valve during pullback of the catheter. CONCLUSIONS:  1. Severe multi-vessel coronary artery disease with sub-acute occlusion of the RCA  ASSESSMENT/RECOMMENDATIONS:  1. Coronary anatomy not favorable for interventional treatment. Patient's blood vessel size are not compatible with stent insertion. Recommend medical management and consultation with cardiothoracic surgery for consideration of coronary bypass grafting.     Lab Reviewed:     Renal Profile:  Lab Results   Component Value Date    CREATININE 7.5 04/08/2021    BUN 63 04/08/2021     04/08/2021    K 3.6 04/08/2021    K 4.6 10/20/2019    CL 92 04/08/2021    CO2 24 04/08/2021     CBC:    Lab Results   Component Value Date    WBC 7.8 04/08/2021    RBC 2.04 04/08/2021    HGB 7.8 04/08/2021    HCT 22.9 04/08/2021    .8 04/08/2021    RDW 18.0 04/08/2021     04/08/2021     BNP:    Lab Results   Component Value Date    PROBNP 6,184 04/04/2021    PROBNP 7,665 06/20/2019    PROBNP 2,015 01/13/2017     Fasting Lipid Panel:    Lab Results   Component Value Date    CHOL 153 07/10/2011    HDL 27 07/10/2011    TRIG 284 07/10/2011     Cardiac Enzymes:  CK/MbTroponin  Lab Results   Component Value Date    TROPONINI 7.09 04/06/2021     PT/ INR   Lab Results   Component Value Date    INR 1.43 04/08/2021    INR 1.60 04/07/2021    INR 1.50 04/06/2021    PROTIME 16.7 04/08/2021    PROTIME 18.6 04/07/2021    PROTIME 17.5 04/06/2021     PTT No results found for: PTT   Lab Results   Component Value Date    MG 1.90 04/08/2021      Lab Results   Component Value Date    TSH 4.37 07/09/2011     All labs and imaging reviewed today    Assessment:  NSTEMI/CAD: consider PCI pending course, declined by CT surgery, multi vessel on angiogram 7/1/6922  Chronic diastolic CHF: ongoing  Permanent atrial fibrillation: stable   - [id number] score 4 on eliquis  AV block: now stable, noted on admission  Hypotension: stable  ESRD  Anemia: worse  History of GI bleed  DM MRSA bacteremia  Tobacco abuse    Plan:   1. Continue medical management for NSTEMI/CAD. Once infection resolves and anemia improved, could consider high risk PCI vs continued medical management. This could likely be as an outpatient. 2. Wean IV dopamine  3. Ok to stop hepatin gtt and restart eliquis from cardiac perspective  4. Restart carvedilol once BP stable off dopamine  5. Continue aspirin, statin  6. Daily weights, strict I/Os  7. ID following  8. Anemia per IM  9.  Ok to transfer out of ICU    Ferrell Rinne, APRN-CNP  [facility]  [phone]

## 2021-04-09 LAB
A/G RATIO: 1 (ref 1.1–2.2)
ALBUMIN SERPL-MCNC: 3.6 G/DL (ref 3.4–5)
ALP BLD-CCNC: 71 U/L (ref 40–129)
ALT SERPL-CCNC: 33 U/L (ref 10–40)
ANION GAP SERPL CALCULATED.3IONS-SCNC: 15 MMOL/L (ref 3–16)
APTT: 33.7 SEC (ref 24.2–36.2)
AST SERPL-CCNC: 48 U/L (ref 15–37)
BASOPHILS ABSOLUTE: 0.1 K/UL (ref 0–0.2)
BASOPHILS RELATIVE PERCENT: 1.3 %
BILIRUB SERPL-MCNC: 0.8 MG/DL (ref 0–1)
BLOOD CULTURE, ROUTINE: ABNORMAL
BLOOD CULTURE, ROUTINE: ABNORMAL
BUN BLDV-MCNC: 33 MG/DL (ref 7–20)
CALCIUM SERPL-MCNC: 8.9 MG/DL (ref 8.3–10.6)
CHLORIDE BLD-SCNC: 93 MMOL/L (ref 99–110)
CO2: 25 MMOL/L (ref 21–32)
CREAT SERPL-MCNC: 4.9 MG/DL (ref 0.9–1.3)
CULTURE, BLOOD 2: ABNORMAL
EOSINOPHILS ABSOLUTE: 0.1 K/UL (ref 0–0.6)
EOSINOPHILS RELATIVE PERCENT: 1.2 %
GFR AFRICAN AMERICAN: 15
GFR NON-AFRICAN AMERICAN: 12
GLOBULIN: 3.6 G/DL
GLUCOSE BLD-MCNC: 126 MG/DL (ref 70–99)
GLUCOSE BLD-MCNC: 147 MG/DL (ref 70–99)
GLUCOSE BLD-MCNC: 175 MG/DL (ref 70–99)
GLUCOSE BLD-MCNC: 188 MG/DL (ref 70–99)
GLUCOSE BLD-MCNC: 98 MG/DL (ref 70–99)
HCT VFR BLD CALC: 22.3 % (ref 40.5–52.5)
HEMATOLOGY PATH CONSULT: NO
HEMOGLOBIN: 7.5 G/DL (ref 13.5–17.5)
INR BLD: 1.49 (ref 0.86–1.14)
LYMPHOCYTES ABSOLUTE: 1 K/UL (ref 1–5.1)
LYMPHOCYTES RELATIVE PERCENT: 11.3 %
MAGNESIUM: 1.9 MG/DL (ref 1.8–2.4)
MCH RBC QN AUTO: 37.5 PG (ref 26–34)
MCHC RBC AUTO-ENTMCNC: 33.5 G/DL (ref 31–36)
MCV RBC AUTO: 111.9 FL (ref 80–100)
MONOCYTES ABSOLUTE: 1.3 K/UL (ref 0–1.3)
MONOCYTES RELATIVE PERCENT: 15 %
NEUTROPHILS ABSOLUTE: 6.3 K/UL (ref 1.7–7.7)
NEUTROPHILS RELATIVE PERCENT: 71.2 %
ORGANISM: ABNORMAL
PDW BLD-RTO: 18.4 % (ref 12.4–15.4)
PERFORMED ON: ABNORMAL
PHOSPHORUS: 3.3 MG/DL (ref 2.5–4.9)
PLATELET # BLD: 235 K/UL (ref 135–450)
PMV BLD AUTO: 8.2 FL (ref 5–10.5)
POTASSIUM SERPL-SCNC: 4.1 MMOL/L (ref 3.5–5.1)
PROTHROMBIN TIME: 17.4 SEC (ref 10–13.2)
RBC # BLD: 1.99 M/UL (ref 4.2–5.9)
SODIUM BLD-SCNC: 133 MMOL/L (ref 136–145)
TOTAL PROTEIN: 7.2 G/DL (ref 6.4–8.2)
WBC # BLD: 8.8 K/UL (ref 4–11)

## 2021-04-09 PROCEDURE — 99232 SBSQ HOSP IP/OBS MODERATE 35: CPT | Performed by: NURSE PRACTITIONER

## 2021-04-09 PROCEDURE — 6370000000 HC RX 637 (ALT 250 FOR IP): Performed by: NURSE PRACTITIONER

## 2021-04-09 PROCEDURE — 6360000002 HC RX W HCPCS: Performed by: INTERNAL MEDICINE

## 2021-04-09 PROCEDURE — 84100 ASSAY OF PHOSPHORUS: CPT

## 2021-04-09 PROCEDURE — 85025 COMPLETE CBC W/AUTO DIFF WBC: CPT

## 2021-04-09 PROCEDURE — 85730 THROMBOPLASTIN TIME PARTIAL: CPT

## 2021-04-09 PROCEDURE — 85610 PROTHROMBIN TIME: CPT

## 2021-04-09 PROCEDURE — 6370000000 HC RX 637 (ALT 250 FOR IP): Performed by: INTERNAL MEDICINE

## 2021-04-09 PROCEDURE — 2060000000 HC ICU INTERMEDIATE R&B

## 2021-04-09 PROCEDURE — 83735 ASSAY OF MAGNESIUM: CPT

## 2021-04-09 PROCEDURE — 80053 COMPREHEN METABOLIC PANEL: CPT

## 2021-04-09 PROCEDURE — 2580000003 HC RX 258: Performed by: INTERNAL MEDICINE

## 2021-04-09 RX ORDER — HEPARIN SODIUM 1000 [USP'U]/ML
2000 INJECTION, SOLUTION INTRAVENOUS; SUBCUTANEOUS PRN
Status: DISCONTINUED | OUTPATIENT
Start: 2021-04-09 | End: 2021-04-12

## 2021-04-09 RX ORDER — HEPARIN SODIUM 1000 [USP'U]/ML
4000 INJECTION, SOLUTION INTRAVENOUS; SUBCUTANEOUS PRN
Status: DISCONTINUED | OUTPATIENT
Start: 2021-04-09 | End: 2021-04-12

## 2021-04-09 RX ORDER — LOPERAMIDE HYDROCHLORIDE 2 MG/1
2 CAPSULE ORAL 4 TIMES DAILY PRN
Status: DISCONTINUED | OUTPATIENT
Start: 2021-04-09 | End: 2021-04-14 | Stop reason: HOSPADM

## 2021-04-09 RX ORDER — HEPARIN SODIUM 10000 [USP'U]/100ML
14.7 INJECTION, SOLUTION INTRAVENOUS CONTINUOUS
Status: DISCONTINUED | OUTPATIENT
Start: 2021-04-09 | End: 2021-04-09

## 2021-04-09 RX ORDER — HEPARIN SODIUM 10000 [USP'U]/100ML
22.3 INJECTION, SOLUTION INTRAVENOUS CONTINUOUS
Status: DISCONTINUED | OUTPATIENT
Start: 2021-04-09 | End: 2021-04-12

## 2021-04-09 RX ADMIN — Medication 10 ML: at 08:32

## 2021-04-09 RX ADMIN — INSULIN GLARGINE 25 UNITS: 100 INJECTION, SOLUTION SUBCUTANEOUS at 21:22

## 2021-04-09 RX ADMIN — APIXABAN 2.5 MG: 2.5 TABLET, FILM COATED ORAL at 08:32

## 2021-04-09 RX ADMIN — LOPERAMIDE HYDROCHLORIDE 2 MG: 2 CAPSULE ORAL at 15:37

## 2021-04-09 RX ADMIN — HEPARIN SODIUM 14.7 ML/HR: 10000 INJECTION, SOLUTION INTRAVENOUS at 21:22

## 2021-04-09 RX ADMIN — NEPHROCAP 1 MG: 1 CAP ORAL at 08:32

## 2021-04-09 RX ADMIN — FERROUS SULFATE TAB 325 MG (65 MG ELEMENTAL FE) 325 MG: 325 (65 FE) TAB at 11:37

## 2021-04-09 RX ADMIN — ASPIRIN 81 MG: 81 TABLET, COATED ORAL at 08:32

## 2021-04-09 RX ADMIN — PRAVASTATIN SODIUM 40 MG: 40 TABLET ORAL at 21:21

## 2021-04-09 RX ADMIN — INSULIN GLARGINE 25 UNITS: 100 INJECTION, SOLUTION SUBCUTANEOUS at 11:37

## 2021-04-09 RX ADMIN — FERROUS SULFATE TAB 325 MG (65 MG ELEMENTAL FE) 325 MG: 325 (65 FE) TAB at 16:54

## 2021-04-09 RX ADMIN — FERROUS SULFATE TAB 325 MG (65 MG ELEMENTAL FE) 325 MG: 325 (65 FE) TAB at 08:32

## 2021-04-09 ASSESSMENT — ENCOUNTER SYMPTOMS
GASTROINTESTINAL NEGATIVE: 1
SHORTNESS OF BREATH: 0

## 2021-04-09 NOTE — PROGRESS NOTES
Writer sent message to Dr. Sola Pereira regarding pt. Will clarify fem line removal when receive call back from MD.  [facility]  4/9/2021    1401 Writer received call from Dr. Vikki Medina, discussed pt and plan of care. MD reviewed chart, pharmacy to dose vanc to be given with dialysis. Writer will remove fem line once peripheral IV obtained.   [facility]  4/9/2021

## 2021-04-09 NOTE — PLAN OF CARE
Problem: Skin Integrity:  Goal: Will show no infection signs and symptoms  Description: Will show no infection signs and symptoms  4/9/2021 1856 by Alberto Lazar RN  Outcome: Ongoing  Note: Pt in contact isolation for MRSA bacteria. R fem line in place. Dressing intact, old drainage. Site WNL. Problem: Cardiac:  Goal: Ability to maintain an adequate cardiac output will improve  Description: Ability to maintain an adequate cardiac output will improve  Intervention: Evaluate response to arrhythmia  Note: Pt in Atrial Fibrillation this shift, controlled. Problem: Cardiac:  Goal: Hemodynamic stability will improve  Description: Hemodynamic stability will improve  Intervention: Monitor hemodynamic parameters  Note: Vital signs stable this shift with B/P soft SBP 90-low 100's.

## 2021-04-09 NOTE — PROGRESS NOTES
APTT collected from pt's R fem line and sent to lab. Elia Sat  4/9/2021    1340 writer received call from Leticia Hameed pharmacist, due to pt having eliquis dose this a.m, pharmacist informed Leslie Sensor will adjust the time for start rate for the heparin drip.   [facility]  4/9/2021

## 2021-04-09 NOTE — PROGRESS NOTES
Patient with transfer orders to PCU - C4 room 448. Report given bedside to Gavi Wilcox RN. Patient transferred to wheelchair, with personal belongings, chart, on tele monitor. Care assumed by Ms Tiffany Gonzalez RN.

## 2021-04-09 NOTE — CONSULTS
Pharmacy to Manage Heparin Infusion per [facility]    Dx: Severe multi-vessel coronary artery disease with sub-acute occlusion of the RCA  Pt AdjBwt = 95.6 Kg  Baseline aPTT = ordered for 2000    Low Dose Heparin Infusion  Received Eliquis this morning at 0832, therefore, will delay starting Heparin infusion until 2030 at the rate of 14.7 ml/hr (was therapeutic on this rate the day prior)  Recheck aPTT in 6 hours  Goal aPTT = 49-76 seconds. aPTT < 36.3    Heparin 60 units/kg bolus Increase infusion by 4 units/kg/hr  aPTT 36.3 - 48.9 Heparin 30 units/kg bolus Increase infusion by 2 units/kg/hr  aPTT 49 - 76   No bolus No change   aPTT 76.1 - 85 No bolus Decrease infusion by 2 units/kg/hr  aPTT 85.1 - 94 Hold heparin for 1 hour Decrease infusion by 3 units/kg/hr  aPTT 94.1 - 103   Hold heparin for 1 hour Decrease infusion by 4 units/kg/hr  aPTT > 103 Hold heparin for 1 hour Decrease infusion by 6 units/kg/hr    Obtain aPTT 6 hours after bolus and 6 hours after any dose change until two consecutive therapeutic aPTT are achieved- then daily. Dong Cordon PharmD  4/9/2021 at 1:34 PM    4/10  APTT= 44.5 sec  - 2,000 units bolus  - increase infusion rate to 18.5ml/hr per protocol  - next aPTT at 21:00. Nati Valentin. 4/10/21 2:49 PM EDT    4/10/2021  Recent Labs     04/10/21  2206   APTT 34.8     Will give bolus of 4000 units and increase heparin gtt to 22.3 mL/hr. Recheck aPTT in 6 hours. Sid Avalos PharmD  4/10/2021 11:28 PM    4/11/2021  Recent Labs     04/11/21  0546   APTT 65.1*     Continue heparin gtt at 22.3 mL/hr. Recheck aPTT in 6 hours. Sid Avalos PharmJOSHUA  4/11/2021 6:55 AM      4/11  APTT= 68.6 sec  No changes needed  Continue infusion at 22.3ml/hr per protocol  Next aPTT on 4/12am  Nati Pantoja/Chago. 4/11/21 1:18 PM EDT    4/12/2021  Recent Labs     04/12/21  0513   APTT 75.5*     Continue heparin gtt at 22.3 mL/hr. Continue to check aPTT daily.     4/13/2021  Recent Labs 04/13/21  0308   APTT 83.0*     Decrease heparin 20.4 mL/hr. Recheck aPTT in 6 hours.   Carlos Rivas, PharmD  4/13/2021 3:47 AM

## 2021-04-09 NOTE — PROGRESS NOTES
Progress Note    HISTORY     CC:   Weakness, bradycardia            We are following for ESRD      Subjective/   HPI:     Admitted for bradycardia, SOB      ROS:  Out of ICU  Denies CP , no SOB at the moment      Social Hx:  No visitors    Past Medical and Surgical History:  - Reviewed, no changes     EXAM       Objective/     Vitals:    04/09/21 0300 04/09/21 0400 04/09/21 0500 04/09/21 0737   BP: 100/61 83/66 92/60 (!) 93/58   Pulse: 80 79 73 92   Resp:  20  18   Temp:    98.1 °F (36.7 °C)   TempSrc:    Oral   SpO2: 94% 94%  92%   Weight:  275 lb 12.7 oz (125.1 kg)     Height:         24HR INTAKE/OUTPUT:      Intake/Output Summary (Last 24 hours) at 4/9/2021 1029  Last data filed at 4/9/2021 1002  Gross per 24 hour   Intake 2710 ml   Output 2800 ml   Net -90 ml     Constitutional:  Ill appearing, somnolent   Eyes:  Pupils reactive, sclera clear   Neck:  Normal thyroid, no masses   Cardiovascular:  Irregular, no rub  Respiratory:  No distress, no wheezing  Psychiatry:  Flat mood/affect, alert  Abdomen: +bs, soft, nt, no masses   Musculoskeletal: No LE edema, no clubbing   Lymphatics:  No LAD in neck, no supraclavicular nodes   Access:  Right [address]       MEDICAL DECISION MAKING       Data/  Recent Labs     04/07/21  0412 04/08/21  0440 04/09/21  0345   WBC 10.7 7.8 8.8   HGB 8.4* 7.8* 7.5*   HCT 24.8* 22.9* 22.3*   .3* 111.8* 111.9*    215 235     Recent Labs     04/07/21  0412 04/08/21  0440 04/09/21  0345   * 133* 133*   K 4.2 3.6 4.1   CL 94* 92* 93*   CO2 26 24 25   GLUCOSE 203* 121* 98   PHOS 3.9 3.7 3.3   MG 1.80 1.90 1.90   BUN 43* 63* 33*   CREATININE 5.9* 7.5* 4.9*   LABGLOM 10* 7* 12*   GFRAA 12* 9* 15*       Assessment/Plan        1. Symptomatic bradycardia   - no plans for pacemaker right now  2. CAD- not a candidate for surgery  -medical management  3. End stage renal disease -HD TTS  -outpatient DW was 129.5  Now below that, reached 126 kg on 4/8  -access:S/p revision LUE with stent graft 3/3/21, recently dialyzing via [facility]  -will ask dr Bijal Fox if AVF could be used, so we can d/c the [facility]   4. Anemia of chronic disease -   PRISCA at HD   5. DM2  6. Fever- MRSA bacteremia  -vanco  per ID; can redose at HD, so he does not need extra line

## 2021-04-09 NOTE — PROGRESS NOTES
Patient transferred to room 448 from [facility]. Patient oriented to room, call light, bed rails, phone, lights and bathroom. Patient instructed about the schedule of the day including: vital sign frequency, lab draws, possible tests, frequency of MD and staff rounds, including RN/MD rounding together at bedside, daily weights, and I &O's. Patient instructed about prescribed diet, how to use 8MENU, and television. Telemetry box in place, patient aware of placement and reason. Call light within reach. Will continue to monitor.

## 2021-04-09 NOTE — CONSULTS
- vanc dosing only on HD days T,Th,Sa while in HD per Dr. Pablito Evangelista, no other IV access . Nati Pantoja/Chago. 4/12/21 10:45 AM EDT                  Pharmacy Note  Vancomycin Consult     Ritu Cancino is a 62 y.o. male started on empiric Vancomycin for leukocytosis; consult received from Dr. Rusty Grace to manage therapy. Also receiving the following antibiotics: Cefepime.     Allergies:  Codeine, Onion, and Penicillins           Recent Labs     04/05/21  0355 04/06/21  0406   CREATININE 7.2* 9.7*           Recent Labs     04/05/21  0355 04/06/21  0406   WBC 13.3* 14.5*      Estimated Creatinine Clearance: 11 mL/min (A) (based on SCr of 9.7 mg/dL [facility])).       Intake/Output Summary (Last 24 hours) at 4/6/2021 1407  Last data filed at 4/6/2021 0902      Gross per 24 hour   Intake 713.33 ml   Output 425 ml   Net 288.33 ml             Wt Readings from Last 1 Encounters:   04/04/21 285 lb 7.9 oz (129.5 kg)          Body mass index is 40.96 kg/m².     Goal Vancomycin trough: 15-20 mcg/mL   Goal Vancomycin AUC: 400-600      Assessment/Plan:  Will initiate Vancomycin 2000 mg IVPB x 1. Will pulse dose Vancomycin given ESRD on HD T/Th/Sat. Vancomycin level 4/8 at 0600.      Thank you for the consult.     Stefani Hendricks. Christopher AlvesD, BCPS   4/6/2021 2:10 PM     4/8  Vanc random = 11.4 mcg/mL at 0440. Will give vancomycin 1500 mg x 1. Recheck vanc random tomorrow in the AM (4/9 at 0600). Elvia Borden PharmD  4/8/2021 5:51 AM    Day 4  Vanc level tomorrow AM  ESRD on HD T/TH/Sat  Goal Trough 15-20 mcg/mL  Rabia Barbosa PharmD  4/9/2021 at 10:40 AM    4/12  Vanc random = 15.5 mcg/mL at 0512. Give vancomycin 1000 mg x 1. Recheck vanc random tomorrow in the AM.  Elvia Borden PharmD  4/12/2021 6:06 AM    4/13  Vanc random = 25.5 mcg/mL at 0308. Will hold vancomycin today.   Recheck vanc level on 4/15 in the AM.  Elvai Borden PharmD  4/13/2021 6:23 AM

## 2021-04-09 NOTE — PROGRESS NOTES
[facility]  Cardiology  Progress Note    Admission date:  4/3/2021    Reason for follow up visit: CAD    HPI/CC: Kelly Tejada is a 62 y.o. male who was admitted 4/3/2021 for fatigue. EKG showed junctional rhythm. Troponin 7. Echo showed EF 50% moderate MR. LHC showed severe CAD, CT surgery consulted and not felt to be surgical candidate. Also treated for MRSA bacteremia, anemia, ESRD. Rhythm has been AF 80s. Subjective: More alert and participatory in conversation today. No chest pain or shortness of breath, feels fatigued. Vitals:  Blood pressure (!) 93/58, pulse 92, temperature 98.1 °F (36.7 °C), temperature source Oral, resp. rate 18, height 5' 10\" (1.778 m), weight 275 lb 12.7 oz (125.1 kg), SpO2 92 %.   Temp  Av.2 °F (36.8 °C)  Min: 97.6 °F (36.4 °C)  Max: 98.6 °F (37 °C)  Pulse  Av.6  Min: 58  Max: 98  BP  Min: 77/51  Max: 126/74  SpO2  Av.9 %  Min: 92 %  Max: 100 %    24 hour I/O    Intake/Output Summary (Last 24 hours) at 2021 1003  Last data filed at 2021 1002  Gross per 24 hour   Intake 2710 ml   Output 2800 ml   Net -90 ml     Current Facility-Administered Medications   Medication Dose Route Frequency Provider Last Rate Last Admin    heparin (porcine) injection 4,700 Units  4,700 Units Intracatheter PRN Huber Ohara MD   4,700 Units at 21 1230    apixaban (ELIQUIS) tablet 2.5 mg  2.5 mg Oral BID GIANLUCA Pizarro - CNP   2.5 mg at 21 0033    vancomycin (VANCOCIN) intermittent dosing (placeholder)   Other RX Placeholder Layo Weston MD        mupirocin (BACTROBAN) 2 % ointment   Nasal BID Isi Sanabria MD   Given at 21 2120    aspirin EC tablet 81 mg  81 mg Oral Daily Isi Sanabria MD   81 mg at 21 0344    b complex-C-folic acid (NEPHROCAPS) capsule 1 mg  1 capsule Oral Daily Isi Sanabria MD   1 mg at 21 0620    ferrous sulfate (IRON 325) tablet 325 mg  325 mg Oral TID  Isi Sanabria MD   325 mg at thyroidmegaly, JVD difficult to assess  Skin:  Warm and dry; no rash or lesions  Heart: Irregular, normal apex, S1 and S2 normal, no M/G/R  Lungs:  Normal respiratory effort; clear; no wheezing/rhonchi/rales  Abdomen: soft, non tender, + bowel sounds  Extremities:  No edema or cyanosis; no clubbing  Neuro: alert and oriented, moves legs and arms equally, normal mood and affect  Right radial site soft, no hematoma, 2+ pulse    Data Reviewed:    Echo 4/5/2021:  Technically difficult examination. Low systolic function with an estimated ejection fraction of 50%. Left ventricular diastolic filling pressure are indeterminate. Bi-atrial enlargement. Moderate posterior mitral annular calcification with restricted movement of   posterior leaflet. The maximum mitral valve pressure gradient is 9 mmHg and the mean pressure   gradient is 3 mmHg with max velocity of 1.48 m/sec. Mild MS. Moderate mitral regurgitation. Trace aortic and pulmonic regurgitation. Systolic pulmonary artery pressure (SPAP) is normal and estimated at 32 mmHg   (right atrial pressure 8 mmHg). Coronary angiogram 4/5/2021:  1. Left heart catheterization  2. Selective left and right coronary angiogram  3. Left ventriculography   Procedure Findings:  1. Severe multi vessel coronary artery diease              ~100% mid RCA -sub acute occlusion. ~70-80% LAD              ~70% OM  2. Normal left ventricular function with EF estimated at 55-60%  3. Normal left heart hemodynamics  Details:              Mei Goncalves was brought to the cardiac catheterization lab in a fasting state after informed consent was obtained. If the patient was able to provide written consent, it was obtained. The patient's vitals were monitored through out the procedure. The patient was sedated using the appropriate levels of sedation and ASA guidelines.               The appropriate right radial access site area was prepped and drapped in a sterile fashion. The area was anesthetized with 2% lidocaine. Using the modified Seldinger technique, an arterial sheath was introduced into the arterial access site using a single anterior wall puncture. The sheath was flushed and prepped in usual fashion. Catheters used during the procedure included 5 Cypriot TIG 4.0 catheter. In addition, we used a EBU 3.5, EBU 4.06 Western Jeri catheters. The catheters were advanced and removed over a .035\" wire, into the appropriate positions. Multiple angiographic views were obtained. An LV gram was obtained. Findings:  1. Left main coronary artery was normal. It gave off the left anterior descending artery and left circumflex. 2. Left anterior descending artery has severe atherosclerotic disease. It was moderate in size. It gave off septal perforators and a moderate sized diagonal branch. The LAD covered the entire apex of the left ventricle.               ~70% mid  3. Left circumflex has severe atherosclerotic disease. It was large in size. There was a severe sized obtuse marginal branch.              ~70% OM  4. Right coronary artery has severe atherosclerotic disease. It is exceedingly large vessel. Diameter is 6 to 7 mm minimum and ranging to near 8 mm in segments.              ~100% mid RCA  5. Left ventriculogram showed normal LVEF at 55-60%. Wall motion was normal . There was no significant mitral valve or aortic valve disease noted. LVEDP was normal. There was no gradient noted across the aortic valve during pullback of the catheter. CONCLUSIONS:  1. Severe multi-vessel coronary artery disease with sub-acute occlusion of the RCA  ASSESSMENT/RECOMMENDATIONS:  1. Coronary anatomy not favorable for interventional treatment. Patient's blood vessel size are not compatible with stent insertion. Recommend medical management and consultation with cardiothoracic surgery for consideration of coronary bypass grafting.     Lab Reviewed:     Renal Profile:  Lab procedures. Would restart IV heparin if eliquis will need held more than 1 day. 5. Can arrange DREW if needed for bacteremia, ID following  6.  Daily weights, strict I/Os    GIANLUCA Anthony-CNP  [facility]  [phone]

## 2021-04-09 NOTE — PROGRESS NOTES
Comprehensive Nutrition Assessment    Type and Reason for Visit:  Initial(LOS)    Nutrition Recommendations/Plan:   1. Continue Cardiac diet   2. Offer Ensure high protein with meals   3. Recommend Probiotic, lactobaicillus on formulary  4. Will monitor nutritional adequacy, nutrition-related labs, weights, BMs, and clinical progress     Nutrition Assessment:  Patient admitted with a-fib with slow ventricular response, medical management, not a pacemaker candidate. Nephrology following due to ESRD, HD on T, TH, Sat. Currently on a cardiac diet eating % meals. Loose stools noted, Immodium ordered. Patient may benefit from Probiotic with antibioic requirement. Malnutrition Assessment:  Malnutrition Status: At risk for malnutrition (Comment)      Estimated Daily Nutrient Needs:  Energy (kcal):  3777-1519; Weight Used for Energy Requirements:  Ideal     Protein (g):  ; Weight Used for Protein Requirements:  Ideal(1.3-1.5)        Fluid (ml/day):   ; Method Used for Fluid Requirements:  1 ml/kcal      Nutrition Related Findings:  BM 3-5 daily; Na 133 mmol/L this am      Wounds:  (scattered abrasions)       Current Nutrition Therapies:    DIET CARDIAC;   Dietary Nutrition Supplements: Low Calorie High Protein Supplement    Anthropometric Measures:  · Height: 5' 10\" (177.8 cm)  · Current Body Weight: 275 lb 12 oz (125.1 kg)      · Ideal Body Weight: 166 lbs; % Ideal Body Weight     · BMI: 39.6   · BMI Categories: Obese Class 2 (BMI 35.0 -39.9)       Nutrition Diagnosis:   · Increased nutrient needs related to increase demand for energy/nutrients as evidenced by (extended hospital stay, renal failure)      Nutrition Interventions:   Food and/or Nutrient Delivery:  Continue Current Diet, Start Oral Nutrition Supplement(recommend Probiotic)  Nutrition Education/Counseling:  No recommendation at this time   Coordination of Nutrition Care:  Continue to monitor while inpatient    Goals:  Patient will eat 50% or greater of meals and supplements. Nutrition Monitoring and Evaluation:   Behavioral-Environmental Outcomes:      Food/Nutrient Intake Outcomes:  Food and Nutrient Intake  Physical Signs/Symptoms Outcomes:  Biochemical Data, Nutrition Focused Physical Findings, None Identified     Discharge Planning:     Too soon to determine     Electronically signed by Sondra Hobson, [address],  on 4/9/21 at 3:17 PM EDT    Contact: Office: [phone]; [address] [place] [address] Road: [phone]

## 2021-04-09 NOTE — PROGRESS NOTES
Notified Dr. Harjeet Dougherty office, Anita Devcharlieyoko, @ [phone] 4/9/21 for vascular surgery consult. -[phone] [facility]

## 2021-04-09 NOTE — PROGRESS NOTES
Per Dr Farrah Seay, Eliquis and aspirin need to be held prior to exchanging [facility], we can plan to exchange [facility] on Monday if blood thinners are held and blood cultures remain negative.

## 2021-04-09 NOTE — PROGRESS NOTES
Pt requesting imodium for loose stools, per pt takes at home as needed. Roxanna Padron continues to touch base with specials to relay the message for Dr. Thi Jernigan to call Dr. Hussein Strauss, still unable to get a hold of anyone at this time. Writer sent message to Dr. Hussein Strauss regarding both. Mylene Andrews  4/9/2021    [phone] writer received call from Dr. Fernando Alberts ordered. If stool consistency appears cdiff to send sample.  Billee Form [facility] [place] [facility]  4/9/2021

## 2021-04-09 NOTE — PROGRESS NOTES
Hospitalist Progress Note      PCP: GIANLUCA Rich - CNP    Date of Admission: 4/3/2021    Chief Complaint: Fatigue, weakness    Hospital Course: Reviewed H&P    Subjective: Patient seen and examined . Tranferred Out of ICU yesterday and doing Don Siad. Patient offers no new complaints . Received PS from RN stating that Patient's [facility] exchange cancelled as his Eliquis was resumed by cardiology and heparin gtt stopped yesterday . Called and D/w Cardiology NP Patricio Gonzalez)  . Will hold Eliquis and restart Heparin gtt and Patient's need to be continued on Aspirin as he has Severe Multivessel CAD . W/d/w Dr. Marissa Murphy that aspirin would not be stopped for [facility] exchange at this time. - Will possibly get his Femoral LINE removed and Ok for PICC for IV meds . Repeat Blood Culture Negative . Discussed care plan with patient and RN at bedside .        Medications:  Reviewed    Infusion Medications    heparin (PORCINE) Infusion      dextrose      sodium chloride       Scheduled Medications    [Held by provider] apixaban  2.5 mg Oral BID    vancomycin (VANCOCIN) intermittent dosing (placeholder)   Other RX Placeholder    aspirin  81 mg Oral Daily    b complex-C-folic acid  1 capsule Oral Daily    ferrous sulfate  325 mg Oral TID WC    pravastatin  40 mg Oral Nightly    insulin glargine  25 Units Subcutaneous BID    insulin lispro  0.05 Units/kg Subcutaneous TID WC    insulin lispro  0-6 Units Subcutaneous TID WC    insulin lispro  0-3 Units Subcutaneous Nightly     PRN Meds: heparin (porcine), midodrine, glucose, dextrose, glucagon (rDNA), dextrose, sodium chloride flush, sodium chloride, ondansetron, acetaminophen **OR** acetaminophen      Intake/Output Summary (Last 24 hours) at 4/9/2021 1222  Last data filed at 4/9/2021 1002  Gross per 24 hour   Intake 2710 ml   Output 2800 ml   Net -90 ml       Physical Exam Performed:  /68   Pulse 87   Temp 98.3 °F (36.8 °C) (Oral)   Resp 18   Ht 5' 10\" (1.778 m)   Wt 275 lb 12.7 oz (125.1 kg)   SpO2 96%   BMI 39.57 kg/m²     General appearance: No apparent distress, appears stated age and cooperative. HEENT: Pupils equal, round, and reactive to light. Conjunctivae/corneas clear. Neck: Supple, with full range of motion. No jugular venous distention. Trachea midline. Respiratory:  Normal respiratory effort. Clear to auscultation, bilaterally without Rales/Wheezes/Rhonchi. Cardiovascular: Regular rate and rhythm with normal S1/S2 without murmurs, rubs or gallops. Abdomen: Soft, non-tender, non-distended with normal bowel sounds. Musculoskeletal: No clubbing, cyanosis or edema bilaterally. Full range of motion without deformity. Skin: Skin color, texture, turgor normal.  No rashes or lesions. Neurologic:  Neurovascularly intact without any focal sensory/motor deficits. Cranial nerves: II-XII intact, grossly non-focal.  Psychiatric: Alert and oriented, thought content appropriate, normal insight  Capillary Refill: Brisk,< 3 seconds   Peripheral Pulses: +2 palpable, equal bilaterally       Labs:   Recent Labs     04/07/21 0412 04/08/21 0440 04/09/21  0345   WBC 10.7 7.8 8.8   HGB 8.4* 7.8* 7.5*   HCT 24.8* 22.9* 22.3*    215 235     Recent Labs     04/07/21 0412 04/08/21 0440 04/09/21  0345   * 133* 133*   K 4.2 3.6 4.1   CL 94* 92* 93*   CO2 26 24 25   BUN 43* 63* 33*   CREATININE 5.9* 7.5* 4.9*   CALCIUM 8.7 8.9 8.9   PHOS 3.9 3.7 3.3     Recent Labs     04/07/21 0412 04/08/21 0440 04/09/21  0345   AST 56* 48* 48*   ALT 25 29 33   BILITOT 0.6 0.7 0.8   ALKPHOS 77 73 71     Recent Labs     04/07/21 0412 04/08/21 0440 04/09/21  0345   INR 1.60* 1.43* 1.49*     No results for input(s): CKTOTAL, TROPONINI in the last 72 hours. Radiology:  XR PELVIS (1-2 VIEWS)   Final Result   Status post right femoral line placement as above.          XR CHEST PORTABLE   Final Result   Cardiomegaly, with mild central venous congestion         IR REPLACE TUNNELED CVC WO SQ PORT/PUMP SAME ACCESS    (Results Pending)     Active Hospital Problems    Diagnosis Date Noted    AV block [I44.30] 04/05/2021    Atrial fibrillation with slow ventricular response (HCC) [I48.91] 04/04/2021    Hypotension [I95.9]     Lightheadedness [R42]     Chronic obstructive pulmonary disease (HCC) [J44.9]     Suspected sleep apnea [R29.818]     ESRD (end stage renal disease) ([facility] [address] [N18.6]     DM2 (diabetes mellitus, type 2) ([facility] [address] [E11.9] 04/11/2019    Morbid obesity ([facility] [address] [E66.01] 01/25/2017    Chronic anticoagulation [Z79.01] 11/30/2012    Essential (primary) hypertension [I10] 08/02/2011    Atrial fibrillation, chronic (HCC) [I48.20] 07/12/2011     Assessment/Plan:  Afib w/ SVR (POA)   -  HR and BP significantly improved on dopamine, currently running at 3mcg/kg/min. Patient symptomatically feels better too. -  Carvedilol and diltiazem held. -  Home ASA and statin continued. Apixaban held in case a procedure is performed   such as pacemaker implantation. - EP consulted and recommended cardiology evaluation. Severe multivessel coronary artery disease with subacute occlusion of the RCA   -As evidenced on [facility]   - cardiology did LHC on 4/6/2021 which showed severe multivessel disease with subacute occlusion of RCA. Recommended CT surgery evaluation. CT surgery evaluated the same day and stated that patient is not a surgical candidate. Cardiology plan to proceed with high risk PCI once infection improves     ESRD on HD  -  Tues, Thurs, Sat dialysis schedule. -  Dialysis access is a tunneled R IJ HD catheter. -  Left brachiocephalic fistula was just created 3/3/2021.  -  Continue home nephrocaps, ferric citrate, and prn midodrine.  -Nephrology consulted and assisting with maintenance dialysis while inpatient. Fever and leukocytosis secondary to central line related BSI-noted on 4/6/2021. Sepsis ruled out. MRSA bacteremia only. Chest x-ray on admission negative. Started on empiric vancomycin/cefepime on 4/6/2021after obtaining blood cultures x2 (1 including from the femoral line) . Showed MRSA bacteremia. Continued Abx . Leukocytosis resolved. Femoral Line remains in place.   -  Reviewed ID consultation to assist with Mx -discontinued cefepime and recommended to continue IV vancomycin. Recommended to remove femoral central line. Recommended to consider DREW. repeat blood cultures from 4/8/21 - NGTD    - Will DC femoral Line and get PICC line ordered and obtain nephrology clearance for PICC      DM2  -  Hold all oral antidiabetic agents. Start s.c. Insulin regimen based on home regimen. Anemia of Chronic Kidney  Disease -  On long term AC with eliquis. Baseline Hb 8-9 . Currently Hb 7.5 - 7.8 . Monitor CBC jazzmine. In the setting of IV heparin gtt . No signs of Bleeding / Hemolysis at this time . Consider PRBC transfusion for Hb < 7     DVT Prophylaxis: Heparin GTT  Diet: DIET CARDIAC;  Code Status: Full Code    PT/OT Eval Status: Not yet ordered    Dispo -at least 3 to 4 days pending  clinical improvement, cardiology intervention. The note was completed using Dragon -speech recognition software & EMR  . Every effort was made to ensure accuracy; however, inadvertent computerized transcription errors may be present. Miriam Chinchilla MD     Addendum at 1400  Received call back from Dr. Sebastián Mott -he stated that it would be okay for patient to be on aspirin.

## 2021-04-09 NOTE — PROGRESS NOTES
BP (!) 93/58   Pulse 92   Temp 98.1 °F (36.7 °C) (Oral)   Resp 18   Ht 5' 10\" (1.778 m)   Wt 275 lb 12.7 oz (125.1 kg)   SpO2 92%   BMI 39.57 kg/m²  on room air this a.m. Pt denies pain, discomfort or shortness. Lungs diminished. Atrial Fibrillation on tele. Pt denies any needs at this time. Bedside table and call light within reach. Pt instructed to call out for any needs and assistance. Will continue to monitor.   [place] [facility]  4/9/2021

## 2021-04-09 NOTE — PROGRESS NOTES
Pt with loose brown stool, noted consistency with 2 RN's, do not feel appears to be cdiff. Writer will administer prn imodium.   Mickael Boeck [facility]  4/9/2021

## 2021-04-09 NOTE — CARE COORDINATION
Spoke with patient and daughter at bedside for introduction as patient is new to this CM. Patient and daughter did voice some concern regarding patient getting around in his home. RN present at time of conversation. All parties in agreement that a therapy assessment would be in his best interest but that we should wait until tomorrow per RN. Will need orders put in when patient is medically appropriate for therapy. Patient has Medicare so no precert would be needed if SNF is decision.

## 2021-04-09 NOTE — PLAN OF CARE
Problem: Falls - Risk of:  Goal: Will remain free from falls  Description: Will remain free from falls  4/9/2021 0752 by Chey Mallory RN  Outcome: Ongoing  4/9/2021 0752 by Chey Mallory RN  Outcome: Ongoing  Goal: Absence of physical injury  Description: Absence of physical injury  4/9/2021 0752 by Chey Mallory RN  Outcome: Ongoing  4/9/2021 0752 by Chey Mallory RN  Outcome: Ongoing     Problem: Skin Integrity:  Goal: Will show no infection signs and symptoms  Description: Will show no infection signs and symptoms  4/9/2021 0752 by Chey Mallory RN  Outcome: Ongoing  4/9/2021 0752 by Chey Mallory RN  Outcome: Ongoing  Goal: Absence of new skin breakdown  Description: Absence of new skin breakdown  4/9/2021 0752 by Chey Mallory RN  Outcome: Ongoing  4/9/2021 0752 by Cehy Mallory RN  Outcome: Ongoing     Problem: Pain:  Goal: Pain level will decrease  Description: Pain level will decrease  4/9/2021 0752 by Chey Mallory RN  Outcome: Ongoing  4/9/2021 0752 by Chey Mallory RN  Outcome: Ongoing  Goal: Control of acute pain  Description: Control of acute pain  4/9/2021 0752 by Chey Mallory RN  Outcome: Ongoing  4/9/2021 0752 by Chey Mallory RN  Outcome: Ongoing  Goal: Control of chronic pain  Description: Control of chronic pain  4/9/2021 0752 by Chey Mallory RN  Outcome: Ongoing  4/9/2021 0752 by Chey Mallory RN  Outcome: Ongoing

## 2021-04-10 LAB
A/G RATIO: 1 (ref 1.1–2.2)
ALBUMIN SERPL-MCNC: 3.6 G/DL (ref 3.4–5)
ALP BLD-CCNC: 70 U/L (ref 40–129)
ALT SERPL-CCNC: 30 U/L (ref 10–40)
ANION GAP SERPL CALCULATED.3IONS-SCNC: 13 MMOL/L (ref 3–16)
ANISOCYTOSIS: ABNORMAL
APTT: 34.8 SEC (ref 24.2–36.2)
APTT: 39.9 SEC (ref 24.2–36.2)
APTT: 44.5 SEC (ref 24.2–36.2)
AST SERPL-CCNC: 35 U/L (ref 15–37)
BANDED NEUTROPHILS RELATIVE PERCENT: 5 % (ref 0–7)
BASOPHILS ABSOLUTE: 0.1 K/UL (ref 0–0.2)
BASOPHILS RELATIVE PERCENT: 1 %
BILIRUB SERPL-MCNC: 0.6 MG/DL (ref 0–1)
BUN BLDV-MCNC: 45 MG/DL (ref 7–20)
CALCIUM SERPL-MCNC: 8.8 MG/DL (ref 8.3–10.6)
CHLORIDE BLD-SCNC: 92 MMOL/L (ref 99–110)
CO2: 26 MMOL/L (ref 21–32)
CREAT SERPL-MCNC: 6.4 MG/DL (ref 0.9–1.3)
EOSINOPHILS ABSOLUTE: 0.2 K/UL (ref 0–0.6)
EOSINOPHILS RELATIVE PERCENT: 3 %
GFR AFRICAN AMERICAN: 11
GFR NON-AFRICAN AMERICAN: 9
GLOBULIN: 3.5 G/DL
GLUCOSE BLD-MCNC: 141 MG/DL (ref 70–99)
GLUCOSE BLD-MCNC: 144 MG/DL (ref 70–99)
GLUCOSE BLD-MCNC: 164 MG/DL (ref 70–99)
GLUCOSE BLD-MCNC: 181 MG/DL (ref 70–99)
HCT VFR BLD CALC: 22 % (ref 40.5–52.5)
HEMATOLOGY PATH CONSULT: NO
HEMOGLOBIN: 7.3 G/DL (ref 13.5–17.5)
HYPOCHROMIA: ABNORMAL
INR BLD: 1.31 (ref 0.86–1.14)
LYMPHOCYTES ABSOLUTE: 1.8 K/UL (ref 1–5.1)
LYMPHOCYTES RELATIVE PERCENT: 24 %
MACROCYTES: ABNORMAL
MAGNESIUM: 2.1 MG/DL (ref 1.8–2.4)
MCH RBC QN AUTO: 37.3 PG (ref 26–34)
MCHC RBC AUTO-ENTMCNC: 33 G/DL (ref 31–36)
MCV RBC AUTO: 113 FL (ref 80–100)
MONOCYTES ABSOLUTE: 0.9 K/UL (ref 0–1.3)
MONOCYTES RELATIVE PERCENT: 12 %
NEUTROPHILS ABSOLUTE: 4.4 K/UL (ref 1.7–7.7)
NEUTROPHILS RELATIVE PERCENT: 55 %
OVALOCYTES: ABNORMAL
PDW BLD-RTO: 18.6 % (ref 12.4–15.4)
PERFORMED ON: ABNORMAL
PHOSPHORUS: 5 MG/DL (ref 2.5–4.9)
PLATELET # BLD: 223 K/UL (ref 135–450)
PMV BLD AUTO: 8.1 FL (ref 5–10.5)
POTASSIUM SERPL-SCNC: 3.9 MMOL/L (ref 3.5–5.1)
PROTHROMBIN TIME: 15.2 SEC (ref 10–13.2)
RBC # BLD: 1.95 M/UL (ref 4.2–5.9)
SODIUM BLD-SCNC: 131 MMOL/L (ref 136–145)
TOTAL PROTEIN: 7.1 G/DL (ref 6.4–8.2)
VANCOMYCIN RANDOM: 17 UG/ML
WBC # BLD: 7.3 K/UL (ref 4–11)

## 2021-04-10 PROCEDURE — 85730 THROMBOPLASTIN TIME PARTIAL: CPT

## 2021-04-10 PROCEDURE — 80202 ASSAY OF VANCOMYCIN: CPT

## 2021-04-10 PROCEDURE — 6360000002 HC RX W HCPCS: Performed by: INTERNAL MEDICINE

## 2021-04-10 PROCEDURE — 84100 ASSAY OF PHOSPHORUS: CPT

## 2021-04-10 PROCEDURE — 6360000002 HC RX W HCPCS

## 2021-04-10 PROCEDURE — 99232 SBSQ HOSP IP/OBS MODERATE 35: CPT | Performed by: NURSE PRACTITIONER

## 2021-04-10 PROCEDURE — 6370000000 HC RX 637 (ALT 250 FOR IP): Performed by: INTERNAL MEDICINE

## 2021-04-10 PROCEDURE — 83735 ASSAY OF MAGNESIUM: CPT

## 2021-04-10 PROCEDURE — 2580000003 HC RX 258: Performed by: INTERNAL MEDICINE

## 2021-04-10 PROCEDURE — 36592 COLLECT BLOOD FROM PICC: CPT

## 2021-04-10 PROCEDURE — P9047 ALBUMIN (HUMAN), 25%, 50ML: HCPCS

## 2021-04-10 PROCEDURE — 85025 COMPLETE CBC W/AUTO DIFF WBC: CPT

## 2021-04-10 PROCEDURE — 80053 COMPREHEN METABOLIC PANEL: CPT

## 2021-04-10 PROCEDURE — 2060000000 HC ICU INTERMEDIATE R&B

## 2021-04-10 PROCEDURE — 90935 HEMODIALYSIS ONE EVALUATION: CPT

## 2021-04-10 PROCEDURE — 85610 PROTHROMBIN TIME: CPT

## 2021-04-10 RX ORDER — ALBUMIN (HUMAN) 12.5 G/50ML
12.5 SOLUTION INTRAVENOUS PRN
Status: DISCONTINUED | OUTPATIENT
Start: 2021-04-10 | End: 2021-04-14 | Stop reason: HOSPADM

## 2021-04-10 RX ORDER — ALBUMIN (HUMAN) 12.5 G/50ML
SOLUTION INTRAVENOUS
Status: COMPLETED
Start: 2021-04-10 | End: 2021-04-10

## 2021-04-10 RX ORDER — HEPARIN SODIUM 1000 [USP'U]/ML
2000 INJECTION, SOLUTION INTRAVENOUS; SUBCUTANEOUS ONCE
Status: COMPLETED | OUTPATIENT
Start: 2021-04-10 | End: 2021-04-10

## 2021-04-10 RX ORDER — DEXTROSE MONOHYDRATE 50 MG/ML
INJECTION, SOLUTION INTRAVENOUS
Status: DISPENSED
Start: 2021-04-10 | End: 2021-04-10

## 2021-04-10 RX ADMIN — PRAVASTATIN SODIUM 40 MG: 40 TABLET ORAL at 21:43

## 2021-04-10 RX ADMIN — ALBUMIN (HUMAN) 25 G: 0.25 INJECTION, SOLUTION INTRAVENOUS at 08:06

## 2021-04-10 RX ADMIN — INSULIN LISPRO 1 UNITS: 100 INJECTION, SOLUTION INTRAVENOUS; SUBCUTANEOUS at 21:51

## 2021-04-10 RX ADMIN — INSULIN LISPRO 1 UNITS: 100 INJECTION, SOLUTION INTRAVENOUS; SUBCUTANEOUS at 17:38

## 2021-04-10 RX ADMIN — ASPIRIN 81 MG: 81 TABLET, COATED ORAL at 13:21

## 2021-04-10 RX ADMIN — HEPARIN SODIUM 16.6 ML/HR: 10000 INJECTION, SOLUTION INTRAVENOUS at 13:32

## 2021-04-10 RX ADMIN — LOPERAMIDE HYDROCHLORIDE 2 MG: 2 CAPSULE ORAL at 18:58

## 2021-04-10 RX ADMIN — EPOETIN ALFA-EPBX 10000 UNITS: 10000 INJECTION, SOLUTION INTRAVENOUS; SUBCUTANEOUS at 11:50

## 2021-04-10 RX ADMIN — INSULIN GLARGINE 25 UNITS: 100 INJECTION, SOLUTION SUBCUTANEOUS at 21:51

## 2021-04-10 RX ADMIN — INSULIN LISPRO 1 UNITS: 100 INJECTION, SOLUTION INTRAVENOUS; SUBCUTANEOUS at 13:30

## 2021-04-10 RX ADMIN — INSULIN LISPRO 7 UNITS: 100 INJECTION, SOLUTION INTRAVENOUS; SUBCUTANEOUS at 13:31

## 2021-04-10 RX ADMIN — FERROUS SULFATE TAB 325 MG (65 MG ELEMENTAL FE) 325 MG: 325 (65 FE) TAB at 17:17

## 2021-04-10 RX ADMIN — FERROUS SULFATE TAB 325 MG (65 MG ELEMENTAL FE) 325 MG: 325 (65 FE) TAB at 13:21

## 2021-04-10 RX ADMIN — VANCOMYCIN HYDROCHLORIDE 1000 MG: 1 INJECTION, POWDER, LYOPHILIZED, FOR SOLUTION INTRAVENOUS at 05:56

## 2021-04-10 RX ADMIN — HEPARIN SODIUM 2000 UNITS: 1000 INJECTION INTRAVENOUS; SUBCUTANEOUS at 05:56

## 2021-04-10 RX ADMIN — ALBUMIN (HUMAN) 25 G: 12.5 SOLUTION INTRAVENOUS at 08:06

## 2021-04-10 RX ADMIN — MIDODRINE HYDROCHLORIDE 2.5 MG: 5 TABLET ORAL at 07:56

## 2021-04-10 RX ADMIN — Medication 10 ML: at 23:33

## 2021-04-10 RX ADMIN — INSULIN GLARGINE 25 UNITS: 100 INJECTION, SOLUTION SUBCUTANEOUS at 13:32

## 2021-04-10 RX ADMIN — NEPHROCAP 1 MG: 1 CAP ORAL at 13:33

## 2021-04-10 RX ADMIN — INSULIN LISPRO 7 UNITS: 100 INJECTION, SOLUTION INTRAVENOUS; SUBCUTANEOUS at 17:38

## 2021-04-10 RX ADMIN — HEPARIN SODIUM 4000 UNITS: 1000 INJECTION INTRAVENOUS; SUBCUTANEOUS at 23:33

## 2021-04-10 RX ADMIN — HEPARIN SODIUM 2000 UNITS: 1000 INJECTION INTRAVENOUS; SUBCUTANEOUS at 15:22

## 2021-04-10 RX ADMIN — Medication 10 ML: at 21:52

## 2021-04-10 ASSESSMENT — ENCOUNTER SYMPTOMS
SHORTNESS OF BREATH: 0
GASTROINTESTINAL NEGATIVE: 1

## 2021-04-10 ASSESSMENT — PAIN SCALES - GENERAL: PAINLEVEL_OUTOF10: 0

## 2021-04-10 NOTE — PROGRESS NOTES
[address]  Cardiology  Progress Note    Admission date:  4/3/2021    Reason for follow up visit: CAD    HPI/CC: Cuauhtemoc Germain is a 62 y.o. male who was admitted 4/3/2021 for fatigue. EKG showed junctional rhythm. Troponin 7. Echo showed EF 50% moderate MR. LHC showed severe CAD, CT surgery consulted and not felt to be surgical candidate. Also treated for MRSA bacteremia, anemia, ESRD. Rhythm has been AF 80s. Subjective: Seen at dialysis, issues with hypotension, asymptomatic. No chest pain or shortness of breath. Vitals:  Blood pressure (!) 94/54, pulse 75, temperature 98.5 °F (36.9 °C), temperature source Oral, resp. rate 16, height 5' 10\" (1.778 m), weight 275 lb 12.7 oz (125.1 kg), SpO2 94 %.   Temp  Av.4 °F (36.9 °C)  Min: 98.1 °F (36.7 °C)  Max: 98.6 °F (37 °C)  Pulse  Av.8  Min: 69  Max: 92  BP  Min: 93/58  Max: 102/68  SpO2  Av.4 %  Min: 92 %  Max: 96 %    24 hour I/O    Intake/Output Summary (Last 24 hours) at 4/10/2021 0700  Last data filed at 2021 2200  Gross per 24 hour   Intake 1090 ml   Output 0 ml   Net 1090 ml     Current Facility-Administered Medications   Medication Dose Route Frequency Provider Last Rate Last Admin    dextrose 5 % solution             heparin (porcine) injection 2,000 Units  2,000 Units Intravenous PRN Daphne Schilling MD   2,000 Units at 04/10/21 0556    heparin 25,000 units in dextrose 5% 250 mL (premix) infusion  16.6 mL/hr Intravenous Continuous Daphne Schilling MD 16.6 mL/hr at 04/10/21 0557 16.6 mL/hr at 04/10/21 0557    heparin (porcine) injection 4,000 Units  4,000 Units Intravenous PRN Daphne Schilling MD        loperamide (IMODIUM) capsule 2 mg  2 mg Oral 4x Daily PRN Daphne Schilling MD   2 mg at 21 1537    heparin (porcine) injection 4,700 Units  4,700 Units Intracatheter PRN Angel Ruiz MD   4,700 Units at 21 1230    [Held by provider] apixaban (ELIQUIS) tablet 2.5 mg  2.5 mg Oral BID [facility] Amee Cormier - CNP   2.5 mg at 04/09/21 2495    vancomycin (VANCOCIN) intermittent dosing (placeholder)   Other RX Placeholder Erika Rankin MD        aspirin EC tablet 81 mg  81 mg Oral Daily Rosette Devine MD   81 mg at 04/09/21 9300    b complex-C-folic acid (NEPHROCAPS) capsule 1 mg  1 capsule Oral Daily Rosette Devine MD   1 mg at 04/09/21 0743    ferrous sulfate (IRON 325) tablet 325 mg  325 mg Oral TID  Rosette Devine MD   325 mg at 04/09/21 1654    midodrine (PROAMATINE) tablet 2.5 mg  2.5 mg Oral Daily PRN Rosette Devine MD        pravastatin (PRAVACHOL) tablet 40 mg  40 mg Oral Nightly Rosette Devine MD   40 mg at 04/09/21 2121    glucose (GLUTOSE) 40 % oral gel 15 g  15 g Oral PRN Rosette Devine MD        dextrose 50 % IV solution  12.5 g Intravenous PRN Rosette Devine MD        glucagon (rDNA) injection 1 mg  1 mg Intramuscular PRN Rosette Devine MD        dextrose 5 % solution  100 mL/hr Intravenous PRN Rosette Devine MD        sodium chloride flush 0.9 % injection 10 mL  10 mL Intravenous PRN Rosette Devine MD   10 mL at 04/09/21 0832    0.9 % sodium chloride infusion  25 mL Intravenous PRN Rosette Devine MD        ondansetron [facility]) injection 4 mg  4 mg Intravenous Q6H PRN Rosette Devine MD        acetaminophen (TYLENOL) tablet 650 mg  650 mg Oral Q4H PRN Rosette Devine MD   650 mg at 04/06/21 1758    Or    acetaminophen (TYLENOL) suppository 650 mg  650 mg Rectal Q4H PRN Rosette Devine MD        insulin glargine (LANTUS) injection vial 25 Units  25 Units Subcutaneous BID Rosette Devine MD   25 Units at 04/09/21 2122    insulin lispro (HUMALOG) injection vial 7 Units  0.05 Units/kg Subcutaneous TID  Rosette Devine MD   Stopped at 04/07/21 1718    insulin lispro (HUMALOG) injection vial 0-6 Units  0-6 Units Subcutaneous TID REI Devine MD   Stopped at 04/07/21 1717    insulin lispro (HUMALOG) injection vial 0-3 hemodynamics  Details:              Iker Avilez was brought to the cardiac catheterization lab in a fasting state after informed consent was obtained. If the patient was able to provide written consent, it was obtained. The patient's vitals were monitored through out the procedure. The patient was sedated using the appropriate levels of sedation and ASA guidelines. The appropriate right radial access site area was prepped and drapped in a sterile fashion. The area was anesthetized with 2% lidocaine. Using the modified Seldinger technique, an arterial sheath was introduced into the arterial access site using a single anterior wall puncture. The sheath was flushed and prepped in usual fashion. Catheters used during the procedure included 5 Thai TIG 4.0 catheter. In addition, we used a EBU 3.5, EBU 4.06 Western Jeri catheters. The catheters were advanced and removed over a .035\" wire, into the appropriate positions. Multiple angiographic views were obtained. An LV gram was obtained. Findings:  1. Left main coronary artery was normal. It gave off the left anterior descending artery and left circumflex. 2. Left anterior descending artery has severe atherosclerotic disease. It was moderate in size. It gave off septal perforators and a moderate sized diagonal branch. The LAD covered the entire apex of the left ventricle.               ~70% mid  3. Left circumflex has severe atherosclerotic disease. It was large in size. There was a severe sized obtuse marginal branch.              ~70% OM  4. Right coronary artery has severe atherosclerotic disease. It is exceedingly large vessel. Diameter is 6 to 7 mm minimum and ranging to near 8 mm in segments.              ~100% mid RCA  5. Left ventriculogram showed normal LVEF at 55-60%. Wall motion was normal . There was no significant mitral valve or aortic valve disease noted.  LVEDP was normal. There was no gradient noted across the aortic valve during pullback of the catheter. CONCLUSIONS:  1. Severe multi-vessel coronary artery disease with sub-acute occlusion of the RCA  ASSESSMENT/RECOMMENDATIONS:  1. Coronary anatomy not favorable for interventional treatment. Patient's blood vessel size are not compatible with stent insertion. Recommend medical management and consultation with cardiothoracic surgery for consideration of coronary bypass grafting.     Lab Reviewed:     Renal Profile:  Lab Results   Component Value Date    CREATININE 6.4 04/10/2021    BUN 45 04/10/2021     04/10/2021    K 3.9 04/10/2021    K 4.6 10/20/2019    CL 92 04/10/2021    CO2 26 04/10/2021     CBC:    Lab Results   Component Value Date    WBC 7.3 04/10/2021    RBC 1.95 04/10/2021    HGB 7.3 04/10/2021    HCT 22.0 04/10/2021    .0 04/10/2021    RDW 18.6 04/10/2021     04/10/2021     BNP:    Lab Results   Component Value Date    PROBNP 6,184 04/04/2021    PROBNP 7,665 06/20/2019    PROBNP 2,015 01/13/2017     Fasting Lipid Panel:    Lab Results   Component Value Date    CHOL 153 07/10/2011    HDL 27 07/10/2011    TRIG 284 07/10/2011     Cardiac Enzymes:  CK/MbTroponin  Lab Results   Component Value Date    TROPONINI 7.09 04/06/2021     PT/ INR   Lab Results   Component Value Date    INR 1.31 04/10/2021    INR 1.49 04/09/2021    INR 1.43 04/08/2021    PROTIME 15.2 04/10/2021    PROTIME 17.4 04/09/2021    PROTIME 16.7 04/08/2021     PTT No results found for: PTT   Lab Results   Component Value Date    MG 2.10 04/10/2021      Lab Results   Component Value Date    TSH 4.37 07/09/2011     All labs and imaging reviewed today    Assessment:  NSTEMI/CAD: consider PCI pending course, declined by CT surgery, multi vessel on angiogram 5/6/1620  Chronic diastolic CHF: ongoing  Permanent atrial fibrillation: stable   - [id number] score 4 on eliquis  AV block: now stable, noted on admission  Hypotension: stable  ESRD  Anemia: worsening  History of GI bleed  DM MRSA bacteremia  Tobacco abuse    Plan:   1. Continue medical management for NSTEMI/CAD. Once infection resolves and anemia improved, could consider high risk PCI vs continued medical management. This will likely be as an outpatient after follow up. 2. Holding carvedilol due to hypotension, restart as able  3. Continue aspirin, statin; would not recommend holding aspirin due to multivessel CAD and NSTEMI   4. Change IV heparin back to eliquis when procedures complete  5. Can arrange DREW if needed for bacteremia, ID following  6. Fluid management per HD  7. Daily weights, strict I/Os  8. Cardiology will follow peripherally, please call with questions.      Ferrell Rinne, APRN-CNP  [facility]  [phone]

## 2021-04-10 NOTE — PROGRESS NOTES
Progress Note    HISTORY     CC:   Weakness, bradycardia            We are following for ESRD      Subjective/   HPI:     Seen on dialysis  RN not aware that fistula cleared for use. Patient already on dialysis using the [facility]. BP is low but he is not symptomatic       ROS:  Out of ICU  Denies CP , no SOB at the moment      Social Hx:  No visitors    Past Medical and Surgical History:  - Reviewed, no changes     EXAM       Objective/     Vitals:    04/09/21 2030 04/10/21 0000 04/10/21 0300 04/10/21 0801   BP: 100/67  (!) 94/54 93/78   Pulse: 84 69 75 58   Resp: 18 18 16 16   Temp: 98.6 °F (37 °C) 98.4 °F (36.9 °C) 98.5 °F (36.9 °C) 98.5 °F (36.9 °C)   TempSrc: Oral Oral Oral    SpO2: 95%  94%    Weight:       Height:         24HR INTAKE/OUTPUT:      Intake/Output Summary (Last 24 hours) at 4/10/2021 1238  Last data filed at 4/9/2021 2200  Gross per 24 hour   Intake 840 ml   Output 0 ml   Net 840 ml     Constitutional:  Ill appearing, somnolent   Eyes:  Pupils reactive, sclera clear   Neck:  Normal thyroid, no masses   Cardiovascular:  Irregular, no rub  Respiratory:  No distress, no wheezing  Psychiatry:  Flat mood/affect, alert  Abdomen: +bs, soft, nt, no masses   Musculoskeletal: No LE edema, no clubbing   Lymphatics:  No LAD in neck, no supraclavicular nodes   Access:  Right [facility]       MEDICAL DECISION MAKING       Data/  Recent Labs     04/08/21 0440 04/09/21  0345 04/10/21  0300   WBC 7.8 8.8 7.3   HGB 7.8* 7.5* 7.3*   HCT 22.9* 22.3* 22.0*   .8* 111.9* 113.0*    235 223     Recent Labs     04/08/21 0440 04/09/21  0345 04/10/21  0300   * 133* 131*   K 3.6 4.1 3.9   CL 92* 93* 92*   CO2 24 25 26   GLUCOSE 121* 98 181*   PHOS 3.7 3.3 5.0*   MG 1.90 1.90 2.10   BUN 63* 33* 45*   CREATININE 7.5* 4.9* 6.4*   LABGLOM 7* 12* 9*   GFRAA 9* 15* 11*       Assessment/Plan        1. Symptomatic bradycardia   - no plans for pacemaker right now    2. CAD- not a candidate for surgery  -medical management    3. End stage renal disease -HD TTS  -outpatient DW was 129.5  Now below that, reached 126 kg on 4/8  -access:S/p revision LUE with stent graft 3/3/21, recently dialyzing via [facility]  -Dr. Daisy Zhou has cleared fistula for use. TDC was used 4/10  -- Will use fistula with next dialysis, looks good and ready to go. Planning for d/c of TDC as long as cannulation goes well. If problems with cannulation might be best to go ahead and just exchange the [facility] so he has a back up dialysis access until confident with cannulation    4. Anemia of chronic disease -   Start PRISCA at HD     5. DM2    6. Fever- MRSA bacteremia  -vanco  per ID; can redose at HD    7.   Needs access for heparin gtt until procedure have been completed and then can go back on eliquis

## 2021-04-10 NOTE — PROGRESS NOTES
APTT 39.9. Patient given 2,000 unit heparin bolus and gtt increased to 16.6 ml/hr. Verified with pharmacy and nurse. Will cont to monitor.

## 2021-04-10 NOTE — PLAN OF CARE
Problem: Cardiac:  Goal: Hemodynamic stability will improve  Description: Hemodynamic stability will improve  Outcome: Ongoing    Pt HD pt with dialysis today. Problem: Metabolic:  Goal: Ability to maintain clinical measurements within normal limits will improve  Description: Ability to maintain clinical measurements within normal limits will improve  Outcome: Ongoing   Pt to maintain appropriate blood glucose levels. Glucose to be monitored AC/HS with sliding scale as indicated.

## 2021-04-11 LAB
A/G RATIO: 1.1 (ref 1.1–2.2)
ABO/RH: NORMAL
ALBUMIN SERPL-MCNC: 3.6 G/DL (ref 3.4–5)
ALP BLD-CCNC: 69 U/L (ref 40–129)
ALT SERPL-CCNC: 27 U/L (ref 10–40)
ANION GAP SERPL CALCULATED.3IONS-SCNC: 11 MMOL/L (ref 3–16)
ANTIBODY SCREEN: NORMAL
APTT: 65.1 SEC (ref 24.2–36.2)
APTT: 68.6 SEC (ref 24.2–36.2)
AST SERPL-CCNC: 27 U/L (ref 15–37)
BASOPHILS ABSOLUTE: 0.1 K/UL (ref 0–0.2)
BASOPHILS RELATIVE PERCENT: 1.3 %
BILIRUB SERPL-MCNC: 0.5 MG/DL (ref 0–1)
BLOOD BANK DISPENSE STATUS: NORMAL
BLOOD BANK PRODUCT CODE: NORMAL
BPU ID: NORMAL
BUN BLDV-MCNC: 28 MG/DL (ref 7–20)
CALCIUM SERPL-MCNC: 8.6 MG/DL (ref 8.3–10.6)
CHLORIDE BLD-SCNC: 95 MMOL/L (ref 99–110)
CO2: 28 MMOL/L (ref 21–32)
CREAT SERPL-MCNC: 5 MG/DL (ref 0.9–1.3)
DESCRIPTION BLOOD BANK: NORMAL
EOSINOPHILS ABSOLUTE: 0.1 K/UL (ref 0–0.6)
EOSINOPHILS RELATIVE PERCENT: 1.7 %
GFR AFRICAN AMERICAN: 14
GFR NON-AFRICAN AMERICAN: 12
GLOBULIN: 3.2 G/DL
GLUCOSE BLD-MCNC: 117 MG/DL (ref 70–99)
GLUCOSE BLD-MCNC: 134 MG/DL (ref 70–99)
GLUCOSE BLD-MCNC: 135 MG/DL (ref 70–99)
GLUCOSE BLD-MCNC: 135 MG/DL (ref 70–99)
GLUCOSE BLD-MCNC: 153 MG/DL (ref 70–99)
HCT VFR BLD CALC: 19.6 % (ref 40.5–52.5)
HCT VFR BLD CALC: 20.5 % (ref 40.5–52.5)
HCT VFR BLD CALC: 21.4 % (ref 40.5–52.5)
HEMATOLOGY PATH CONSULT: NO
HEMOGLOBIN: 6.6 G/DL (ref 13.5–17.5)
HEMOGLOBIN: 6.9 G/DL (ref 13.5–17.5)
HEMOGLOBIN: 7.1 G/DL (ref 13.5–17.5)
INR BLD: 1.23 (ref 0.86–1.14)
LYMPHOCYTES ABSOLUTE: 1.7 K/UL (ref 1–5.1)
LYMPHOCYTES RELATIVE PERCENT: 19.5 %
MAGNESIUM: 2 MG/DL (ref 1.8–2.4)
MCH RBC QN AUTO: 38.1 PG (ref 26–34)
MCHC RBC AUTO-ENTMCNC: 33.5 G/DL (ref 31–36)
MCV RBC AUTO: 113.6 FL (ref 80–100)
MONOCYTES ABSOLUTE: 1.1 K/UL (ref 0–1.3)
MONOCYTES RELATIVE PERCENT: 12.1 %
NEUTROPHILS ABSOLUTE: 5.8 K/UL (ref 1.7–7.7)
NEUTROPHILS RELATIVE PERCENT: 65.4 %
PDW BLD-RTO: 18.5 % (ref 12.4–15.4)
PERFORMED ON: ABNORMAL
PHOSPHORUS: 3.8 MG/DL (ref 2.5–4.9)
PLATELET # BLD: 238 K/UL (ref 135–450)
PMV BLD AUTO: 8.3 FL (ref 5–10.5)
POTASSIUM SERPL-SCNC: 4 MMOL/L (ref 3.5–5.1)
PROTHROMBIN TIME: 14.3 SEC (ref 10–13.2)
RBC # BLD: 1.81 M/UL (ref 4.2–5.9)
SODIUM BLD-SCNC: 134 MMOL/L (ref 136–145)
TOTAL PROTEIN: 6.8 G/DL (ref 6.4–8.2)
WBC # BLD: 8.8 K/UL (ref 4–11)

## 2021-04-11 PROCEDURE — 6370000000 HC RX 637 (ALT 250 FOR IP): Performed by: INTERNAL MEDICINE

## 2021-04-11 PROCEDURE — 85018 HEMOGLOBIN: CPT

## 2021-04-11 PROCEDURE — 36415 COLL VENOUS BLD VENIPUNCTURE: CPT

## 2021-04-11 PROCEDURE — P9016 RBC LEUKOCYTES REDUCED: HCPCS

## 2021-04-11 PROCEDURE — 86850 RBC ANTIBODY SCREEN: CPT

## 2021-04-11 PROCEDURE — 85014 HEMATOCRIT: CPT

## 2021-04-11 PROCEDURE — 2060000000 HC ICU INTERMEDIATE R&B

## 2021-04-11 PROCEDURE — 83735 ASSAY OF MAGNESIUM: CPT

## 2021-04-11 PROCEDURE — 86901 BLOOD TYPING SEROLOGIC RH(D): CPT

## 2021-04-11 PROCEDURE — 86923 COMPATIBILITY TEST ELECTRIC: CPT

## 2021-04-11 PROCEDURE — 80053 COMPREHEN METABOLIC PANEL: CPT

## 2021-04-11 PROCEDURE — 2580000003 HC RX 258: Performed by: INTERNAL MEDICINE

## 2021-04-11 PROCEDURE — 6360000002 HC RX W HCPCS: Performed by: INTERNAL MEDICINE

## 2021-04-11 PROCEDURE — 36430 TRANSFUSION BLD/BLD COMPNT: CPT

## 2021-04-11 PROCEDURE — 86900 BLOOD TYPING SEROLOGIC ABO: CPT

## 2021-04-11 PROCEDURE — 85730 THROMBOPLASTIN TIME PARTIAL: CPT

## 2021-04-11 PROCEDURE — 36592 COLLECT BLOOD FROM PICC: CPT

## 2021-04-11 PROCEDURE — 84100 ASSAY OF PHOSPHORUS: CPT

## 2021-04-11 PROCEDURE — 85025 COMPLETE CBC W/AUTO DIFF WBC: CPT

## 2021-04-11 PROCEDURE — 85610 PROTHROMBIN TIME: CPT

## 2021-04-11 RX ORDER — SODIUM CHLORIDE 9 MG/ML
INJECTION, SOLUTION INTRAVENOUS PRN
Status: COMPLETED | OUTPATIENT
Start: 2021-04-11 | End: 2021-04-11

## 2021-04-11 RX ADMIN — NEPHROCAP 1 MG: 1 CAP ORAL at 08:41

## 2021-04-11 RX ADMIN — INSULIN LISPRO 7 UNITS: 100 INJECTION, SOLUTION INTRAVENOUS; SUBCUTANEOUS at 12:21

## 2021-04-11 RX ADMIN — INSULIN LISPRO 7 UNITS: 100 INJECTION, SOLUTION INTRAVENOUS; SUBCUTANEOUS at 08:42

## 2021-04-11 RX ADMIN — FERROUS SULFATE TAB 325 MG (65 MG ELEMENTAL FE) 325 MG: 325 (65 FE) TAB at 12:21

## 2021-04-11 RX ADMIN — Medication 10 ML: at 20:39

## 2021-04-11 RX ADMIN — SODIUM CHLORIDE: 9 INJECTION, SOLUTION INTRAVENOUS at 20:38

## 2021-04-11 RX ADMIN — FERROUS SULFATE TAB 325 MG (65 MG ELEMENTAL FE) 325 MG: 325 (65 FE) TAB at 08:41

## 2021-04-11 RX ADMIN — ASPIRIN 81 MG: 81 TABLET, COATED ORAL at 08:41

## 2021-04-11 RX ADMIN — INSULIN GLARGINE 25 UNITS: 100 INJECTION, SOLUTION SUBCUTANEOUS at 20:44

## 2021-04-11 RX ADMIN — PRAVASTATIN SODIUM 40 MG: 40 TABLET ORAL at 20:39

## 2021-04-11 RX ADMIN — INSULIN LISPRO 1 UNITS: 100 INJECTION, SOLUTION INTRAVENOUS; SUBCUTANEOUS at 20:44

## 2021-04-11 RX ADMIN — INSULIN GLARGINE 25 UNITS: 100 INJECTION, SOLUTION SUBCUTANEOUS at 08:41

## 2021-04-11 RX ADMIN — HEPARIN SODIUM 22.3 ML/HR: 10000 INJECTION, SOLUTION INTRAVENOUS at 16:09

## 2021-04-11 RX ADMIN — FERROUS SULFATE TAB 325 MG (65 MG ELEMENTAL FE) 325 MG: 325 (65 FE) TAB at 16:09

## 2021-04-11 RX ADMIN — HEPARIN SODIUM 22.3 ML/HR: 10000 INJECTION, SOLUTION INTRAVENOUS at 04:17

## 2021-04-11 RX ADMIN — INSULIN LISPRO 7 UNITS: 100 INJECTION, SOLUTION INTRAVENOUS; SUBCUTANEOUS at 16:54

## 2021-04-11 ASSESSMENT — PAIN SCALES - GENERAL
PAINLEVEL_OUTOF10: 0

## 2021-04-11 NOTE — PROGRESS NOTES
Dr. Laura Wilson covering for Dr Geeta Wood this morning. Spoke to him about the perfect serve message. See message below. Per Dr Laura Wilson will forward message to Dr Geeta Wood.     [id number] [address] [place], [place] [address]y 54 or Facility: [facility] From: [facility] RE: Laura Lea 1960 RM: [id number] Critical Hgb 6.9 Need Callback: NO CALLBACK REQ C4 PROGRESSIVE CARE  Read 7:26 AM

## 2021-04-11 NOTE — PROGRESS NOTES
Progress Note    HISTORY     CC:   Weakness, bradycardia            We are following for ESRD      Subjective/   HPI:       Tolerated dialysis yesterday. TDC was used. BP stable after being low on dialysis. He did have low BP during treatment but no symptoms. Somnolent today     ROS:  Out of ICU  Denies CP , no SOB at the moment    Social Hx:  No visitors    Past Medical and Surgical History:  - Reviewed, no changes     EXAM       Objective/     Vitals:    04/11/21 0114 04/11/21 0545 04/11/21 0552 04/11/21 0838   BP: 133/64  117/72 107/70   Pulse: 89  82 96   Resp: 16  16 18   Temp: 98.7 °F (37.1 °C)  98.7 °F (37.1 °C) 98.7 °F (37.1 °C)   TempSrc: Oral  Oral Oral   SpO2: 94%  93% 95%   Weight:  284 lb 6.3 oz (129 kg)     Height:         24HR INTAKE/OUTPUT:      Intake/Output Summary (Last 24 hours) at 4/11/2021 1004  Last data filed at 4/11/2021 0548  Gross per 24 hour   Intake 2005.71 ml   Output 1200 ml   Net 805.71 ml     Constitutional:  Ill appearing, somnolent   Eyes:  Pupils reactive, sclera clear   Neck:  Normal thyroid, no masses   Cardiovascular:  Irregular, no rub  Respiratory:  No distress, no wheezing  Psychiatry:  Flat mood/affect, alert  Abdomen: +bs, soft, nt, no masses   Musculoskeletal: No LE edema, no clubbing   Lymphatics:  No LAD in neck, no supraclavicular nodes   Access:  Right [facility]       MEDICAL DECISION MAKING       Data/  Recent Labs     04/09/21  0345 04/10/21  0300 04/11/21  0546   WBC 8.8 7.3 8.8   HGB 7.5* 7.3* 6.9*   HCT 22.3* 22.0* 20.5*   .9* 113.0* 113.6*    223 238     Recent Labs     04/09/21  0345 04/10/21  0300 04/11/21  0546   * 131* 134*   K 4.1 3.9 4.0   CL 93* 92* 95*   CO2 25 26 28   GLUCOSE 98 181* 135*   PHOS 3.3 5.0* 3.8   MG 1.90 2.10 2.00   BUN 33* 45* 28*   CREATININE 4.9* 6.4* 5.0*   LABGLOM 12* 9* 12*   GFRAA 15* 11* 14*       Assessment/Plan        1. Symptomatic bradycardia   - no plans for pacemaker right now    2. CAD- not a

## 2021-04-11 NOTE — PROGRESS NOTES
Paged Dr. Lois Gee regarding critical H&H. Awaiting response/orders. 4/11/21 6:46 AM   [phone] Hospital or Facility: [facility] From: [facility] Pena RE: Penelope Reis 1960 RM: 448 FYI: Lab reported critical Hgb 6.9 and Hct 20.5 this morning. Please advise.  Need Callback: NO CALLBACK REQ C4 PROGRESSIVE CARE  Unread

## 2021-04-11 NOTE — PROGRESS NOTES
Dr. Dara Sanchez notified of critical lab value waiting for response/orders.     4/11/21 3:51 PM   [phone] Hospital or Facility: [facility] From: Naun Gaviria RE: Sherrie Myrick 1960 RM: 448 Critical hgb 6.6 hct 19.6 Need Callback: NO CALLBACK REQ C4 PROGRESSIVE CARE  Read 3:56 PM

## 2021-04-11 NOTE — PROGRESS NOTES
Last data filed at 4/11/2021 0548  Gross per 24 hour   Intake 2005.71 ml   Output 1200 ml   Net 805.71 ml       Physical Exam Performed:  /72   Pulse 82   Temp 98.7 °F (37.1 °C) (Oral)   Resp 16   Ht 5' 10\" (1.778 m)   Wt 284 lb 6.3 oz (129 kg)   SpO2 93%   BMI 40.81 kg/m²     Deferred as patient off the floor currently in HD    Labs:   Recent Labs     04/09/21  0345 04/10/21  0300 04/11/21  0546   WBC 8.8 7.3 8.8   HGB 7.5* 7.3* 6.9*   HCT 22.3* 22.0* 20.5*    223 238     Recent Labs     04/09/21  0345 04/10/21  0300 04/11/21  0546   * 131* 134*   K 4.1 3.9 4.0   CL 93* 92* 95*   CO2 25 26 28   BUN 33* 45* 28*   CREATININE 4.9* 6.4* 5.0*   CALCIUM 8.9 8.8 8.6   PHOS 3.3 5.0* 3.8     Recent Labs     04/09/21  0345 04/10/21  0300 04/11/21  0546   AST 48* 35 27   ALT 33 30 27   BILITOT 0.8 0.6 0.5   ALKPHOS 71 70 69     Recent Labs     04/09/21  0345 04/10/21  0300 04/11/21  0546   INR 1.49* 1.31* 1.23*     No results for input(s): CKTOTAL, TROPONINI in the last 72 hours. Radiology:  XR PELVIS (1-2 VIEWS)   Final Result   Status post right femoral line placement as above.          XR CHEST PORTABLE   Final Result   Cardiomegaly, with mild central venous congestion         IR REPLACE TUNNELED CVC WO SQ PORT/PUMP SAME ACCESS    (Results Pending)     Active Hospital Problems    Diagnosis Date Noted    AV block [I44.30] 04/05/2021    Atrial fibrillation with slow ventricular response (HCC) [I48.91] 04/04/2021    Hypotension [I95.9]     Lightheadedness [R42]     Chronic obstructive pulmonary disease (HCC) [J44.9]     Suspected sleep apnea [R29.818]     ESRD (end stage renal disease) ([address] [N18.6]     DM2 (diabetes mellitus, type 2) ([facility] [E11.9] 04/11/2019    Morbid obesity ([facility] [E66.01] 01/25/2017    Chronic anticoagulation [Z79.01] 11/30/2012    Essential (primary) hypertension [I10] 08/02/2011    Atrial fibrillation, chronic ([facility] [I48.20] 07/12/2011     Assessment/Plan:  Afib w/ SVR (POA)   -  HR and BP significantly improved on dopamine, currently running at 3mcg/kg/min. Patient symptomatically feels better too. -  Carvedilol and diltiazem held. -  Home ASA and statin continued. Apixaban held in case a procedure is performed   such as pacemaker implantation. - EP consulted and recommended cardiology evaluation. Severe multivessel coronary artery disease with subacute occlusion of the RCA   -As evidenced on [facility]   - cardiology did LHC on 4/6/2021 which showed severe multivessel disease with subacute occlusion of RCA. Recommended CT surgery evaluation. CT surgery evaluated the same day and stated that patient is not a surgical candidate. Cardiology plan to proceed with high risk PCI once infection improves     ESRD on HD  -  Tues, Thurs, Sat dialysis schedule. -  Dialysis access is a tunneled R IJ HD catheter. -  Left brachiocephalic fistula was   created 3/3/2021. Vascular (Dr. Daisy Zhou) evaluated patient on 4/9/2021 and cleared for the fistula to be used and remove right IJ HD catheter.  -  Continue home nephrocaps, ferric citrate, and prn midodrine.  -Nephrology consulted and assisting with maintenance dialysis while inpatient. Fever and leukocytosis secondary to central line related BSI-noted on 4/6/2021. Sepsis ruled out. MRSA bacteremia only. Chest x-ray on admission negative. Started on empiric vancomycin/cefepime on 4/6/2021after obtaining blood cultures x2 (1 including from the femoral line) . Showed MRSA bacteremia. Continued Abx . Leukocytosis resolved. Femoral Line remains in place.   -  Reviewed ID consultation to assist with Mx -discontinued cefepime and recommended to continue IV vancomycin. Recommended to remove femoral central line; removal/exchange right IJ TDC. Recommended to consider DREW. repeat blood cultures from 4/8/21 - NGTD  .    -Scheduled for right IJ TDC removal/exchange based on accessing LUE fistula on next dialysis session Tuesday, 4/13/2021.        DM2  -  Hold all oral antidiabetic agents. Start s.c. Insulin regimen based on home regimen. Anemia of Chronic Kidney  Disease -  On long term AC with eliquis. Baseline Hb 8-9 . Currently Hb 7.5 - 7.8 . Monitor CBC jazzmine. In the setting of IV heparin gtt . No signs of Bleeding / Hemolysis at this time . Consider PRBC transfusion for Hb < 7     DVT Prophylaxis: Heparin GTT (Eliquis on hold) in anticipation of [facility] exchange/removal scheduled for 4/13/2021. Diet: DIET CARDIAC; Dietary Nutrition Supplements: Low Calorie High Protein Supplement  Code Status: Full Code    PT/OT Eval Status: Not yet ordered    Dispo -at least 3 to 4 days pending  clinical improvement, cardiology intervention. The note was completed using Dragon -speech recognition software & EMR  . Every effort was made to ensure accuracy; however, inadvertent computerized transcription errors may be present.     Mikal Hamilton MD

## 2021-04-11 NOTE — PROGRESS NOTES
Hospitalist Progress Note      PCP: GIANLUCA Smith - CNP    Date of Admission: 4/3/2021    Chief Complaint: Fatigue, weakness    Hospital Course: Reviewed H&P    Subjective: Patient seen and examined this morning. Reviewed overnight events. Patient currently on heparin gtt. with plans for right [facility] exchange and right femoral central line removal per ID. D/w vascular Dr. Ammon Miner on 4/9/2021-he stated that left arm fistula with healed incisions with excellent bruit and thrill and okay to use fistula and remove tunneled dialysis catheter without exchange. Discussed with nephrology (Dr. Nenita Paige) on 4/10/2021-since patient already completed his session today and he was due for next session next Tuesday , plan to cannulate LUE fistula and if functional , then would recommend discontinuing the right [facility] [place] catheter without exchange.     -Hemoglobin dropped to 6.9 this morning. Patient denies any signs of bleeding. Will repeat H&H at 1400 and consider PRBC transfusion for hemoglobin less than 7.    -Continue rest of the current care. No acute events noted overnight.      Medications:  Reviewed    Infusion Medications    heparin (PORCINE) Infusion 22.3 mL/hr (04/11/21 6023)    dextrose      sodium chloride       Scheduled Medications    epoetin kelin-epbx  10,000 Units Subcutaneous Once per day on Mon Wed Fri    [Held by provider] apixaban  2.5 mg Oral BID    vancomycin (VANCOCIN) intermittent dosing (placeholder)   Other RX Placeholder    aspirin  81 mg Oral Daily    b complex-C-folic acid  1 capsule Oral Daily    ferrous sulfate  325 mg Oral TID WC    pravastatin  40 mg Oral Nightly    insulin glargine  25 Units Subcutaneous BID    insulin lispro  0.05 Units/kg Subcutaneous TID WC    insulin lispro  0-6 Units Subcutaneous TID WC    insulin lispro  0-3 Units Subcutaneous Nightly     PRN Meds: albumin human, heparin (porcine), heparin (porcine), loperamide, heparin (porcine), midodrine, glucose, dextrose, glucagon (rDNA), dextrose, sodium chloride flush, sodium chloride, ondansetron, acetaminophen **OR** acetaminophen      Intake/Output Summary (Last 24 hours) at 4/11/2021 1208  Last data filed at 4/11/2021 1125  Gross per 24 hour   Intake 1645.71 ml   Output 0 ml   Net 1645.71 ml       Physical Exam Performed:  /70   Pulse 96   Temp 98.7 °F (37.1 °C) (Oral)   Resp 18   Ht 5' 10\" (1.778 m)   Wt 284 lb 6.3 oz (129 kg)   SpO2 95%   BMI 40.81 kg/m²     General appearance: No apparent distress, appears stated age and cooperative. HEENT: Pupils equal, round, and reactive to light. Conjunctivae/corneas clear. Neck: Supple, with full range of motion. No jugular venous distention. Trachea midline. Respiratory:  Normal respiratory effort. Clear to auscultation, bilaterally without Rales/Wheezes/Rhonchi. Cardiovascular: Regular rate and rhythm with normal S1/S2 without murmurs, rubs or gallops. Abdomen: Soft, non-tender, non-distended with normal bowel sounds. Musculoskeletal: No clubbing, cyanosis or edema bilaterally. Full range of motion without deformity. Skin: Skin color, texture, turgor normal.  No rashes or lesions. Neurologic:  Neurovascularly intact without any focal sensory/motor deficits.  Cranial nerves: II-XII intact, grossly non-focal.  Psychiatric: Alert and oriented, thought content appropriate, normal insight  Capillary Refill: Brisk,< 3 seconds   Peripheral Pulses: +2 palpable, equal bilaterally     Labs:   Recent Labs     04/09/21  0345 04/10/21  0300 04/11/21  0546   WBC 8.8 7.3 8.8   HGB 7.5* 7.3* 6.9*   HCT 22.3* 22.0* 20.5*    223 238     Recent Labs     04/09/21  0345 04/10/21  0300 04/11/21  0546   * 131* 134*   K 4.1 3.9 4.0   CL 93* 92* 95*   CO2 25 26 28   BUN 33* 45* 28*   CREATININE 4.9* 6.4* 5.0*   CALCIUM 8.9 8.8 8.6   PHOS 3.3 5.0* 3.8     Recent Labs     04/09/21  0345 04/10/21  0300 04/11/21  0546   AST 48* 35 27   ALT 33 30 27   BILITOT 0.8 0.6 0.5   ALKPHOS 71 70 69     Recent Labs     04/09/21  0345 04/10/21  0300 04/11/21  0546   INR 1.49* 1.31* 1.23*     No results for input(s): CKTOTAL, TROPONINI in the last 72 hours. Radiology:  XR PELVIS (1-2 VIEWS)   Final Result   Status post right femoral line placement as above. XR CHEST PORTABLE   Final Result   Cardiomegaly, with mild central venous congestion         IR REPLACE TUNNELED CVC WO SQ PORT/PUMP SAME ACCESS    (Results Pending)     Active Hospital Problems    Diagnosis Date Noted    AV block [I44.30] 04/05/2021    Atrial fibrillation with slow ventricular response (HCC) [I48.91] 04/04/2021    Hypotension [I95.9]     Lightheadedness [R42]     Chronic obstructive pulmonary disease (HCC) [J44.9]     Suspected sleep apnea [R29.818]     ESRD (end stage renal disease) [facility] [N18.6]     DM2 (diabetes mellitus, type 2) ([facility] [E11.9] 04/11/2019    Morbid obesity ([facility] [E66.01] 01/25/2017    Chronic anticoagulation [Z79.01] 11/30/2012    Essential (primary) hypertension [I10] 08/02/2011    Atrial fibrillation, chronic (HCC) [I48.20] 07/12/2011     Assessment/Plan:  Afib w/ SVR (POA)   -  HR and BP significantly improved on dopamine, currently running at 3mcg/kg/min. Patient symptomatically feels better too. -  Carvedilol and diltiazem held. -  Home ASA and statin continued. Apixaban held in case a procedure is performed   such as pacemaker implantation. - EP consulted and recommended cardiology evaluation. Severe multivessel coronary artery disease with subacute occlusion of the RCA   -As evidenced on [facility]   - cardiology did LHC on 4/6/2021 which showed severe multivessel disease with subacute occlusion of RCA. Recommended CT surgery evaluation. CT surgery evaluated the same day and stated that patient is not a surgical candidate. Cardiology plan to proceed with high risk PCI once infection improves     ESRD on HD  -  Tues, Thurs, Sat dialysis schedule.   -  Dialysis access is a tunneled R IJ HD catheter. -  Left brachiocephalic fistula was   created 3/3/2021. Vascular (Dr. Santosh Jimenes) evaluated patient on 4/9/2021 and cleared for the fistula to be used and remove right IJ HD catheter.  -  Continue home nephrocaps, ferric citrate, and prn midodrine.  -Nephrology consulted and assisting with maintenance dialysis while inpatient. Fever and leukocytosis secondary to central line related BSI-noted on 4/6/2021. Sepsis ruled out. MRSA bacteremia only. Chest x-ray on admission negative. Started on empiric vancomycin/cefepime on 4/6/2021after obtaining blood cultures x2 (1 including from the femoral line) . Showed MRSA bacteremia. Continued Abx . Leukocytosis resolved. Femoral Line remains in place.   -  Reviewed ID consultation to assist with Mx -discontinued cefepime and recommended to continue IV vancomycin. Recommended to remove femoral central line; removal/exchange right IJ TDC. Recommended to consider DREW. repeat blood cultures from 4/8/21 - NGTD  . -Scheduled for right IJ TDC removal/exchange based on accessing LUE fistula on next dialysis session Tuesday, 4/13/2021.      DM2  -  Hold all oral antidiabetic agents. Start s.c. Insulin regimen based on home regimen. Anemia of Chronic Kidney  Disease -  On long term AC with eliquis. Currently Eliquis on hold and on heparin GTT  Baseline Hb 8-9 . Hemoglobin dropped to 6.9 this morning. No signs of bleeding/hemolysis. Will repeat H&H at 1400 . Continue IV heparin gtt . Consider PRBC transfusion for Hb < 7     DVT Prophylaxis: Heparin GTT (Eliquis on hold) in anticipation of [facility] exchange/removal scheduled for 4/13/2021. Diet: DIET CARDIAC; Dietary Nutrition Supplements: Low Calorie High Protein Supplement  Code Status: Full Code    PT/OT Eval Status: Not yet ordered    Dispo -at least 3 to 4 days pending  clinical improvement, cardiology intervention.      The note was completed using Dragon -speech recognition software & EMR  . Every effort was made to ensure accuracy; however, inadvertent computerized transcription errors may be present.     Meryl Nicole MD

## 2021-04-11 NOTE — FLOWSHEET NOTE
04/11/21 0552   Vital Signs   Temp 98.7 °F (37.1 °C)   Temp Source Oral   Pulse 82   Heart Rate Source Monitor   Resp 16   /72   BP Location Right upper arm   MAP (mmHg) 87   Patient Position Semi fowlers;Supine   Level of Consciousness Alert (0)   MEWS Score 1   Patient Currently in Pain Denies   Pain Assessment   Pain Assessment 0-10   Pain Level 0   Patient's Stated Pain Goal No pain   Oxygen Therapy   SpO2 93 %   Pulse Oximeter Device Mode Intermittent   Pulse Oximeter Device Location Finger   O2 Device None (Room air)   Patient Observation   Observations Agitated, cursing at staff and refusing care @ this time

## 2021-04-12 LAB
A/G RATIO: 1.1 (ref 1.1–2.2)
ALBUMIN SERPL-MCNC: 3.5 G/DL (ref 3.4–5)
ALP BLD-CCNC: 66 U/L (ref 40–129)
ALT SERPL-CCNC: 25 U/L (ref 10–40)
ANION GAP SERPL CALCULATED.3IONS-SCNC: 16 MMOL/L (ref 3–16)
APTT: 71.3 SEC (ref 24.2–36.2)
APTT: 75.5 SEC (ref 24.2–36.2)
AST SERPL-CCNC: 22 U/L (ref 15–37)
BASOPHILS ABSOLUTE: 0.2 K/UL (ref 0–0.2)
BASOPHILS ABSOLUTE: 0.2 K/UL (ref 0–0.2)
BASOPHILS RELATIVE PERCENT: 2.3 %
BASOPHILS RELATIVE PERCENT: 2.4 %
BILIRUB SERPL-MCNC: 0.5 MG/DL (ref 0–1)
BLOOD CULTURE, ROUTINE: NORMAL
BUN BLDV-MCNC: 36 MG/DL (ref 7–20)
CALCIUM SERPL-MCNC: 8.7 MG/DL (ref 8.3–10.6)
CHLORIDE BLD-SCNC: 93 MMOL/L (ref 99–110)
CO2: 24 MMOL/L (ref 21–32)
CREAT SERPL-MCNC: 6.2 MG/DL (ref 0.9–1.3)
CULTURE, BLOOD 2: NORMAL
EOSINOPHILS ABSOLUTE: 0.2 K/UL (ref 0–0.6)
EOSINOPHILS ABSOLUTE: 0.2 K/UL (ref 0–0.6)
EOSINOPHILS RELATIVE PERCENT: 2.3 %
EOSINOPHILS RELATIVE PERCENT: 2.4 %
GFR AFRICAN AMERICAN: 11
GFR NON-AFRICAN AMERICAN: 9
GLOBULIN: 3.2 G/DL
GLUCOSE BLD-MCNC: 130 MG/DL (ref 70–99)
GLUCOSE BLD-MCNC: 131 MG/DL (ref 70–99)
GLUCOSE BLD-MCNC: 140 MG/DL (ref 70–99)
GLUCOSE BLD-MCNC: 154 MG/DL (ref 70–99)
GLUCOSE BLD-MCNC: 214 MG/DL (ref 70–99)
HCT VFR BLD CALC: 22.3 % (ref 40.5–52.5)
HCT VFR BLD CALC: 22.5 % (ref 40.5–52.5)
HEMATOLOGY PATH CONSULT: NO
HEMOGLOBIN: 7.4 G/DL (ref 13.5–17.5)
HEMOGLOBIN: 7.4 G/DL (ref 13.5–17.5)
INR BLD: 1.24 (ref 0.86–1.14)
LYMPHOCYTES ABSOLUTE: 1.7 K/UL (ref 1–5.1)
LYMPHOCYTES ABSOLUTE: 1.8 K/UL (ref 1–5.1)
LYMPHOCYTES RELATIVE PERCENT: 21.1 %
LYMPHOCYTES RELATIVE PERCENT: 21.5 %
MAGNESIUM: 1.9 MG/DL (ref 1.8–2.4)
MCH RBC QN AUTO: 35.8 PG (ref 26–34)
MCH RBC QN AUTO: 36.1 PG (ref 26–34)
MCHC RBC AUTO-ENTMCNC: 32.9 G/DL (ref 31–36)
MCHC RBC AUTO-ENTMCNC: 33.5 G/DL (ref 31–36)
MCV RBC AUTO: 107.9 FL (ref 80–100)
MCV RBC AUTO: 108.8 FL (ref 80–100)
MONOCYTES ABSOLUTE: 0.8 K/UL (ref 0–1.3)
MONOCYTES ABSOLUTE: 0.9 K/UL (ref 0–1.3)
MONOCYTES RELATIVE PERCENT: 11.1 %
MONOCYTES RELATIVE PERCENT: 9.9 %
NEUTROPHILS ABSOLUTE: 5 K/UL (ref 1.7–7.7)
NEUTROPHILS ABSOLUTE: 5.5 K/UL (ref 1.7–7.7)
NEUTROPHILS RELATIVE PERCENT: 63.1 %
NEUTROPHILS RELATIVE PERCENT: 63.9 %
PDW BLD-RTO: 23.5 % (ref 12.4–15.4)
PDW BLD-RTO: 23.6 % (ref 12.4–15.4)
PERFORMED ON: ABNORMAL
PHOSPHORUS: 4.7 MG/DL (ref 2.5–4.9)
PLATELET # BLD: 245 K/UL (ref 135–450)
PLATELET # BLD: 248 K/UL (ref 135–450)
PMV BLD AUTO: 8.1 FL (ref 5–10.5)
PMV BLD AUTO: 8.2 FL (ref 5–10.5)
POTASSIUM SERPL-SCNC: 3.9 MMOL/L (ref 3.5–5.1)
PROTHROMBIN TIME: 14.4 SEC (ref 10–13.2)
RBC # BLD: 2.06 M/UL (ref 4.2–5.9)
RBC # BLD: 2.07 M/UL (ref 4.2–5.9)
SODIUM BLD-SCNC: 133 MMOL/L (ref 136–145)
TOTAL PROTEIN: 6.7 G/DL (ref 6.4–8.2)
VANCOMYCIN RANDOM: 15.5 UG/ML
WBC # BLD: 8 K/UL (ref 4–11)
WBC # BLD: 8.6 K/UL (ref 4–11)

## 2021-04-12 PROCEDURE — 85730 THROMBOPLASTIN TIME PARTIAL: CPT

## 2021-04-12 PROCEDURE — 36415 COLL VENOUS BLD VENIPUNCTURE: CPT

## 2021-04-12 PROCEDURE — 80053 COMPREHEN METABOLIC PANEL: CPT

## 2021-04-12 PROCEDURE — 36592 COLLECT BLOOD FROM PICC: CPT

## 2021-04-12 PROCEDURE — 6370000000 HC RX 637 (ALT 250 FOR IP): Performed by: NURSE PRACTITIONER

## 2021-04-12 PROCEDURE — 80202 ASSAY OF VANCOMYCIN: CPT

## 2021-04-12 PROCEDURE — 99233 SBSQ HOSP IP/OBS HIGH 50: CPT | Performed by: NURSE PRACTITIONER

## 2021-04-12 PROCEDURE — 2580000003 HC RX 258: Performed by: INTERNAL MEDICINE

## 2021-04-12 PROCEDURE — 84100 ASSAY OF PHOSPHORUS: CPT

## 2021-04-12 PROCEDURE — 2060000000 HC ICU INTERMEDIATE R&B

## 2021-04-12 PROCEDURE — 85610 PROTHROMBIN TIME: CPT

## 2021-04-12 PROCEDURE — 6360000002 HC RX W HCPCS: Performed by: INTERNAL MEDICINE

## 2021-04-12 PROCEDURE — 85025 COMPLETE CBC W/AUTO DIFF WBC: CPT

## 2021-04-12 PROCEDURE — 83735 ASSAY OF MAGNESIUM: CPT

## 2021-04-12 PROCEDURE — 6370000000 HC RX 637 (ALT 250 FOR IP): Performed by: INTERNAL MEDICINE

## 2021-04-12 RX ORDER — HEPARIN SODIUM 1000 [USP'U]/ML
4000 INJECTION, SOLUTION INTRAVENOUS; SUBCUTANEOUS PRN
Status: DISCONTINUED | OUTPATIENT
Start: 2021-04-12 | End: 2021-04-13 | Stop reason: ALTCHOICE

## 2021-04-12 RX ORDER — HEPARIN SODIUM 10000 [USP'U]/100ML
20.4 INJECTION, SOLUTION INTRAVENOUS CONTINUOUS
Status: DISCONTINUED | OUTPATIENT
Start: 2021-04-12 | End: 2021-04-13

## 2021-04-12 RX ORDER — HEPARIN SODIUM 1000 [USP'U]/ML
2000 INJECTION, SOLUTION INTRAVENOUS; SUBCUTANEOUS PRN
Status: DISCONTINUED | OUTPATIENT
Start: 2021-04-12 | End: 2021-04-13 | Stop reason: ALTCHOICE

## 2021-04-12 RX ORDER — METOPROLOL SUCCINATE 25 MG/1
25 TABLET, EXTENDED RELEASE ORAL DAILY
Status: DISCONTINUED | OUTPATIENT
Start: 2021-04-12 | End: 2021-04-14 | Stop reason: HOSPADM

## 2021-04-12 RX ADMIN — NEPHROCAP 1 MG: 1 CAP ORAL at 09:59

## 2021-04-12 RX ADMIN — METOPROLOL SUCCINATE 25 MG: 25 TABLET, EXTENDED RELEASE ORAL at 11:54

## 2021-04-12 RX ADMIN — HEPARIN SODIUM 22.3 ML/HR: 10000 INJECTION, SOLUTION INTRAVENOUS at 07:51

## 2021-04-12 RX ADMIN — FERROUS SULFATE TAB 325 MG (65 MG ELEMENTAL FE) 325 MG: 325 (65 FE) TAB at 13:34

## 2021-04-12 RX ADMIN — INSULIN LISPRO 1 UNITS: 100 INJECTION, SOLUTION INTRAVENOUS; SUBCUTANEOUS at 10:03

## 2021-04-12 RX ADMIN — HEPARIN SODIUM 22.3 ML/HR: 10000 INJECTION, SOLUTION INTRAVENOUS at 14:48

## 2021-04-12 RX ADMIN — FERROUS SULFATE TAB 325 MG (65 MG ELEMENTAL FE) 325 MG: 325 (65 FE) TAB at 17:24

## 2021-04-12 RX ADMIN — Medication 10 ML: at 06:34

## 2021-04-12 RX ADMIN — INSULIN LISPRO 7 UNITS: 100 INJECTION, SOLUTION INTRAVENOUS; SUBCUTANEOUS at 12:18

## 2021-04-12 RX ADMIN — INSULIN LISPRO 7 UNITS: 100 INJECTION, SOLUTION INTRAVENOUS; SUBCUTANEOUS at 10:02

## 2021-04-12 RX ADMIN — INSULIN GLARGINE 25 UNITS: 100 INJECTION, SOLUTION SUBCUTANEOUS at 09:59

## 2021-04-12 RX ADMIN — ASPIRIN 81 MG: 81 TABLET, COATED ORAL at 10:00

## 2021-04-12 RX ADMIN — PRAVASTATIN SODIUM 40 MG: 40 TABLET ORAL at 20:57

## 2021-04-12 RX ADMIN — INSULIN LISPRO 7 UNITS: 100 INJECTION, SOLUTION INTRAVENOUS; SUBCUTANEOUS at 17:15

## 2021-04-12 RX ADMIN — VANCOMYCIN HYDROCHLORIDE 1000 MG: 1 INJECTION, POWDER, LYOPHILIZED, FOR SOLUTION INTRAVENOUS at 06:34

## 2021-04-12 RX ADMIN — INSULIN LISPRO 2 UNITS: 100 INJECTION, SOLUTION INTRAVENOUS; SUBCUTANEOUS at 12:18

## 2021-04-12 RX ADMIN — INSULIN GLARGINE 25 UNITS: 100 INJECTION, SOLUTION SUBCUTANEOUS at 20:57

## 2021-04-12 RX ADMIN — FERROUS SULFATE TAB 325 MG (65 MG ELEMENTAL FE) 325 MG: 325 (65 FE) TAB at 10:00

## 2021-04-12 RX ADMIN — HEPARIN SODIUM 22.3 ML/HR: 10000 INJECTION, SOLUTION INTRAVENOUS at 04:27

## 2021-04-12 ASSESSMENT — PAIN SCALES - GENERAL
PAINLEVEL_OUTOF10: 0
PAINLEVEL_OUTOF10: 0

## 2021-04-12 NOTE — PROGRESS NOTES
Spoke with primary RN Vangie Escalera, stated the plan was to use fistula at dialysis tomorrow and then see if there was a need for another tunneled dialysis catheter placement, they will place new order if tunneled dialysis catheter is needed.

## 2021-04-12 NOTE — PLAN OF CARE
Problem: Cardiac:  Goal: Ability to maintain an adequate cardiac output will improve  Description: Ability to maintain an adequate cardiac output will improve  Intervention: Monitor signs and symptoms of cardiac arrhythmias  Note: Pt Atrial Fibrillation this shift, controlled. Pt on heparin drip currently, eliquis on hold at this time. Problem: Metabolic:  Goal: Ability to maintain clinical measurements within normal limits will improve  Description: Ability to maintain clinical measurements within normal limits will improve  Intervention: Evaluate blood glucose measurement  Note: Blood sugars 100-200s this shift. Accu check ACHS. Pt on scheduled 7 units of humalog with meals and low SSI humalog as well. Lantus 25 units twice a day.

## 2021-04-12 NOTE — PROGRESS NOTES
Heparin drip discontinued at this time. Writer will remove right femoral line in 2 hours.   Rosalinda Lyman [facility]  4/12/2021

## 2021-04-12 NOTE — PROGRESS NOTES
Writer spoke with Dr. Agustin Larsen regarding plan, will remove R femoral access due to pt having a peripheral IV for heparin drip to infuse through and will resume heparin drip as ordered, with daily APTT's ordered.   [facility]  4/12/2021

## 2021-04-12 NOTE — CARE COORDINATION
Chart reviewed day #8. Spoke with RN who just pulled patient's femoral line. Patient still on heparin drip. Per Dr. Pat Mims: Dialysis on Sat did not cannulate fistula. Ok to use fistula and remove dialysis catheter    Will attempt to have therapy work with patient tomorrow. Will need orders placed. Will continue to follow.

## 2021-04-12 NOTE — FLOWSHEET NOTE
04/11/21 2105   Vital Signs   Temp 98.3 °F (36.8 °C)   Temp Source Oral   Pulse 102   Heart Rate Source Monitor   Resp 18   /68   BP Location Right upper arm   MAP (mmHg) 84   Patient Position Sitting   Level of Consciousness Alert (0)   MEWS Score 2   Patient Currently in Pain Denies   Pain Assessment   Pain Assessment 0-10   Pain Level 0   Patient's Stated Pain Goal No pain   Oxygen Therapy   SpO2 96 %   Pulse Oximeter Device Mode Intermittent   Pulse Oximeter Device Location Finger   O2 Device None (Room air)   O2 Flow Rate (L/min) 0 L/min

## 2021-04-12 NOTE — PROGRESS NOTES
Pt resting quietly in bed. Pt denies pain, discomfort or shortness of breath. Lungs diminished. Atrial Fibrillation on tele. Heparin drip infusing at ordered rate 22. 3mL/hr, APTT scheduled for 2030. R dressing from where fem line was removed with no new drainage at this time; previously drainage marked. will continue to monitor. Pt denies any needs at this time. Bedside table and call light within reach. Pt instructed to call out for needs and assistance.   [facility]  4/12/2021

## 2021-04-12 NOTE — PROGRESS NOTES
/67   Pulse 86   Temp 99.2 °F (37.3 °C) (Oral)   Resp 18   Ht 5' 10\" (1.778 m)   Wt 281 lb 12 oz (127.8 kg)   SpO2 96%   BMI 40.43 kg/m²  on room air. Pt resting quietly in bed at this time. Pt denies pain, discomfort or shortness of breath at this time. Lungs diminished. Atrial Fibrillation on tele. Heparin drip infusing at ordered rate 22. 3mL/hr. Writer was with Brenda Thomas during rounds, plans to stop heparin drip. Writer will remove femoral line 2 hours after heparin drip stopped. Pt denies any needs at this time. Bedside table and call light within reach. Pt instructed to call out for any needs and assistance. Will continue to monitor.   [facility]  4/12/2021

## 2021-04-12 NOTE — PROGRESS NOTES
sodium chloride, ondansetron, acetaminophen **OR** acetaminophen      Intake/Output Summary (Last 24 hours) at 4/12/2021 1137  Last data filed at 4/12/2021 1120  Gross per 24 hour   Intake 3158.19 ml   Output    Net 3158.19 ml       Physical Exam Performed:  /67   Pulse 86   Temp 99.2 °F (37.3 °C) (Oral)   Resp 18   Ht 5' 10\" (1.778 m)   Wt 281 lb 12 oz (127.8 kg)   SpO2 96%   BMI 40.43 kg/m²     General appearance: No apparent distress, appears stated age and cooperative. HEENT: Pupils equal, round, and reactive to light. Conjunctivae/corneas clear. Neck: Supple, with full range of motion. No jugular venous distention. Trachea midline. Respiratory:  Normal respiratory effort. Clear to auscultation, bilaterally without Rales/Wheezes/Rhonchi. Cardiovascular: Regular rate and rhythm with normal S1/S2 without murmurs, rubs or gallops. Abdomen: Soft, non-tender, non-distended with normal bowel sounds. Musculoskeletal: No clubbing, cyanosis or edema bilaterally. Full range of motion without deformity. Skin: Skin color, texture, turgor normal.  No rashes or lesions. Neurologic:  Neurovascularly intact without any focal sensory/motor deficits. Cranial nerves: II-XII intact, grossly non-focal.  Psychiatric: Alert and oriented, thought content appropriate, normal insight  Capillary Refill: Brisk,< 3 seconds   Peripheral Pulses: +2 palpable, equal bilaterally     Labs:   Recent Labs     04/11/21  0546 04/11/21  0546 04/11/21  2142 04/12/21  0425 04/12/21  0513   WBC 8.8  --   --  8.6 8.0   HGB 6.9*   < > 7.1* 7.4* 7.4*   HCT 20.5*   < > 21.4* 22.5* 22.3*     --   --  245 248    < > = values in this interval not displayed.      Recent Labs     04/10/21  0300 04/11/21  0546 04/12/21  0514   * 134* 133*   K 3.9 4.0 3.9   CL 92* 95* 93*   CO2 26 28 24   BUN 45* 28* 36*   CREATININE 6.4* 5.0* 6.2*   CALCIUM 8.8 8.6 8.7   PHOS 5.0* 3.8 4.7     Recent Labs     04/10/21  0300 04/11/21  0546 04/12/21 0514   AST 35 27 22   ALT 30 27 25   BILITOT 0.6 0.5 0.5   ALKPHOS 70 69 66     Recent Labs     04/10/21  0300 04/11/21  0546 04/12/21  0513   INR 1.31* 1.23* 1.24*     No results for input(s): Sylvie Dinero in the last 72 hours. Radiology:  XR PELVIS (1-2 VIEWS)   Final Result   Status post right femoral line placement as above. XR CHEST PORTABLE   Final Result   Cardiomegaly, with mild central venous congestion           Active Hospital Problems    Diagnosis Date Noted    NSTEMI (non-ST elevated myocardial infarction) ([facility] [address] [I21.4]     AV block [I44.30] 04/05/2021    Atrial fibrillation with slow ventricular response (HCC) [I48.91] 04/04/2021    Hypotension [I95.9]     Lightheadedness [R42]     Chronic obstructive pulmonary disease (HCC) [J44.9]     Suspected sleep apnea [R29.818]     [facility] [N18.6]     DM2 (diabetes mellitus, type 2) ([facility] [E11.9] 04/11/2019    Morbid obesity ([facility] [E66.01] 01/25/2017    Chronic anticoagulation [Z79.01] 11/30/2012    Essential (primary) hypertension [I10] 08/02/2011    Atrial fibrillation, chronic (HCC) [I48.20] 07/12/2011     Assessment/Plan:  Afib w/ SVR (POA)   -  HR and BP significantly improved on dopamine, currently running at 3mcg/kg/min. Patient symptomatically feels better too. -  Carvedilol and diltiazem held. -  Home ASA and statin continued. Apixaban held in case a procedure is performed   such as pacemaker implantation. - EP consulted and recommended cardiology evaluation. Severe multivessel coronary artery disease with subacute occlusion of the RCA   -As evidenced on [facility]   - cardiology did LHC on 4/6/2021 which showed severe multivessel disease with subacute occlusion of RCA. Recommended CT surgery evaluation. CT surgery evaluated the same day and stated that patient is not a surgical candidate.   Cardiology plan to proceed with high risk PCI once infection improves     ESRD on HD  -  Linda Powell, Sat dialysis schedule. -  Dialysis access is a tunneled R IJ HD catheter. -  Left brachiocephalic fistula was   created 3/3/2021. Vascular (Dr. Minerva Lennox) evaluated patient on 4/9/2021 and cleared for the fistula to be used and remove right IJ HD catheter.  -  Continue home nephrocaps, ferric citrate, and prn midodrine.  -Nephrology consulted and assisting with maintenance dialysis while inpatient. Fever and leukocytosis secondary to central line related BSI-noted on 4/6/2021. Sepsis ruled out. MRSA bacteremia only. Chest x-ray on admission negative. Started on empiric vancomycin/cefepime on 4/6/2021after obtaining blood cultures x2 (1 including from the femoral line) . Showed MRSA bacteremia. Continued Abx . Leukocytosis resolved. Femoral Line remains in place.   -  Reviewed ID consultation to assist with Mx -discontinued cefepime and recommended to continue IV vancomycin. Recommended to remove femoral central line; removal/exchange right IJ TDC. Recommended to consider DREW. repeat blood cultures from 4/8/21 - NGTD  . -Scheduled for right IJ TDC removal/exchange based on accessing LUE fistula on next dialysis session Tuesday, 4/13/2021.      DM2  -  Hold all oral antidiabetic agents. Start s.c. Insulin regimen based on home regimen. Anemia of Chronic Kidney  Disease -  On long term AC with eliquis. Currently Eliquis on hold and on heparin GTT  Baseline Hb 8-9 . Hemoglobin dropped to 6.6 on 4/11/21 for which he received 1 unit PRBC transfusion . No signs of bleeding/hemolysis. Continue IV heparin gtt . Monitor H&H . Consider PRBC transfusion for Hb < 7     DVT Prophylaxis: Heparin GTT (Eliquis on hold) in anticipation of [address] exchange/removal scheduled for 4/13/2021. Diet: DIET CARDIAC;   Dietary Nutrition Supplements: Low Calorie High Protein Supplement  Code Status: Full Code    PT/OT Eval Status: Ambulatory     Dispo -at least 3 to 4 days pending  clinical improvement, cardiology intervention. The note was completed using Dragon -speech recognition software & EMR  . Every effort was made to ensure accuracy; however, inadvertent computerized transcription errors may be present.     Willi Sanabria MD

## 2021-04-12 NOTE — PROGRESS NOTES
04/11/21  0546 04/11/21  2142 04/12/21  0425 04/12/21  0513   WBC 8.8  --   --  8.6 8.0   HGB 6.9*   < > 7.1* 7.4* 7.4*   HCT 20.5*   < > 21.4* 22.5* 22.3*   .6*  --   --  108.8* 107.9*     --   --  245 248    < > = values in this interval not displayed. Recent Labs     04/10/21  0300 04/11/21  0546 04/12/21  0514   * 134* 133*   K 3.9 4.0 3.9   CL 92* 95* 93*   CO2 26 28 24   GLUCOSE 181* 135* 130*   PHOS 5.0* 3.8 4.7   MG 2.10 2.00 1.90   BUN 45* 28* 36*   CREATININE 6.4* 5.0* 6.2*   LABGLOM 9* 12* 9*   GFRAA 11* 14* 11*           Assessment/Plan: 1. Symptomatic bradycardia   - no plans for pacemaker right now     2. CAD- not a candidate for surgery  -medical management     3. End stage renal disease -HD TTS  -New TW 125kg  -access:S/p revision LUE with stent graft 3/3/21, recently dialyzing via [facility]  -Dr. Alicia Nguyen has cleared fistula for use. -- Will use fistula tomorrow. Planning for d/c of TDC as long as cannulation goes well. If problems with cannulation might be best to go ahead and just exchange the [facility] so he has a back up dialysis access until confident with cannulation     4. Anemia of chronic disease -   PRISCA with HD. PRN transfusions.       5. DM2     6. Fever- MRSA bacteremia  -vanco per ID; pharmacy dosing, can be given with dialysis. Ok to remove femoral line. Discussed with pharmacist and nurse. - repeat blood cultures are negative      Please do not hesitate to contact me at [phone] if with questions. Thank you!     Douglas Elder MD  4/12/2021  [facility]

## 2021-04-12 NOTE — PROGRESS NOTES
Vascular    LUE fistula with excellent thrill and bruit. Dialysis on Sat did not cannulate fistula. Ok to use fistula and remove dialysis catheter. Call if issues.

## 2021-04-12 NOTE — PROGRESS NOTES
[facility]   Daily Progress Note    Admit Date:  4/3/2021  HPI:    Chief Complaint   Patient presents with    Dizziness     dialysis pt. dizziness starting around 11pm and abdominal cramping; bradycardic and hypotension in route;400ml NS in route       Presented with fatigue. Noted rhythm was a junctional rhythm with bradycardia, troponins were elevated. History of ESRD on HD, diastolic CHF, A. fib, anemia. He was found to have an MRSA bacteremia without sepsis. He underwent LHC for elevated troponins and found to have severe CAD. Was referred to CVTS who found the patient a poor candidate for surgical revascularization. Subjective:  Mr. Mello Postal sitting up in bed. He denies any chest pain. He does have shortness of breath with any activity. However this is unchanged from his chronic dyspnea. Objective:   Patient Vitals for the past 24 hrs:   BP Temp Temp src Pulse Resp SpO2 Height Weight   04/12/21 0431 102/60 98.8 °F (37.1 °C) Oral 78 18 94 % 5' 10\" (1.778 m) 281 lb 12 oz (127.8 kg)   04/12/21 0034 123/72 98.5 °F (36.9 °C) Oral 65 16 95 %     04/11/21 2107    87  95 %     04/11/21 2105 118/68 98.3 °F (36.8 °C) Oral 102 18 96 %     04/11/21 1830 127/75 98.9 °F (37.2 °C) Oral 82 18 97 %     04/11/21 1803 99/68 99 °F (37.2 °C)  81 18      04/11/21 1608 98/61 98.9 °F (37.2 °C) Oral 82 16 97 %     04/11/21 1228 (!) 92/57 98.6 °F (37 °C) Oral 81 18 95 %         Intake/Output Summary (Last 24 hours) at 4/12/2021 0902  Last data filed at 4/12/2021 0427  Gross per 24 hour   Intake 2767.21 ml   Output    Net 2767.21 ml     Wt Readings from Last 3 Encounters:   04/12/21 281 lb 12 oz (127.8 kg)   03/03/21 (!) 306 lb 7 oz (139 kg)   02/18/21 280 lb (127 kg)         ASSESSMENT:   1. NSTEMI: on IV Heparin, no angina  2. CAD: on ASA, statin  3. Atrial fibrillation: Permanent, bradycardia, on IV heparin/Eliquis on hold until no further procedures planned  4.  Anemia: Status post 1 unit PRBCs on 4/11/2021  5. ESRD on HD: per nephrology, TRSa schedule; left arm fistula okay to use per vascular  6. MRSA bacteremia: IV vancomycin  7. History of GI bleed  8. Diabetes mellitus      PLAN:  1. Stop IV Heparin then resume Eliquis  2. Okay to remove femoral line  3. Dyspnea likely multifactorial - anemia, Afib, deconditioning, CAD, MR  4. Would like to add low dose BB if BP allows - will add Toprol with hold parameters    GIANLUCA Khan - CNP, 4/12/2021, 9:02 AM  [facility] [address]   [phone]       Telemetry: Afib 80-90's  NYHA: III    Physical Exam:  General:  Awake, alert, NAD  Skin:  Warm and dry  Neck:  JVP unable to assess  Chest:  Clear to auscultation posteriorly  Cardiovascular:  Irreg Irreg, normal W5R8, soft systolic murmur, no g/r  Abdomen:  Soft, nontender, +bowel sounds  Extremities:  1+ BLE edema        Medications:    epoetin kelin-epbx  10,000 Units Subcutaneous Once per day on Mon Wed Fri    [Held by provider] apixaban  2.5 mg Oral BID    vancomycin (VANCOCIN) intermittent dosing (placeholder)   Other RX Placeholder    aspirin  81 mg Oral Daily    b complex-C-folic acid  1 capsule Oral Daily    ferrous sulfate  325 mg Oral TID WC    pravastatin  40 mg Oral Nightly    insulin glargine  25 Units Subcutaneous BID    insulin lispro  0.05 Units/kg Subcutaneous TID WC    insulin lispro  0-6 Units Subcutaneous TID WC    insulin lispro  0-3 Units Subcutaneous Nightly      heparin (PORCINE) Infusion 22.3 mL/hr (04/12/21 0751)    dextrose      sodium chloride         Lab Data: Lab results independently reviewed and analyzed by myself 4/12/21   CBC:   Recent Labs     04/11/21  0546 04/11/21  0546 04/11/21  2142 04/12/21  0425 04/12/21  0513   WBC 8.8  --   --  8.6 8.0   HGB 6.9*   < > 7.1* 7.4* 7.4*     --   --  245 248    < > = values in this interval not displayed.      BMP:    Recent Labs     04/10/21  0300 04/11/21  0546 04/12/21  0514   * 134* 133*   K 3.9 4.0 3.9   CO2 26 28 24   BUN 45* 28* 36*   CREATININE 6.4* 5.0* 6.2*     INR:    Recent Labs     04/10/21  0300 04/11/21  0546 04/12/21  0513   INR 1.31* 1.23* 1.24*     BNP:  No results for input(s): PROBNP in the last 72 hours. Cardiac Enzymes: No results for input(s): TROPONINI in the last 72 hours. Lipids:   Lab Results   Component Value Date    TRIG 284 07/10/2011    HDL 27 07/10/2011    LDLCALC 70 07/10/2011       Cardiac Imaging:    Echo 4/5/2021:  Technically difficult examination. Low systolic function with an estimated ejection fraction of 50%. Left ventricular diastolic filling pressure are indeterminate. Bi-atrial enlargement. Moderate posterior mitral annular calcification with restricted movement of   posterior leaflet. The maximum mitral valve pressure gradient is 9 mmHg and the mean pressure   gradient is 3 mmHg with max velocity of 1.48 m/sec. Mild MS. Moderate mitral regurgitation. Trace aortic and pulmonic regurgitation. Systolic pulmonary artery pressure (SPAP) is normal and estimated at 32 mmHg   (right atrial pressure 8 mmHg). Coronary angiogram 4/5/2021:  1. Left heart catheterization  2. Selective left and right coronary angiogram  3. Left ventriculography   Procedure Findings:  1. Severe multi vessel coronary artery diease              ~100% mid RCA -sub acute occlusion. ~70-80% LAD              ~70% OM  2. Normal left ventricular function with EF estimated at 55-60%  3. Normal left heart hemodynamics  Details:              Sinan Fuentes was brought to the cardiac catheterization lab in a fasting state after informed consent was obtained. If the patient was able to provide written consent, it was obtained. The patient's vitals were monitored through out the procedure. The patient was sedated using the appropriate levels of sedation and ASA guidelines.               The appropriate right radial access site area was prepped and drapped in a sterile fashion. The area was anesthetized with 2% lidocaine. Using the modified Seldinger technique, an arterial sheath was introduced into the arterial access site using a single anterior wall puncture. The sheath was flushed and prepped in usual fashion. Catheters used during the procedure included 5 [place] TIG 4.0 catheter. In addition, we used a EBU 3.5, EBU 4.06 Western Jeri catheters. The catheters were advanced and removed over a .035\" wire, into the appropriate positions. Multiple angiographic views were obtained. An LV gram was obtained. Findings:  1. Left main coronary artery was normal. It gave off the left anterior descending artery and left circumflex. 2. Left anterior descending artery has severe atherosclerotic disease. It was moderate in size. It gave off septal perforators and a moderate sized diagonal branch. The LAD covered the entire apex of the left ventricle.               ~70% mid  3. Left circumflex has severe atherosclerotic disease. It was large in size. There was a severe sized obtuse marginal branch.              ~70% OM  4. Right coronary artery has severe atherosclerotic disease. It is exceedingly large vessel. Diameter is 6 to 7 mm minimum and ranging to near 8 mm in segments.              ~100% mid RCA  5. Left ventriculogram showed normal LVEF at 55-60%. Wall motion was normal . There was no significant mitral valve or aortic valve disease noted. LVEDP was normal. There was no gradient noted across the aortic valve during pullback of the catheter. CONCLUSIONS:  1. Severe multi-vessel coronary artery disease with sub-acute occlusion of the RCA  ASSESSMENT/RECOMMENDATIONS:  1. Coronary anatomy not favorable for interventional treatment. Patient's blood vessel size are not compatible with stent insertion. Recommend medical management and consultation with cardiothoracic surgery for consideration of coronary bypass grafting.

## 2021-04-13 ENCOUNTER — APPOINTMENT (OUTPATIENT)
Dept: INTERVENTIONAL RADIOLOGY/VASCULAR | Age: 61
DRG: 286 | End: 2021-04-13
Payer: MEDICARE

## 2021-04-13 LAB
A/G RATIO: 1.2 (ref 1.1–2.2)
ALBUMIN SERPL-MCNC: 3.7 G/DL (ref 3.4–5)
ALP BLD-CCNC: 70 U/L (ref 40–129)
ALT SERPL-CCNC: 22 U/L (ref 10–40)
ANION GAP SERPL CALCULATED.3IONS-SCNC: 11 MMOL/L (ref 3–16)
ANION GAP SERPL CALCULATED.3IONS-SCNC: 15 MMOL/L (ref 3–16)
APTT: 83 SEC (ref 24.2–36.2)
AST SERPL-CCNC: 18 U/L (ref 15–37)
BASOPHILS ABSOLUTE: 0.2 K/UL (ref 0–0.2)
BASOPHILS RELATIVE PERCENT: 2.3 %
BILIRUB SERPL-MCNC: 0.4 MG/DL (ref 0–1)
BUN BLDV-MCNC: 16 MG/DL (ref 7–20)
BUN BLDV-MCNC: 41 MG/DL (ref 7–20)
CALCIUM SERPL-MCNC: 8.7 MG/DL (ref 8.3–10.6)
CALCIUM SERPL-MCNC: 8.9 MG/DL (ref 8.3–10.6)
CHLORIDE BLD-SCNC: 90 MMOL/L (ref 99–110)
CHLORIDE BLD-SCNC: 91 MMOL/L (ref 99–110)
CO2: 22 MMOL/L (ref 21–32)
CO2: 30 MMOL/L (ref 21–32)
CREAT SERPL-MCNC: 4 MG/DL (ref 0.9–1.3)
CREAT SERPL-MCNC: 7.1 MG/DL (ref 0.9–1.3)
EOSINOPHILS ABSOLUTE: 0.2 K/UL (ref 0–0.6)
EOSINOPHILS RELATIVE PERCENT: 2.4 %
GFR AFRICAN AMERICAN: 10
GFR AFRICAN AMERICAN: 19
GFR NON-AFRICAN AMERICAN: 15
GFR NON-AFRICAN AMERICAN: 8
GLOBULIN: 3.2 G/DL
GLUCOSE BLD-MCNC: 106 MG/DL (ref 70–99)
GLUCOSE BLD-MCNC: 129 MG/DL (ref 70–99)
GLUCOSE BLD-MCNC: 132 MG/DL (ref 70–99)
GLUCOSE BLD-MCNC: 132 MG/DL (ref 70–99)
GLUCOSE BLD-MCNC: 90 MG/DL (ref 70–99)
HCT VFR BLD CALC: 22.8 % (ref 40.5–52.5)
HEMOGLOBIN: 7.6 G/DL (ref 13.5–17.5)
LYMPHOCYTES ABSOLUTE: 1.8 K/UL (ref 1–5.1)
LYMPHOCYTES RELATIVE PERCENT: 22.1 %
MAGNESIUM: 2 MG/DL (ref 1.8–2.4)
MCH RBC QN AUTO: 36.3 PG (ref 26–34)
MCHC RBC AUTO-ENTMCNC: 33.5 G/DL (ref 31–36)
MCV RBC AUTO: 108.5 FL (ref 80–100)
MONOCYTES ABSOLUTE: 0.8 K/UL (ref 0–1.3)
MONOCYTES RELATIVE PERCENT: 10.1 %
NEUTROPHILS ABSOLUTE: 5 K/UL (ref 1.7–7.7)
NEUTROPHILS RELATIVE PERCENT: 63.1 %
PDW BLD-RTO: 22.9 % (ref 12.4–15.4)
PERFORMED ON: ABNORMAL
PERFORMED ON: ABNORMAL
PERFORMED ON: NORMAL
PHOSPHORUS: 5.6 MG/DL (ref 2.5–4.9)
PLATELET # BLD: 267 K/UL (ref 135–450)
PMV BLD AUTO: 8.2 FL (ref 5–10.5)
POTASSIUM REFLEX MAGNESIUM: 3.6 MMOL/L (ref 3.5–5.1)
POTASSIUM SERPL-SCNC: 4.1 MMOL/L (ref 3.5–5.1)
RBC # BLD: 2.1 M/UL (ref 4.2–5.9)
SODIUM BLD-SCNC: 127 MMOL/L (ref 136–145)
SODIUM BLD-SCNC: 132 MMOL/L (ref 136–145)
TOTAL PROTEIN: 6.9 G/DL (ref 6.4–8.2)
VANCOMYCIN RANDOM: 25.5 UG/ML
WBC # BLD: 8 K/UL (ref 4–11)

## 2021-04-13 PROCEDURE — 6370000000 HC RX 637 (ALT 250 FOR IP): Performed by: INTERNAL MEDICINE

## 2021-04-13 PROCEDURE — 85025 COMPLETE CBC W/AUTO DIFF WBC: CPT

## 2021-04-13 PROCEDURE — 2060000000 HC ICU INTERMEDIATE R&B

## 2021-04-13 PROCEDURE — 99232 SBSQ HOSP IP/OBS MODERATE 35: CPT | Performed by: NURSE PRACTITIONER

## 2021-04-13 PROCEDURE — 2580000003 HC RX 258: Performed by: INTERNAL MEDICINE

## 2021-04-13 PROCEDURE — 80202 ASSAY OF VANCOMYCIN: CPT

## 2021-04-13 PROCEDURE — 6360000002 HC RX W HCPCS: Performed by: INTERNAL MEDICINE

## 2021-04-13 PROCEDURE — 6370000000 HC RX 637 (ALT 250 FOR IP): Performed by: NURSE PRACTITIONER

## 2021-04-13 PROCEDURE — 36415 COLL VENOUS BLD VENIPUNCTURE: CPT

## 2021-04-13 PROCEDURE — 90935 HEMODIALYSIS ONE EVALUATION: CPT

## 2021-04-13 PROCEDURE — 36589 REMOVAL TUNNELED CV CATH: CPT

## 2021-04-13 PROCEDURE — 99232 SBSQ HOSP IP/OBS MODERATE 35: CPT | Performed by: INTERNAL MEDICINE

## 2021-04-13 PROCEDURE — 80053 COMPREHEN METABOLIC PANEL: CPT

## 2021-04-13 PROCEDURE — 85730 THROMBOPLASTIN TIME PARTIAL: CPT

## 2021-04-13 PROCEDURE — 77001 FLUOROGUIDE FOR VEIN DEVICE: CPT

## 2021-04-13 PROCEDURE — 84100 ASSAY OF PHOSPHORUS: CPT

## 2021-04-13 PROCEDURE — 83735 ASSAY OF MAGNESIUM: CPT

## 2021-04-13 RX ADMIN — MIDODRINE HYDROCHLORIDE 2.5 MG: 5 TABLET ORAL at 09:30

## 2021-04-13 RX ADMIN — INSULIN LISPRO 7 UNITS: 100 INJECTION, SOLUTION INTRAVENOUS; SUBCUTANEOUS at 14:15

## 2021-04-13 RX ADMIN — INSULIN GLARGINE 25 UNITS: 100 INJECTION, SOLUTION SUBCUTANEOUS at 13:41

## 2021-04-13 RX ADMIN — FERROUS SULFATE TAB 325 MG (65 MG ELEMENTAL FE) 325 MG: 325 (65 FE) TAB at 13:40

## 2021-04-13 RX ADMIN — NEPHROCAP 1 MG: 1 CAP ORAL at 13:40

## 2021-04-13 RX ADMIN — METOPROLOL SUCCINATE 25 MG: 25 TABLET, EXTENDED RELEASE ORAL at 13:40

## 2021-04-13 RX ADMIN — FERROUS SULFATE TAB 325 MG (65 MG ELEMENTAL FE) 325 MG: 325 (65 FE) TAB at 18:56

## 2021-04-13 RX ADMIN — LOPERAMIDE HYDROCHLORIDE 2 MG: 2 CAPSULE ORAL at 04:34

## 2021-04-13 RX ADMIN — HEPARIN SODIUM 22.3 ML/HR: 10000 INJECTION, SOLUTION INTRAVENOUS at 01:01

## 2021-04-13 RX ADMIN — APIXABAN 2.5 MG: 2.5 TABLET, FILM COATED ORAL at 21:23

## 2021-04-13 RX ADMIN — Medication 10 ML: at 21:23

## 2021-04-13 RX ADMIN — EPOETIN ALFA-EPBX 10000 UNITS: 10000 INJECTION, SOLUTION INTRAVENOUS; SUBCUTANEOUS at 09:30

## 2021-04-13 RX ADMIN — PRAVASTATIN SODIUM 40 MG: 40 TABLET ORAL at 21:23

## 2021-04-13 RX ADMIN — ASPIRIN 81 MG: 81 TABLET, COATED ORAL at 13:40

## 2021-04-13 ASSESSMENT — PAIN SCALES - GENERAL: PAINLEVEL_OUTOF10: 0

## 2021-04-13 NOTE — CARE COORDINATION
Spoke with MD who states that patient is ready to be evaluated by therapy. Patient and daughter and RN have all discussed that SNF might be best option for patient as he has been having a hard time getting around at home. Therapy orders placed. Will watch for therapy rec's and plan accordingly.

## 2021-04-13 NOTE — PROGRESS NOTES
Report called to [facility]. Informed if site should start to bleed hold pressure above the tunnel site. Dressing in place. Pt moved from IR table to bed and is sitting up in the bed. Pt tolerated the procedure well.

## 2021-04-13 NOTE — PROGRESS NOTES
Tunnel dialysis removed by Dr Demetris Marroquin. Pressure held and dermabond used to close the site. Will sit patient up in bed once move him from IR table to bed.

## 2021-04-13 NOTE — PROGRESS NOTES
Via Addy Almonte [facility] or Facility: [facility] From: Teena Ash RE: Gerard Arevalo 1960 RM: [id number] Ilan Borja, NP with Cards, just rounded and said the patient does not need to go back on the heparin drip (from her stand point), after the removal of his vas cath this afternoon. He can resume his Eliquis unless there are other procedures planned. thank you Need Callback: NO CALLBACK REQ C4 PROGRESSIVE CARE    Sent to Dr Lizeth Roe via [employer]. 0'\"   0'\"   4/13/21 3:02 PM   Yes agree     Received from Dr Lizeth Roe via [employer].

## 2021-04-13 NOTE — PROGRESS NOTES
[phone] Hospital or Facility: [facility] From: Charles Radhika RE: Autumn Shall 1960 RM: [id number] patient is back from dialysis and I am arranging for the removal of his TDC. IR says he does not need to be NPO, so can I place a diet order for him? They did say, however, that the heparin drip needs to be off for 2 hours, so can I write an order to stop the heparin drip now? thank you Need Callback: NEEDS CALLBACK C4 PROGRESSIVE CARE      Sent to Dr Shawna Calhoun via [employer]. 0'\"   0'\"   4/13/21 1:09 PM   Yes for both . Thx     Received from Dr Shawna Calhoun via [employer].

## 2021-04-13 NOTE — PROGRESS NOTES
[facility] 82   Daily Progress Note    Admit Date:  4/3/2021  HPI:    Chief Complaint   Patient presents with    Dizziness     dialysis pt. dizziness starting around 11pm and abdominal cramping; bradycardic and hypotension in route;400ml NS in route       Presented with fatigue. Noted rhythm was a junctional rhythm with bradycardia, troponins were elevated. History of ESRD on HD, diastolic CHF, A. fib, anemia. He was found to have an MRSA bacteremia without sepsis. He underwent LHC for elevated troponins and found to have severe CAD. Was referred to CVTS who found the patient a poor candidate for surgical revascularization. Subjective:  Mr. Sullivan Jc sitting up in bed. Denies any chest pain or shortness of breath. Frustrated with continued questions, hospitalization. Objective:   Patient Vitals for the past 24 hrs:   BP Temp Temp src Pulse Resp SpO2 Weight   04/13/21 1330 105/68   87  96 %    04/13/21 1325 105/68 97.7 °F (36.5 °C) Oral 82 18 98 %    04/13/21 1229 116/62 97.5 °F (36.4 °C)  87 20  286 lb 6 oz (129.9 kg)   04/13/21 0808 116/74 97.8 °F (36.6 °C)  83 20  290 lb 12.6 oz (131.9 kg)   04/13/21 0325 101/69 98.2 °F (36.8 °C) Oral 84 16 95 %    04/12/21 2333 105/65 98.7 °F (37.1 °C) Oral 80 16 94 %    04/12/21 2100 106/72 97.5 °F (36.4 °C) Oral 90 16 94 %        Intake/Output Summary (Last 24 hours) at 4/13/2021 1513  Last data filed at 4/13/2021 1311  Gross per 24 hour   Intake 1259 ml   Output 3200 ml   Net -1941 ml     Wt Readings from Last 3 Encounters:   04/13/21 286 lb 6 oz (129.9 kg)   03/03/21 (!) 306 lb 7 oz (139 kg)   02/18/21 280 lb (127 kg)         ASSESSMENT:   1. NSTEMI: on IV Heparin, no angina  2. CAD: on ASA, statin  3. Atrial fibrillation: Permanent, bradycardia, on IV heparin, Eliquis on hold until no further procedures planned  4. Anemia: Status post 1 unit PRBCs on 4/11/2021  5.  ESRD on HD: per nephrology, TRSa schedule; left arm fistula functioned well today  6. MRSA bacteremia: IV vancomycin  7. History of GI bleed  8. Diabetes mellitus      PLAN:  1. Okay to IV Heparin and resume Eliquis when okay with other providers  2. Dyspnea likely multifactorial - anemia, Afib, deconditioning, CAD, MR  3. Continue low dose Toprol for severe CAD  4. Continue ASA and statin  5. Nothing further to contribute. Will sign off. Will arrange a follow up appointment in the office.      GIANLUCA Kurtz - CNP, 4/13/2021, 3:13 PM  [facility]   [phone]       Telemetry: Afib 80-90's  NYHA: III    Physical Exam:  General:  Awake, alert, NAD  Skin:  Warm and dry  Neck:  JVP unable to assess  Chest:  Clear to auscultation posteriorly  Cardiovascular:  Irreg Irreg, normal O7J7, soft systolic murmur, no g/r  Abdomen:  Soft, nontender, +bowel sounds  Extremities:  1+ BLE edema        Medications:    metoprolol succinate  25 mg Oral Daily    epoetin kelin-epbx  10,000 Units Subcutaneous Once per day on Tue Thu Sat    [Held by provider] apixaban  2.5 mg Oral BID    vancomycin (VANCOCIN) intermittent dosing (placeholder)   Other RX Placeholder    aspirin  81 mg Oral Daily    b complex-C-folic acid  1 capsule Oral Daily    ferrous sulfate  325 mg Oral TID WC    pravastatin  40 mg Oral Nightly    insulin glargine  25 Units Subcutaneous BID    insulin lispro  0.05 Units/kg Subcutaneous TID WC    insulin lispro  0-6 Units Subcutaneous TID WC    insulin lispro  0-3 Units Subcutaneous Nightly      heparin (PORCINE) Infusion Stopped (04/13/21 1312)    dextrose      sodium chloride         Lab Data: Lab results independently reviewed and analyzed by myself 4/12/21   CBC:   Recent Labs     04/12/21  0425 04/12/21  0513 04/13/21  0308   WBC 8.6 8.0 8.0   HGB 7.4* 7.4* 7.6*    248 267     BMP:    Recent Labs     04/11/21  0546 04/12/21  0514 04/13/21  0308   * 133* 127*   K 4.0 3.9 4.1   CO2 28 24 22   BUN 28* 36* 41*   CREATININE 5.0* 6.2* 7.1*     INR: Recent Labs     04/11/21  0546 04/12/21  0513   INR 1.23* 1.24*     BNP:  No results for input(s): PROBNP in the last 72 hours. Cardiac Enzymes: No results for input(s): TROPONINI in the last 72 hours. Lipids:   Lab Results   Component Value Date    TRIG 284 07/10/2011    HDL 27 07/10/2011    LDLCALC 70 07/10/2011       Cardiac Imaging:    Echo 4/5/2021:  Technically difficult examination. Low systolic function with an estimated ejection fraction of 50%. Left ventricular diastolic filling pressure are indeterminate. Bi-atrial enlargement. Moderate posterior mitral annular calcification with restricted movement of   posterior leaflet. The maximum mitral valve pressure gradient is 9 mmHg and the mean pressure   gradient is 3 mmHg with max velocity of 1.48 m/sec. Mild MS. Moderate mitral regurgitation. Trace aortic and pulmonic regurgitation. Systolic pulmonary artery pressure (SPAP) is normal and estimated at 32 mmHg   (right atrial pressure 8 mmHg). Coronary angiogram 4/5/2021:  1. Left heart catheterization  2. Selective left and right coronary angiogram  3. Left ventriculography   Procedure Findings:  1. Severe multi vessel coronary artery diease              ~100% mid RCA -sub acute occlusion. ~70-80% LAD              ~70% OM  2. Normal left ventricular function with EF estimated at 55-60%  3. Normal left heart hemodynamics  Details:              Iker Avilez was brought to the cardiac catheterization lab in a fasting state after informed consent was obtained. If the patient was able to provide written consent, it was obtained. The patient's vitals were monitored through out the procedure. The patient was sedated using the appropriate levels of sedation and ASA guidelines. The appropriate right radial access site area was prepped and drapped in a sterile fashion. The area was anesthetized with 2% lidocaine.  Using the modified Seldinger technique, an

## 2021-04-13 NOTE — PROGRESS NOTES
BP 98/61   Pulse 84   Temp 98.9 °F (37.2 °C) (Oral)   Resp 18   Ht 5' 10\" (1.778 m)   Wt 286 lb 6 oz (129.9 kg)   SpO2 96%   BMI 41.09 kg/m²     General appearance: No apparent distress, appears stated age and cooperative. HEENT: Pupils equal, round, and reactive to light. Conjunctivae/corneas clear. Neck: Supple, with full range of motion. No jugular venous distention. Trachea midline. Respiratory:  Normal respiratory effort. Clear to auscultation, bilaterally without Rales/Wheezes/Rhonchi. Cardiovascular: Regular rate and rhythm with normal S1/S2 without murmurs, rubs or gallops. Abdomen: Soft, non-tender, non-distended with normal bowel sounds. Musculoskeletal: No clubbing, cyanosis or edema bilaterally. Full range of motion without deformity. Skin: Skin color, texture, turgor normal.  No rashes or lesions. Neurologic:  Neurovascularly intact without any focal sensory/motor deficits. Cranial nerves: II-XII intact, grossly non-focal.  Psychiatric: Alert and oriented, thought content appropriate, normal insight  Capillary Refill: Brisk,< 3 seconds   Peripheral Pulses: +2 palpable, equal bilaterally     Labs:   Recent Labs     04/12/21  0425 04/12/21  0513 04/13/21  0308   WBC 8.6 8.0 8.0   HGB 7.4* 7.4* 7.6*   HCT 22.5* 22.3* 22.8*    248 267     Recent Labs     04/11/21  0546 04/12/21  0514 04/13/21  0308   * 133* 127*   K 4.0 3.9 4.1   CL 95* 93* 90*   CO2 28 24 22   BUN 28* 36* 41*   CREATININE 5.0* 6.2* 7.1*   CALCIUM 8.6 8.7 8.7   PHOS 3.8 4.7 5.6*     Recent Labs     04/11/21  0546 04/12/21  0514 04/13/21  0308   AST 27 22 18   ALT 27 25 22   BILITOT 0.5 0.5 0.4   ALKPHOS 69 66 70     Recent Labs     04/11/21  0546 04/12/21  0513   INR 1.23* 1.24*     No results for input(s): CKTOTAL, TROPONINI in the last 72 hours.   Radiology:  IR REMOVE TUNNELED CVAD WO SQ PORT/PUMP   Final Result   Technically successful right-sided tunneled dialysis catheter removal.         XR PELVIS (1-2

## 2021-04-13 NOTE — PROGRESS NOTES
04/13/21  0308   WBC 8.6 8.0 8.0   HGB 7.4* 7.4* 7.6*   HCT 22.5* 22.3* 22.8*   .8* 107.9* 108.5*    248 267     Recent Labs     04/11/21  0546 04/12/21  0514 04/13/21  0308   * 133* 127*   K 4.0 3.9 4.1   CL 95* 93* 90*   CO2 28 24 22   GLUCOSE 135* 130* 132*   PHOS 3.8 4.7 5.6*   MG 2.00 1.90 2.00   BUN 28* 36* 41*   CREATININE 5.0* 6.2* 7.1*   LABGLOM 12* 9* 8*   GFRAA 14* 11* 10*           Assessment/Plan: 1. Symptomatic bradycardia   - no plans for pacemaker right now     2. CAD- not a candidate for surgery  -medical management     3. End stage renal disease -HD TTS  -New TW 125kg  -access:S/p revision LUE with stent graft 3/3/21, recently dialyzing via [place]  -Dr. Riya Farnsworth has cleared fistula for use. Access is working great today. Will ask IR to remove TDC today.     4. Anemia of chronic disease -   PRISCA with HD. PRN transfusions.       5. DM2     6. Fever- MRSA bacteremia  -vanco per ID; pharmacy dosing, can be given with dialysis. - repeat blood cultures are negative      Please do not hesitate to contact me at [phone] if with questions. Thank you!     Nhung Fairbanks MD  4/13/2021  [facility]

## 2021-04-13 NOTE — PROGRESS NOTES
Pt arrived to IR for tunneled dialysis catheter removal from right side. Dr Betsy Maynard talked to patient prior to procedure. All questions were answered. Labs and allergies reviewed.  Time out completed prior to start of procedure

## 2021-04-13 NOTE — FLOWSHEET NOTE
Treatment time: 4 hrs    Net UF: 2.8 liters    Pre weight: 131.9kg 1pillow 1top sheet  Post weight: 129.9kg  EDW: 129.5kg    Access used: LAVF  Access function: Good    Medications or blood products given: Retacrit [id number] and Midodrine 2.5mg    Regular outpatient schedule: TTS    Summary of response to treatment:      04/13/21 1229   Vital Signs   /62   Temp 97.5 °F (36.4 °C)   Pulse 87   Resp 20   Weight 286 lb 6 oz (129.9 kg)   Weight Method Bed scale   Percent Weight Change -1.52   Dry Weight 285 lb 7.9 oz (129.5 kg)   Pain Assessment   Pain Assessment 0-10   Pain Level 0   Post-Hemodialysis Assessment   Post-Treatment Procedures Blood returned; Access bleeding time < 10 minutes   Machine Disinfection Process Acid/Vinegar Clean;Heat Disinfect; Exterior Machine Disinfection   Rinseback Volume (ml) 400 ml   Total Liters Processed (l/min) 89 l/min   Dialyzer Clearance Moderately streaked   Duration of Treatment (minutes) 240 minutes   Heparin amount administered during treatment (units) 0 units   Hemodialysis Intake (ml) 400 ml   Hemodialysis Output (ml) 3200 ml   NET Removed (ml) 2800 ml   Tolerated Treatment Good   Patient Response to Treatment No issues. Pt tolerated tx well. No refills per critline. Bilateral Breath Sounds Diminished   Edema Generalized   Physician Notified? Yes   Copy of dialysis treatment record placed in chart, to be scanned into EMR.

## 2021-04-13 NOTE — PROGRESS NOTES
Infectious Disease Follow up Notes    CC :  MRSA bacteremia      Antibiotics:   vanc by level     Admit Date:   4/3/2021  Hospital Day: 11    Subjective:   He remains afebrile   \"I'm tired. \"  Unwilling to answer any questions, belligerent.       Objective:     Patient Vitals for the past 8 hrs:   BP Temp Temp src Pulse Resp SpO2 Weight   04/13/21 1648 98/61 98.9 °F (37.2 °C) Oral 84 18 96 %    04/13/21 1607 117/70   87 20 96 %    04/13/21 1600 122/74   84 20 100 %    04/13/21 1557 133/66   78 20 96 %    04/13/21 1330 105/68   87  96 %    04/13/21 1325 105/68 97.7 °F (36.5 °C) Oral 82 18 98 %    04/13/21 1229 116/62 97.5 °F (36.4 °C)  87 20  286 lb 6 oz (129.9 kg)       EXAM:     HEENT:  NCAT, PERRL, sclera anicteric  NECK:  supple       LUNGS:  CTA upper lobes april     CV:   RRR  ABD: Soft, NT.  BS present    EXT: Stasis changes, hyperkeratotic skin LE         LINE: Port in L chest noted, not accessed       Scheduled Meds:   metoprolol succinate  25 mg Oral Daily    epoetin kelin-epbx  10,000 Units Subcutaneous Once per day on Tue Thu Sat    apixaban  2.5 mg Oral BID    vancomycin (VANCOCIN) intermittent dosing (placeholder)   Other RX Placeholder    aspirin  81 mg Oral Daily    b complex-C-folic acid  1 capsule Oral Daily    ferrous sulfate  325 mg Oral TID WC    pravastatin  40 mg Oral Nightly    insulin glargine  25 Units Subcutaneous BID    insulin lispro  0.05 Units/kg Subcutaneous TID WC    insulin lispro  0-6 Units Subcutaneous TID WC    insulin lispro  0-3 Units Subcutaneous Nightly       Continuous Infusions:   dextrose      sodium chloride            Data Review:    Lab Results   Component Value Date    WBC 8.0 04/13/2021    HGB 7.6 (L) 04/13/2021    HCT 22.8 (L) 04/13/2021    .5 (H) 04/13/2021     04/13/2021     Lab Results   Component Value Date    CREATININE 7.1 ([facility]) 04/13/2021 BUN 41 (H) 04/13/2021     (L) 04/13/2021    K 4.1 04/13/2021    CL 90 (L) 04/13/2021    CO2 22 04/13/2021       Hepatic Function Panel:   Lab Results   Component Value Date    ALKPHOS 70 04/13/2021    ALT 22 04/13/2021    AST 18 04/13/2021    PROT 6.9 04/13/2021    PROT 7.6 12/16/2012    BILITOT 0.4 04/13/2021    BILIDIR 0.19 12/16/2012    IBILI 0.28 12/16/2012    LABALBU 3.7 04/13/2021       Cultures:   4/6       BC x2 MRSA, vanc JENNIFER 1  4/8 BC x2 neg        Radiology Review:  All pertinent images / reports were reviewed as a part of this visit. TTE 4/5/21   Summary   Technically difficult examination.   Low systolic function with an estimated ejection fraction of 50%.   Left ventricular diastolic filling pressure are indeterminate.   Bi-atrial enlargement.   Moderate posterior mitral annular calcification with restricted movement of   posterior leaflet.   The maximum mitral valve pressure gradient is 9 mmHg and the mean pressure   gradient is 3 mmHg with max velocity of 1.48 m/sec. Mild MS.   Moderate mitral regurgitation.   Trace aortic and pulmonic regurgitation.   Systolic pulmonary artery pressure (SPAP) is normal and estimated at 32 mmHg   (right atrial pressure 8 mmHg).        Assessment:     Patient Active Problem List    Diagnosis Date Noted    NSTEMI (non-ST elevated myocardial infarction) ([address]     AV block 04/05/2021    Atrial fibrillation with slow ventricular response ([address] 04/04/2021    Hypotension     Lightheadedness     Chronic obstructive pulmonary disease ([address]     Suspected sleep apnea     Dialysis AV fistula malfunction (HCC)     Mild malnutrition ([address] 09/26/2019    Sepsis ([address] 09/25/2019    ESRD (end stage renal disease) ([address]     Shortness of breath     Potassium excess 04/11/2019    DM2 (diabetes mellitus, type 2) ([address] 04/11/2019    Stage 5 chronic kidney disease not on chronic dialysis ([address] 04/11/2019    Gastric outlet obstruction 10/25/2017    Tobacco abuse 09/27/2017    Wound infection     Leg wound, left     CKD (chronic kidney disease), stage III 07/25/2017    Gastrointestinal hemorrhage 93/01/2130    Metabolic acidosis 87/92/8728    Other disorder of calcium metabolism 07/12/2017    Depression 06/30/2017    Furunculosis (recurrent, on chronic suppressive Abx) 06/30/2017    Ulcer of left calf with fat layer exposed ([address] 06/30/2017    Hyperphosphatemia 06/30/2017    Vitamin D deficiency 06/30/2017    Secondary hyperparathyroidism of renal origin ([address] 06/30/2017    Iron deficiency 06/30/2017    White matter changes, severe by MRI brain 06/30/2017    Coronary artery disease involving native coronary artery 06/30/2017    Renal arteriosclerosis 06/30/2017    Calciphylaxis 06/08/2017    Primary osteoarthritis of both knees 02/23/2017    Morbid obesity ([address] 01/25/2017    Normocytic anemia 01/13/2017    Chronic anticoagulation 11/30/2012    Hyperlipidemia 08/07/2012    Deep vein thrombosis (DVT) ([address] 08/07/2012    Essential (primary) hypertension 08/02/2011    Atrial fibrillation, chronic ([address] 07/12/2011    Type 2 diabetes mellitus with stage 4 chronic kidney disease, without long-term current use of insulin ([address] 07/09/2011    Cigarette smoker 07/09/2011       MMP      ESRD on HD TTS. S/p revision LUE with stent graft 3/3/21, recently dialyzing via [facility] R chest      Admitted 4/4/21 with symptomatic bradycardia, afib with slow VR  Multivessel CAD by [facility]   Echo with EF 50%, moderate MR, no vegetation noted     Fever on HD #2 associated with leukocytosis - resolved      MRSA bacteremia, 2/2 sets collected on HD #3, from peripheral site and femoral TLC. ? Nosocomial CLABSI from the femoral TLC vs CLABSI from the [facility] present on admission with delayed recognition  Repeat BC are negative  The femoral line was removed and removal of TDC, now dialyzing via AVF        PCN allergy - hives/rash      -criteria met for abbreviated course of IV abx for \"uncomplicated\" MRSA bacteremia  -would continue IV vanc at HD through 4/27/21    [address] Elvia tripp DC from ID standpoint     Discussed with patient/family, all questions answered  D/w RN       Jagdish Stanton MD  Phone: [phone]   Fax : [phone]

## 2021-04-14 VITALS
SYSTOLIC BLOOD PRESSURE: 103 MMHG | BODY MASS INDEX: 41 KG/M2 | DIASTOLIC BLOOD PRESSURE: 64 MMHG | WEIGHT: 286.38 LBS | OXYGEN SATURATION: 95 % | HEIGHT: 70 IN | RESPIRATION RATE: 18 BRPM | HEART RATE: 84 BPM | TEMPERATURE: 97.9 F

## 2021-04-14 LAB
BASOPHILS ABSOLUTE: 0.1 K/UL (ref 0–0.2)
BASOPHILS RELATIVE PERCENT: 1.8 %
EOSINOPHILS ABSOLUTE: 0.1 K/UL (ref 0–0.6)
EOSINOPHILS RELATIVE PERCENT: 2 %
GLUCOSE BLD-MCNC: 150 MG/DL (ref 70–99)
GLUCOSE BLD-MCNC: 205 MG/DL (ref 70–99)
GLUCOSE BLD-MCNC: 75 MG/DL (ref 70–99)
HCT VFR BLD CALC: 24.3 % (ref 40.5–52.5)
HEMOGLOBIN: 8 G/DL (ref 13.5–17.5)
LYMPHOCYTES ABSOLUTE: 1.2 K/UL (ref 1–5.1)
LYMPHOCYTES RELATIVE PERCENT: 16.7 %
MCH RBC QN AUTO: 35.9 PG (ref 26–34)
MCHC RBC AUTO-ENTMCNC: 32.9 G/DL (ref 31–36)
MCV RBC AUTO: 108.9 FL (ref 80–100)
MONOCYTES ABSOLUTE: 0.7 K/UL (ref 0–1.3)
MONOCYTES RELATIVE PERCENT: 10.2 %
NEUTROPHILS ABSOLUTE: 4.9 K/UL (ref 1.7–7.7)
NEUTROPHILS RELATIVE PERCENT: 69.3 %
PDW BLD-RTO: 23.4 % (ref 12.4–15.4)
PERFORMED ON: ABNORMAL
PERFORMED ON: ABNORMAL
PERFORMED ON: NORMAL
PLATELET # BLD: 292 K/UL (ref 135–450)
PMV BLD AUTO: 8.3 FL (ref 5–10.5)
RBC # BLD: 2.23 M/UL (ref 4.2–5.9)
SARS-COV-2, NAAT: NOT DETECTED
WBC # BLD: 7 K/UL (ref 4–11)

## 2021-04-14 PROCEDURE — 6370000000 HC RX 637 (ALT 250 FOR IP): Performed by: INTERNAL MEDICINE

## 2021-04-14 PROCEDURE — 97162 PT EVAL MOD COMPLEX 30 MIN: CPT

## 2021-04-14 PROCEDURE — 36415 COLL VENOUS BLD VENIPUNCTURE: CPT

## 2021-04-14 PROCEDURE — 97166 OT EVAL MOD COMPLEX 45 MIN: CPT

## 2021-04-14 PROCEDURE — 85025 COMPLETE CBC W/AUTO DIFF WBC: CPT

## 2021-04-14 PROCEDURE — 97530 THERAPEUTIC ACTIVITIES: CPT

## 2021-04-14 PROCEDURE — 87635 SARS-COV-2 COVID-19 AMP PRB: CPT

## 2021-04-14 PROCEDURE — 6370000000 HC RX 637 (ALT 250 FOR IP): Performed by: NURSE PRACTITIONER

## 2021-04-14 PROCEDURE — 97110 THERAPEUTIC EXERCISES: CPT

## 2021-04-14 PROCEDURE — 97535 SELF CARE MNGMENT TRAINING: CPT

## 2021-04-14 RX ORDER — METOPROLOL SUCCINATE 25 MG/1
25 TABLET, EXTENDED RELEASE ORAL DAILY
Qty: 30 TABLET | Refills: 3 | Status: SHIPPED | OUTPATIENT
Start: 2021-04-15

## 2021-04-14 RX ADMIN — FERROUS SULFATE TAB 325 MG (65 MG ELEMENTAL FE) 325 MG: 325 (65 FE) TAB at 13:43

## 2021-04-14 RX ADMIN — INSULIN LISPRO 7 UNITS: 100 INJECTION, SOLUTION INTRAVENOUS; SUBCUTANEOUS at 09:15

## 2021-04-14 RX ADMIN — INSULIN LISPRO 1 UNITS: 100 INJECTION, SOLUTION INTRAVENOUS; SUBCUTANEOUS at 09:14

## 2021-04-14 RX ADMIN — INSULIN LISPRO 7 UNITS: 100 INJECTION, SOLUTION INTRAVENOUS; SUBCUTANEOUS at 13:44

## 2021-04-14 RX ADMIN — INSULIN GLARGINE 25 UNITS: 100 INJECTION, SOLUTION SUBCUTANEOUS at 09:14

## 2021-04-14 RX ADMIN — NEPHROCAP 1 MG: 1 CAP ORAL at 10:30

## 2021-04-14 RX ADMIN — INSULIN LISPRO 2 UNITS: 100 INJECTION, SOLUTION INTRAVENOUS; SUBCUTANEOUS at 13:43

## 2021-04-14 RX ADMIN — FERROUS SULFATE TAB 325 MG (65 MG ELEMENTAL FE) 325 MG: 325 (65 FE) TAB at 17:29

## 2021-04-14 RX ADMIN — ASPIRIN 81 MG: 81 TABLET, COATED ORAL at 10:30

## 2021-04-14 RX ADMIN — FERROUS SULFATE TAB 325 MG (65 MG ELEMENTAL FE) 325 MG: 325 (65 FE) TAB at 09:11

## 2021-04-14 RX ADMIN — APIXABAN 2.5 MG: 2.5 TABLET, FILM COATED ORAL at 10:30

## 2021-04-14 ASSESSMENT — PAIN DESCRIPTION - PAIN TYPE: TYPE: CHRONIC PAIN

## 2021-04-14 NOTE — PROGRESS NOTES
Occupational Therapy   Occupational Therapy Initial Assessment/treatment   Date: 2021   Patient Name: Lokesh Vasquez  MRN: [id number]     : 1960    Date of Service: 2021    Discharge Recommendations:  Subacute/Skilled Nursing Facility  OT Equipment Recommendations  Other: defer to next level of care    Assessment   Performance deficits / Impairments: Decreased functional mobility ; Decreased endurance;Decreased ADL status; Decreased strength;Decreased safe awareness;Decreased cognition;Decreased balance    Assessment: Pt 61 yo male functioning with deficits in the areas listed above following hospital stay due to falls and bradycardia. Pt reports IND PLOF and lives at  home alone. Pt educated on the role of therapy and importance of continued activity while in acute care. Pt required increased assist for LB dressing and toileting with cues for encouragement. Pt required CGA-min A for functional mobility and transfers with RW. Pt does not have 24 hour assist and may need SNF level of care at discharge. Disease Specific Education: Pt educated on importance of OOB mobility, prevention of complications of bedrest, and general safety during hospitalization. Pt verbalized understanding    Prognosis: Good  Decision Making: Medium Complexity  OT Education: OT Role;Plan of Care;ADL Adaptive Strategies;Transfer Training  REQUIRES OT FOLLOW UP: Yes  Activity Tolerance  Activity Tolerance: Patient Tolerated treatment well  Activity Tolerance: BP 97/63 HR 86 O2 95%  Safety Devices  Safety Devices in place: Yes  Type of devices: Call light within reach; Chair alarm in place;Gait belt;Nurse notified; Left in chair           Patient Diagnosis(es): The primary encounter diagnosis was Symptomatic bradycardia. Diagnoses of Lightheadedness, Nausea and vomiting, intractability of vomiting not specified, unspecified vomiting type, and Hypotension, unspecified hypotension type were also pertinent to this visit.      has a past medical history of Atrial fibrillation ([facility] CAD (coronary artery disease), Cardiomyopathy ([facility] CHF (congestive heart failure) ([facility] Chronic kidney disease, COPD (chronic obstructive pulmonary disease) ([facility] Deep vein thrombosis (DVT) ([facility] Depression, Diabetes mellitus ([facility] Gastrointestinal bleed, GERD (gastroesophageal reflux disease), Heme positive stool, Hemodialysis patient ([facility] History of blood transfusion, Hx of blood clots, Hyperlipidemia, Hypertension, Hypoglycemia, MDRO (multiple drug resistant organisms) resistance, MRSA (methicillin resistant staph aureus) culture positive, MRSA (methicillin resistant staph aureus) culture positive, Primary osteoarthritis of both knees, and Primary osteoarthritis of both knees. has a past surgical history that includes Hand surgery; Kidney biopsy (Right, 06/20/2016); Upper gastrointestinal endoscopy (01/17/2017); Colonoscopy (01/17/2017); Kidney biopsy (Left, 09/20/2016); Cardiac catheterization (07/12/2011); Upper gastrointestinal endoscopy (10/24/2017); Tunneled venous port placement (Right, 11/09/2017); open treatment radial shaft fracture (Right, 8/7/2018); fracture surgery (Left); Dialysis fistula creation (Left, 2020); and Dialysis fistula creation (Left, 3/3/2021).            Restrictions  Restrictions/Precautions  Restrictions/Precautions: General Precautions, Contact Precautions, Fall Risk  Position Activity Restriction  Other position/activity restrictions: No BP LUE, contact for MRSA    Subjective   General  Chart Reviewed: Yes  Patient assessed for rehabilitation services?: Yes  Family / Caregiver Present: No  Referring Practitioner: Daljit Chacon MD  Diagnosis: MHA for bradycardia  Subjective  Subjective: Pt agreeable to therapy when needing to get up to the bathroom  General Comment  Comments: RN approved therapy  Patient Currently in Pain: Denies  Vital Signs  Temp: 98.5 °F (36.9 °C)  Temp Source: Oral  Pulse: 83  Heart Rate Source: Monitor  Resp: 20  BP: (!) 93/56  BP Location: Right upper arm  MAP (mmHg): 69  Patient Position: Left side  Level of Consciousness: Alert (0)  MEWS Score: 2  Patient Currently in Pain: Denies  Oxygen Therapy  SpO2: 98 %  O2 Device: None (Room air)     Social/Functional History  Social/Functional History  Lives With: Alone  Type of Home: House  Home Access: Stairs to enter with rails  Entrance Stairs - Number of Steps: 2  Bathroom Shower/Tub: Tub/Shower unit  Bathroom Toilet: Handicap height  Bathroom Equipment: Grab bars in shower, Shower chair, Grab bars around toilet  Home Equipment: Cane, Rolling walker  ADL Assistance: Independent  Homemaking Assistance: Independent  Ambulation Assistance: Independent       Objective   Vision: Within Functional Limits  Hearing: Within functional limits    Orientation  Overall Orientation Status: Within Functional Limits  Observation/Palpation  Posture: Fair  Balance  Sitting Balance: Stand by assistance(seated EOB)  Standing Balance: Contact guard assistance(RW)  Standing Balance  Time: ~5 minutes  Activity: transfers, mobility in room in preparation for household distances  Functional Mobility  Functional - Mobility Device: Rolling Walker  Activity: Other  Assist Level: Minimal assistance  Functional Mobility Comments: assist with RW management  Toilet Transfers  Toilet - Technique: Ambulating  Equipment Used: Standard bedside commode  Toilet Transfer: Contact guard assistance  ADL  Feeding: Independent  LE Dressing: Maximum assistance(assist with changing brief)  Toileting: Maximum assistance(assist with pericare, and brief management)  Tone RUE  RUE Tone: Normotonic  Tone LUE  LUE Tone: Normotonic  Coordination  Movements Are Fluid And Coordinated: Yes     Bed mobility  Supine to Sit: Minimal assistance  Sit to Supine: Unable to assess  Transfers  Sit to stand: Contact guard assistance  Stand to sit: Contact guard assistance     Cognition  Overall Cognitive Status: Exceptions  Arousal/Alertness: Appropriate responses to stimuli  Following Commands: Follows one step commands consistently  Attention Span: Attends with cues to redirect  Problem Solving: Assistance required to generate solutions;Assistance required to correct errors made           Type of ROM/Therapeutic Exercise  Type of ROM/Therapeutic Exercise: AROM     LUE AROM (degrees)  LUE AROM : WFL  RUE AROM (degrees)  RUE AROM : WFL  LUE Strength  L Hand General: 4+/5  RUE Strength  R Hand General: 4+/5                   Plan   Plan  Times per week: 3-5x/wk  Current Treatment Recommendations: Strengthening, Balance Training, Functional Mobility Training, Safety Education & Training, Self-Care / ADL      AM-PAC Score        AM-PAC Inpatient Daily Activity Raw Score: 14 (04/14/21 1410)  AM-PAC Inpatient ADL T-Scale Score : 33.39 (04/14/21 1410)  ADL Inpatient CMS 0-100% Score: 59.67 (04/14/21 1410)  ADL Inpatient CMS G-Code Modifier : CK (04/14/21 1410)    Goals  Short term goals  Time Frame for Short term goals: 1 week (4/21/21)  Short term goal 1: Pt will complete LB dressing with S.  Short term goal 2: PT will complete toilet transfer with S.  Short term goal 3: Pt will complete 5 minutes of dynamic standing for adls with CGA. Short term goal 4: Pt will complete 15 reps of BUE exercises for increased endurance and strength. (4/17)  Patient Goals   Patient goals : \"to get stronger to go home\"       Therapy Time   Individual Concurrent Group Co-treatment   Time In 1010         Time Out 1043         Minutes 33         Timed Code Treatment Minutes: 23 Minutes(10 minutes for evaluation)       Padma Melgar OTR/L    If pt is unable to be seen after this session, please let this note serve as discharge summary. Please see case management note for discharge disposition. Thank you.

## 2021-04-14 NOTE — FLOWSHEET NOTE
Follow -up for Shant Oliveros. Pt shared stories about himself, his struggles, and relationship with his family. He said he has passed through a lot in life and has very little support. Grieving the death of his mother and feels very sad he was not present. 04/14/21 1200   Encounter Summary   Services provided to: Patient   Referral/Consult From: Other    Continue Visiting   (4/14 Pt received Holy Communion)   Complexity of Encounter Moderate   Length of Encounter 45 minutes   Spiritual Assessment Completed Yes   Spiritual/Taoist   Type Spiritual struggle   Assessment Hopeful; Approachable;Peaceful   Intervention Active listening;Explored feelings, thoughts, concerns;Prayer;Communion;Discussed illness/injury and it's impact   Outcome Expressed gratitude;Engaged in conversation; Shared life review;Encouraged

## 2021-04-14 NOTE — CARE COORDINATION
CASE MANAGEMENT DISCHARGE SUMMARY      Discharge to: [address] completed: [address] Newman [facility] Exemption Notification (HENS) completed: yes    Transportation: ambulance    Medical Transport explained to pt/family. Pt/family voice no agency preference. [facility]    time: 7:30pm    Ambulance form completed: Yes    Confirmed discharge plan with: patient/daughter/RN/Tracy with [facility]     Patient: yes     Family, name and contact number: Saulo Lozano (daughter) [phone]     Facility/Agency, name:  [facility] faxed  To  [phone]   Phone number for report to facility:  [phone]     RN, name: [facility]     Note: Discharging nurse to complete [facility], reconcile [facility], and place final copy with patient's discharge packet. RN to ensure that written prescriptions for  Level II medications are sent with patient to the facility as per protocol.

## 2021-04-14 NOTE — PROGRESS NOTES
Physical Therapy    Facility/Department: [facility]  Initial Assessment    NAME: Karli Brito  : 1960  MRN: [id number]    Date of Service: 2021    Discharge Recommendations:  Subacute/Skilled Nursing Facility   PT Equipment Recommendations  Equipment Needed: No  If pt is unable to be seen after this session, please let this note serve as discharge summary. Please see case management note for discharge disposition. Thank you. Assessment   Body structures, Functions, Activity limitations: Decreased functional mobility ; Decreased balance;Decreased strength;Decreased endurance  Assessment: Pt functioning below basleine due to deconditiong and was admitted to [facility] for bradycardia. Pt reports a decline in functional mobility and endurance. Was living with his mom and her caretaker until she passed away recently. Pt would benefit from skilled PT to address mobility deficits and improve safe indep. Recommend SNF upon DC, however likely pt will decline preferring home health. PT believes pt will need 24 hr assist/supervision. Treatment Diagnosis: deconditioning  Prognosis: Good  Decision Making: Medium Complexity  PT Education: Goals; General Safety; Disease Specific Education;PT Role;Plan of Care;Transfer Training;Energy Conservation  Patient Education: Pt expressed understanding on benefits of incrased OOB activity  Barriers to Learning: readiness to learn  REQUIRES PT FOLLOW UP: Yes  Activity Tolerance  Activity Tolerance: Patient Tolerated treatment well       Patient Diagnosis(es): The primary encounter diagnosis was Symptomatic bradycardia. Diagnoses of Lightheadedness, Nausea and vomiting, intractability of vomiting not specified, unspecified vomiting type, and Hypotension, unspecified hypotension type were also pertinent to this visit.      has a past medical history of Atrial fibrillation ([address] [address] [address] CAD (coronary artery disease), Cardiomyopathy ([facility] CHF (congestive heart failure) ([facility] [address] [address] Chronic kidney disease, COPD (chronic obstructive pulmonary disease) (HCC), Deep vein thrombosis (DVT) ([facility] Depression, Diabetes mellitus ([facility] Gastrointestinal bleed, GERD (gastroesophageal reflux disease), Heme positive stool, Hemodialysis patient ([facility] History of blood transfusion, Hx of blood clots, Hyperlipidemia, Hypertension, Hypoglycemia, MDRO (multiple drug resistant organisms) resistance, MRSA (methicillin resistant staph aureus) culture positive, MRSA (methicillin resistant staph aureus) culture positive, Primary osteoarthritis of both knees, and Primary osteoarthritis of both knees. has a past surgical history that includes Hand surgery; Kidney biopsy (Right, 06/20/2016); Upper gastrointestinal endoscopy (01/17/2017); Colonoscopy (01/17/2017); Kidney biopsy (Left, 09/20/2016); Cardiac catheterization (07/12/2011); Upper gastrointestinal endoscopy (10/24/2017); Tunneled venous port placement (Right, 11/09/2017); open treatment radial shaft fracture (Right, 8/7/2018); fracture surgery (Left); Dialysis fistula creation (Left, 2020); and Dialysis fistula creation (Left, 3/3/2021). Restrictions  Restrictions/Precautions  Restrictions/Precautions: General Precautions, Contact Precautions, Fall Risk  Position Activity Restriction  Other position/activity restrictions: No BP LUE  Vision/Hearing  Vision: Within Functional Limits  Hearing: Within functional limits     Subjective  General  Chart Reviewed: Yes  Patient assessed for rehabilitation services?: Yes  Response To Previous Treatment: Not applicable  Family / Caregiver Present: No  Referring Practitioner: Giovanny Madera MD  Referral Date : 04/13/21  Diagnosis: bradycardia  Follows Commands: Within Functional Limits  General Comment  Comments: cleared bynursing  Subjective  Subjective: pt resting in bed. Needs to use the bathroom and agreeable to get up. Reports pain \"generally all over\". Refused to be more specific.  Denied need for intervention  Pain Screening  Patient Currently in Pain: Yes  Vital Signs  Pulse: 86  BP: 97/63  Oxygen Therapy  SpO2: 95 %  O2 Device: None (Room air)  Pre Treatment Pain Screening  Intervention List: Patient able to continue with treatment;Patient declined any intervention    Orientation  Orientation  Overall Orientation Status: Within Functional Limits  Social/Functional History  Social/Functional History  Lives With: Alone  Type of Home: House  Home Access: Stairs to enter with rails  Entrance Stairs - Number of Steps: 2  Bathroom Shower/Tub: Tub/Shower unit  Bathroom Toilet: Handicap height  Bathroom Equipment: Grab bars in shower, Shower chair, Grab bars around toilet  Home Equipment: Cane, Rolling walker  ADL Assistance: Independent  Homemaking Assistance: Independent  Ambulation Assistance: Independent  Cognition        Objective          PROM RLE (degrees)  RLE PROM: WFL  AROM RLE (degrees)  RLE AROM: WFL  PROM LLE (degrees)  LLE PROM: WFL  AROM LLE (degrees)  LLE AROM : WFL  Strength RLE  Comment: 3+/5  Strength LLE  Comment: 3+/5  Tone RLE  RLE Tone: Normotonic  Tone LLE  LLE Tone: Normotonic  Motor Control  Gross Motor?: WFL     Bed mobility  Supine to Sit: Minimal assistance  Sit to Supine: Unable to assess  Transfers  Sit to Stand: Minimal Assistance  Stand to sit: Minimal Assistance  Ambulation  Ambulation?: Yes  More Ambulation?: No  Ambulation 1  Surface: level tile  Device: Rolling Walker  Assistance: Contact guard assistance;Minimal assistance  Gait Deviations: Slow Cat; Shuffles;Decreased step length; Increased CECE  Distance: 4' + 13'  Stairs/Curb  Stairs?: No     Balance  Posture: Good  Sitting - Static: Good  Sitting - Dynamic: Good  Standing - Static: Fair  Standing - Dynamic: [address]  Times per week: 3-5x/week  Current Treatment Recommendations: Strengthening, Balance Training, Endurance Training, Functional Mobility Training, Transfer Training, Stair training, Gait Training, Positioning, Equipment Evaluation, Education, & procurement, Patient/Caregiver Education & Training, Safety Education & Training, Home Exercise Program  Safety Devices  Type of devices:  All fall risk precautions in place, Left in chair, Call light within reach, Chair alarm in place, Gait belt, Patient at risk for falls, Nurse notified  Restraints  Initially in place: No      AM-PAC Score  AM-PAC Inpatient Mobility Raw Score : 16 (04/14/21 1128)  AM-PAC Inpatient T-Scale Score : 40.78 (04/14/21 1128)  Mobility Inpatient CMS 0-100% Score: 54.16 (04/14/21 1128)  Mobility Inpatient CMS G-Code Modifier : CK (04/14/21 1128)          Goals  Short term goals  Time Frame for Short term goals: 4/18  Short term goal 1: Pt will complete transfers with supervision  Short term goal 2: Pt will ambulate x 36' with RW with supervision  Short term goal 3: Pt will complete 2 stairs with CGA  Short term goal 4: Pt will complete B LE exercises to improve functional mobility by 4/16  Patient Goals   Patient goals : to go home       Therapy Time   Individual Concurrent Group Co-treatment   Time In 1010         Time Out 1043         Minutes 33         Timed Code Treatment Minutes: Diaz PT

## 2021-04-14 NOTE — FLOWSHEET NOTE
04/13/21 2009   Assessment   Charting Type Shift assessment   Neurological   Neuro (WDL) WDL   Level of Consciousness Alert (0)   Orientation Level Oriented X4   Cognition Appropriate judgement; Appropriate safety awareness; Follows commands   Language Appropriate for developmental age;Nods/gestures appropriately  (Mumbles, speaks under his breath intentionally)   Size R Pupil (mm) 3   R Pupil Shape Round   R Pupil Reaction MONSERRAT  (per pt refusal)   Size L Pupil (mm) 3   L Pupil Shape Round   L Pupil Reaction MONSERRAT  (per pt refusal)   R Hand  Strong   L Hand  Strong   R Foot Dorsiflexion MONSERRAT  (pt refusing assessment @ this time)   L Foot Dorsiflexion MONSERRAT  (pt refusing assessment @ this time)   R Foot Plantar Flexion MONSERRAT  (pt refusing assessment @ this time)   L Foot Plantar Flexion MONSERRAT  (pt refusing assessment @ this time)   RUE Motor Response Responds to command   Sensation RUE MONSERRAT  (pt refusing assessment @ this time)   LUE Motor Response Responds to command   Sensation LUE MONSERRAT  (pt refusing assessment @ this time)   RLE Motor Response Responds to command   Sensation RLE MONSERRAT  (pt refusing assessment @ this time)   LLE Motor Response Responds to command   Sensation LLE MONSERRAT  (pt refusing assessment @ this time)   Gag Present   Tongue Deviation None   Anastasia Coma Scale   Eye Opening 4   Best Verbal Response 5   Best Motor Response 6   [place] Coma Scale Score 15   HEENT   HEENT (WDL) X  (Wears glasses)   Right Eye Impaired vision  (Glasses)   Left Eye Impaired vision  (Glasses)   Nose Intact   Throat Intact   Neck Trachea midline   Tongue Pink & moist   Voice Normal;Other (Comment)  (pt mumbles, speaks very quietly under his breath)   Mucous Membrane Moist;Pink; Intact   Teeth Missing teeth   Respiratory   Respiratory Quality/Effort Unlabored   Chest Assessment Trachea midline; Chest expansion symmetrical   L Breath Sounds Clear;Diminished   R Breath Sounds Clear;Diminished   Breath Sounds   Right Upper Lobe Dressing Patient Refused   Skin Fold Management No   Multiple Skin Integrity Sites Yes   Assessed this shift? No  (pt refusing assessment @ this time)   Skin Integrity Site 2   Skin Integrity Location 2 Redness   Location 2 Buttocks/sacrum   Preventative Dressing Patient Refused   Assessed this shift? No  (pt refusing assessment @ this time)   Musculoskeletal   Musculoskeletal (WDL) X   RUE Full movement   LUE Full movement   RL Extremity Swelling;Weakness;Limited movement   LL Extremity Swelling;Weakness;Limited movement   Genitourinary   Genitourinary (WDL) WDL  (HD patient, pt states voids some)   Flank Tenderness No   Suprapubic Tenderness No   Dysuria No   Urine Assessment   Urine Color MONSERRAT   Urine Appearance MONSERRAT   Incontinence No   Urine Odor MONSERRAT   Anus/Rectum   Anus/Rectum (WDL) WDL  (Per pt; refusing assessment @ this time)   Evaluation MONSERRAT  (pt refusing assessment @ this time)   Psychosocial   Psychosocial (WDL) X   Patient Behaviors Not interactive; Withdrawn

## 2021-04-14 NOTE — PLAN OF CARE
Problem: Falls - Risk of:  Goal: Will remain free from falls  Description: Will remain free from falls  Outcome: Ongoing     Problem: Skin Integrity:  Goal: Absence of new skin breakdown  Description: Absence of new skin breakdown  Outcome: Ongoing     Problem: Cardiac:  Goal: Hemodynamic stability will improve  Description: Hemodynamic stability will improve  Outcome: Ongoing     Problem: Health Behavior:  Goal: Ability to manage health-related needs will improve  Description: Ability to manage health-related needs will improve  Outcome: Ongoing     Problem: Metabolic:  Goal: Ability to maintain clinical measurements within normal limits will improve  Description: Ability to maintain clinical measurements within normal limits will improve  Outcome: Ongoing     Problem: Metabolic:  Goal: Complications related to the disease process, condition or treatment will be avoided or minimized  Description: Complications related to the disease process, condition or treatment will be avoided or minimized  Outcome: Ongoing     Problem: Nutritional:  Goal: Ability to identify appropriate dietary choices will improve  Description: Ability to identify appropriate dietary choices will improve  Outcome: Ongoing

## 2021-04-14 NOTE — PROGRESS NOTES
Paged NP requesting to hold Lantus. Okay to hold - see note. 4/13/21 9:27 PM   [phone] Hospital or Facility: [facility] From: Francia Nava RE: Larry Lin 1960 RM: 80 Okay to hold Lantus this evening? Pt's blood glucose was 106, pt has consumed less than 50% of both meals today.  Need Callback: NO CALLBACK REQ C4 PROGRESSIVE CARE  Read 9:30 PM         4/13/21 9:30 PM   Yes  -NP

## 2021-04-14 NOTE — PROGRESS NOTES
[address] [facility] [address] acuna Facility: [facility] From: Leia Mukherjee RE: Rojelio Iqbal 1960 RM: [id number] patient is being discharged today,  time is 7:30 pm. Dr Hussein Strauss is asking that you please put your discharge recs on the [address] [place] for his antibiotics. thank you! Need Callback: NO CALLBACK REQ C4 PROGRESSIVE CARE ROUTINE      Sent to Dr Camila Yoder via perfect serve. 0'\"   0'\"   4/14/21 2:46 PM   To Order abx for him, the nephrologist calls in the order to the hx unit. I can confirm w her. [place] typically Not needed.  Thanks     Received back from Dr Camila Yoder

## 2021-04-14 NOTE — PROGRESS NOTES
Nephrology Progress Note   [email or web address]      This patient is a 62year old male whom we are following for ESRD. Subjective: The patient was seen and examined. RI TDC removed by VERA yesterday. Eating breakfast.    Family History: No family at bedside  ROS: No fever or chills      Vitals:  BP 97/63   Pulse 86   Temp 98 °F (36.7 °C) (Oral)   Resp 20   Ht 5' 10\" (1.778 m)   Wt 286 lb 6 oz (129.9 kg)   SpO2 95%   BMI 41.09 kg/m²   I/O last 3 completed shifts: In: 546 [I.V.:146]  Out: 3200   I/O this shift:  In: 360 [P.O.:360]  Out: -     Physical Exam:  Physical Exam  Vitals signs reviewed. Constitutional:       General: He is not in acute distress. Appearance: Normal appearance. HENT:      Head: Normocephalic and atraumatic. Eyes:      General: No scleral icterus. Conjunctiva/sclera: Conjunctivae normal.   Cardiovascular:      Rate and Rhythm: Normal rate. Heart sounds: No friction rub. Pulmonary:      Effort: Pulmonary effort is normal. No respiratory distress. Breath sounds: No wheezing. Abdominal:      General: Bowel sounds are normal. There is no distension. Tenderness: There is no abdominal tenderness. Neurological:      Mental Status: He is alert.        Access: IRINA CASILLAS TDC      Medications:   metoprolol succinate  25 mg Oral Daily    epoetin kelin-epbx  10,000 Units Subcutaneous Once per day on Tue Thu Sat    apixaban  2.5 mg Oral BID    vancomycin (VANCOCIN) intermittent dosing (placeholder)   Other RX Placeholder    aspirin  81 mg Oral Daily    b complex-C-folic acid  1 capsule Oral Daily    ferrous sulfate  325 mg Oral TID WC    pravastatin  40 mg Oral Nightly    insulin glargine  25 Units Subcutaneous BID    insulin lispro  0.05 Units/kg Subcutaneous TID WC    insulin lispro  0-6 Units Subcutaneous TID WC    insulin lispro  0-3 Units Subcutaneous Nightly         Labs:  Recent Labs     04/12/21  0513 04/13/21  0308 04/14/21  0614   WBC 8.0 8.0 7.0   HGB 7.4* 7.6* 8.0*   HCT 22.3* 22.8* 24.3*   .9* 108.5* 108.9*    267 292     Recent Labs     04/12/21  0514 04/13/21  0308 04/13/21  1632   * 127* 132*   K 3.9 4.1 3.6   CL 93* 90* 91*   CO2 24 22 30   GLUCOSE 130* 132* 129*   PHOS 4.7 5.6*  --    MG 1.90 2.00  --    BUN 36* 41* 16   CREATININE 6.2* 7.1* 4.0*   LABGLOM 9* 8* 15*   GFRAA 11* 10* 19*           Assessment/Plan: 1. Symptomatic bradycardia   - improved.     2. CAD- not a candidate for surgery  -medical management     3. End stage renal disease -HD TTS  -New TW 125kg  -access:S/p revision LUE with stent graft 3/3/21, access working well. - RI TDC removed on 4/13/21.     4. Anemia of chronic disease -   PRISCA with HD. PRN transfusions.       5. DM2     6. Fever- MRSA bacteremia  - vanco per ID; pharmacy dosing, can be given in dialysis. End date 4/27/21.  - repeat blood cultures are negative     Please do not hesitate to contact me at [phone] if with questions. Thank you!     Garfield Moe MD  4/14/2021  [facility]

## 2021-04-14 NOTE — DISCHARGE INSTR - COC
Continuity of Care Form    Patient Name: Iker Avilez   :  1960  MRN:  [id number]    Admit date:  4/3/2021  Discharge date:  2021    Code Status Order: Full Code   Advance Directives:   [facility] Documentation       Date/Time Healthcare Directive Type of Healthcare Directive Copy in [facility] [address] Agent's Name Healthcare Agent's Phone Number    [phone]  No, patient does not have an advance directive for healthcare treatment -- -- -- -- --            Admitting Physician:  Alysia Greene MD  PCP: GIANLUCA Dunaway CNP    Discharging Nurse: [address] [place] [address] Unit/Room#: 8776/4544-93  Discharging Unit Phone Number: [phone]    Emergency Contact:   Extended Emergency Contact Information  Primary Emergency Contact: Ximena Reis   [address] Phone: [phone]  Relation: Child    Past Surgical History:  Past Surgical History:   Procedure Laterality Date    CARDIAC CATHETERIZATION  2011    COLONOSCOPY  2017    diverticulosis    DIALYSIS FISTULA CREATION Left     DIALYSIS FISTULA CREATION Left 3/3/2021    LEFT UPPER ARM BRACHIAL CEPHALIC ARTERIAL VENOUS FISTULA; LEFT UPPER EXTREMITY ANGIOGRAM; STENT GRAFT ULNAR ARTERIAL VENOUS FISTULA performed by Mallorie Caputo MD at [facility] Left     ankle; age 13    HAND SURGERY      KIDNEY BIOPSY Right 2016    KIDNEY BIOPSY Left 2016    OPEN TREATMENT RADIAL SHAFT FRACTURE Right 2018    OPEN REDUCTION INTERNAL FIXATION RIGHT DISTAL RADIUS, RIGHT CARPAL TUNNEL RELEASE performed by Isi Nielsen MD at [id number] [place] [address] 94 Right 2017    BARD POWER PORT    UPPER GASTROINTESTINAL ENDOSCOPY  2017    mild chronic gastritis; duodenal biopsy normal    UPPER GASTROINTESTINAL ENDOSCOPY  10/24/2017       Immunization History:   Immunization History   Administered Date(s) Administered   Merissa Deni Influenza Vaccine, unspecified formulation 01/18/2007, 10/31/2008    Influenza, Quadv, IM, PF (6 mo and older Fluzone, Flulaval, Fluarix, and 3 yrs and older Afluria) 01/14/2017    Pneumococcal Polysaccharide ([id number]) 01/18/2007, 11/14/2015    Tdap (Boostrix, Adacel) 01/01/2001, 09/08/2008       Active Problems:  Patient Active Problem List   Diagnosis Code    Type 2 diabetes mellitus with stage 4 chronic kidney disease, without long-term current use of insulin ([facility]) E11.22, N18.4    Cigarette smoker F17.210    Atrial fibrillation, chronic ([facility]) I48.20    Essential (primary) hypertension I10    Chronic anticoagulation Z79.01    Normocytic anemia D64.9    Primary osteoarthritis of both knees M17.0    Calciphylaxis E83.59    Depression F32.9    Hyperlipidemia E78.5    Morbid obesity ([facility]) E66.01    Furunculosis (recurrent, on chronic suppressive Abx) L02.92    Ulcer of left calf with fat layer exposed ([facility] L97.222    Hyperphosphatemia E83.39    Vitamin D deficiency E55.9    Secondary hyperparathyroidism of renal origin ([facility] N25.81    Iron deficiency E61.1    White matter changes, severe by MRI brain [id number]    Coronary artery disease involving native coronary artery I25.10    Renal arteriosclerosis I12.9    Other disorder of calcium metabolism E83.59    CKD (chronic kidney disease), stage III N18.30    Gastrointestinal hemorrhage T56.3    Metabolic acidosis E30.5    Leg wound, left S81.802A    Deep vein thrombosis (DVT) ([facility]) I82.409    Tobacco abuse Z72.0    Wound infection T14. 8XXA, L08.9    Gastric outlet obstruction K31.1    Potassium excess E87.5    DM2 (diabetes mellitus, type 2) ([facility]) E11.9    Stage 5 chronic kidney disease not on chronic dialysis ([facility]) N18.5    Shortness of breath R06.02    ESRD (end stage renal disease) ([facility] N18.6    Sepsis ([facility]) A41.9    Mild malnutrition ([facility]) E44.1    Dialysis AV fistula malfunction ([facility]) T82.590A    Atrial fibrillation with slow ventricular response (HCC) I48.91    Hypotension I95.9    Lightheadedness R42    Chronic obstructive pulmonary disease (HCC) J44.9    Suspected sleep apnea R29.818    AV block I44.30    NSTEMI (non-ST elevated myocardial infarction) ([facility] I21.4       Isolation/Infection:   Isolation            Contact          Patient Infection Status       Infection Onset Added Last Indicated Last Indicated By Review Planned Expiration Resolved Resolved By    MRSA 04/06/21 04/08/21 04/06/21 Culture, Blood 1        Resolved    MRSA  09/29/17 09/29/17 Jc Brito RN   06/20/19 Jc Brito RN            Nurse Assessment:  Last Vital Signs: BP 97/63   Pulse 86   Temp 98 °F (36.7 °C) (Oral)   Resp 20   Ht 5' 10\" (1.778 m)   Wt 286 lb 6 oz (129.9 kg)   SpO2 95%   BMI 41.09 kg/m²     Last documented pain score (0-10 scale): Pain Level: 0  Last Weight:   Wt Readings from Last 1 Encounters:   04/13/21 286 lb 6 oz (129.9 kg)     Mental Status:  oriented, alert, coherent, logical, thought processes intact and able to concentrate and follow conversation    IV Access:  - None    Nursing Mobility/ADLs:  Walking   Assisted  Transfer  Assisted  Bathing  Assisted  Dressing  Assisted  Toileting  Assisted  Feeding  Independent  Med Admin  Assisted  Med Delivery   whole    Wound Care Documentation and Therapy:        Elimination:  Continence:   · Bowel: Yes  · Bladder: Yes  Urinary Catheter: None   Colostomy/Ileostomy/Ileal Conduit: No       Date of Last BM: 4/13/2021    Intake/Output Summary (Last 24 hours) at 4/14/2021 1311  Last data filed at 4/14/2021 0906  Gross per 24 hour   Intake 360 ml   Output    Net 360 ml     I/O last 3 completed shifts: In: 546 [I.V.:146]  Out: 3200     Safety Concerns:     None    Impairments/Disabilities:      None    Nutrition Therapy:  Current Nutrition Therapy:   - Oral Diet:  Cardiac    Routes of Feeding: Oral  Liquids:  Thin Liquids  Daily Fluid Restriction: no  Last Modified Barium Swallow with Video (Video Swallowing Test): not done    Treatments at the Time of Hospital Discharge:   Respiratory Treatments:   Oxygen Therapy:  is not on home oxygen therapy. Ventilator:    - No ventilator support    Rehab Therapies: Physical Therapy and Occupational Therapy  Weight Bearing Status/Restrictions: No weight bearing restirctions  Other Medical Equipment (for information only, NOT a DME order):  walker  Other Treatments:     Patient's personal belongings (please select all that are sent with patient):      RN SIGNATURE:  Shayla Betancourt RN    CASE MANAGEMENT/SOCIAL WORK SECTION    Inpatient Status Date: ***    Readmission Risk Assessment Score:  Readmission Risk              Risk of Unplanned Readmission:        23           Discharging to Facility/ Agency   · Name: [facility]   · Address:  · Phone: [phone]  · Fax: [phone]    Dialysis Facility (if applicable)   · Name:  · Address:  · Dialysis Schedule:  · Phone:  · Fax:    / signature: Electronically signed by Damon Koehler RN on 4/14/21 at 1:11 PM EDT    PHYSICIAN SECTION    Prognosis: Fair    Condition at Discharge: Stable    Rehab Potential (if transferring to Rehab): Fair    Recommended Labs or Other Treatments After Discharge: SNF to arrange follow-up with PCP, cardiology as instructed    IV Vanc given at dialysis through 5/19/75     Physician Certification: I certify the above information and transfer of Sophie Valderrama  is necessary for the continuing treatment of the diagnosis listed and that he requires [facility] for {GREATER/LESS:[id number]} 30 days.      Update Admission H&P: No change in H&P    PHYSICIAN SIGNATURE:  Electronically signed by Anali Cuellar MD on 4/14/21 at 2:28 PM EDT

## 2021-04-14 NOTE — FLOWSHEET NOTE
04/13/21 2009   Vital Signs   Temp 98.6 °F (37 °C)   Temp Source Oral   Pulse 73   Heart Rate Source Monitor   Resp 18   BP (!) 92/53   BP Location Right upper arm   MAP (mmHg) 66   Patient Position Semi fowlers   Level of Consciousness Alert (0)   MEWS Score 2   Patient Currently in Pain No   Pain Assessment   Pain Assessment 0-10   Pain Level 0   Patient's Stated Pain Goal No pain   Oxygen Therapy   SpO2 94 %   Pulse Oximeter Device Mode Intermittent   Pulse Oximeter Device Location Finger   O2 Device None (Room air)   O2 Flow Rate (L/min) 0 L/min

## 2021-04-15 NOTE — PROGRESS NOTES
Report given to transportation team @ bedside; paperwork signed. Pt's belongings packed and given to transport. Pt denies need to void or use BSC at this time. Report previously called to [facility] by day shift RN. Pt denies having any questions/concerns at discharge.

## 2021-04-15 NOTE — DISCHARGE SUMMARY
Hospital Medicine Discharge Summary    Patient ID: Lela Noriega      Patient's PCP: Angus Pickens APRN - CNP    Admit Date: 4/3/2021     Discharge Date: 4/14/2021      Admitting Physician: Benito Traore MD     Discharge Physician: Dylan Gordon MD     Discharge Diagnoses: Active Hospital Problems    Diagnosis    NSTEMI (non-ST elevated myocardial infarction) ([facility] [I21.4]    AV block [I44.30]    Atrial fibrillation with slow ventricular response (HCC) [I48.91]    Hypotension [I95.9]    Lightheadedness [R42]    Chronic obstructive pulmonary disease ([facility] [J44.9]    Suspected sleep apnea [R29.818]    ESRD (end stage renal disease) ([facility] [N18.6]    DM2 (diabetes mellitus, type 2) ([facility] [E11.9]    Morbid obesity ([facility] [E66.01]    Chronic anticoagulation [Z79.01]    Essential (primary) hypertension [I10]    Atrial fibrillation, chronic (HCC) [I48.20]       The patient was seen and examined on day of discharge and this discharge summary is in conjunction with any daily progress note from day of discharge. HPI from admission H&P : 64-year-old with ESRD on hemodialysis; chronic atrial fibrillation presented to [facility] with complaints of fatigue, weakness. He was noted to have bradycardia with hypotension upon arrival to ED. He got admitted to the hospital for further evaluation and management as follows. Hospital Course: By Problem list   Afib w/ SVR (POA)   -  HR and BP significantly improved on dopamine, currently running at 3mcg/kg/min.  Patient symptomatically feels better too. -  Carvedilol and diltiazem held and discontinued . Cardiology changed him from Coreg to Toprol-XL 25 mg daily. Patient's blood pressure needs to be monitored and has to be given midodrine during dialysis days.   -  Home ASA and statin continued. Marianne Iona held for procedures and resumed at discharge  - EP and cardiology evaluated and assisted with management.     Severe multivessel coronary artery disease with subacute occlusion of the RCA   -As evidenced on [facility]   - cardiology did [facility] on 4/6/2021 which showed severe multivessel disease with subacute occlusion of RCA. Recommended CT surgery evaluation. CT surgery evaluated the same day and stated that patient is not a surgical candidate. Cardiology plan to discuss high risk PCI options as outpatient  -Continue aspirin, statin     ESRD on HD  -  Tues, Thurs, Sat dialysis schedule. -  Dialysis access was a tunneled R IJ HD catheter. -  Left brachiocephalic fistula was   created 3/3/2021. Vascular (Dr. Adrian Haddad) evaluated patient on 4/9/2021 and cleared for the fistula to be used and remove right IJ HD catheter. Right IJ HD catheter removed on 4/13/2021  -  Continue home nephrocaps, ferric citrate, and prn midodrine.  -Nephrology consulted and assisting with maintenance dialysis while inpatient.     Fever and leukocytosis secondary to central line related BSI-noted on 4/6/2021. Sepsis ruled out. MRSA bacteremia only. Chest x-ray on admission negative. Started on empiric vancomycin/cefepime on 4/6/2021after obtaining blood cultures x2 (1 including from the femoral line) . Showed MRSA bacteremia. Continued Abx . Leukocytosis resolved. Femoral Line remains in place.   -  Reviewed ID consultation to assist with Mx -discontinued cefepime and recommended to continue IV vancomycin. Removed femoral central line on 4/12/2021.; repeat blood cultures from 4/8/21 - NGTD  . -Removed right IJ TDC on 4/13/2021  -ID recommended to continue vancomycin postdialysis through 4/27/2021. DM2  -  Held all oral antidiabetic agents.  Started  s.c. Insulin regimen based on home regimen.     Anemia of Chronic Kidney  Disease -  On long term AC with eliquis. Will DC heparin gtt. and resume Eliquis  Baseline Hb 8-9 . Hemoglobin dropped to 6.6 on 4/11/21 for which he received 1 unit PRBC transfusion .   No signs of bleeding/hemolysis.        Patient evaluated by PT OT during hospital stay and was recommended for SNF. Social work assisted with disposition. Physical Exam Performed:   /64   Pulse 84   Temp 97.9 °F (36.6 °C) (Oral)   Resp 18   Ht 5' 10\" (1.778 m)   Wt 286 lb 6 oz (129.9 kg)   SpO2 95%   BMI 41.09 kg/m²     General appearance: No apparent distress, appears stated age and cooperative. HEENT: Pupils equal, round, and reactive to light. Conjunctivae/corneas clear. Neck: Supple, with full range of motion. No jugular venous distention. Trachea midline. Respiratory:  Normal respiratory effort. Clear to auscultation, bilaterally without Rales/Wheezes/Rhonchi. Cardiovascular: Regular rate and rhythm with normal S1/S2 without murmurs, rubs or gallops. Abdomen: Soft, non-tender, non-distended with normal bowel sounds. Musculoskeletal: No clubbing, cyanosis or edema bilaterally.  Full range of motion without deformity. Skin: Skin color, texture, turgor normal.  No rashes or lesions. Neurologic:  Neurovascularly intact without any focal sensory/motor deficits. Cranial nerves: II-XII intact, grossly non-focal.  Psychiatric: Alert and oriented, thought content appropriate, normal insight  Capillary Refill: Brisk,< 3 seconds   Peripheral Pulses: +2 palpable, equal bilaterally       Labs:  For convenience and continuity at follow-up the following most recent labs are provided:      CBC:    Lab Results   Component Value Date    WBC 7.0 04/14/2021    HGB 8.0 04/14/2021    HCT 24.3 04/14/2021     04/14/2021       Renal:    Lab Results   Component Value Date     04/13/2021    K 3.6 04/13/2021    CL 91 04/13/2021    CO2 30 04/13/2021    BUN 16 04/13/2021    CREATININE 4.0 04/13/2021    CALCIUM 8.9 04/13/2021    PHOS 5.6 04/13/2021         Significant Diagnostic Studies    Radiology:   IR REMOVE TUNNELED CVAD WO SQ PORT/PUMP   Final Result   Technically successful right-sided tunneled dialysis catheter removal.         XR PELVIS (1-2 VIEWS)   Final apixaban (ELIQUIS) 2.5 MG TABS tablet Take 2.5 mg by mouth 2 times daily Take two tablets BIDHistorical Med             Time Spent on discharge is more than 30 minutes in the examination, evaluation, counseling and review of medications and discharge plan. Signed:    Hussain Rahman MD   4/15/2021      Thank you GIANLUCA Soliz CNP for the opportunity to be involved in this patient's care. If you have any questions or concerns please feel free to contact me at [phone].

## 2021-04-23 ENCOUNTER — TELEPHONE (OUTPATIENT)
Dept: CARDIOLOGY CLINIC | Age: 61
End: 2021-04-23

## 2021-04-23 NOTE — TELEPHONE ENCOUNTER
Sudarshan from the [facility], is scheduled to see [facility] on 5/5/21 for a hsfu, but they are wanting to switch medications. MD there wants to discontinue his metoprolol and start Carvedilol 3.125mg BID. RN wasn't told why they wanted to switch.

## 2021-10-14 NOTE — TELEPHONE ENCOUNTER
Incoming call from [facility], spoke with Jessica Lockhart. She stated that the patient has been having occasional bleeding in throat once a week. She stated that this is something new and she wanted to mention it to Dr. Alexander Garcia to she what the patient should do. Jessica Lockhart can be reached at [phone].

## 2021-11-22 NOTE — TELEPHONE ENCOUNTER
Received another phone call from  Dr Erasmo Olivarez. He is asking to speak with [facility] personally. Im not sure this was made clear in the original phone call. He seems annoyed that he has not received a phone call back yet. I did explain the message was put through as a request for cardiac clearance.  He said he didn't care how it was sent through, he needs to talk to [facility]SP or an NP.

## 2021-11-22 NOTE — TELEPHONE ENCOUNTER
Lily Pathak, could we schedule the pt for clx with RKG at [facility] on the hold spot for 11/22?

## 2021-11-23 NOTE — TELEPHONE ENCOUNTER
Then casi home #  [phone] was told he does not use this # and has not for a year or more and not to call back. I tried calling # voicemail box has not been set up. Called daughter which is his Emergency contact and also on his comm form. She gave me # [phone] which is not a working #. She is going to try and contact pt to call office back. Gave my name to ask for. Informed her very important to call due to sched appt.

## 2021-11-24 NOTE — LETTER
[address]  [address] [place] [address] [address]IVE  [address] [address] [address] [address]  Phone: [phone]  Fax: [phone]    Flavia Nieves MD        November 24, 2021    Sherwin Mims 1960    Patient is intermidete risk clinically for low risk type of surgery. May proceed as planned. Please hold Eliquis for 48 hours prior to procedure but continue taking aspirin daily.       Sincerely,        Flavia Nieves MD

## 2021-11-24 NOTE — PROGRESS NOTES
[facility] Office Note  11/24/2021     Subjective:  Mr. Arlette Curran is here for Cardiac Risk Stratifications for a tooth extraction. He has a PMH of AF, CAD, Cardiomyopathy, CHF. HPI: Today, patient states he is here for clearance for a tooth extraction. He smokes different amounts daily. Today he had 2 cigarettes and yesterday he smoked 6 cigarettes. Patient denies current edema, chest pain, sob, palpitations, dizziness or syncope. Patient is taking all cardiac medications as prescribed and tolerates them well. Review of Systems:  12 point ROS negative in all areas as listed below except as in [place]  Constitutional, EENT, Cardiovascular, pulmonary, GI, , Musculoskeletal, skin, neurological, hematological, endocrine, Psychiatric      Reviewed past medical history, social, and family history. Less than half pack per day.   Past Medical History:   Diagnosis Date    At[facility]     CAD (coronary artery disease)     [facility]2015    CHF (congestive heart failure) (HCC)     Chronic kidney disease     COPD (chronic obstructive pulmonary disease) (HCC)     Deep vein thrombosis (DVT) ([facility] 08/07/2012    Depression     Diabetes mellitus (HCC)     Gastrointestinal bleed     GERD (gastroesophageal reflux disease)     Heme positive stool 01/2017    Hemodialysis patient ([facility]     T-TH-S  [place]    History of blood transfusion     1/2017    Hx of blood clots     left leg; 5 plus years ago    Hyperlipidemia     Hypertension     Hypoglycemia     MDRO (multiple drug resistant organisms) resistance 2017    treated with antibiotics    MRSA (methicillin resistant staph aureus) culture positive 9/26/17,09/18/2017    posterior left calf    MRSA (methicillin resistant staph aureus) culture positive 04/06/2021    blood culture    Primary osteoarthritis of both knees     Primary osteoarthritis of both knees      Past Surgical History:   Procedure Laterality Date    CARDIAC CATHETERIZATION  07/12/2011    COLONOSCOPY  01/17/2017    diverticulosis    DIALYSIS FISTULA CREATION Left 2020    DIALYSIS FISTULA CREATION Left 3/3/2021    LEFT UPPER ARM BRACHIAL CEPHALIC ARTERIAL VENOUS FISTULA; LEFT UPPER EXTREMITY ANGIOGRAM; STENT GRAFT ULNAR ARTERIAL VENOUS FISTULA performed by Pallavi Lopez MD at [address] Left     ankle; age 13    HAND SURGERY      KIDNEY BIOPSY Right 06/20/2016    KIDNEY BIOPSY Left 09/20/2016    OPEN TREATMENT RADIAL SHAFT FRACTURE Right 8/7/2018    OPEN REDUCTION INTERNAL FIXATION RIGHT DISTAL RADIUS, RIGHT CARPAL TUNNEL RELEASE performed by Shari Banks MD at [facility] TUNNELED VENOUS PORT PLACEMENT Right 11/09/2017    BARD POWER PORT    UPPER GASTROINTESTINAL ENDOSCOPY  01/17/2017    mild chronic gastritis; duodenal biopsy normal    UPPER GASTROINTESTINAL ENDOSCOPY  10/24/2017       Objective:   /68   Pulse 83   Ht 6' (1.829 m)   Wt 285 lb (129.3 kg)   SpO2 98%   BMI 38.65 kg/m²     Wt Readings from Last 3 Encounters:   11/24/21 285 lb (129.3 kg)   04/13/21 286 lb 6 oz (129.9 kg)   03/03/21 (!) 306 lb 7 oz (139 kg)       Physical Exam:  General: No Respiratory distress, appears well developed and well nourished. Eyes:  Sclera nonicteric  Nose/Sinuses:  negative findings: nose shows no deformity, asymmetry, or inflammation, nasal mucosa normal, septum midline with no perforation or bleeding  Back:  no pain to palpation  Joint:  no active joint inflammation  Musculoskeletal:  negative  Skin:  Warm and dry  Neck:  Negative for JVD and Carotid Bruits. Chest:  Dimished,Clear to auscultation, respiration easy  Cardiovascular:  irregular, S1S2 normal, no murmur, no rub or thrill.   Abdomen:  Soft normal liver and spleen  Extremities:   No edema, clubbing, cyanosis,  Neuro: intact    Medications:   Outpatient Encounter Medications as of 11/24/2021   Medication Sig Dispense Refill    carvedilol (COREG) 12.5 MG tablet       Ferric Citrate (AURYXIA) 1  MG(Fe) TABS Take 3 tablets by mouth 3 times daily (with meals)      Cholecalciferol (VITAMIN D3) 1.25 MG (60641 UT) CAPS Take by mouth every 30 days      aspirin 81 MG EC tablet Take 81 mg by mouth daily      midodrine (PROAMATINE) 2.5 MG tablet Take 2.5 mg by mouth daily ON DIALYSIS DAYS      B Complex-C-Folic Acid (AYLA CAPS) 1 MG CAPS Take 1 capsule by mouth daily      pravastatin (PRAVACHOL) 40 MG tablet Take 1 tablet by mouth daily (Patient taking differently: Take 40 mg by mouth nightly ) 30 tablet 0    insulin glargine (LANTUS SOLOSTAR) 100 UNIT/ML injection pen Inject 25 Units into the skin 2 times daily 5 pen 0    blood glucose test strips (AGAJumpHawkIX AMP TEST) strip 1 each by In Vitro route daily As needed. 100 each 3    Blood Glucose Monitoring Suppl (AGAMATRIX PRESTO) w/Device KIT Check twice daily 1 kit 0    AGAMATRIX ULTRA-THIN LANCETS MISC Check sugars bid 100 each 0    apixaban (ELIQUIS) 2.5 MG TABS tablet Take 2.5 mg by mouth 2 times daily Take two tablets BID      dilTIAZem (CARDIZEM CD) 120 MG extended release capsule       metoprolol succinate (TOPROL XL) 25 MG extended release tablet Take 1 tablet by mouth daily (Patient not taking: Reported on 11/24/2021) 30 tablet 3     No facility-administered encounter medications on file as of 11/24/2021. Lab Data:  CBC: No results for input(s): WBC, HGB, HCT, MCV, PLT in the last 72 hours. BMP: No results for input(s): NA, K, CL, CO2, PHOS, BUN, CREATININE in the last 72 hours. Invalid input(s): CA  LIVER PROFILE: No results for input(s): AST, ALT, LIPASE, BILIDIR, BILITOT, ALKPHOS in the last 72 hours. Invalid input(s):   AMYLASE,  ALB  LIPID:   Lab Results   Component Value Date    CHOL 153 07/10/2011     Lab Results   Component Value Date    TRIG 284 (H) 07/10/2011     Lab Results   Component Value Date    HDL 27 (L) 07/10/2011     Lab Results   Component Value Date    LDLCALC 70 07/10/2011     Lab Results   Component Value Date    LABVLDL 57 07/10/2011     No results found for: CHOLHDLRATIO  PT/INR: No results for input(s): PROTIME, INR in the last 72 hours. A1C:   Lab Results   Component Value Date    LABA1C 7.1 2021     BNP:  No results for input(s): BNP in the last 72 hours. IMAGIN.  Atrial fibrillationLow voltage QRSInferior infarct , age undeterminedAbnormal ECGConfirmed by Bindu Turner MD, Usman Rey ([id number]) on 2021 5:58:17 PM  Echo 4..  ejection fraction of 50%. Left ventricular diastolic filling pressure are indeterminate. Bi-atrial enlargement. Moderate posterior mitral annular calcification with restricted movement of   posterior leaflet. The maximum mitral valve pressure gradient is 9 mmHg and the mean pressure   gradient is 3 mmHg with max velocity of 1.48 m/sec. Mild MS. Moderate mitral regurgitation. Trace aortic and pulmonic regurgitation. Systolic pulmonary artery pressure (SPAP) is normal and estimated at 32 mmHg   (right atrial pressure 8 mmHg). Assessment:  Terrance Bean was seen today for new patient and cardiac clearance. Diagnoses and all orders for this visit:    Permanent atrial fibrillation ([facility]    Coronary artery disease involving native coronary artery of native heart without angina pectoris    Ischemic cardiomyopathy    Chronic diastolic congestive heart failure ([place] [facility] [address]          Plan:  1. Labs from 2021 reviewed with patient  2. Do not take Eliquis for 48 hours before the procedure  3. Continue to take the Aspirin  4. See Letter for clearance  5. Smoking Cessation advised risk for stroke heart attack PAD discussed  6. Follow up as needed    This note is scribed in the presence of Dr. Elia Carter MD by Berto Leung RN.    I, Dr. Steph Reza, personally performed the services described in this documentation, as scribed by the above signed scribe in my presence. It is both accurate and complete to my knowledge.  I agree with the details independently gathered by the clinical support staff, while the remaining scribed note accurately describes my personal service to the patient.

## 2021-11-24 NOTE — PATIENT INSTRUCTIONS
Plan:  1. Labs from 04/2021 reviewed with patient  2. Do not take Eliquis for 48 hours before the procedure  3. Continue to take the Aspirin  4. See Letter for clearance  5. Smoking Cessation advised risk for stroke heart attack PAD discussed  6.  Follow up as needed

## 2021-11-24 NOTE — TELEPHONE ENCOUNTER
Spoke with pt he will be there today. He requests that an MA help him from the parking lot with a wheel chair. I have checked with the RN at [facility] and she said that would be fine. I will forward this to the [facility] to give them a heads up as well.

## 2021-11-24 NOTE — LETTER
[address] [address]  Phone: [phone]  Fax: [phone]    Adilene Herrera MD    November 24, 2021     [address] [place] [address]    Patient: Nate Spencer   MR Number: [id number]   YOB: 1960   Date of Visit: 11/24/2021       Dear Saima Kaplan: Thank you for referring Magalie Jacob to me for evaluation/treatment. Below are the relevant portions of my assessment and plan of care. If you have questions, please do not hesitate to call me. I look forward to following Parul Lennon along with you.     Sincerely,      Adilene Herrera MD

## 2022-01-01 ENCOUNTER — HOSPITAL ENCOUNTER (EMERGENCY)
Age: 62
End: 2022-06-24
Attending: EMERGENCY MEDICINE
Payer: COMMERCIAL

## 2022-01-01 VITALS — BODY MASS INDEX: 38.6 KG/M2 | HEIGHT: 72 IN | WEIGHT: 285 LBS

## 2022-01-01 DIAGNOSIS — I46.9 CARDIAC ARREST (HCC): Primary | ICD-10-CM

## 2022-01-01 PROCEDURE — 99284 EMERGENCY DEPT VISIT MOD MDM: CPT

## 2022-01-01 PROCEDURE — 6360000002 HC RX W HCPCS: Performed by: EMERGENCY MEDICINE

## 2022-01-01 PROCEDURE — 2700000000 HC OXYGEN THERAPY PER DAY

## 2022-01-01 PROCEDURE — 92950 HEART/LUNG RESUSCITATION CPR: CPT

## 2022-01-01 PROCEDURE — 31500 INSERT EMERGENCY AIRWAY: CPT

## 2022-01-01 PROCEDURE — 2500000003 HC RX 250 WO HCPCS: Performed by: EMERGENCY MEDICINE

## 2022-01-01 PROCEDURE — 94761 N-INVAS EAR/PLS OXIMETRY MLT: CPT

## 2022-01-01 RX ORDER — EPINEPHRINE 0.1 MG/ML
SYRINGE (ML) INJECTION DAILY PRN
Status: COMPLETED | OUTPATIENT
Start: 2022-01-01 | End: 2022-01-01

## 2022-01-01 RX ORDER — NALOXONE HYDROCHLORIDE 1 MG/ML
INJECTION INTRAMUSCULAR; INTRAVENOUS; SUBCUTANEOUS DAILY PRN
Status: COMPLETED | OUTPATIENT
Start: 2022-01-01 | End: 2022-01-01

## 2022-01-01 RX ORDER — CALCIUM CHLORIDE 100 MG/ML
INJECTION INTRAVENOUS; INTRAVENTRICULAR DAILY PRN
Status: COMPLETED | OUTPATIENT
Start: 2022-01-01 | End: 2022-01-01

## 2022-01-01 RX ADMIN — EPINEPHRINE 1 MG: 0.1 INJECTION, SOLUTION ENDOTRACHEAL; INTRACARDIAC; INTRAVENOUS at 12:33

## 2022-01-01 RX ADMIN — EPINEPHRINE 1 MG: 0.1 INJECTION, SOLUTION ENDOTRACHEAL; INTRACARDIAC; INTRAVENOUS at 12:29

## 2022-01-01 RX ADMIN — CALCIUM CHLORIDE 1000 MG: 100 INJECTION, SOLUTION INTRAVENOUS at 12:36

## 2022-01-01 RX ADMIN — CALCIUM CHLORIDE 1000 MG: 100 INJECTION, SOLUTION INTRAVENOUS at 12:30

## 2022-01-01 RX ADMIN — SODIUM BICARBONATE 50 MEQ: 84 INJECTION, SOLUTION INTRAVENOUS at 12:39

## 2022-01-01 RX ADMIN — NALOXONE HYDROCHLORIDE 1 MG: 1 INJECTION PARENTERAL at 12:36

## 2022-01-01 RX ADMIN — SODIUM BICARBONATE 50 MEQ: 84 INJECTION, SOLUTION INTRAVENOUS at 12:31

## 2022-01-01 RX ADMIN — EPINEPHRINE 1 MG: 0.1 INJECTION, SOLUTION ENDOTRACHEAL; INTRACARDIAC; INTRAVENOUS at 12:40

## 2022-06-24 NOTE — ED PROVIDER NOTES
[facility]  ED  EMERGENCY DEPARTMENT ENCOUNTER        Pt Name: Yuliana Garcia  MRN: [id number]  Vel[place] 1960  Date of evaluation: 6/24/2022  Provider: Rema Dominguez MD  PCP: Karla Caballero, APRN - [facility]       Chief Complaint   Patient presents with   Carreon Cardiac Arrest     pt arrived via ems, cpr with lucus machine on patient and functioning properly. medics report patient was at lunch with daughter when he became unresponsive. daughter began cpr called 911. Dr Essie Barrientos is at the bedside. medics report v-fib , medics administered 2 rounds of epi, 4 shocks adminitered. cpr in progress upon arrival.        HISTORY OFPRESENT ILLNESS   (Location/Symptom, Timing/Onset, Context/Setting, Quality, Duration, Modifying Factors,Severity)  Note limiting factors. Yuliana Garcia is a 62 y.o. male presenting today due to concern for reportedly just finishing dialysis this morning and ultimately going to lunch with his daughter and while eating he started to have a mental status change per daughter and then started to choke and following that his eyes rolled in the back of his head. She caught him before falling and helped him to the ground and started CPR at the scene. EMS then arrived and stated that he had a faint pulse with agonal respirations and did start CPR and monitor was showing ventricular fibrillation. They reportedly gave 4 shocks related to this but denied ever being able to get a pulse back once it was lost on the scene. They had been doing 15 minutes of CPR prior to arrival.  They did not intubate the patient but used BVM for oxygenation. History and physical are severely limited at this time due to the patient being in cardiac arrest.          REVIEW OF SYSTEMS    (2-9 systems for level 4, 10 or more for level 5)     Review of Systems   Unable to perform ROS: Patient unresponsive         Positives and Pertinent negatives as per HPI.       PASTMEDICAL HISTORY Past Medical History:   Diagnosis Date    Atrial fibrillation ([facility]     CAD (coronary artery disease)     Cardiomyopathy ([facility] 07/13/2015    [facility]est[facility]     Chronic kidney disease     COPD (chronic obstructive pulmonary disease) ([facility]     [facility]    Depression     Diabetes mellitus ([facility]     Gastrointestinal bleed     GERD (gastroesophageal reflux disease)     Heme positive stool 01/2017    Hemodialysis patient ([facility]     T-TH-S  Maggi Loss    History of blood transfusion     1/2017    Hx of blood clots     left leg; 5 plus years ago    Hyperlipidemia     Hypertension     Hypoglycemia     MDRO (multiple drug resistant organisms) resistance 2017    treated with antibiotics    MRSA (methicillin resistant staph aureus) culture positive 9/26/17,09/18/2017    posterior left calf    MRSA (methicillin resistant staph aureus) culture positive 04/06/2021    blood culture    Primary osteoarthritis of both knees     Primary osteoarthritis of both knees          SURGICAL HISTORY       Past Surgical History:   Procedure Laterality Date    CARDIAC CATHETERIZATION  07/12/2011    COLONOSCOPY  01/17/2017    diverticulosis    DIALYSIS FISTULA CREATION Left 2020    DIALYSIS FISTULA CREATION Left 3/3/2021    LEFT UPPER ARM BRACHIAL CEPHALIC ARTERIAL VENOUS FISTULA; LEFT UPPER EXTREMITY ANGIOGRAM; STENT GRAFT ULNAR ARTERIAL VENOUS FISTULA performed by Tammy Pendleton MD at [facility] Left     ankle; age 13    HAND SURGERY      OPEN TREATMENT RADIAL SHAFT FRACTURE Right 8/7/2018    OPEN REDUCTION INTERNAL FIXATION RIGHT DISTAL RADIUS, RIGHT CARPAL TUNNEL RELEASE performed by Riya Wills MD at [facility] RENAL BIOPSY Right 06/20/2016    RENAL BIOPSY Left 09/20/2016    TUNNELED VENOUS PORT PLACEMENT Right 11/09/2017    BARD POWER PORT    UPPER GASTROINTESTINAL ENDOSCOPY  01/17/2017    mild chronic gastritis; duodenal biopsy normal    UPPER GASTROINTESTINAL ENDOSCOPY  10/24/2017         CURRENT MEDICATIONS       Previous Medications    AGAMATRIX ULTRA-THIN LANCETS MISC    Check sugars bid    APIXABAN (ELIQUIS) 2.5 MG TABS TABLET    Take 2.5 mg by mouth 2 times daily Take two tablets BID    ASPIRIN 81 MG EC TABLET    Take 81 mg by mouth daily    B COMPLEX-C-FOLIC ACID (AYLA CAPS) 1 MG CAPS    Take 1 capsule by mouth daily    BLOOD GLUCOSE MONITORING SUPPL (AGACystinosis Research FoundationIX PRESTO) W/DEVICE KIT    Check twice daily    BLOOD GLUCOSE TEST STRIPS (AGACystinosis Research FoundationIX AMP TEST) STRIP    1 each by In Vitro route daily As needed.     CARVEDILOL (COREG) 12.5 MG TABLET        CHOLECALCIFEROL (VITAMIN D3) 1.25 MG (61954 UT) CAPS    Take by mouth every 30 days    DILTIAZEM (CARDIZEM CD) 120 MG EXTENDED RELEASE CAPSULE        FERRIC CITRATE (AURYXIA) 1  MG(FE) TABS    Take 3 tablets by mouth 3 times daily (with meals)    INSULIN GLARGINE (LANTUS SOLOSTAR) 100 UNIT/ML INJECTION PEN    Inject 25 Units into the skin 2 times daily    METOPROLOL SUCCINATE (TOPROL XL) 25 MG EXTENDED RELEASE TABLET    Take 1 tablet by mouth daily    MIDODRINE (PROAMATINE) 2.5 MG TABLET    Take 2.5 mg by mouth daily ON DIALYSIS DAYS    PRAVASTATIN (PRAVACHOL) 40 MG TABLET    Take 1 tablet by mouth daily       ALLERGIES     Codeine, Onion, and Penicillins    FAMILY HISTORY       Family History   Problem Relation Age of Onset    Arthritis Mother     Depression Mother     High Blood Pressure Mother     Mental Illness Mother 80        dementia    Obesity Mother     Osteoporosis Mother           SOCIAL HISTORY       Social History     Socioeconomic History    Marital status:      Spouse name: Not on file    Number of children: Not on file    Years of education: Not on file    Highest education level: Not on file   Occupational History    Occupation: disabled   Tobacco Use    Smoking status: Current Every Day Smoker     Packs/day: 0.50     Years: 15.00     Pack Resp SpO2 Height Weight   -- -- -- -- -- -- -- --       Physical Exam  Vitals and nursing note reviewed. Constitutional:       Appearance: He is obese. He is toxic-appearing. HENT:      Head: Normocephalic. Right Ear: External ear normal.      Left Ear: External ear normal.      Mouth/Throat:      Mouth: Mucous membranes are dry. Comments: Food noted in mouth  Eyes:      Comments: Pupils fixed and dilated   Cardiovascular:      Comments: No palpable pulses  Pulmonary:      Comments: No spontaneous respirations  Abdominal:      Tenderness: There is no abdominal tenderness. Musculoskeletal:         General: No signs of injury. Cervical back: Neck supple. Skin:     General: Skin is cool and dry. Neurological:      Mental Status: He is unresponsive. GCS: GCS eye subscore is 1. GCS verbal subscore is 1. GCS motor subscore is 1. Motor: No seizure activity. Comments: Pupils fixed and dilated  No cough or gag reflex   Psychiatric:         Speech: He is noncommunicative. DIAGNOSTIC RESULTS   :    Labs Reviewed - No data to display    All other labs were within normal range or not returned asof this dictation. EKG: All EKG's are interpreted by the Emergency Department Physician who either signs or Co-signs this chart in the absence of a cardiologist.        RADIOLOGY:   Non-plain film images such as CT, Ultrasound and MRI are read by the radiologist. Early Gunnels images are visualized and preliminarily interpreted by the  ED Provider with the belowfindings:        Interpretation per the Radiologist below, if available at the time of this note:    No orders to display         PROCEDURES   Unless otherwise noted below, none     I did perform a bedside ultrasound of the patient and cardiac ultrasound was obtained showing no cardiac activity and monitor showed asystole at that time.   I did this multiple times during the code and it always showed cardiac standstill and he ultimately was pronounced dead at [id number] based on this. CPR was performed for 15 minutes in the emergency department. Procedures    CRITICAL CARE TIME   N/A    CONSULTS: Spoke with Dr. Pat Coe on-call for [facility] and he stated that they would be happy to sign the death certificate. IP CONSULT TO PRIMARY CARE PROVIDER    EMERGENCY DEPARTMENT COURSE and DIFFERENTIAL DIAGNOSIS/MDM:   Vitals:    Vitals:    06/24/22 1245   Weight: 285 lb (129.3 kg)   Height: 6' (1.829 m)       Patient was given the following medications:  Medications   sodium bicarbonate 8.4 % injection (has no administration in time range)   EPINEPHrine 1 MG/10ML injection (1 mg IntraVENous Given 6/24/22 1240)   calcium chloride 10 % injection (1,000 mg IntraVENous Given 6/24/22 1236)   sodium bicarbonate 8.4 % injection (50 mEq IntraVENous Given 6/24/22 1239)   naloxone (NARCAN) injection (1 mg IntraVENous Given 6/24/22 1236)     Patient was evaluated due to reportedly being at lunch with his daughter prior to arrival and becoming unresponsive to the point where he had agonal respirations and a very faint pulse when EMS first arrived. They did start CPR shortly after this due to losing pulses and at that time he did have 4 shockable rhythms with ventricular fibrillation but they were never able to get a pulse back. When he arrived to the emergency department he was in asystole. I did initially intubate the patient after suctioning out significant amount of food. Due to history of dialysis even though he went today, I did give him 2 doses of calcium along with 2 of bicarbonate in case there was any concern for hyperkalemia. He received naloxone in case of any narcotic drugs that could have been used. He also received multiple rounds of epinephrine. Every rhythm check showed asystole and we never able to get a survivable rhythm back.   I did a bedside ultrasound but did not save the imaging and this did show cardiac standstill multiple times. I did attempt to do CPR on the patient for at least 15 minutes on top of the 15 minutes EMS that it but was unable to get a pulse and therefore I did ultimately pronounce the patient dead at [id number] with his daughter at the bedside. I did express my condolences to his daughter and did call for the  to come to be with her during this challenging time. Based on the amount of CPR and the significant comorbidities, and with having cardiac standstill and asystole, the chance of any good neurological outcome even if we were able to get a pulse back was practically 0 at the time that his time of death was called, and family was aware of this as well. The patient tolerated their visit well. The patient and / or the family were informed of the results of any tests, a time was given to answer questions. FINAL IMPRESSION      1. Cardiac arrest [facility])          DISPOSITION/PLAN   DISPOSITION  2022 12:46:53 PM      PATIENT REFERRED TO:  No follow-up provider specified.     DISCHARGEMEDICATIONS:  New Prescriptions    No medications on file       DISCONTINUED MEDICATIONS:  Discontinued Medications    No medications on file              (Please note that portions of this note were completed with a voicerecognition program.  Efforts were made to edit the dictations but occasionally words are mis-transcribed.)    Lio Harrison MD (electronically signed)            Lio Harrison MD  [phone]

## 2022-06-24 NOTE — FLOWSHEET NOTE
Paged to offer support to family at the death of pt. Daughter and ex-wife present at bedside. Tearful. Shared stories about memories with pt, his ill-health and spiritual life.  offered listening presence emotional support and prayer was said with family at bedside.

## 2022-06-27 LAB
GLUCOSE BLD-MCNC: 354 MG/DL (ref 70–99)
PERFORMED ON: ABNORMAL

## (undated) DEVICE — SOLUTION IV 1000ML LAC RINGERS PH 6.5 INJ USP VIAFLX PLAS

## (undated) DEVICE — SUTURE NONABSORBABLE MONOFILAMENT 6-0 BV-1 1X30 IN PROLENE 8709H

## (undated) DEVICE — INTENDED TO BE USED TO OCCLUDE, RETRACT AND IDENTIFY ARTERIES, VEINS, TENDONS AND NERVES IN SURGICAL PROCEDURES: Brand: STERION®  VESSEL LOOP

## (undated) DEVICE — PUNCH AORT DIA4MM LNG HNDL

## (undated) DEVICE — SOLUTION IV IRRIG POUR BRL 0.9% SODIUM CHL 2F7124

## (undated) DEVICE — Device

## (undated) DEVICE — ZIMMER® STERILE DISPOSABLE TOURNIQUET CUFF WITH PLC, DUAL PORT, SINGLE BLADDER, 18 IN. (46 CM)

## (undated) DEVICE — GOWN SIRUS NONREIN XL W/TWL: Brand: MEDLINE INDUSTRIES, INC.

## (undated) DEVICE — SUTURE ETHLN SZ 4-0 L18IN NONABSORBABLE BLK L19MM PS-2 3/8 1667H

## (undated) DEVICE — 3M™ TEGADERM™ TRANSPARENT FILM DRESSING FRAME STYLE, 1624W, 2-3/8 IN X 2-3/4 IN (6 CM X 7 CM), 100/CT 4CT/CASE: Brand: 3M™ TEGADERM™

## (undated) DEVICE — GLOVE SURG SZ 65 L12IN FNGR THK94MIL STD WHT LTX FREE

## (undated) DEVICE — GLOVE SURG SZ 8 L11.77IN FNGR THK9.8MIL STRW LTX POLYMER

## (undated) DEVICE — PENCIL ES L3M BTTN SWCH S STL HEX LOK BLDE ELECTRD HOLSTER

## (undated) DEVICE — DISH PETRI ST LF

## (undated) DEVICE — GUIDEWIRE WITH ICE™ HYDROPHILIC COATING: Brand: V-18™ CONTROL WIRE™

## (undated) DEVICE — INTENDED FOR TISSUE SEPARATION, AND OTHER PROCEDURES THAT REQUIRE A SHARP SURGICAL BLADE TO PUNCTURE OR CUT.: Brand: BARD-PARKER ® STAINLESS STEEL BLADES

## (undated) DEVICE — FOGARTY - HYDRAGRIP SURGICAL - CLAMP INSERTS: Brand: FOGARTY SOFTJAW

## (undated) DEVICE — STERILE POLYISOPRENE POWDER-FREE SURGICAL GLOVES: Brand: PROTEXIS

## (undated) DEVICE — TOWEL,OR,DSP,ST,BLUE,STD,4/PK,20PK/CS: Brand: MEDLINE

## (undated) DEVICE — DRESSING PETRO W7.6XL7.6CM CELOS ACETT NONADHERING MESH

## (undated) DEVICE — 10FR FRAZIER SUCTION HANDLE: Brand: CARDINAL HEALTH

## (undated) DEVICE — K WIRE FIX L152MM DIA1.6MM S STL 2 TRCR PNT
Type: IMPLANTABLE DEVICE | Site: ARM | Status: NON-FUNCTIONAL
Removed: 2018-08-07

## (undated) DEVICE — GLOVE,SURG,SENSICARE,ALOE,LF,PF,6: Brand: MEDLINE

## (undated) DEVICE — SUTURE MCRYL SZ 4-0 L18IN ABSRB UD L19MM PS-2 3/8 CIR PRIM Y496G

## (undated) DEVICE — MEDI-VAC NON-CONDUCTIVE SUCTION TUBING: Brand: CARDINAL HEALTH

## (undated) DEVICE — GOWN SIRUS NONREIN LG W/TWL: Brand: MEDLINE INDUSTRIES, INC.

## (undated) DEVICE — PINNACLE INTRODUCER SHEATH: Brand: PINNACLE

## (undated) DEVICE — ELECTRODE PT RET AD L9FT HI MOIST COND ADH HYDRGEL CORDED

## (undated) DEVICE — SMARTGOWN BREATHABLE SURGICAL GOWN: Brand: CONVERTORS

## (undated) DEVICE — SNAP KOVER: Brand: UNBRANDED

## (undated) DEVICE — SUTURE VCRL + SZ 3-0 L27IN ABSRB UD L26MM SH 1/2 CIR VCP416H

## (undated) DEVICE — PINNACLE PRECISION ACCESS SYSTEM INTRODUCER SHEATH: Brand: PINNACLE PRECISION ACCESS SYSTEM

## (undated) DEVICE — PADDING CAST W2INXL4YD COT RAYON BLEND HIGHLY ABSRB

## (undated) DEVICE — GOWN,SIRUS,POLYRNF,SETINSLV,L,20/CS: Brand: MEDLINE

## (undated) DEVICE — SET ADMIN PRIMING 7ML L30IN 7.35LB 20 GTT 2ND RLER CLMP

## (undated) DEVICE — METER ACCU-CHEK INFORM BASE GLUCOSE

## (undated) DEVICE — GAUZE,SPONGE,4"X4",16PLY,XRAY,STRL,LF: Brand: MEDLINE

## (undated) DEVICE — SNAP KAP: Brand: UNBRANDED

## (undated) DEVICE — CHLORAPREP 26ML ORANGE

## (undated) DEVICE — SUTURE VCRL SZ 3-0 L18IN ABSRB UD L26MM SH 1/2 CIR J864D

## (undated) DEVICE — SHEET,DRAPE,53X77,STERILE: Brand: MEDLINE

## (undated) DEVICE — PTA BALLOON DILATATION CATHETER: Brand: STERLING™

## (undated) DEVICE — CATHETER IV 20GA L1.25IN PNK FEP SFTY STR HUB RADPQ DISP

## (undated) DEVICE — SPLINT ORTH W3XL12IN LAYERED FBRGLS FOAM PD BRTH BK MOLD

## (undated) DEVICE — STERILE HOOK LOCK LATEX FREE ELASTIC BANDAGE 4INX5YD: Brand: HOOK LOCK™

## (undated) DEVICE — SET GRAV VENT NVENT CK VLV 3 NDL FREE PRT 10 GTT

## (undated) DEVICE — DEVICE INFL W/ HEM VLV TORQ

## (undated) DEVICE — 2.7MM SHORT DRILL BIT W/QUICK CONNECT: Brand: PERI-LOC

## (undated) DEVICE — DRAPE EQUIP C ARM MINI 10000100] TIDI PRODUCTS INC]

## (undated) DEVICE — SOLUTION IV IRRIG 500ML 0.9% SODIUM CHL 2F7123

## (undated) DEVICE — ELECTRODE NDL 2.8IN COAT VALLEYLAB

## (undated) DEVICE — PERI-LOC 2.0MM SHORT DRILL BIT W/                                    QUICK CONNECT: Brand: PERI-LOC

## (undated) DEVICE — SYRINGE ANGIO 12ML COR CTRL ROT ADPT SLD PLUNG CLR BRL M

## (undated) DEVICE — PROVE COVER: Brand: UNBRANDED

## (undated) DEVICE — BLADE CLIPPER GEN PURP NS